# Patient Record
Sex: FEMALE | Race: WHITE | NOT HISPANIC OR LATINO | Employment: OTHER | URBAN - METROPOLITAN AREA
[De-identification: names, ages, dates, MRNs, and addresses within clinical notes are randomized per-mention and may not be internally consistent; named-entity substitution may affect disease eponyms.]

---

## 2017-01-09 ENCOUNTER — COMMUNICATION - HEALTHEAST (OUTPATIENT)
Dept: INTERNAL MEDICINE | Facility: CLINIC | Age: 77
End: 2017-01-09

## 2017-01-09 DIAGNOSIS — E78.5 HYPERLIPIDEMIA: ICD-10-CM

## 2017-01-09 DIAGNOSIS — I10 UNSPECIFIED ESSENTIAL HYPERTENSION: ICD-10-CM

## 2017-01-09 DIAGNOSIS — K21.9 GERD (GASTROESOPHAGEAL REFLUX DISEASE): ICD-10-CM

## 2017-03-06 ENCOUNTER — COMMUNICATION - HEALTHEAST (OUTPATIENT)
Dept: INTERNAL MEDICINE | Facility: CLINIC | Age: 77
End: 2017-03-06

## 2017-03-06 DIAGNOSIS — K21.9 GERD (GASTROESOPHAGEAL REFLUX DISEASE): ICD-10-CM

## 2017-03-06 DIAGNOSIS — E78.5 HYPERLIPIDEMIA: ICD-10-CM

## 2017-05-30 ENCOUNTER — COMMUNICATION - HEALTHEAST (OUTPATIENT)
Dept: INTERNAL MEDICINE | Facility: CLINIC | Age: 77
End: 2017-05-30

## 2017-05-30 ENCOUNTER — RECORDS - HEALTHEAST (OUTPATIENT)
Dept: GENERAL RADIOLOGY | Age: 77
End: 2017-05-30

## 2017-05-30 ENCOUNTER — OFFICE VISIT - HEALTHEAST (OUTPATIENT)
Dept: INTERNAL MEDICINE | Facility: CLINIC | Age: 77
End: 2017-05-30

## 2017-05-30 DIAGNOSIS — E78.5 HYPERLIPIDEMIA: ICD-10-CM

## 2017-05-30 DIAGNOSIS — M25.551 RIGHT HIP PAIN: ICD-10-CM

## 2017-05-30 DIAGNOSIS — M25.551 PAIN IN RIGHT HIP: ICD-10-CM

## 2017-05-30 DIAGNOSIS — I10 ESSENTIAL HYPERTENSION: ICD-10-CM

## 2017-05-30 DIAGNOSIS — Z00.00 ANNUAL PHYSICAL EXAM: ICD-10-CM

## 2017-05-30 LAB
CHOLEST SERPL-MCNC: 221 MG/DL
FASTING STATUS PATIENT QL REPORTED: YES
HDLC SERPL-MCNC: 53 MG/DL
LDLC SERPL CALC-MCNC: 109 MG/DL
TRIGL SERPL-MCNC: 297 MG/DL

## 2017-05-30 ASSESSMENT — MIFFLIN-ST. JEOR: SCORE: 1316.84

## 2017-05-31 ENCOUNTER — COMMUNICATION - HEALTHEAST (OUTPATIENT)
Dept: INTERNAL MEDICINE | Facility: CLINIC | Age: 77
End: 2017-05-31

## 2017-06-13 ENCOUNTER — COMMUNICATION - HEALTHEAST (OUTPATIENT)
Dept: PEDIATRICS | Facility: CLINIC | Age: 77
End: 2017-06-13

## 2017-06-13 ENCOUNTER — AMBULATORY - HEALTHEAST (OUTPATIENT)
Dept: NURSING | Facility: CLINIC | Age: 77
End: 2017-06-13

## 2017-06-13 ENCOUNTER — RECORDS - HEALTHEAST (OUTPATIENT)
Dept: ADMINISTRATIVE | Facility: OTHER | Age: 77
End: 2017-06-13

## 2017-06-13 DIAGNOSIS — I10 ESSENTIAL HYPERTENSION: ICD-10-CM

## 2017-07-18 ENCOUNTER — COMMUNICATION - HEALTHEAST (OUTPATIENT)
Dept: INTERNAL MEDICINE | Facility: CLINIC | Age: 77
End: 2017-07-18

## 2017-07-18 DIAGNOSIS — K21.9 GERD (GASTROESOPHAGEAL REFLUX DISEASE): ICD-10-CM

## 2017-07-18 DIAGNOSIS — E78.5 HYPERLIPIDEMIA: ICD-10-CM

## 2017-08-09 ENCOUNTER — COMMUNICATION - HEALTHEAST (OUTPATIENT)
Dept: INTERNAL MEDICINE | Facility: CLINIC | Age: 77
End: 2017-08-09

## 2017-09-07 ENCOUNTER — COMMUNICATION - HEALTHEAST (OUTPATIENT)
Dept: SCHEDULING | Facility: CLINIC | Age: 77
End: 2017-09-07

## 2017-09-07 ENCOUNTER — OFFICE VISIT - HEALTHEAST (OUTPATIENT)
Dept: INTERNAL MEDICINE | Facility: CLINIC | Age: 77
End: 2017-09-07

## 2017-09-07 DIAGNOSIS — I10 HTN (HYPERTENSION): ICD-10-CM

## 2017-09-07 DIAGNOSIS — I49.1 PREMATURE ATRIAL COMPLEXES: ICD-10-CM

## 2017-09-15 ENCOUNTER — HOSPITAL ENCOUNTER (OUTPATIENT)
Dept: CARDIOLOGY | Facility: HOSPITAL | Age: 77
Discharge: HOME | End: 2017-09-15

## 2017-09-15 DIAGNOSIS — I49.1 PREMATURE ATRIAL COMPLEXES: ICD-10-CM

## 2017-09-15 LAB
AORTIC ROOT: 3.7 CM
AORTIC VALVE MEAN VELOCITY: 120 CM/S
AV DIMENSIONLESS INDEX VTI: 0.7
AV MEAN GRADIENT: 6 MMHG
AV PEAK GRADIENT: 9.5 MMHG
AV VALVE AREA: 2.6 CM2
AV VELOCITY RATIO: 0.8
BSA FOR ECHO PROCEDURE: 1.99 M2
CV BLOOD PRESSURE: NORMAL MMHG
CV ECHO HEIGHT: 62 IN
CV ECHO WEIGHT: 200 LBS
DOP CALC AO PEAK VEL: 154 CM/S
DOP CALC AO VTI: 33.6 CM
DOP CALC LVOT AREA: 3.8 CM2
DOP CALC LVOT DIAMETER: 2.2 CM
DOP CALC LVOT PEAK VEL: 119 CM/S
DOP CALC LVOT STROKE VOLUME: 88.9 CM3
DOP CALCLVOT PEAK VEL VTI: 23.4 CM
EJECTION FRACTION: 65 % (ref 55–75)
FRACTIONAL SHORTENING: 28.7 % (ref 28–44)
INTERVENTRICULAR SEPTUM IN END DIASTOLE: 1.11 CM (ref 0.6–0.9)
IVS/PW RATIO: 1
LA AREA 1: 18.1 CM2
LA AREA 2: 19.2 CM2
LEFT ATRIUM LENGTH: 4.9 CM
LEFT ATRIUM SIZE: 4.2 CM
LEFT ATRIUM VOLUME INDEX: 30.3 ML/M2
LEFT ATRIUM VOLUME: 60.3 CM3
LEFT VENTRICLE CARDIAC INDEX: 3.1 L/MIN/M2
LEFT VENTRICLE CARDIAC OUTPUT: 6.2 L/MIN
LEFT VENTRICLE DIASTOLIC VOLUME INDEX: 35.3 CM3/M2 (ref 34–74)
LEFT VENTRICLE DIASTOLIC VOLUME: 70.2 CM3 (ref 46–106)
LEFT VENTRICLE HEART RATE: 70 BPM
LEFT VENTRICLE MASS INDEX: 87.9 G/M2
LEFT VENTRICLE SYSTOLIC VOLUME INDEX: 12.3 CM3/M2 (ref 11–31)
LEFT VENTRICLE SYSTOLIC VOLUME: 24.5 CM3 (ref 14–42)
LEFT VENTRICULAR INTERNAL DIMENSION IN DIASTOLE: 4.39 CM (ref 3.8–5.2)
LEFT VENTRICULAR INTERNAL DIMENSION IN SYSTOLE: 3.13 CM (ref 2.2–3.5)
LEFT VENTRICULAR MASS: 174.9 G
LEFT VENTRICULAR OUTFLOW TRACT MEAN GRADIENT: 3 MMHG
LEFT VENTRICULAR OUTFLOW TRACT MEAN VELOCITY: 88.5 CM/S
LEFT VENTRICULAR OUTFLOW TRACT PEAK GRADIENT: 6 MMHG
LEFT VENTRICULAR POSTERIOR WALL IN END DIASTOLE: 1.15 CM (ref 0.6–0.9)
LV STROKE VOLUME INDEX: 44.7 ML/M2
MITRAL VALVE E/A RATIO: 0.8
MV AVERAGE E/E' RATIO: 11.5 CM/S
MV DECELERATION TIME: 187 MS
MV E'TISSUE VEL-LAT: 8.51 CM/S
MV E'TISSUE VEL-MED: 6.67 CM/S
MV LATERAL E/E' RATIO: 10.3
MV MEDIAL E/E' RATIO: 13.1
MV PEAK A VELOCITY: 105 CM/S
MV PEAK E VELOCITY: 87.3 CM/S
NUC REST DIASTOLIC VOLUME INDEX: 3200 LBS
NUC REST SYSTOLIC VOLUME INDEX: 62 IN
TRICUSPID REGURGITATION PEAK PRESSURE GRADIENT: 31.8 MMHG
TRICUSPID VALVE ANULAR PLANE SYSTOLIC EXCURSION: 2.3 CM
TRICUSPID VALVE PEAK REGURGITANT VELOCITY: 282 CM/S

## 2017-09-15 ASSESSMENT — MIFFLIN-ST. JEOR: SCORE: 1330.44

## 2017-09-27 LAB
ATRIAL RATE - MUSE: 83 BPM
DIASTOLIC BLOOD PRESSURE - MUSE: NORMAL MMHG
INTERPRETATION ECG - MUSE: NORMAL
P AXIS - MUSE: 70 DEGREES
PR INTERVAL - MUSE: 128 MS
QRS DURATION - MUSE: 80 MS
QT - MUSE: 374 MS
QTC - MUSE: 439 MS
R AXIS - MUSE: 47 DEGREES
SYSTOLIC BLOOD PRESSURE - MUSE: NORMAL MMHG
T AXIS - MUSE: 37 DEGREES
VENTRICULAR RATE- MUSE: 83 BPM

## 2017-10-03 ENCOUNTER — OFFICE VISIT - HEALTHEAST (OUTPATIENT)
Dept: INTERNAL MEDICINE | Facility: CLINIC | Age: 77
End: 2017-10-03

## 2017-10-03 DIAGNOSIS — I10 HTN (HYPERTENSION): ICD-10-CM

## 2017-10-03 DIAGNOSIS — I10 ESSENTIAL HYPERTENSION: ICD-10-CM

## 2017-10-03 DIAGNOSIS — R06.83 SNORING: ICD-10-CM

## 2017-10-03 ASSESSMENT — MIFFLIN-ST. JEOR: SCORE: 1330.44

## 2017-10-05 ENCOUNTER — COMMUNICATION - HEALTHEAST (OUTPATIENT)
Dept: INTERNAL MEDICINE | Facility: CLINIC | Age: 77
End: 2017-10-05

## 2018-01-10 ENCOUNTER — COMMUNICATION - HEALTHEAST (OUTPATIENT)
Dept: INTERNAL MEDICINE | Facility: CLINIC | Age: 78
End: 2018-01-10

## 2018-01-10 DIAGNOSIS — I10 ESSENTIAL HYPERTENSION: ICD-10-CM

## 2018-01-16 ENCOUNTER — COMMUNICATION - HEALTHEAST (OUTPATIENT)
Dept: INTERNAL MEDICINE | Facility: CLINIC | Age: 78
End: 2018-01-16

## 2018-04-18 ENCOUNTER — COMMUNICATION - HEALTHEAST (OUTPATIENT)
Dept: INTERNAL MEDICINE | Facility: CLINIC | Age: 78
End: 2018-04-18

## 2018-04-18 ENCOUNTER — OFFICE VISIT - HEALTHEAST (OUTPATIENT)
Dept: INTERNAL MEDICINE | Facility: CLINIC | Age: 78
End: 2018-04-18

## 2018-04-18 DIAGNOSIS — J40 BRONCHITIS: ICD-10-CM

## 2018-04-18 DIAGNOSIS — R05.9 COUGH: ICD-10-CM

## 2018-04-18 DIAGNOSIS — G47.33 OSA (OBSTRUCTIVE SLEEP APNEA): ICD-10-CM

## 2018-04-18 DIAGNOSIS — R06.00 DYSPNEA: ICD-10-CM

## 2018-04-18 LAB — D DIMER PPP FEU-MCNC: 0.55 FEU UG/ML

## 2018-04-19 ENCOUNTER — COMMUNICATION - HEALTHEAST (OUTPATIENT)
Dept: INTERNAL MEDICINE | Facility: CLINIC | Age: 78
End: 2018-04-19

## 2018-04-23 ENCOUNTER — OFFICE VISIT - HEALTHEAST (OUTPATIENT)
Dept: INTERNAL MEDICINE | Facility: CLINIC | Age: 78
End: 2018-04-23

## 2018-04-23 DIAGNOSIS — I10 ESSENTIAL HYPERTENSION: ICD-10-CM

## 2018-04-23 DIAGNOSIS — J44.1 COPD EXACERBATION (H): ICD-10-CM

## 2018-04-23 DIAGNOSIS — R91.1 NODULE OF RIGHT LUNG: ICD-10-CM

## 2018-04-23 DIAGNOSIS — J43.9 EMPHYSEMA LUNG (H): ICD-10-CM

## 2018-04-23 LAB
ALBUMIN SERPL-MCNC: 3.6 G/DL (ref 3.5–5)
ALP SERPL-CCNC: 128 U/L (ref 45–120)
ALT SERPL W P-5'-P-CCNC: 39 U/L (ref 0–45)
ANION GAP SERPL CALCULATED.3IONS-SCNC: 14 MMOL/L (ref 5–18)
AST SERPL W P-5'-P-CCNC: 17 U/L (ref 0–40)
BASOPHILS # BLD AUTO: 0 THOU/UL (ref 0–0.2)
BASOPHILS NFR BLD AUTO: 0 % (ref 0–2)
BILIRUB SERPL-MCNC: 0.4 MG/DL (ref 0–1)
BUN SERPL-MCNC: 20 MG/DL (ref 8–28)
CALCIUM SERPL-MCNC: 9.2 MG/DL (ref 8.5–10.5)
CHLORIDE BLD-SCNC: 100 MMOL/L (ref 98–107)
CO2 SERPL-SCNC: 30 MMOL/L (ref 22–31)
CREAT SERPL-MCNC: 0.96 MG/DL (ref 0.6–1.1)
EOSINOPHIL # BLD AUTO: 0.1 THOU/UL (ref 0–0.4)
EOSINOPHIL NFR BLD AUTO: 1 % (ref 0–6)
ERYTHROCYTE [DISTWIDTH] IN BLOOD BY AUTOMATED COUNT: 13 % (ref 11–14.5)
GFR SERPL CREATININE-BSD FRML MDRD: 56 ML/MIN/1.73M2
GLUCOSE BLD-MCNC: 110 MG/DL (ref 70–125)
HCT VFR BLD AUTO: 45.4 % (ref 35–47)
HGB BLD-MCNC: 15.1 G/DL (ref 12–16)
LYMPHOCYTES # BLD AUTO: 2.3 THOU/UL (ref 0.8–4.4)
LYMPHOCYTES NFR BLD AUTO: 24 % (ref 20–40)
MCH RBC QN AUTO: 30.4 PG (ref 27–34)
MCHC RBC AUTO-ENTMCNC: 33.3 G/DL (ref 32–36)
MCV RBC AUTO: 91 FL (ref 80–100)
MONOCYTES # BLD AUTO: 0.9 THOU/UL (ref 0–0.9)
MONOCYTES NFR BLD AUTO: 9 % (ref 2–10)
NEUTROPHILS # BLD AUTO: 6.4 THOU/UL (ref 2–7.7)
NEUTROPHILS NFR BLD AUTO: 66 % (ref 50–70)
PLATELET # BLD AUTO: 341 THOU/UL (ref 140–440)
PMV BLD AUTO: 10.6 FL (ref 8.5–12.5)
POTASSIUM BLD-SCNC: 3.4 MMOL/L (ref 3.5–5)
PROT SERPL-MCNC: 7.1 G/DL (ref 6–8)
RBC # BLD AUTO: 4.97 MILL/UL (ref 3.8–5.4)
SODIUM SERPL-SCNC: 144 MMOL/L (ref 136–145)
WBC: 9.8 THOU/UL (ref 4–11)

## 2018-04-24 ENCOUNTER — COMMUNICATION - HEALTHEAST (OUTPATIENT)
Dept: INTERNAL MEDICINE | Facility: CLINIC | Age: 78
End: 2018-04-24

## 2018-04-26 ENCOUNTER — OFFICE VISIT - HEALTHEAST (OUTPATIENT)
Dept: INTERNAL MEDICINE | Facility: CLINIC | Age: 78
End: 2018-04-26

## 2018-04-26 DIAGNOSIS — J43.9 EMPHYSEMA LUNG (H): ICD-10-CM

## 2018-04-26 DIAGNOSIS — J44.1 COPD EXACERBATION (H): ICD-10-CM

## 2018-05-21 ENCOUNTER — HOSPITAL ENCOUNTER (OUTPATIENT)
Dept: RESPIRATORY THERAPY | Facility: HOSPITAL | Age: 78
Discharge: HOME | End: 2018-05-21

## 2018-05-21 DIAGNOSIS — J43.9 EMPHYSEMA LUNG (H): ICD-10-CM

## 2018-05-22 ENCOUNTER — COMMUNICATION - HEALTHEAST (OUTPATIENT)
Dept: INTERNAL MEDICINE | Facility: CLINIC | Age: 78
End: 2018-05-22

## 2018-06-02 ENCOUNTER — COMMUNICATION - HEALTHEAST (OUTPATIENT)
Dept: INTERNAL MEDICINE | Facility: CLINIC | Age: 78
End: 2018-06-02

## 2018-06-02 DIAGNOSIS — I10 ESSENTIAL HYPERTENSION: ICD-10-CM

## 2018-06-02 DIAGNOSIS — E78.5 HYPERLIPIDEMIA: ICD-10-CM

## 2018-06-02 DIAGNOSIS — K21.9 GERD (GASTROESOPHAGEAL REFLUX DISEASE): ICD-10-CM

## 2018-06-04 ENCOUNTER — AMBULATORY - HEALTHEAST (OUTPATIENT)
Dept: INTERNAL MEDICINE | Facility: CLINIC | Age: 78
End: 2018-06-04

## 2018-06-04 ENCOUNTER — COMMUNICATION - HEALTHEAST (OUTPATIENT)
Dept: INTERNAL MEDICINE | Facility: CLINIC | Age: 78
End: 2018-06-04

## 2018-06-04 ENCOUNTER — OFFICE VISIT - HEALTHEAST (OUTPATIENT)
Dept: INTERNAL MEDICINE | Facility: CLINIC | Age: 78
End: 2018-06-04

## 2018-06-04 DIAGNOSIS — E78.5 HYPERLIPIDEMIA: ICD-10-CM

## 2018-06-04 DIAGNOSIS — Z00.00 MEDICARE ANNUAL WELLNESS VISIT, SUBSEQUENT: ICD-10-CM

## 2018-06-04 DIAGNOSIS — Z12.11 SCREENING FOR COLON CANCER: ICD-10-CM

## 2018-06-04 DIAGNOSIS — I10 HTN (HYPERTENSION): ICD-10-CM

## 2018-06-04 DIAGNOSIS — R93.89 ABNORMAL CHEST XRAY: ICD-10-CM

## 2018-06-04 DIAGNOSIS — R73.01 IMPAIRED FASTING BLOOD SUGAR: ICD-10-CM

## 2018-06-04 DIAGNOSIS — K21.9 GERD (GASTROESOPHAGEAL REFLUX DISEASE): ICD-10-CM

## 2018-06-04 DIAGNOSIS — I10 ESSENTIAL HYPERTENSION: ICD-10-CM

## 2018-06-04 DIAGNOSIS — J44.9 COPD (CHRONIC OBSTRUCTIVE PULMONARY DISEASE) (H): ICD-10-CM

## 2018-06-04 DIAGNOSIS — E78.5 HLD (HYPERLIPIDEMIA): ICD-10-CM

## 2018-06-04 DIAGNOSIS — Z87.891 HISTORY OF NICOTINE USE: ICD-10-CM

## 2018-06-04 DIAGNOSIS — G47.33 OSA (OBSTRUCTIVE SLEEP APNEA): ICD-10-CM

## 2018-06-04 DIAGNOSIS — H81.10 BPPV (BENIGN PAROXYSMAL POSITIONAL VERTIGO): ICD-10-CM

## 2018-06-04 LAB
ALBUMIN SERPL-MCNC: 4.1 G/DL (ref 3.5–5)
ALP SERPL-CCNC: 123 U/L (ref 45–120)
ALT SERPL W P-5'-P-CCNC: 32 U/L (ref 0–45)
ANION GAP SERPL CALCULATED.3IONS-SCNC: 11 MMOL/L (ref 5–18)
AST SERPL W P-5'-P-CCNC: 18 U/L (ref 0–40)
BILIRUB SERPL-MCNC: 0.6 MG/DL (ref 0–1)
BUN SERPL-MCNC: 15 MG/DL (ref 8–28)
CALCIUM SERPL-MCNC: 9.8 MG/DL (ref 8.5–10.5)
CHLORIDE BLD-SCNC: 105 MMOL/L (ref 98–107)
CHOLEST SERPL-MCNC: 228 MG/DL
CO2 SERPL-SCNC: 29 MMOL/L (ref 22–31)
CREAT SERPL-MCNC: 0.82 MG/DL (ref 0.6–1.1)
FASTING STATUS PATIENT QL REPORTED: YES
GFR SERPL CREATININE-BSD FRML MDRD: >60 ML/MIN/1.73M2
GLUCOSE BLD-MCNC: 106 MG/DL (ref 70–125)
HBA1C MFR BLD: 6.3 % (ref 3.5–6)
HDLC SERPL-MCNC: 57 MG/DL
LDLC SERPL CALC-MCNC: 99 MG/DL
POTASSIUM BLD-SCNC: 3.8 MMOL/L (ref 3.5–5)
PROT SERPL-MCNC: 7.1 G/DL (ref 6–8)
SODIUM SERPL-SCNC: 145 MMOL/L (ref 136–145)
TRIGL SERPL-MCNC: 360 MG/DL

## 2018-06-04 ASSESSMENT — MIFFLIN-ST. JEOR: SCORE: 1326.82

## 2018-06-05 ENCOUNTER — OFFICE VISIT - HEALTHEAST (OUTPATIENT)
Dept: OCCUPATIONAL THERAPY | Facility: REHABILITATION | Age: 78
End: 2018-06-05

## 2018-06-05 DIAGNOSIS — R42 DIZZINESS: ICD-10-CM

## 2018-06-05 DIAGNOSIS — R26.81 UNSTEADINESS ON FEET: ICD-10-CM

## 2018-06-06 ENCOUNTER — COMMUNICATION - HEALTHEAST (OUTPATIENT)
Dept: INTERNAL MEDICINE | Facility: CLINIC | Age: 78
End: 2018-06-06

## 2018-06-06 DIAGNOSIS — E78.5 HLD (HYPERLIPIDEMIA): ICD-10-CM

## 2018-06-06 DIAGNOSIS — J44.9 COPD (CHRONIC OBSTRUCTIVE PULMONARY DISEASE) (H): ICD-10-CM

## 2018-06-18 ENCOUNTER — OFFICE VISIT - HEALTHEAST (OUTPATIENT)
Dept: OCCUPATIONAL THERAPY | Facility: REHABILITATION | Age: 78
End: 2018-06-18

## 2018-06-18 DIAGNOSIS — R26.81 UNSTEADINESS ON FEET: ICD-10-CM

## 2018-06-18 DIAGNOSIS — R42 DIZZINESS: ICD-10-CM

## 2018-06-29 ENCOUNTER — ANESTHESIA - HEALTHEAST (OUTPATIENT)
Dept: SURGERY | Facility: HOSPITAL | Age: 78
End: 2018-06-29

## 2018-06-29 ENCOUNTER — SURGERY - HEALTHEAST (OUTPATIENT)
Dept: SURGERY | Facility: HOSPITAL | Age: 78
End: 2018-06-29

## 2018-06-29 ASSESSMENT — MIFFLIN-ST. JEOR: SCORE: 1275.56

## 2018-07-03 ENCOUNTER — COMMUNICATION - HEALTHEAST (OUTPATIENT)
Dept: PHARMACY | Facility: CLINIC | Age: 78
End: 2018-07-03

## 2018-07-03 DIAGNOSIS — E78.5 HYPERLIPIDEMIA, UNSPECIFIED HYPERLIPIDEMIA TYPE: ICD-10-CM

## 2018-07-03 DIAGNOSIS — I15.9 SECONDARY HYPERTENSION: ICD-10-CM

## 2018-07-03 DIAGNOSIS — D50.0 IRON DEFICIENCY ANEMIA DUE TO CHRONIC BLOOD LOSS: ICD-10-CM

## 2018-07-03 DIAGNOSIS — K21.9 GASTROESOPHAGEAL REFLUX DISEASE WITHOUT ESOPHAGITIS: ICD-10-CM

## 2018-07-03 DIAGNOSIS — J44.9 CHRONIC OBSTRUCTIVE PULMONARY DISEASE, UNSPECIFIED COPD TYPE (H): ICD-10-CM

## 2018-07-03 DIAGNOSIS — K81.0 ACUTE CHOLECYSTITIS: ICD-10-CM

## 2018-07-03 DIAGNOSIS — R73.03 PREDIABETES: ICD-10-CM

## 2018-07-03 DIAGNOSIS — R42 DIZZINESS AND GIDDINESS: ICD-10-CM

## 2018-07-09 ENCOUNTER — OFFICE VISIT - HEALTHEAST (OUTPATIENT)
Dept: OCCUPATIONAL THERAPY | Facility: REHABILITATION | Age: 78
End: 2018-07-09

## 2018-07-09 DIAGNOSIS — R42 DIZZINESS: ICD-10-CM

## 2018-07-09 DIAGNOSIS — R26.81 UNSTEADINESS ON FEET: ICD-10-CM

## 2018-08-13 ENCOUNTER — RECORDS - HEALTHEAST (OUTPATIENT)
Dept: ADMINISTRATIVE | Facility: OTHER | Age: 78
End: 2018-08-13

## 2018-08-20 ENCOUNTER — RECORDS - HEALTHEAST (OUTPATIENT)
Dept: ADMINISTRATIVE | Facility: OTHER | Age: 78
End: 2018-08-20

## 2018-09-04 ENCOUNTER — COMMUNICATION - HEALTHEAST (OUTPATIENT)
Dept: INTERNAL MEDICINE | Facility: CLINIC | Age: 78
End: 2018-09-04

## 2018-09-04 ENCOUNTER — OFFICE VISIT - HEALTHEAST (OUTPATIENT)
Dept: INTERNAL MEDICINE | Facility: CLINIC | Age: 78
End: 2018-09-04

## 2018-09-04 DIAGNOSIS — D50.0 IRON DEFICIENCY ANEMIA DUE TO CHRONIC BLOOD LOSS: ICD-10-CM

## 2018-09-04 DIAGNOSIS — I10 HTN (HYPERTENSION): ICD-10-CM

## 2018-09-04 DIAGNOSIS — J44.9 CHRONIC OBSTRUCTIVE PULMONARY DISEASE, UNSPECIFIED COPD TYPE (H): ICD-10-CM

## 2018-09-04 DIAGNOSIS — R42 DIZZINESS AND GIDDINESS: ICD-10-CM

## 2018-09-04 DIAGNOSIS — Z01.818 PREOP GENERAL PHYSICAL EXAM: ICD-10-CM

## 2018-09-04 DIAGNOSIS — G47.33 OSA (OBSTRUCTIVE SLEEP APNEA): ICD-10-CM

## 2018-09-04 DIAGNOSIS — E66.01 MORBID OBESITY (H): ICD-10-CM

## 2018-09-04 DIAGNOSIS — E78.5 HYPERLIPIDEMIA, UNSPECIFIED HYPERLIPIDEMIA TYPE: ICD-10-CM

## 2018-09-04 DIAGNOSIS — M16.11 OSTEOARTHRITIS OF RIGHT HIP: ICD-10-CM

## 2018-09-04 DIAGNOSIS — R73.03 PREDIABETES: ICD-10-CM

## 2018-09-04 LAB
ANION GAP SERPL CALCULATED.3IONS-SCNC: 9 MMOL/L (ref 5–18)
ATRIAL RATE - MUSE: 65 BPM
BUN SERPL-MCNC: 17 MG/DL (ref 8–28)
CALCIUM SERPL-MCNC: 9.7 MG/DL (ref 8.5–10.5)
CHLORIDE BLD-SCNC: 105 MMOL/L (ref 98–107)
CO2 SERPL-SCNC: 29 MMOL/L (ref 22–31)
CREAT SERPL-MCNC: 0.83 MG/DL (ref 0.6–1.1)
DIASTOLIC BLOOD PRESSURE - MUSE: NORMAL MMHG
ERYTHROCYTE [DISTWIDTH] IN BLOOD BY AUTOMATED COUNT: 11.6 % (ref 11–14.5)
GFR SERPL CREATININE-BSD FRML MDRD: >60 ML/MIN/1.73M2
GLUCOSE BLD-MCNC: 98 MG/DL (ref 70–125)
HCT VFR BLD AUTO: 41.3 % (ref 35–47)
HGB BLD-MCNC: 13.9 G/DL (ref 12–16)
INTERPRETATION ECG - MUSE: NORMAL
IRON SERPL-MCNC: 57 UG/DL (ref 42–175)
MCH RBC QN AUTO: 30 PG (ref 27–34)
MCHC RBC AUTO-ENTMCNC: 33.8 G/DL (ref 32–36)
MCV RBC AUTO: 89 FL (ref 80–100)
P AXIS - MUSE: 69 DEGREES
PLATELET # BLD AUTO: 247 THOU/UL (ref 140–440)
PMV BLD AUTO: 8.5 FL (ref 7–10)
POTASSIUM BLD-SCNC: 4.6 MMOL/L (ref 3.5–5)
PR INTERVAL - MUSE: 140 MS
QRS DURATION - MUSE: 78 MS
QT - MUSE: 408 MS
QTC - MUSE: 424 MS
R AXIS - MUSE: 42 DEGREES
RBC # BLD AUTO: 4.65 MILL/UL (ref 3.8–5.4)
SODIUM SERPL-SCNC: 143 MMOL/L (ref 136–145)
SYSTOLIC BLOOD PRESSURE - MUSE: NORMAL MMHG
T AXIS - MUSE: 39 DEGREES
VENTRICULAR RATE- MUSE: 65 BPM
WBC: 5.3 THOU/UL (ref 4–11)

## 2018-09-04 ASSESSMENT — MIFFLIN-ST. JEOR: SCORE: 1303.23

## 2018-09-05 LAB
CHOLEST SERPL-MCNC: 206 MG/DL
FERRITIN SERPL-MCNC: 36 NG/ML (ref 10–130)
HDLC SERPL-MCNC: 71 MG/DL
LDLC SERPL CALC-MCNC: 86 MG/DL
TRIGL SERPL-MCNC: 244 MG/DL

## 2018-09-06 ENCOUNTER — COMMUNICATION - HEALTHEAST (OUTPATIENT)
Dept: INTERNAL MEDICINE | Facility: CLINIC | Age: 78
End: 2018-09-06

## 2018-09-17 ENCOUNTER — RECORDS - HEALTHEAST (OUTPATIENT)
Dept: ADMINISTRATIVE | Facility: OTHER | Age: 78
End: 2018-09-17

## 2018-09-17 ENCOUNTER — COMMUNICATION - HEALTHEAST (OUTPATIENT)
Dept: INTERNAL MEDICINE | Facility: CLINIC | Age: 78
End: 2018-09-17

## 2018-10-08 ENCOUNTER — OFFICE VISIT - HEALTHEAST (OUTPATIENT)
Dept: INTERNAL MEDICINE | Facility: CLINIC | Age: 78
End: 2018-10-08

## 2018-10-08 DIAGNOSIS — G47.33 OSA (OBSTRUCTIVE SLEEP APNEA): ICD-10-CM

## 2018-10-08 DIAGNOSIS — I10 HTN (HYPERTENSION): ICD-10-CM

## 2018-10-08 DIAGNOSIS — E78.5 HYPERLIPIDEMIA, UNSPECIFIED HYPERLIPIDEMIA TYPE: ICD-10-CM

## 2018-10-08 DIAGNOSIS — K21.9 GASTROESOPHAGEAL REFLUX DISEASE WITHOUT ESOPHAGITIS: ICD-10-CM

## 2018-10-08 DIAGNOSIS — J44.9 CHRONIC OBSTRUCTIVE PULMONARY DISEASE, UNSPECIFIED COPD TYPE (H): ICD-10-CM

## 2018-10-11 ENCOUNTER — RECORDS - HEALTHEAST (OUTPATIENT)
Dept: ADMINISTRATIVE | Facility: OTHER | Age: 78
End: 2018-10-11

## 2018-10-15 ENCOUNTER — RECORDS - HEALTHEAST (OUTPATIENT)
Dept: ADMINISTRATIVE | Facility: OTHER | Age: 78
End: 2018-10-15

## 2018-10-18 ENCOUNTER — RECORDS - HEALTHEAST (OUTPATIENT)
Dept: ADMINISTRATIVE | Facility: OTHER | Age: 78
End: 2018-10-18

## 2018-11-05 ENCOUNTER — RECORDS - HEALTHEAST (OUTPATIENT)
Dept: ADMINISTRATIVE | Facility: OTHER | Age: 78
End: 2018-11-05

## 2018-11-08 ENCOUNTER — RECORDS - HEALTHEAST (OUTPATIENT)
Dept: ADMINISTRATIVE | Facility: OTHER | Age: 78
End: 2018-11-08

## 2019-02-22 ENCOUNTER — COMMUNICATION - HEALTHEAST (OUTPATIENT)
Dept: INTERNAL MEDICINE | Facility: CLINIC | Age: 79
End: 2019-02-22

## 2019-02-22 DIAGNOSIS — K21.9 GASTROESOPHAGEAL REFLUX DISEASE WITHOUT ESOPHAGITIS: ICD-10-CM

## 2019-03-25 ENCOUNTER — COMMUNICATION - HEALTHEAST (OUTPATIENT)
Dept: INTERNAL MEDICINE | Facility: CLINIC | Age: 79
End: 2019-03-25

## 2019-03-25 DIAGNOSIS — E78.2 MIXED HYPERLIPIDEMIA: ICD-10-CM

## 2019-06-05 ENCOUNTER — COMMUNICATION - HEALTHEAST (OUTPATIENT)
Dept: LAB | Facility: CLINIC | Age: 79
End: 2019-06-05

## 2019-06-05 DIAGNOSIS — E78.5 HYPERLIPIDEMIA, UNSPECIFIED HYPERLIPIDEMIA TYPE: ICD-10-CM

## 2019-06-05 DIAGNOSIS — R73.03 PREDIABETES: ICD-10-CM

## 2019-06-05 DIAGNOSIS — D50.0 IRON DEFICIENCY ANEMIA DUE TO CHRONIC BLOOD LOSS: ICD-10-CM

## 2019-06-05 DIAGNOSIS — I15.9 SECONDARY HYPERTENSION: ICD-10-CM

## 2019-06-07 ENCOUNTER — AMBULATORY - HEALTHEAST (OUTPATIENT)
Dept: LAB | Facility: CLINIC | Age: 79
End: 2019-06-07

## 2019-06-07 DIAGNOSIS — I15.9 SECONDARY HYPERTENSION: ICD-10-CM

## 2019-06-07 DIAGNOSIS — E78.5 HYPERLIPIDEMIA, UNSPECIFIED HYPERLIPIDEMIA TYPE: ICD-10-CM

## 2019-06-07 DIAGNOSIS — D50.0 IRON DEFICIENCY ANEMIA DUE TO CHRONIC BLOOD LOSS: ICD-10-CM

## 2019-06-07 DIAGNOSIS — R73.03 PREDIABETES: ICD-10-CM

## 2019-06-07 LAB
ALBUMIN SERPL-MCNC: 4.3 G/DL (ref 3.5–5)
ALP SERPL-CCNC: 134 U/L (ref 45–120)
ALT SERPL W P-5'-P-CCNC: 44 U/L (ref 0–45)
ANION GAP SERPL CALCULATED.3IONS-SCNC: 11 MMOL/L (ref 5–18)
AST SERPL W P-5'-P-CCNC: 26 U/L (ref 0–40)
BILIRUB SERPL-MCNC: 0.3 MG/DL (ref 0–1)
BUN SERPL-MCNC: 14 MG/DL (ref 8–28)
CALCIUM SERPL-MCNC: 9.5 MG/DL (ref 8.5–10.5)
CHLORIDE BLD-SCNC: 109 MMOL/L (ref 98–107)
CHOLEST SERPL-MCNC: 181 MG/DL
CO2 SERPL-SCNC: 23 MMOL/L (ref 22–31)
CREAT SERPL-MCNC: 0.84 MG/DL (ref 0.6–1.1)
ERYTHROCYTE [DISTWIDTH] IN BLOOD BY AUTOMATED COUNT: 12.4 % (ref 11–14.5)
FASTING STATUS PATIENT QL REPORTED: YES
GFR SERPL CREATININE-BSD FRML MDRD: >60 ML/MIN/1.73M2
GLUCOSE BLD-MCNC: 112 MG/DL (ref 70–125)
HBA1C MFR BLD: 5.9 % (ref 3.5–6)
HCT VFR BLD AUTO: 44.3 % (ref 35–47)
HDLC SERPL-MCNC: 69 MG/DL
HGB BLD-MCNC: 14.5 G/DL (ref 12–16)
LDLC SERPL CALC-MCNC: 73 MG/DL
MCH RBC QN AUTO: 29.1 PG (ref 27–34)
MCHC RBC AUTO-ENTMCNC: 32.8 G/DL (ref 32–36)
MCV RBC AUTO: 89 FL (ref 80–100)
PLATELET # BLD AUTO: 272 THOU/UL (ref 140–440)
PMV BLD AUTO: 8.2 FL (ref 7–10)
POTASSIUM BLD-SCNC: 4.5 MMOL/L (ref 3.5–5)
PROT SERPL-MCNC: 6.8 G/DL (ref 6–8)
RBC # BLD AUTO: 4.98 MILL/UL (ref 3.8–5.4)
SODIUM SERPL-SCNC: 143 MMOL/L (ref 136–145)
TRIGL SERPL-MCNC: 196 MG/DL
WBC: 4.6 THOU/UL (ref 4–11)

## 2019-06-26 ENCOUNTER — OFFICE VISIT - HEALTHEAST (OUTPATIENT)
Dept: INTERNAL MEDICINE | Facility: CLINIC | Age: 79
End: 2019-06-26

## 2019-06-26 DIAGNOSIS — M54.50 CHRONIC RIGHT-SIDED LOW BACK PAIN WITHOUT SCIATICA: ICD-10-CM

## 2019-06-26 DIAGNOSIS — G89.29 CHRONIC RIGHT-SIDED LOW BACK PAIN WITHOUT SCIATICA: ICD-10-CM

## 2019-06-26 DIAGNOSIS — Z00.00 ENCOUNTER FOR MEDICARE ANNUAL WELLNESS EXAM: ICD-10-CM

## 2019-06-26 DIAGNOSIS — R42 VERTIGO: ICD-10-CM

## 2019-06-26 DIAGNOSIS — R91.1 NODULE OF RIGHT LUNG: ICD-10-CM

## 2019-06-26 DIAGNOSIS — I10 ESSENTIAL HYPERTENSION: ICD-10-CM

## 2019-06-26 DIAGNOSIS — E78.5 HYPERLIPIDEMIA, UNSPECIFIED HYPERLIPIDEMIA TYPE: ICD-10-CM

## 2019-06-26 DIAGNOSIS — Z78.0 POST-MENOPAUSAL: ICD-10-CM

## 2019-06-26 DIAGNOSIS — E66.01 MORBID OBESITY (H): ICD-10-CM

## 2019-06-26 DIAGNOSIS — R73.03 PREDIABETES: ICD-10-CM

## 2019-06-26 DIAGNOSIS — E78.2 MIXED HYPERLIPIDEMIA: ICD-10-CM

## 2019-06-26 DIAGNOSIS — G47.33 OSA (OBSTRUCTIVE SLEEP APNEA): ICD-10-CM

## 2019-06-26 DIAGNOSIS — J44.9 CHRONIC OBSTRUCTIVE PULMONARY DISEASE, UNSPECIFIED COPD TYPE (H): ICD-10-CM

## 2019-06-26 DIAGNOSIS — Z12.31 VISIT FOR SCREENING MAMMOGRAM: ICD-10-CM

## 2019-06-26 DIAGNOSIS — M25.511 ACUTE PAIN OF RIGHT SHOULDER: ICD-10-CM

## 2019-06-26 DIAGNOSIS — Z72.0 TOBACCO USE: ICD-10-CM

## 2019-06-26 DIAGNOSIS — K21.9 GASTROESOPHAGEAL REFLUX DISEASE WITHOUT ESOPHAGITIS: ICD-10-CM

## 2019-06-26 DIAGNOSIS — Z87.891 HISTORY OF NICOTINE USE: ICD-10-CM

## 2019-06-26 ASSESSMENT — MIFFLIN-ST. JEOR: SCORE: 1348.59

## 2019-07-01 ENCOUNTER — HOSPITAL ENCOUNTER (OUTPATIENT)
Dept: CT IMAGING | Facility: CLINIC | Age: 79
Discharge: HOME | End: 2019-07-01

## 2019-07-01 ENCOUNTER — RECORDS - HEALTHEAST (OUTPATIENT)
Dept: ADMINISTRATIVE | Facility: OTHER | Age: 79
End: 2019-07-01

## 2019-07-01 DIAGNOSIS — R91.1 NODULE OF RIGHT LUNG: ICD-10-CM

## 2019-07-02 ENCOUNTER — COMMUNICATION - HEALTHEAST (OUTPATIENT)
Dept: INTERNAL MEDICINE | Facility: CLINIC | Age: 79
End: 2019-07-02

## 2019-07-11 ENCOUNTER — RECORDS - HEALTHEAST (OUTPATIENT)
Dept: ADMINISTRATIVE | Facility: OTHER | Age: 79
End: 2019-07-11

## 2019-07-24 ENCOUNTER — OFFICE VISIT - HEALTHEAST (OUTPATIENT)
Dept: INTERNAL MEDICINE | Facility: CLINIC | Age: 79
End: 2019-07-24

## 2019-07-24 DIAGNOSIS — M10.9 ACUTE GOUTY ARTHRITIS: ICD-10-CM

## 2019-07-24 DIAGNOSIS — M75.41 IMPINGEMENT SYNDROME OF SHOULDER REGION, RIGHT: ICD-10-CM

## 2019-08-02 ENCOUNTER — HOSPITAL ENCOUNTER (OUTPATIENT)
Dept: MAMMOGRAPHY | Facility: CLINIC | Age: 79
Discharge: HOME | End: 2019-08-02

## 2019-08-02 DIAGNOSIS — Z12.31 VISIT FOR SCREENING MAMMOGRAM: ICD-10-CM

## 2019-08-28 ENCOUNTER — COMMUNICATION - HEALTHEAST (OUTPATIENT)
Dept: INTERNAL MEDICINE | Facility: CLINIC | Age: 79
End: 2019-08-28

## 2019-08-28 DIAGNOSIS — R42 VERTIGO: ICD-10-CM

## 2019-12-03 ENCOUNTER — OFFICE VISIT - HEALTHEAST (OUTPATIENT)
Dept: INTERNAL MEDICINE | Facility: CLINIC | Age: 79
End: 2019-12-03

## 2019-12-03 DIAGNOSIS — J44.1 COPD EXACERBATION (H): ICD-10-CM

## 2019-12-03 DIAGNOSIS — I10 ESSENTIAL HYPERTENSION: ICD-10-CM

## 2019-12-03 DIAGNOSIS — E78.5 HYPERLIPIDEMIA, UNSPECIFIED HYPERLIPIDEMIA TYPE: ICD-10-CM

## 2019-12-03 DIAGNOSIS — J44.9 CHRONIC OBSTRUCTIVE PULMONARY DISEASE, UNSPECIFIED COPD TYPE (H): ICD-10-CM

## 2019-12-03 DIAGNOSIS — R73.03 PREDIABETES: ICD-10-CM

## 2019-12-17 ENCOUNTER — COMMUNICATION - HEALTHEAST (OUTPATIENT)
Dept: INTERNAL MEDICINE | Facility: CLINIC | Age: 79
End: 2019-12-17

## 2019-12-17 DIAGNOSIS — I10 ESSENTIAL HYPERTENSION: ICD-10-CM

## 2020-01-15 ENCOUNTER — COMMUNICATION - HEALTHEAST (OUTPATIENT)
Dept: INTERNAL MEDICINE | Facility: CLINIC | Age: 80
End: 2020-01-15

## 2020-01-15 DIAGNOSIS — E78.2 MIXED HYPERLIPIDEMIA: ICD-10-CM

## 2020-01-15 DIAGNOSIS — K21.9 GASTROESOPHAGEAL REFLUX DISEASE WITHOUT ESOPHAGITIS: ICD-10-CM

## 2020-01-15 DIAGNOSIS — I10 ESSENTIAL HYPERTENSION: ICD-10-CM

## 2020-05-22 ENCOUNTER — COMMUNICATION - HEALTHEAST (OUTPATIENT)
Dept: INTERNAL MEDICINE | Facility: CLINIC | Age: 80
End: 2020-05-22

## 2020-06-08 ENCOUNTER — AMBULATORY - HEALTHEAST (OUTPATIENT)
Dept: LAB | Facility: CLINIC | Age: 80
End: 2020-06-08

## 2020-06-08 ENCOUNTER — OFFICE VISIT - HEALTHEAST (OUTPATIENT)
Dept: INTERNAL MEDICINE | Facility: CLINIC | Age: 80
End: 2020-06-08

## 2020-06-08 ENCOUNTER — COMMUNICATION - HEALTHEAST (OUTPATIENT)
Dept: INTERNAL MEDICINE | Facility: CLINIC | Age: 80
End: 2020-06-08

## 2020-06-08 DIAGNOSIS — J44.9 CHRONIC OBSTRUCTIVE PULMONARY DISEASE, UNSPECIFIED COPD TYPE (H): ICD-10-CM

## 2020-06-08 DIAGNOSIS — M54.2 CERVICAL PAIN (NECK): ICD-10-CM

## 2020-06-08 DIAGNOSIS — M75.41 IMPINGEMENT SYNDROME OF SHOULDER REGION, RIGHT: ICD-10-CM

## 2020-06-08 DIAGNOSIS — E66.01 MORBID OBESITY (H): ICD-10-CM

## 2020-06-08 DIAGNOSIS — R73.03 PREDIABETES: ICD-10-CM

## 2020-06-08 DIAGNOSIS — I10 ESSENTIAL HYPERTENSION: ICD-10-CM

## 2020-06-08 DIAGNOSIS — E78.2 MIXED HYPERLIPIDEMIA: ICD-10-CM

## 2020-06-08 DIAGNOSIS — E78.5 HYPERLIPIDEMIA, UNSPECIFIED HYPERLIPIDEMIA TYPE: ICD-10-CM

## 2020-06-08 DIAGNOSIS — Z72.0 TOBACCO USE: ICD-10-CM

## 2020-06-08 LAB
ALBUMIN SERPL-MCNC: 4.1 G/DL (ref 3.5–5)
ALP SERPL-CCNC: 117 U/L (ref 45–120)
ALT SERPL W P-5'-P-CCNC: 59 U/L (ref 0–45)
ANION GAP SERPL CALCULATED.3IONS-SCNC: 10 MMOL/L (ref 5–18)
AST SERPL W P-5'-P-CCNC: 41 U/L (ref 0–40)
BILIRUB SERPL-MCNC: 0.3 MG/DL (ref 0–1)
BUN SERPL-MCNC: 18 MG/DL (ref 8–28)
CALCIUM SERPL-MCNC: 9.6 MG/DL (ref 8.5–10.5)
CHLORIDE BLD-SCNC: 105 MMOL/L (ref 98–107)
CHOLEST SERPL-MCNC: 219 MG/DL
CO2 SERPL-SCNC: 26 MMOL/L (ref 22–31)
CREAT SERPL-MCNC: 0.89 MG/DL (ref 0.6–1.1)
ERYTHROCYTE [DISTWIDTH] IN BLOOD BY AUTOMATED COUNT: 13.8 % (ref 11–14.5)
FASTING STATUS PATIENT QL REPORTED: YES
GFR SERPL CREATININE-BSD FRML MDRD: >60 ML/MIN/1.73M2
GLUCOSE BLD-MCNC: 98 MG/DL (ref 70–125)
HBA1C MFR BLD: 6.3 % (ref 3.5–6)
HCT VFR BLD AUTO: 40.3 % (ref 35–47)
HDLC SERPL-MCNC: 83 MG/DL
HGB BLD-MCNC: 13.4 G/DL (ref 12–16)
LDLC SERPL CALC-MCNC: 77 MG/DL
MCH RBC QN AUTO: 27.7 PG (ref 27–34)
MCHC RBC AUTO-ENTMCNC: 33.2 G/DL (ref 32–36)
MCV RBC AUTO: 84 FL (ref 80–100)
PLATELET # BLD AUTO: 264 THOU/UL (ref 140–440)
PMV BLD AUTO: 8.2 FL (ref 7–10)
POTASSIUM BLD-SCNC: 4.4 MMOL/L (ref 3.5–5)
PROT SERPL-MCNC: 7.3 G/DL (ref 6–8)
RBC # BLD AUTO: 4.82 MILL/UL (ref 3.8–5.4)
SODIUM SERPL-SCNC: 141 MMOL/L (ref 136–145)
TRIGL SERPL-MCNC: 294 MG/DL
WBC: 5.9 THOU/UL (ref 4–11)

## 2020-06-09 ENCOUNTER — COMMUNICATION - HEALTHEAST (OUTPATIENT)
Dept: INTERNAL MEDICINE | Facility: CLINIC | Age: 80
End: 2020-06-09

## 2020-06-12 ENCOUNTER — AMBULATORY - HEALTHEAST (OUTPATIENT)
Dept: INTERNAL MEDICINE | Facility: CLINIC | Age: 80
End: 2020-06-12

## 2020-06-12 ENCOUNTER — COMMUNICATION - HEALTHEAST (OUTPATIENT)
Dept: INTERNAL MEDICINE | Facility: CLINIC | Age: 80
End: 2020-06-12

## 2020-06-12 DIAGNOSIS — I10 ESSENTIAL HYPERTENSION: ICD-10-CM

## 2020-06-12 DIAGNOSIS — J44.9 CHRONIC OBSTRUCTIVE PULMONARY DISEASE, UNSPECIFIED COPD TYPE (H): ICD-10-CM

## 2020-06-12 DIAGNOSIS — K21.9 GASTROESOPHAGEAL REFLUX DISEASE WITHOUT ESOPHAGITIS: ICD-10-CM

## 2020-06-12 DIAGNOSIS — M54.2 CERVICAL PAIN (NECK): ICD-10-CM

## 2020-06-12 DIAGNOSIS — E78.2 MIXED HYPERLIPIDEMIA: ICD-10-CM

## 2020-06-12 RX ORDER — LOSARTAN POTASSIUM 25 MG/1
25 TABLET ORAL DAILY
Qty: 90 TABLET | Refills: 3 | Status: SHIPPED | OUTPATIENT
Start: 2020-06-12 | End: 2021-08-18

## 2020-06-12 RX ORDER — ROSUVASTATIN CALCIUM 40 MG/1
40 TABLET, COATED ORAL AT BEDTIME
Qty: 90 TABLET | Refills: 3 | Status: SHIPPED | OUTPATIENT
Start: 2020-06-12 | End: 2021-08-18

## 2020-06-12 RX ORDER — ALBUTEROL SULFATE 90 UG/1
2 AEROSOL, METERED RESPIRATORY (INHALATION) EVERY 4 HOURS PRN
Qty: 1 INHALER | Refills: 0 | Status: SHIPPED | OUTPATIENT
Start: 2020-06-12 | End: 2021-08-06

## 2020-06-15 ENCOUNTER — AMBULATORY - HEALTHEAST (OUTPATIENT)
Dept: INTERNAL MEDICINE | Facility: CLINIC | Age: 80
End: 2020-06-15

## 2020-06-15 DIAGNOSIS — R79.89 ELEVATED LFTS: ICD-10-CM

## 2020-06-15 DIAGNOSIS — R73.03 PREDIABETES: ICD-10-CM

## 2020-06-19 ENCOUNTER — OFFICE VISIT - HEALTHEAST (OUTPATIENT)
Dept: PHYSICAL THERAPY | Facility: REHABILITATION | Age: 80
End: 2020-06-19

## 2020-06-19 DIAGNOSIS — M25.511 ACUTE PAIN OF RIGHT SHOULDER: ICD-10-CM

## 2020-06-19 DIAGNOSIS — M54.2 CHRONIC NECK PAIN: ICD-10-CM

## 2020-06-19 DIAGNOSIS — M25.519 ACUTE SHOULDER PAIN: ICD-10-CM

## 2020-06-19 DIAGNOSIS — M62.81 GENERALIZED MUSCLE WEAKNESS: ICD-10-CM

## 2020-06-19 DIAGNOSIS — M25.611 DECREASED RIGHT SHOULDER RANGE OF MOTION: ICD-10-CM

## 2020-06-19 DIAGNOSIS — R29.898 DECREASED RANGE OF MOTION OF NECK: ICD-10-CM

## 2020-06-19 DIAGNOSIS — G89.29 CHRONIC NECK PAIN: ICD-10-CM

## 2020-07-13 ENCOUNTER — OFFICE VISIT - HEALTHEAST (OUTPATIENT)
Dept: PHYSICAL THERAPY | Facility: REHABILITATION | Age: 80
End: 2020-07-13

## 2020-07-13 DIAGNOSIS — M54.2 CHRONIC NECK PAIN: ICD-10-CM

## 2020-07-13 DIAGNOSIS — M25.511 ACUTE PAIN OF RIGHT SHOULDER: ICD-10-CM

## 2020-07-13 DIAGNOSIS — M25.611 DECREASED RIGHT SHOULDER RANGE OF MOTION: ICD-10-CM

## 2020-07-13 DIAGNOSIS — R29.898 DECREASED RANGE OF MOTION OF NECK: ICD-10-CM

## 2020-07-13 DIAGNOSIS — G89.29 CHRONIC NECK PAIN: ICD-10-CM

## 2020-07-13 DIAGNOSIS — M62.81 GENERALIZED MUSCLE WEAKNESS: ICD-10-CM

## 2020-07-27 ENCOUNTER — COMMUNICATION - HEALTHEAST (OUTPATIENT)
Dept: INTERNAL MEDICINE | Facility: CLINIC | Age: 80
End: 2020-07-27

## 2020-08-19 ENCOUNTER — OFFICE VISIT - HEALTHEAST (OUTPATIENT)
Dept: PHYSICAL THERAPY | Facility: REHABILITATION | Age: 80
End: 2020-08-19

## 2020-08-19 DIAGNOSIS — M62.81 GENERALIZED MUSCLE WEAKNESS: ICD-10-CM

## 2020-08-19 DIAGNOSIS — M25.511 ACUTE PAIN OF RIGHT SHOULDER: ICD-10-CM

## 2020-08-19 DIAGNOSIS — G89.29 CHRONIC NECK PAIN: ICD-10-CM

## 2020-08-19 DIAGNOSIS — M54.2 CHRONIC NECK PAIN: ICD-10-CM

## 2020-08-19 DIAGNOSIS — R29.898 DECREASED RANGE OF MOTION OF NECK: ICD-10-CM

## 2020-08-19 DIAGNOSIS — M25.611 DECREASED RIGHT SHOULDER RANGE OF MOTION: ICD-10-CM

## 2020-08-26 ENCOUNTER — OFFICE VISIT - HEALTHEAST (OUTPATIENT)
Dept: SURGERY | Facility: CLINIC | Age: 80
End: 2020-08-26

## 2020-08-26 ENCOUNTER — COMMUNICATION - HEALTHEAST (OUTPATIENT)
Dept: SURGERY | Facility: CLINIC | Age: 80
End: 2020-08-26

## 2020-08-26 DIAGNOSIS — K21.9 GASTROESOPHAGEAL REFLUX DISEASE WITHOUT ESOPHAGITIS: ICD-10-CM

## 2020-08-26 DIAGNOSIS — R73.03 PRE-DIABETES: ICD-10-CM

## 2020-08-26 DIAGNOSIS — H91.91 HEARING LOSS OF RIGHT EAR, UNSPECIFIED HEARING LOSS TYPE: ICD-10-CM

## 2020-08-26 DIAGNOSIS — M19.90 ARTHRITIS: ICD-10-CM

## 2020-08-26 DIAGNOSIS — N39.3 STRESS INCONTINENCE OF URINE: ICD-10-CM

## 2020-08-26 DIAGNOSIS — G47.33 OBSTRUCTIVE SLEEP APNEA SYNDROME: ICD-10-CM

## 2020-08-26 DIAGNOSIS — I10 HYPERTENSION, UNSPECIFIED TYPE: ICD-10-CM

## 2020-08-26 DIAGNOSIS — K76.0 FATTY LIVER: ICD-10-CM

## 2020-08-26 DIAGNOSIS — M50.30 DEGENERATIVE DISC DISEASE, CERVICAL: ICD-10-CM

## 2020-08-26 DIAGNOSIS — E66.01 MORBID OBESITY (H): ICD-10-CM

## 2020-08-26 ASSESSMENT — MIFFLIN-ST. JEOR: SCORE: 1366.73

## 2020-08-31 ENCOUNTER — OFFICE VISIT - HEALTHEAST (OUTPATIENT)
Dept: INTERNAL MEDICINE | Facility: CLINIC | Age: 80
End: 2020-08-31

## 2020-08-31 DIAGNOSIS — R25.2 CRAMPING OF HANDS: ICD-10-CM

## 2020-08-31 DIAGNOSIS — K21.9 GASTROESOPHAGEAL REFLUX DISEASE WITHOUT ESOPHAGITIS: ICD-10-CM

## 2020-08-31 DIAGNOSIS — Z00.00 ENCOUNTER FOR MEDICARE ANNUAL WELLNESS EXAM: ICD-10-CM

## 2020-08-31 DIAGNOSIS — M50.30 DEGENERATIVE DISC DISEASE, CERVICAL: ICD-10-CM

## 2020-08-31 DIAGNOSIS — Z23 NEED FOR VACCINATION: ICD-10-CM

## 2020-08-31 DIAGNOSIS — R74.01 TRANSAMINITIS: ICD-10-CM

## 2020-08-31 DIAGNOSIS — Z87.39 HISTORY OF GOUT: ICD-10-CM

## 2020-08-31 DIAGNOSIS — R06.02 SOB (SHORTNESS OF BREATH) ON EXERTION: ICD-10-CM

## 2020-08-31 DIAGNOSIS — R35.0 URINARY FREQUENCY: ICD-10-CM

## 2020-08-31 DIAGNOSIS — R42 VERTIGO: ICD-10-CM

## 2020-08-31 LAB
ALBUMIN UR-MCNC: NEGATIVE MG/DL
APPEARANCE UR: CLEAR
ATRIAL RATE - MUSE: 74 BPM
BACTERIA #/AREA URNS HPF: ABNORMAL HPF
BILIRUB UR QL STRIP: NEGATIVE
COLOR UR AUTO: YELLOW
DIASTOLIC BLOOD PRESSURE - MUSE: NORMAL
GLUCOSE UR STRIP-MCNC: NEGATIVE MG/DL
HGB UR QL STRIP: NEGATIVE
INTERPRETATION ECG - MUSE: NORMAL
KETONES UR STRIP-MCNC: NEGATIVE MG/DL
LEUKOCYTE ESTERASE UR QL STRIP: ABNORMAL
NITRATE UR QL: NEGATIVE
P AXIS - MUSE: 65 DEGREES
PH UR STRIP: 5.5 [PH] (ref 5–8)
PR INTERVAL - MUSE: 140 MS
QRS DURATION - MUSE: 78 MS
QT - MUSE: 386 MS
QTC - MUSE: 428 MS
R AXIS - MUSE: 50 DEGREES
RBC #/AREA URNS AUTO: ABNORMAL HPF
SP GR UR STRIP: 1.02 (ref 1–1.03)
SQUAMOUS #/AREA URNS AUTO: ABNORMAL LPF
SYSTOLIC BLOOD PRESSURE - MUSE: NORMAL
T AXIS - MUSE: 40 DEGREES
UROBILINOGEN UR STRIP-ACNC: ABNORMAL
VENTRICULAR RATE- MUSE: 74 BPM
WBC #/AREA URNS AUTO: ABNORMAL HPF

## 2020-08-31 RX ORDER — CELECOXIB 200 MG/1
200 CAPSULE ORAL DAILY
Qty: 90 CAPSULE | Refills: 2 | Status: SHIPPED | OUTPATIENT
Start: 2020-08-31 | End: 2022-05-12

## 2020-08-31 RX ORDER — GABAPENTIN 100 MG/1
100 CAPSULE ORAL DAILY
Qty: 90 CAPSULE | Refills: 3 | Status: SHIPPED | OUTPATIENT
Start: 2020-08-31 | End: 2021-08-13

## 2020-08-31 RX ORDER — VIT B COMP NO.3/FOLIC/C/BIOTIN 1 MG-60 MG
1 TABLET ORAL DAILY
Status: SHIPPED | COMMUNITY
Start: 2020-08-31 | End: 2021-08-06

## 2020-08-31 RX ORDER — MECLIZINE HYDROCHLORIDE 25 MG/1
25 TABLET ORAL 3 TIMES DAILY PRN
Qty: 30 TABLET | Refills: 0 | Status: SHIPPED | OUTPATIENT
Start: 2020-08-31 | End: 2023-08-17

## 2020-08-31 ASSESSMENT — MIFFLIN-ST. JEOR: SCORE: 1366.73

## 2020-09-01 LAB — BACTERIA SPEC CULT: NO GROWTH

## 2020-09-02 ENCOUNTER — OFFICE VISIT - HEALTHEAST (OUTPATIENT)
Dept: SURGERY | Facility: CLINIC | Age: 80
End: 2020-09-02

## 2020-09-02 DIAGNOSIS — E66.9 OBESITY (BMI 30-39.9): ICD-10-CM

## 2020-09-03 ENCOUNTER — HOSPITAL ENCOUNTER (OUTPATIENT)
Dept: MAMMOGRAPHY | Facility: CLINIC | Age: 80
Discharge: HOME | End: 2020-09-03

## 2020-09-03 DIAGNOSIS — Z12.31 VISIT FOR SCREENING MAMMOGRAM: ICD-10-CM

## 2020-10-28 ENCOUNTER — OFFICE VISIT - HEALTHEAST (OUTPATIENT)
Dept: SURGERY | Facility: CLINIC | Age: 80
End: 2020-10-28

## 2020-10-28 ENCOUNTER — COMMUNICATION - HEALTHEAST (OUTPATIENT)
Dept: SURGERY | Facility: CLINIC | Age: 80
End: 2020-10-28

## 2020-10-28 DIAGNOSIS — R73.03 PREDIABETES: ICD-10-CM

## 2020-10-28 DIAGNOSIS — E66.01 MORBID OBESITY (H): ICD-10-CM

## 2020-10-28 DIAGNOSIS — K76.0 FATTY LIVER: ICD-10-CM

## 2020-10-28 ASSESSMENT — MIFFLIN-ST. JEOR: SCORE: 1393.95

## 2020-11-02 ENCOUNTER — OFFICE VISIT - HEALTHEAST (OUTPATIENT)
Dept: SURGERY | Facility: CLINIC | Age: 80
End: 2020-11-02

## 2020-11-02 DIAGNOSIS — E66.9 OBESITY (BMI 30-39.9): ICD-10-CM

## 2020-11-04 ENCOUNTER — AMBULATORY - HEALTHEAST (OUTPATIENT)
Dept: LAB | Facility: CLINIC | Age: 80
End: 2020-11-04

## 2020-11-04 DIAGNOSIS — R25.2 CRAMPING OF HANDS: ICD-10-CM

## 2020-11-04 DIAGNOSIS — R79.89 ELEVATED LFTS: ICD-10-CM

## 2020-11-04 DIAGNOSIS — R73.03 PREDIABETES: ICD-10-CM

## 2020-11-04 LAB
ALT SERPL W P-5'-P-CCNC: 37 U/L (ref 0–45)
AST SERPL W P-5'-P-CCNC: 30 U/L (ref 0–40)
HBA1C MFR BLD: 6.1 %
MAGNESIUM SERPL-MCNC: 2.2 MG/DL (ref 1.8–2.6)

## 2020-11-10 ENCOUNTER — COMMUNICATION - HEALTHEAST (OUTPATIENT)
Dept: INTERNAL MEDICINE | Facility: CLINIC | Age: 80
End: 2020-11-10

## 2020-11-30 ENCOUNTER — OFFICE VISIT - HEALTHEAST (OUTPATIENT)
Dept: INTERNAL MEDICINE | Facility: CLINIC | Age: 80
End: 2020-11-30

## 2020-11-30 DIAGNOSIS — E78.2 MIXED HYPERLIPIDEMIA: ICD-10-CM

## 2020-11-30 DIAGNOSIS — J44.9 CHRONIC OBSTRUCTIVE PULMONARY DISEASE, UNSPECIFIED COPD TYPE (H): ICD-10-CM

## 2020-11-30 DIAGNOSIS — K21.9 GASTROESOPHAGEAL REFLUX DISEASE WITHOUT ESOPHAGITIS: ICD-10-CM

## 2020-11-30 DIAGNOSIS — I10 ESSENTIAL HYPERTENSION: ICD-10-CM

## 2020-11-30 DIAGNOSIS — Z76.89 ENCOUNTER TO ESTABLISH CARE: ICD-10-CM

## 2020-11-30 DIAGNOSIS — G47.33 SEVERE OBSTRUCTIVE SLEEP APNEA: ICD-10-CM

## 2020-11-30 DIAGNOSIS — M79.646 THUMB PAIN, UNSPECIFIED LATERALITY: ICD-10-CM

## 2020-11-30 DIAGNOSIS — R06.02 SOB (SHORTNESS OF BREATH): ICD-10-CM

## 2020-11-30 DIAGNOSIS — Z87.39 HISTORY OF GOUT: ICD-10-CM

## 2020-11-30 LAB
ALBUMIN SERPL-MCNC: 4.1 G/DL (ref 3.5–5)
ALP SERPL-CCNC: 120 U/L (ref 45–120)
ALT SERPL W P-5'-P-CCNC: 48 U/L (ref 0–45)
ANION GAP SERPL CALCULATED.3IONS-SCNC: 12 MMOL/L (ref 5–18)
AST SERPL W P-5'-P-CCNC: 39 U/L (ref 0–40)
BILIRUB SERPL-MCNC: 0.3 MG/DL (ref 0–1)
BNP SERPL-MCNC: 34 PG/ML (ref 0–155)
BUN SERPL-MCNC: 14 MG/DL (ref 8–28)
C REACTIVE PROTEIN LHE: 0.3 MG/DL (ref 0–0.8)
CALCIUM SERPL-MCNC: 9.6 MG/DL (ref 8.5–10.5)
CHLORIDE BLD-SCNC: 106 MMOL/L (ref 98–107)
CO2 SERPL-SCNC: 24 MMOL/L (ref 22–31)
CREAT SERPL-MCNC: 0.85 MG/DL (ref 0.6–1.1)
ERYTHROCYTE [DISTWIDTH] IN BLOOD BY AUTOMATED COUNT: 14.2 % (ref 11–14.5)
ERYTHROCYTE [SEDIMENTATION RATE] IN BLOOD BY WESTERGREN METHOD: 19 MM/HR (ref 0–20)
GFR SERPL CREATININE-BSD FRML MDRD: >60 ML/MIN/1.73M2
GLUCOSE BLD-MCNC: 110 MG/DL (ref 70–125)
HCT VFR BLD AUTO: 41.5 % (ref 35–47)
HGB BLD-MCNC: 13.6 G/DL (ref 12–16)
MCH RBC QN AUTO: 27.3 PG (ref 27–34)
MCHC RBC AUTO-ENTMCNC: 32.7 G/DL (ref 32–36)
MCV RBC AUTO: 83 FL (ref 80–100)
PLATELET # BLD AUTO: 280 THOU/UL (ref 140–440)
PMV BLD AUTO: 8.5 FL (ref 7–10)
POTASSIUM BLD-SCNC: 4.9 MMOL/L (ref 3.5–5)
PROT SERPL-MCNC: 7.3 G/DL (ref 6–8)
RBC # BLD AUTO: 4.98 MILL/UL (ref 3.8–5.4)
SODIUM SERPL-SCNC: 142 MMOL/L (ref 136–145)
URATE SERPL-MCNC: 4 MG/DL (ref 2–7.5)
WBC: 6.3 THOU/UL (ref 4–11)

## 2020-11-30 RX ORDER — ZINC GLUCONATE 50 MG
50 TABLET ORAL DAILY
Status: SHIPPED | COMMUNITY
Start: 2020-11-30 | End: 2021-08-06

## 2020-11-30 RX ORDER — QUINIDINE SULFATE 200 MG
TABLET ORAL
Status: SHIPPED | COMMUNITY
Start: 2020-11-30 | End: 2023-08-07

## 2020-11-30 RX ORDER — ASCORBIC ACID 500 MG
500 TABLET ORAL DAILY
Status: SHIPPED | COMMUNITY
Start: 2020-11-30 | End: 2021-08-06

## 2020-12-04 ENCOUNTER — AMBULATORY - HEALTHEAST (OUTPATIENT)
Dept: INTERNAL MEDICINE | Facility: CLINIC | Age: 80
End: 2020-12-04

## 2020-12-04 DIAGNOSIS — R06.02 SOB (SHORTNESS OF BREATH): ICD-10-CM

## 2020-12-15 ENCOUNTER — OFFICE VISIT - HEALTHEAST (OUTPATIENT)
Dept: INTERNAL MEDICINE | Facility: CLINIC | Age: 80
End: 2020-12-15

## 2020-12-15 DIAGNOSIS — R10.13 EPIGASTRIC PAIN: ICD-10-CM

## 2020-12-15 DIAGNOSIS — R68.84 JAW PAIN: ICD-10-CM

## 2020-12-15 LAB
ATRIAL RATE - MUSE: 75 BPM
DIASTOLIC BLOOD PRESSURE - MUSE: NORMAL
INTERPRETATION ECG - MUSE: NORMAL
P AXIS - MUSE: 89 DEGREES
PR INTERVAL - MUSE: 134 MS
QRS DURATION - MUSE: 78 MS
QT - MUSE: 378 MS
QTC - MUSE: 422 MS
R AXIS - MUSE: 48 DEGREES
SYSTOLIC BLOOD PRESSURE - MUSE: NORMAL
T AXIS - MUSE: 34 DEGREES
VENTRICULAR RATE- MUSE: 75 BPM

## 2020-12-16 ENCOUNTER — COMMUNICATION - HEALTHEAST (OUTPATIENT)
Dept: INTERNAL MEDICINE | Facility: CLINIC | Age: 80
End: 2020-12-16

## 2020-12-16 ENCOUNTER — AMBULATORY - HEALTHEAST (OUTPATIENT)
Dept: INTERNAL MEDICINE | Facility: CLINIC | Age: 80
End: 2020-12-16

## 2020-12-16 DIAGNOSIS — K76.9 LIVER LESION, LEFT LOBE: ICD-10-CM

## 2020-12-18 ENCOUNTER — HOSPITAL ENCOUNTER (OUTPATIENT)
Dept: NUCLEAR MEDICINE | Facility: HOSPITAL | Age: 80
Discharge: HOME | End: 2020-12-18
Attending: INTERNAL MEDICINE

## 2020-12-18 ENCOUNTER — HOSPITAL ENCOUNTER (OUTPATIENT)
Dept: CARDIOLOGY | Facility: HOSPITAL | Age: 80
Discharge: HOME | End: 2020-12-18
Attending: INTERNAL MEDICINE

## 2020-12-18 DIAGNOSIS — R06.02 SOB (SHORTNESS OF BREATH): ICD-10-CM

## 2020-12-18 LAB
CV STRESS CURRENT BP HE: NORMAL
CV STRESS CURRENT HR HE: 101
CV STRESS CURRENT HR HE: 103
CV STRESS CURRENT HR HE: 104
CV STRESS CURRENT HR HE: 105
CV STRESS CURRENT HR HE: 108
CV STRESS CURRENT HR HE: 109
CV STRESS CURRENT HR HE: 110
CV STRESS CURRENT HR HE: 110
CV STRESS CURRENT HR HE: 111
CV STRESS CURRENT HR HE: 111
CV STRESS CURRENT HR HE: 91
CV STRESS CURRENT HR HE: 92
CV STRESS CURRENT HR HE: 96
CV STRESS DEVIATION TIME HE: NORMAL
CV STRESS ECHO PERCENT HR HE: NORMAL
CV STRESS EXERCISE STAGE HE: NORMAL
CV STRESS FINAL RESTING BP HE: NORMAL
CV STRESS FINAL RESTING HR HE: 103
CV STRESS MAX HR HE: 118
CV STRESS MAX TREADMILL GRADE HE: 0
CV STRESS MAX TREADMILL SPEED HE: 0
CV STRESS PEAK DIA BP HE: NORMAL
CV STRESS PEAK SYS BP HE: NORMAL
CV STRESS PHASE HE: NORMAL
CV STRESS PROTOCOL HE: NORMAL
CV STRESS RESTING PT POSITION HE: NORMAL
CV STRESS ST DEVIATION AMOUNT HE: NORMAL
CV STRESS ST DEVIATION ELEVATION HE: NORMAL
CV STRESS ST EVELATION AMOUNT HE: NORMAL
CV STRESS TEST TYPE HE: NORMAL
CV STRESS TOTAL STAGE TIME MIN 1 HE: NORMAL
NUC STRESS EJECTION FRACTION: 67 %
RATE PRESSURE PRODUCT: NORMAL
STRESS ECHO BASELINE DIASTOLIC HE: 95
STRESS ECHO BASELINE HR: 95
STRESS ECHO BASELINE SYSTOLIC BP: 234
STRESS ECHO CALCULATED PERCENT HR: 84 %
STRESS ECHO LAST STRESS DIASTOLIC BP: 90
STRESS ECHO LAST STRESS HR: 111
STRESS ECHO LAST STRESS SYSTOLIC BP: 198
STRESS ECHO TARGET HR: 140
STRESS/REST PERFUSION RATIO: 1.02

## 2020-12-18 ASSESSMENT — MIFFLIN-ST. JEOR: SCORE: 1391.68

## 2020-12-21 ENCOUNTER — OFFICE VISIT - HEALTHEAST (OUTPATIENT)
Dept: INTERNAL MEDICINE | Facility: CLINIC | Age: 80
End: 2020-12-21

## 2020-12-21 DIAGNOSIS — R06.02 SOB (SHORTNESS OF BREATH): ICD-10-CM

## 2020-12-21 DIAGNOSIS — I10 ESSENTIAL HYPERTENSION: ICD-10-CM

## 2020-12-21 DIAGNOSIS — R10.13 EPIGASTRIC PAIN: ICD-10-CM

## 2020-12-21 DIAGNOSIS — F40.240 CLAUSTROPHOBIA: ICD-10-CM

## 2020-12-21 DIAGNOSIS — K76.9 LIVER LESION, LEFT LOBE: ICD-10-CM

## 2020-12-22 ENCOUNTER — HOSPITAL ENCOUNTER (OUTPATIENT)
Dept: MRI IMAGING | Facility: HOSPITAL | Age: 80
Discharge: HOME | End: 2020-12-22

## 2020-12-22 DIAGNOSIS — K76.9 LIVER LESION, LEFT LOBE: ICD-10-CM

## 2021-02-02 ENCOUNTER — OFFICE VISIT - HEALTHEAST (OUTPATIENT)
Dept: INTERNAL MEDICINE | Facility: CLINIC | Age: 81
End: 2021-02-02

## 2021-02-02 DIAGNOSIS — M25.511 ACUTE PAIN OF RIGHT SHOULDER: ICD-10-CM

## 2021-02-04 ENCOUNTER — AMBULATORY - HEALTHEAST (OUTPATIENT)
Dept: NURSING | Facility: CLINIC | Age: 81
End: 2021-02-04

## 2021-02-08 ENCOUNTER — COMMUNICATION - HEALTHEAST (OUTPATIENT)
Dept: INTERNAL MEDICINE | Facility: CLINIC | Age: 81
End: 2021-02-08

## 2021-02-25 ENCOUNTER — AMBULATORY - HEALTHEAST (OUTPATIENT)
Dept: NURSING | Facility: CLINIC | Age: 81
End: 2021-02-25

## 2021-03-02 ENCOUNTER — COMMUNICATION - HEALTHEAST (OUTPATIENT)
Dept: INTERNAL MEDICINE | Facility: CLINIC | Age: 81
End: 2021-03-02

## 2021-03-02 DIAGNOSIS — J44.9 CHRONIC OBSTRUCTIVE PULMONARY DISEASE, UNSPECIFIED COPD TYPE (H): ICD-10-CM

## 2021-03-11 ENCOUNTER — OFFICE VISIT - HEALTHEAST (OUTPATIENT)
Dept: INTERNAL MEDICINE | Facility: CLINIC | Age: 81
End: 2021-03-11

## 2021-03-11 DIAGNOSIS — M62.830 SPASM OF BACK MUSCLES: ICD-10-CM

## 2021-03-11 DIAGNOSIS — N32.81 OAB (OVERACTIVE BLADDER): ICD-10-CM

## 2021-03-11 DIAGNOSIS — I10 ESSENTIAL HYPERTENSION: ICD-10-CM

## 2021-03-11 DIAGNOSIS — M25.511 ACUTE PAIN OF RIGHT SHOULDER: ICD-10-CM

## 2021-05-24 ENCOUNTER — RECORDS - HEALTHEAST (OUTPATIENT)
Dept: ADMINISTRATIVE | Facility: CLINIC | Age: 81
End: 2021-05-24

## 2021-05-25 ENCOUNTER — RECORDS - HEALTHEAST (OUTPATIENT)
Dept: ADMINISTRATIVE | Facility: CLINIC | Age: 81
End: 2021-05-25

## 2021-05-26 ENCOUNTER — RECORDS - HEALTHEAST (OUTPATIENT)
Dept: ADMINISTRATIVE | Facility: CLINIC | Age: 81
End: 2021-05-26

## 2021-05-27 ENCOUNTER — RECORDS - HEALTHEAST (OUTPATIENT)
Dept: ADMINISTRATIVE | Facility: CLINIC | Age: 81
End: 2021-05-27

## 2021-05-27 VITALS — HEART RATE: 70 BPM | DIASTOLIC BLOOD PRESSURE: 76 MMHG | SYSTOLIC BLOOD PRESSURE: 158 MMHG

## 2021-05-27 NOTE — TELEPHONE ENCOUNTER
Refill Approved    Rx renewed per Medication Renewal Policy. Medication was last renewed on 6/7/18.    Renetta Vargas, [facility] Triage/Med Refill 3/25/2019     Requested Prescriptions   Pending Prescriptions Disp Refills     rosuvastatin (CRESTOR) 40 MG tablet [Pharmacy Med Name: ROSUVASTATIN  40MG  TAB] 90 tablet 3     Sig: TAKE 1 TABLET BY MOUTH AT  BEDTIME    Statins Refill Protocol (Hmg CoA Reductase Inhibitors) Passed - 3/25/2019  5:08 AM       Passed - PCP or prescribing provider visit in past 12 months     Last office visit with prescriber/PCP: 10/8/2018 Shawna Moses FNP OR same dept: 10/8/2018 Shawna Moses FNP OR same specialty: 10/8/2018 Shawna Moses FNP  Last physical: 9/4/2018 Last MTM visit: Visit date not found   Next visit within 3 mo: Visit date not found  Next physical within 3 mo: Visit date not found  Prescriber OR PCP: MULU Rao  Last diagnosis associated with med order: There are no diagnoses linked to this encounter.  If protocol passes may refill for 12 months if within 3 months of last provider visit (or a total of 15 months).

## 2021-05-28 ENCOUNTER — RECORDS - HEALTHEAST (OUTPATIENT)
Dept: ADMINISTRATIVE | Facility: CLINIC | Age: 81
End: 2021-05-28

## 2021-05-29 ENCOUNTER — RECORDS - HEALTHEAST (OUTPATIENT)
Dept: ADMINISTRATIVE | Facility: CLINIC | Age: 81
End: 2021-05-29

## 2021-05-30 ENCOUNTER — RECORDS - HEALTHEAST (OUTPATIENT)
Dept: ADMINISTRATIVE | Facility: CLINIC | Age: 81
End: 2021-05-30

## 2021-05-30 VITALS — WEIGHT: 197 LBS | BODY MASS INDEX: 36.25 KG/M2 | HEIGHT: 62 IN

## 2021-05-30 NOTE — PROGRESS NOTES
Assessment and Plan:       1. Encounter for Medicare annual wellness exam  2. Obesity (BMI 35.0-39.9) with comorbidity (H)  3. Prediabetes  - The following high BMI interventions were performed this visit: encouragement to exercise    4. Tobacco use  5. Chronic obstructive pulmonary disease, unspecified COPD type (H)  6. Nodule of right lung, repeat 4/2019  - Has cut back on smoking but not stopped- encouraged complete cessation  - RESTART: Incruse Ellipta, reviewed rationale  - Continue Breo  - Due for follow up of incidental pulmonary nodule with repeat CT     7. TAMAR (obstructive sleep apnea)- CPAP nightly   - Continue CPAP     8. Essential hypertension  - stable    9. Hyperlipidemia, unspecified hyperlipidemia type  - continue statin     10. Gastroesophageal reflux disease without esophagitis  - Stable, unable to tolerate PPI just daily  - omeprazole (PRILOSEC) 20 MG capsule; Take 1 capsule (20 mg total) by mouth 2 (two) times a day.  Dispense: 180 capsule; Refill: 3    11. Mixed hyperlipidemia  - repeat lipid panel  - CONTINUE: rosuvastatin (CRESTOR) 40 MG tablet; Take 1 tablet (40 mg total) by mouth at bedtime.  Dispense: 90 tablet; Refill: 3    12. Chronic right-sided low back pain without sciatica  - Will refer for pool therapy   - Ambulatory referral to PT/OT    13. Vertigo  - Uses meclizine sparingly   - meclizine (ANTIVERT) 25 mg tablet; Take 1 tablet (25 mg total) by mouth 3 (three) times a day as needed.  Dispense: 30 tablet; Refill: 0    14. Visit for screening mammogram  - Mammo Screening Bilateral; Future    15. Right shoulder pain   - Signs of impingment  - XR Shoulder Right 2 or More VWS; Future  - Return in 2-3 weeks for right shoulder corticosteroid injection     16. Post-menopausal  - DXA Bone Density Scan; Future        The following health maintenance schedule was reviewed with the patient and provided in printed form in the after visit summary:   Health Maintenance   Topic Date Due     DXA  SCAN  07/08/2005     ZOSTER VACCINES (2 of 2) 12/10/2015     FALL RISK ASSESSMENT  06/26/2020     ADVANCE DIRECTIVES DISCUSSED WITH PATIENT  06/04/2023     TD 18+ HE  05/30/2027     PNEUMOCOCCAL POLYSACCHARIDE VACCINE AGE 65 AND OVER  Completed     INFLUENZA VACCINE RULE BASED  Completed     PNEUMOCOCCAL CONJUGATE VACCINE FOR ADULTS (PCV13 OR PREVNAR)  Completed        Subjective:   Chief Complaint: Kori Leach is an 78 y.o. female here for an Annual Wellness visit.     HPI:  Kori Leach is an 78 y.o. female here for an Annual Wellness visit. She has several new concerns today.     She has been going to [facility] and an [place] [facility] for right lumbar radiculopathy. She had 3 injections in [place] and did PT. February was her last injection. Pool therapy was the most effective for her pain. She also takes Aleve.     There is a family history of breast cancer in  Her mother. She wishes to continue with breast cancer screening every other year.   She has a new complaint of right shoulder pain. Symptoms began more than a month ago. No injury. Wakes her up at night.    She continues Breo for COPD. She still sneaks cigarettes occasionally-- her  does not know that she smokes. She had a number of exacerbations last fall so Incruse Ellipta was started, she stopped this after 1 month not realizing it was meant to be a regular inhaler.     Reflux reviewed. She tried omeprazole once daily but reflux worsened.     Review of Systems:  Please see above.  The rest of the review of systems are negative for all systems.    Patient Care Team:  Shawna Moses FNP as PCP - General (Nurse Practitioner)     Patient Active Problem List   Diagnosis     HLD (hyperlipidemia)     HTN (hypertension)     Dizziness     Low back pain     Dysphagia     TAMAR (obstructive sleep apnea)- CPAP nightly      Nodule of right lung, repeat 4/2019     Chronic obstructive pulmonary disease, unspecified COPD type (H)      Prediabetes     Acute cholecystitis     Obesity (BMI 35.0-39.9) with comorbidity (H)     Tobacco use     Past Medical History:   Diagnosis Date     History of nicotine use, stopped 2/2018 6/4/2018     MO (iron deficiency anemia), r/t angiodyplasia  4/24/2015     Inverted nipple     bilateral      Past Surgical History:   Procedure Laterality Date     APPENDECTOMY       CERVICAL SPINE SURGERY  2015     LAPAROSCOPIC CHOLECYSTECTOMY N/A 6/29/2018    Procedure: CHOLECYSTECTOMY, LAPAROSCOPIC;  Surgeon: Jose Armando Humphries MD;  [place] [facility] [place]:      REPLACEMENT TOTAL KNEE BILATERAL        Family History   Problem Relation Age of Onset     Breast cancer Mother 45     Diabetes Mother      Heart disease Mother      Hyperlipidemia Mother      Hypertension Mother      Depression Father      Alcohol abuse Father      Hyperlipidemia Sister      Depression Sister       Social History     Socioeconomic History     Marital status:      Spouse name: Not on file     Number of children: 3     Years of education: Not on file     Highest education level: Not on file   Occupational History     Occupation: retired    Social Needs     Financial resource strain: Not on file     Food insecurity:     Worry: Not on file     Inability: Not on file     Transportation needs:     Medical: Not on file     Non-medical: Not on file   Tobacco Use     Smoking status: Former Smoker     Types: Cigarettes     Smokeless tobacco: Never Used     Tobacco comment: 1-2 cigarettes per day on average    Substance and Sexual Activity     Alcohol use: Yes     Comment: less than 1 drink per month      Drug use: No     Sexual activity: Not on file   Lifestyle     Physical activity:     Days per week: Not on file     Minutes per session: Not on file     Stress: Not on file   Relationships     Social connections:     Talks on phone: Not on file     Gets together: Not on file     Attends Yazidism service: Not on file     Active member of club  "or organization: Not on file     Attends meetings of clubs or organizations: Not on file     Relationship status: Not on file     Intimate partner violence:     Fear of current or ex partner: Not on file     Emotionally abused: Not on file     Physically abused: Not on file     Forced sexual activity: Not on file   Other Topics Concern     Not on file   Social History Narrative     Not on file      Current Outpatient Medications   Medication Sig Dispense Refill     aspirin 81 MG EC tablet Take 81 mg by mouth daily.       omega-3 fatty acids-vitamin E (FISH OIL) 1,000 mg cap Take 1 capsule by mouth daily.       albuterol (PROAIR HFA;PROVENTIL HFA;VENTOLIN HFA) 90 mcg/actuation inhaler Inhale 2 puffs every 4 (four) hours as needed. 1 Inhaler 0     fluticasone furoate-vilanterol (BREO ELLIPTA) 100-25 mcg/dose DsDv inhaler Inhale 1 Dose daily. 60 each 5     losartan (COZAAR) 25 MG tablet Take 1 tablet (25 mg total) by mouth daily. 90 tablet 1     meclizine (ANTIVERT) 25 mg tablet Take 1 tablet (25 mg total) by mouth 3 (three) times a day as needed. 30 tablet 0     omeprazole (PRILOSEC) 20 MG capsule Take 1 capsule (20 mg total) by mouth 2 (two) times a day. 180 capsule 3     rosuvastatin (CRESTOR) 40 MG tablet Take 1 tablet (40 mg total) by mouth at bedtime. 90 tablet 3     umeclidinium (INCRUSE ELLIPTA) 62.5 mcg/actuation DsDv inhaler Inhale 1 puff daily. 90 each 1     No current facility-administered medications for this visit.       Objective:   Vital Signs:   Visit Vitals  /62   Pulse 66   Ht 5' 2\" (1.575 m)   Wt 204 lb (92.5 kg)   BMI 37.31 kg/m         VisionScreening:  No exam data present     PHYSICAL EXAM  Gen: Well developed, well nourished, no acute distress.  HEENT: normocephalic/atraumatic, PERRLA/EOMI, TMs: Gray, normal light reflex, no nasal discharge.  Oral mucosa: no erythema/exudate  Neck: No LAD/masses/thyromegaly/bruits  Lungs: clear bilaterally  Heart: regular rate and rhythm, no " murmurs/gallops/rubs  Abdomen: Normal bowel sounds, soft, non-tender, non-distended, no masses  Lymphatics: no supraclavicular/cervical LAD. No edema.  Neuro: A&O x 3  Psych: Behavior appropriate, engaging.  Thought processes congruent, non-tangential.  Musculoskeletal: right shoulder with pain above 90 degrees of abduction. She has some limitations reaching endpoints of extension and abduction as compared with left ROM. Full internal/external rotation   Skin: no rashes or lesions.      Assessment Results 6/26/2019   Activities of Daily Living No help needed   Instrumental Activities of Daily Living No help needed   Get Up and Go Score Less than 12 seconds   Mini Cog Total Score 4   Some recent data might be hidden     A Mini-Cog score of 0-2 suggests the possibility of dementia, score of 3-5 suggests no dementia    Identified Health Risks:     She is at risk for lack of exercise and has been provided with information to increase physical activity for the benefit of her well-being.  The patient was counseled and encouraged to consider modifying their diet and eating habits. She was provided with information on recommended healthy diet options.  She is at risk for falling and has been provided with information to reduce the risk of falling at home.  Information regarding advance directives (living kearns), including where she can download the appropriate form, was provided to the patient via the AVS.

## 2021-05-30 NOTE — PROGRESS NOTES
Internal Medicine Office Visit  [facility]  Patient Name: Kori Leach  Patient Age: 79 y.o.  YOB: 1940  MRN: [id number]    Date of Visit: 2019  Reason for Office Visit:   Chief Complaint   Patient presents with     Injections           Assessment / Plan / Medical Decision Makin. Impingement syndrome of shoulder region, right  - Injection procedure as noted below   - triamcinolone acetonide 40 mg/mL injection 80 mg (KENALOG-40)    2. Acute gouty arthritis  - Reviewed dietary modifications, possible uric acid lowering medication if recurrence. Will rx prednisone for use as needed. She should be seen if she has to take the prednisone for gout flare   - predniSONE (DELTASONE) 20 MG tablet; Take 40 mg by mouth daily for 3 days, THEN 20 mg daily for 3 days, THEN 10 mg daily for 3 days. Take at the first sign of a gout flare.  Dispense: 11 tablet; Refill: 0        Health Maintenance Review  Health Maintenance   Topic Date Due     ZOSTER VACCINES (2 of 2) 12/10/2015     INFLUENZA VACCINE RULE BASED (1) 2019     MEDICARE ANNUAL WELLNESS VISIT  2020     FALL RISK ASSESSMENT  2020     DXA SCAN  2021     ADVANCE CARE PLANNING  2023     TD 18+ HE  2027     PNEUMOCOCCAL POLYSACCHARIDE VACCINE AGE 65 AND OVER  Completed     PNEUMOCOCCAL CONJUGATE VACCINE FOR ADULTS (PCV13 OR PREVNAR)  Completed         I am having Kori Leach start on predniSONE. I am also having her maintain her aspirin, omega-3 fatty acids-vitamin E, albuterol, fluticasone furoate-vilanterol, losartan, meclizine, omeprazole, rosuvastatin, and umeclidinium. We administered triamcinolone acetonide 40 mg/mL.      HPI:  Kori Leach is a 79 y.o. year old who presents to the office today for follow up of right shoulder pain. She was also seen in  for an annual physical and reported pain of the right shoulder.  She had signs of impingement and  an x-ray was done showing degenerative changes in the AC joint with small osteophytes.    We reviewed a recent orthopedic visit for gout of the right wrist. This responded well to corticosteroid taper.     Review of Systems- pertinent positive in bold:  Negative for other joint pains, swelling      Current Scheduled Meds:  Outpatient Encounter Medications as of 2019   Medication Sig Dispense Refill     albuterol (PROAIR HFA;PROVENTIL HFA;VENTOLIN HFA) 90 mcg/actuation inhaler Inhale 2 puffs every 4 (four) hours as needed. 1 Inhaler 0     aspirin 81 MG EC tablet Take 81 mg by mouth daily.       fluticasone furoate-vilanterol (BREO ELLIPTA) 100-25 mcg/dose DsDv inhaler Inhale 1 Dose daily. 60 each 5     losartan (COZAAR) 25 MG tablet Take 1 tablet (25 mg total) by mouth daily. 90 tablet 1     meclizine (ANTIVERT) 25 mg tablet Take 1 tablet (25 mg total) by mouth 3 (three) times a day as needed. 30 tablet 0     omega-3 fatty acids-vitamin E (FISH OIL) 1,000 mg cap Take 1 capsule by mouth daily.       omeprazole (PRILOSEC) 20 MG capsule Take 1 capsule (20 mg total) by mouth 2 (two) times a day. 180 capsule 3     rosuvastatin (CRESTOR) 40 MG tablet Take 1 tablet (40 mg total) by mouth at bedtime. 90 tablet 3     umeclidinium (INCRUSE ELLIPTA) 62.5 mcg/actuation DsDv inhaler Inhale 1 puff daily. 90 each 1     predniSONE (DELTASONE) 20 MG tablet Take 40 mg by mouth daily for 3 days, THEN 20 mg daily for 3 days, THEN 10 mg daily for 3 days. Take at the first sign of a gout flare. 11 tablet 0     [] triamcinolone acetonide 40 mg/mL injection 80 mg (KENALOG-40)        No facility-administered encounter medications on file as of 2019.      Past Medical History:   Diagnosis Date     History of nicotine use, stopped 2018     MO (iron deficiency anemia), r/t angiodyplasia  2015     Inverted nipple     bilateral     Past Surgical History:   Procedure Laterality Date     APPENDECTOMY       CERVICAL  SPINE SURGERY  2015     LAPAROSCOPIC CHOLECYSTECTOMY N/A 6/29/2018    Procedure: CHOLECYSTECTOMY, LAPAROSCOPIC;  Surgeon: Jose Armando Humphries MD;  [place] [facility] [place]  Service:      REPLACEMENT TOTAL KNEE BILATERAL       Social History     Tobacco Use     Smoking status: Former Smoker     Types: Cigarettes     Smokeless tobacco: Never Used     Tobacco comment: 1-2 cigarettes per day on average    Substance Use Topics     Alcohol use: Yes     Comment: less than 1 drink per month      Drug use: No       Objective / Physical Examination:  Vitals:    07/24/19 1111   BP: 130/70   Pulse: 72     Wt Readings from Last 3 Encounters:   06/26/19 204 lb (92.5 kg)   10/08/18 189 lb (85.7 kg)   09/04/18 194 lb (88 kg)     There is no height or weight on file to calculate BMI.     Constitutional: In no apparent distress  MSK: right shoulder with pain over the AC joint. Pain with abduction above 90 degrees      Procedure: Verbal consent for a steroid injection was obtained. Posterior border of the acromion was sterilely prepped and a mixture of 1 mL lidocaine 1% and 2 mL kenalog (40 mg/mL) was injected to the subacromial space of the right shoulder.   Patient tolerated the procedure well.   Patient instructed to avoid excessive use of the shoulder over the next 2 weeks but in particular the next 3 days. Monitor for signs of allergic reaction. Contact the office for any concerns.       No orders of the defined types were placed in this encounter.  Followup: Return in about 6 months (around 1/24/2020) for Next scheduled follow up. earlier if needed.        Shawna Moses, CNP

## 2021-05-31 ENCOUNTER — RECORDS - HEALTHEAST (OUTPATIENT)
Dept: ADMINISTRATIVE | Facility: CLINIC | Age: 81
End: 2021-05-31

## 2021-05-31 VITALS — BODY MASS INDEX: 36.8 KG/M2 | WEIGHT: 200 LBS | HEIGHT: 62 IN

## 2021-05-31 VITALS — HEIGHT: 62 IN | BODY MASS INDEX: 36.8 KG/M2 | WEIGHT: 200 LBS

## 2021-05-31 VITALS — WEIGHT: 200.6 LBS | BODY MASS INDEX: 36.69 KG/M2

## 2021-05-31 NOTE — TELEPHONE ENCOUNTER
RN cannot approve Refill Request    RN can NOT refill this medication med is not covered by policy/route to provider. Last office visit: 7/24/2019 Shawna Moses FNP Last Physical: 6/26/2019 Last MTM visit: Visit date not found Last visit same specialty: 7/24/2019 Shawna Moses FNP.  Next visit within 3 mo: Visit date not found  Next physical within 3 mo: Visit date not found      Karime Stack, [facility] Triage/Med Refill 8/29/2019    Requested Prescriptions   Pending Prescriptions Disp Refills     meclizine (ANTIVERT) 25 mg tablet [Pharmacy Med Name: MECLIZINE  25MG  TAB] 30 tablet 0     Sig: TAKE 1 TABLET BY MOUTH 3  TIMES A DAY AS NEEDED       There is no refill protocol information for this order

## 2021-06-01 VITALS — BODY MASS INDEX: 35.85 KG/M2 | WEIGHT: 196 LBS

## 2021-06-01 VITALS — WEIGHT: 199.2 LBS | BODY MASS INDEX: 36.66 KG/M2 | HEIGHT: 62 IN

## 2021-06-01 VITALS — BODY MASS INDEX: 35.7 KG/M2 | WEIGHT: 194 LBS | HEIGHT: 62 IN

## 2021-06-01 VITALS — WEIGHT: 189 LBS | BODY MASS INDEX: 34.57 KG/M2

## 2021-06-01 VITALS — WEIGHT: 195 LBS | BODY MASS INDEX: 35.67 KG/M2

## 2021-06-01 VITALS — WEIGHT: 187.9 LBS | HEIGHT: 62 IN | BODY MASS INDEX: 34.58 KG/M2

## 2021-06-01 VITALS — WEIGHT: 196 LBS | BODY MASS INDEX: 35.85 KG/M2

## 2021-06-02 ENCOUNTER — RECORDS - HEALTHEAST (OUTPATIENT)
Dept: ADMINISTRATIVE | Facility: CLINIC | Age: 81
End: 2021-06-02

## 2021-06-03 VITALS
TEMPERATURE: 98.2 F | WEIGHT: 215 LBS | HEART RATE: 76 BPM | BODY MASS INDEX: 39.32 KG/M2 | DIASTOLIC BLOOD PRESSURE: 60 MMHG | SYSTOLIC BLOOD PRESSURE: 126 MMHG | OXYGEN SATURATION: 96 %

## 2021-06-03 VITALS — WEIGHT: 204 LBS | HEIGHT: 62 IN | BODY MASS INDEX: 37.54 KG/M2

## 2021-06-04 VITALS
HEIGHT: 62 IN | WEIGHT: 208 LBS | HEART RATE: 70 BPM | SYSTOLIC BLOOD PRESSURE: 122 MMHG | BODY MASS INDEX: 38.28 KG/M2 | DIASTOLIC BLOOD PRESSURE: 72 MMHG

## 2021-06-04 VITALS — HEIGHT: 62 IN | BODY MASS INDEX: 38.28 KG/M2 | WEIGHT: 208 LBS

## 2021-06-04 VITALS — WEIGHT: 214 LBS | BODY MASS INDEX: 39.38 KG/M2 | HEIGHT: 62 IN

## 2021-06-04 VITALS
WEIGHT: 206 LBS | BODY MASS INDEX: 37.68 KG/M2 | DIASTOLIC BLOOD PRESSURE: 70 MMHG | HEART RATE: 80 BPM | SYSTOLIC BLOOD PRESSURE: 130 MMHG

## 2021-06-04 NOTE — PROGRESS NOTES
Internal Medicine Office Visit  [facility]   Patient Name: Kori Leach  Patient Age: 79 y.o.  YOB: 1940  MRN: [id number]    Date of Visit: 12/3/2019  Reason for Office Visit:   Chief Complaint   Patient presents with     Cough           Assessment / Plan / Medical Decision Makin. Chronic obstructive pulmonary disease, unspecified COPD type (H)  2. COPD exacerbation (H)  - RESUME: umeclidinium (INCRUSE ELLIPTA) 62.5 mcg/actuation DsDv inhaler; Inhale 1 puff daily.  Dispense: 90 each; Refill: 1  - START: predniSONE (DELTASONE) 20 MG tablet; Take 40 mg by mouth daily for 5 days.  Dispense: 10 tablet; Refill: 0  - START: azithromycin (ZITHROMAX Z-MAKAYLA) 250 MG tablet; Take 2 tablets (500 mg) on  Day 1,  followed by 1 tablet (250 mg) once daily on Days 2 through 5.  Dispense: 6 tablet; Refill: 0    3. Hyperlipidemia, unspecified hyperlipidemia type  - Lipid Profile; Future    4. Essential hypertension  Stable     5. Prediabetes  - Glycosylated Hemoglobin A1c; Future        Health Maintenance Review  Health Maintenance   Topic Date Due     COPD ACTION PLAN  1940     ZOSTER VACCINES (2 of 2) 12/10/2015     MEDICARE ANNUAL WELLNESS VISIT  2020     FALL RISK ASSESSMENT  2020     DXA SCAN  2021     ADVANCE CARE PLANNING  2023     TD 18+ HE  2027     SPIROMETRY  Completed     PNEUMOCOCCAL IMMUNIZATION 65+ LOW/MEDIUM RISK  Completed     INFLUENZA VACCINE RULE BASED  Completed         I am having Kori Leach start on predniSONE and azithromycin. I am also having her maintain her aspirin, omega-3 fatty acids-vitamin E, albuterol, fluticasone furoate-vilanterol, losartan, omeprazole, rosuvastatin, meclizine, and umeclidinium.      HPI:  Kori Leach is a 79 y.o. year old who presents to the office today for a sick visit and follow up. For the past 3 weeks she has had cold symptoms of a runny nose, cough, phlegm. No fever or chills.  She does feel more short of breath. She has been out of her Incruse Ellipta inhaler because the pharmacy sent the refill of albuterol rather than this inhaler. She is using albuterol once daily.     She has low back pain, she has done pool therapy for this in the past which was effective.     She has cervical pain and in November had RFA for the pain.    Continues statin for HLD, no myalgias associated with the medication.     Leaves for [place] soon for the winter.     Review of Systems:  As in HPI       Current Scheduled Meds:  Outpatient Encounter Medications as of 12/3/2019   Medication Sig Dispense Refill     albuterol (PROAIR HFA;PROVENTIL HFA;VENTOLIN HFA) 90 mcg/actuation inhaler Inhale 2 puffs every 4 (four) hours as needed. 1 Inhaler 0     aspirin 81 MG EC tablet Take 81 mg by mouth daily.       fluticasone furoate-vilanterol (BREO ELLIPTA) 100-25 mcg/dose DsDv inhaler Inhale 1 Dose daily. 60 each 5     losartan (COZAAR) 25 MG tablet Take 1 tablet (25 mg total) by mouth daily. 90 tablet 1     meclizine (ANTIVERT) 25 mg tablet TAKE 1 TABLET BY MOUTH 3  TIMES A DAY AS NEEDED 30 tablet 0     omega-3 fatty acids-vitamin E (FISH OIL) 1,000 mg cap Take 1 capsule by mouth daily.       omeprazole (PRILOSEC) 20 MG capsule Take 1 capsule (20 mg total) by mouth 2 (two) times a day. 180 capsule 3     rosuvastatin (CRESTOR) 40 MG tablet Take 1 tablet (40 mg total) by mouth at bedtime. 90 tablet 3     umeclidinium (INCRUSE ELLIPTA) 62.5 mcg/actuation DsDv inhaler Inhale 1 puff daily. 90 each 1     [DISCONTINUED] umeclidinium (INCRUSE ELLIPTA) 62.5 mcg/actuation DsDv inhaler Inhale 1 puff daily. 90 each 1     [] azithromycin (ZITHROMAX Z-MAKAYLA) 250 MG tablet Take 2 tablets (500 mg) on  Day 1,  followed by 1 tablet (250 mg) once daily on Days 2 through 5. 6 tablet 0     [] predniSONE (DELTASONE) 20 MG tablet Take 40 mg by mouth daily for 5 days. 10 tablet 0     No facility-administered encounter medications on  file as of 12/3/2019.        Past Medical History:   Diagnosis Date     History of nicotine use, stopped 2/2018 6/4/2018     MO (iron deficiency anemia), r/t angiodyplasia  4/24/2015     Inverted nipple     bilateral     Past Surgical History:   Procedure Laterality Date     APPENDECTOMY       CERVICAL SPINE SURGERY  2015     LAPAROSCOPIC CHOLECYSTECTOMY N/A 6/29/2018    Procedure: CHOLECYSTECTOMY, LAPAROSCOPIC;  Surgeon: Jose Armando Humphries MD;  [place]: [facility] [place];  Service:      REPLACEMENT TOTAL KNEE BILATERAL       Social History     Tobacco Use     Smoking status: Former Smoker     Types: Cigarettes     Smokeless tobacco: Never Used     Tobacco comment: 1-2 cigarettes per day on average    Substance Use Topics     Alcohol use: Yes     Comment: less than 1 drink per month      Drug use: No       Objective / Physical Examination:  Vitals:    12/03/19 0942   BP: 126/60   Pulse: 76   Temp: 98.2  F (36.8  C)   TempSrc: Oral   SpO2: 96%   Weight: 215 lb (97.5 kg)     Wt Readings from Last 3 Encounters:   12/03/19 215 lb (97.5 kg)   06/26/19 204 lb (92.5 kg)   10/08/18 189 lb (85.7 kg)     Body mass index is 39.32 kg/m .     Constitutional: In no apparent distress  Eyes: PERRL, Non-icteric.   ENT: Tympanic membrane clear with landmarks well visualized bilaterally. Septum midline, nares patent, no visible polyps, mucosa moist and without drainage. Lips and mucosa moist. Pharynx without erythema or exudate. Neck is supple, trachea midline. No cervical adenopathy  Respiratory: Breath sounds diminished. No crackles but she does have expiratory wheezing in left lower lung field. Normal inspiratory and expiratory effort  Cardiovascular: Regular rate and rhythm. No murmurs, rubs, or gallops. No edema.  Gastrointestinal: Bowel sounds active all four quadrants. Soft, non-tender.   Psych: Alert and oriented x3.     Orders Placed This Encounter   Procedures     HM2(CBC w/o Differential)     Comprehensive Metabolic Panel      Glycosylated Hemoglobin A1c     Lipid Profile   Followup: Return in about 6 months (around 6/3/2020) for Next scheduled follow up. earlier if needed.      Shawna Moses, CNP

## 2021-06-04 NOTE — TELEPHONE ENCOUNTER
Refill Approved    Rx renewed per Medication Renewal Policy. Medication was last renewed on 6/26/19.    Renetta Vargas, [facility] Triage/Med Refill 12/19/2019     Requested Prescriptions   Pending Prescriptions Disp Refills     losartan (COZAAR) 25 MG tablet [Pharmacy Med Name: LOSARTAN  25MG  TAB] 90 tablet 1     Sig: TAKE 1 TABLET BY MOUTH  DAILY       Angiotensin Receptor Blocker Protocol Passed - 12/17/2019  4:39 AM        Passed - PCP or prescribing provider visit in past 12 months       Last office visit with prescriber/PCP: 12/3/2019 Shawna Moses FNP OR same dept: 12/3/2019 Shawna Moses FNP OR same specialty: 12/3/2019 Shawna Moses FNP  Last physical: 6/26/2019 Last MTM visit: Visit date not found   Next visit within 3 mo: Visit date not found  Next physical within 3 mo: Visit date not found  Prescriber OR PCP: MULU Rao  Last diagnosis associated with med order: 1. Essential hypertension  - losartan (COZAAR) 25 MG tablet [Pharmacy Med Name: LOSARTAN  25MG  TAB]; TAKE 1 TABLET BY MOUTH  DAILY  Dispense: 90 tablet; Refill: 1    If protocol passes may refill for 12 months if within 3 months of last provider visit (or a total of 15 months).             Passed - Serum potassium within the past 12 months     Lab Results   Component Value Date    Potassium 4.5 06/07/2019             Passed - Blood pressure filed in past 12 months     BP Readings from Last 1 Encounters:   12/03/19 126/60             Passed - Serum creatinine within the past 12 months     Creatinine   Date Value Ref Range Status   06/07/2019 0.84 0.60 - 1.10 mg/dL Final

## 2021-06-05 VITALS — BODY MASS INDEX: 37.21 KG/M2 | HEIGHT: 63 IN | WEIGHT: 210 LBS

## 2021-06-05 VITALS
WEIGHT: 213 LBS | SYSTOLIC BLOOD PRESSURE: 154 MMHG | BODY MASS INDEX: 37.73 KG/M2 | DIASTOLIC BLOOD PRESSURE: 72 MMHG | HEART RATE: 88 BPM

## 2021-06-05 VITALS
BODY MASS INDEX: 37.73 KG/M2 | HEART RATE: 90 BPM | OXYGEN SATURATION: 98 % | SYSTOLIC BLOOD PRESSURE: 126 MMHG | WEIGHT: 213 LBS | DIASTOLIC BLOOD PRESSURE: 80 MMHG

## 2021-06-05 VITALS
OXYGEN SATURATION: 98 % | WEIGHT: 211 LBS | HEART RATE: 77 BPM | SYSTOLIC BLOOD PRESSURE: 122 MMHG | TEMPERATURE: 97.6 F | DIASTOLIC BLOOD PRESSURE: 62 MMHG | BODY MASS INDEX: 38.59 KG/M2

## 2021-06-05 VITALS
SYSTOLIC BLOOD PRESSURE: 132 MMHG | HEART RATE: 82 BPM | OXYGEN SATURATION: 97 % | DIASTOLIC BLOOD PRESSURE: 64 MMHG | BODY MASS INDEX: 38.78 KG/M2 | WEIGHT: 212 LBS

## 2021-06-05 NOTE — TELEPHONE ENCOUNTER
"Pt is requesting refills on medicaitons:  They are not needed at this time but would like at new pharmacy so they are \"ready\" when needed.      Teed up      Last Office Visit  12/3/2019 Shawna Moses FNP  Notes:  1. Chronic obstructive pulmonary disease, unspecified COPD type (H)  2. COPD exacerbation (H)  - RESUME: umeclidinium (INCRUSE ELLIPTA) 62.5 mcg/actuation DsDv inhaler; Inhale 1 puff daily.  Dispense: 90 each; Refill: 1  - START: predniSONE (DELTASONE) 20 MG tablet; Take 40 mg by mouth daily for 5 days.  Dispense: 10 tablet; Refill: 0  - START: azithromycin (ZITHROMAX Z-MAKAYLA) 250 MG tablet; Take 2 tablets (500 mg) on  Day 1,  followed by 1 tablet (250 mg) once daily on Days 2 through 5.  Dispense: 6 tablet; Refill: 0     3. Hyperlipidemia, unspecified hyperlipidemia type  - Lipid Profile; Future     4. Essential hypertension  Stable      5. Prediabetes  - Glycosylated Hemoglobin A1c; Future       Last Filled:    rosuvastatin (CRESTOR) 40 MG tablet 90 tablet 3 6/26/2019  No     omeprazole (PRILOSEC) 20 MG capsule 180 capsule 3 6/26/2019  No     losartan (COZAAR) 25 MG tablet 90 tablet 1 12/19/2019  No         Next OV:  Visit date not found - nothing scheduled - In [place] at this time        Medication teed up for provider signature    "

## 2021-06-08 NOTE — PROGRESS NOTES
Internal Medicine Office Visit  [facility]   Patient Name: Kori Leach  Patient Age: 79 y.o.  YOB: 1940  MRN: [id number]    Date of Visit: 2020  Reason for Office Visit:   Chief Complaint   Patient presents with     Injections           Assessment / Plan / Medical Decision Makin. Chronic obstructive pulmonary disease, unspecified COPD type (H)  2. Tobacco use  Encouraged complete tobacco cessation but she is not ready to do so at this time  Continue Breo and albuterol. LAMA was too expensive so she is not currently taking this    3. Essential hypertension  Stable with current medication    4. Hyperlipidemia, unspecified hyperlipidemia type  Continue statin     5. Prediabetes  Morbid obesity (H)  Repeat A1C   Encouraged weight loss with a plant based diet     6. Cervical pain (neck)  Start gabapentin 100 mg nightly and increase by 100 mg every 3 days until she gets to 300 mg nightly. Could increase further beyond this but want to assure she tolerates the medication   - Ambulatory referral to PT/OT  - Ambulatory referral to [facility]    7. Impingement syndrome of shoulder region, right  Injection as indicated below. Aftercare instructions reviewed         Health Maintenance Review  Health Maintenance   Topic Date Due     COPD ACTION PLAN  1940     ZOSTER VACCINES (2 of 2) 12/10/2015     MEDICARE ANNUAL WELLNESS VISIT  2020     FALL RISK ASSESSMENT  2020     DXA SCAN  2021     ADVANCE CARE PLANNING  2023     LIPID  2025     TD 18+ HE  2027     SPIROMETRY  Completed     PNEUMOCOCCAL IMMUNIZATION 65+ LOW/MEDIUM RISK  Completed     INFLUENZA VACCINE RULE BASED  Completed         I have discontinued Kori Leach's umeclidinium. I am also having her maintain her aspirin, omega-3 fatty acids-vitamin E, and meclizine. We administered triamcinolone acetonide 40 mg/mL.      HPI:  Kori Leach is a 79 y.o. year old who  presents to the office today for right shoulder pain. She had an injection last July and had very good relief of pain with this for about 10 months. She takes an NSAID twice per week when she golfs.    She saw [facility] for cervical pain and had an injection in October before she left for [place]. She has pain when she turns her head, pain is on the left side. She did a medial branch block in [place] and was told that she is not a candidate for radiofrequency ablation.     COPD was reviewed. She has shortness of breath with activity. She has 1 cigarette per night. She hides this habit from her spouse.     Weight reviewed. She did weight watchers in [place], has not continued this in [place] and is frustrated/feels confined by eating this way.     Blood pressure is well controlled with current medication. No lightheadedness or dizziness associated with treatment.     Review of Systems:  Negative for chest pain       Current Scheduled Meds:  Outpatient Encounter Medications as of 6/8/2020   Medication Sig Dispense Refill     aspirin 81 MG EC tablet Take 81 mg by mouth daily.       meclizine (ANTIVERT) 25 mg tablet TAKE 1 TABLET BY MOUTH 3  TIMES A DAY AS NEEDED 30 tablet 0     omega-3 fatty acids-vitamin E (FISH OIL) 1,000 mg cap Take 1 capsule by mouth daily.       [DISCONTINUED] albuterol (PROAIR HFA;PROVENTIL HFA;VENTOLIN HFA) 90 mcg/actuation inhaler Inhale 2 puffs every 4 (four) hours as needed. 1 Inhaler 0     [DISCONTINUED] fluticasone furoate-vilanterol (BREO ELLIPTA) 100-25 mcg/dose DsDv inhaler Inhale 1 Dose daily. 60 each 5     [DISCONTINUED] losartan (COZAAR) 25 MG tablet Take 1 tablet (25 mg total) by mouth daily. 90 tablet 1     [DISCONTINUED] omeprazole (PRILOSEC) 20 MG capsule Take 1 capsule (20 mg total) by mouth 2 (two) times a day. 180 capsule 3     [DISCONTINUED] rosuvastatin (CRESTOR) 40 MG tablet Take 1 tablet (40 mg total) by mouth at bedtime. 90 tablet 1     [DISCONTINUED] albuterol (PROAIR  HFA;PROVENTIL HFA;VENTOLIN HFA) 90 mcg/actuation inhaler Inhale 2 puffs every 4 (four) hours as needed. 1 Inhaler 0     [DISCONTINUED] fluticasone furoate-vilanteroL (BREO ELLIPTA) 100-25 mcg/dose DsDv inhaler Inhale 1 Dose daily. 120 each 3     [DISCONTINUED] gabapentin (NEURONTIN) 100 MG capsule Take 100 mg by mouth at bedtime for 3 days, THEN 200 mg at bedtime for 3 days, THEN 300 mg at bedtime. 90 capsule 1     [DISCONTINUED] losartan (COZAAR) 25 MG tablet Take 1 tablet (25 mg total) by mouth daily. 90 tablet 3     [DISCONTINUED] rosuvastatin (CRESTOR) 40 MG tablet Take 1 tablet (40 mg total) by mouth at bedtime. 90 tablet 3     [DISCONTINUED] umeclidinium (INCRUSE ELLIPTA) 62.5 mcg/actuation DsDv inhaler Inhale 1 puff daily. 90 each 1     [] triamcinolone acetonide 40 mg/mL injection 80 mg (KENALOG-40)        No facility-administered encounter medications on file as of 2020.          Past Medical History:   Diagnosis Date     History of nicotine use, stopped 2018     MO (iron deficiency anemia), r/t angiodyplasia  2015     Inverted nipple     bilateral     Past Surgical History:   Procedure Laterality Date     APPENDECTOMY       CERVICAL SPINE SURGERY       LAPAROSCOPIC CHOLECYSTECTOMY N/A 2018    Procedure: CHOLECYSTECTOMY, LAPAROSCOPIC;  Surgeon: Jose Armando Humphries MD;  Location: [facility];  Service:      REPLACEMENT TOTAL KNEE BILATERAL       Social History     Tobacco Use     Smoking status: Former Smoker     Types: Cigarettes     Smokeless tobacco: Never Used     Tobacco comment: 1-2 cigarettes per day on average    Substance Use Topics     Alcohol use: Yes     Comment: less than 1 drink per month      Drug use: No       Objective / Physical Examination:  Vitals:    20 1130   BP: 130/70   Pulse: 80   Weight: 206 lb (93.4 kg)     Wt Readings from Last 3 Encounters:   20 206 lb (93.4 kg)   19 215 lb (97.5 kg)   19 204 lb (92.5 kg)     Body  mass index is 37.68 kg/m .     Constitutional: In no apparent distress  Respiratory: Clear to auscultation bilaterally. Normal inspiratory and expiratory effort  Cardiovascular: Regular rate and rhythm. No murmurs, rubs, or gallops. No edema.   Gastrointestinal: Bowel sounds active all four quadrants. Soft, non-tender.   Psych: Alert and oriented x3.   MSK: limited ROM r/t pain       Procedure: Verbal consent for a steroid injection was obtained. Posterior border of the acromion was sterilely prepped and a mixture of 1 mL lidocaine 1% and 2 mL kenalog (40 mg/mL) was injected to the subacromial space of the right shoulder.   Patient tolerated the procedure well.   Patient instructed to avoid excessive use of the shoulder over the next 2 weeks but in particular the next 3 days. Monitor for signs of allergic reaction. Contact the office for any concerns.       Orders Placed This Encounter   Procedures     Ambulatory referral to PT/OT     Ambulatory referral to Spine Care   Followup: Return in about 4 months (around 10/8/2020) for Annual physical, Next scheduled follow up. prior to leaving for [place] . earlier if needed.        Shawna Moses, CNP

## 2021-06-09 NOTE — PROGRESS NOTES
[facility] Daily Progress     Patient Name: Kori Leach  Date: 2020  Visit #:   Referring Provider: Shawna Moses FNP  Referring Diagnosis:   Cervical pain (neck) [M54.2]        Impingement syndrome of shoulder region, right [M75.41]         Visit Diagnosis:     ICD-10-CM    1. Chronic neck pain  M54.2     G89.29    2. Acute pain of right shoulder  M25.511    3. Generalized muscle weakness  M62.81    4. Decreased range of motion of neck  R29.898    5. Decreased right shoulder range of motion  M25.611        Assessment:     Pt demo's improved cervical and R shoulder ROM with minimal to no pain or difficulty remaining with ADLs and golfing.    Patient is benefitting from skilled physical therapy and is making steady progress toward functional goals.  Patient is appropriate to continue with skilled physical therapy intervention, as indicated by initial plan of care.    Goal Status:  Pt. will demonstrate/verbalize independence in self-management of condition in : 12 weeks - IMPROVING    Pt. will be independent with home exercise program in : 12 weeks;Comment  Comment:: for neck and shoulder flexibility and strengthening for improved daily function.   - IMPROVING    Pt will: demo equal neck and shoulder ROM B for improved use of neck and B UE in ADLs with pain <2/10 for improved QOL. - IMPROVING      Plan / Patient Education:     Continue with initial plan of care.  Reassess in 4 weeks rotator cuff strength and neck and shoulder ROM. Finalize HEP for golf strengthening as able and discharge if no sx remaining.    Exercises:  Exercise #1: S/l ER, standing scap abd, rows with theraband and resisted trunk rotation x10-20 reps 1-2 sets 1-2x/day    Subjective:     Pain Ratin/10  Pt reports doing HEP and doing well with them. Pt reports she feels like she was hitting the golf ball a little better and can look easier over L shoulder. Pt hasn't been having any shoulder pain lately.  Pt  reports she has been able to play 9 holes without difficulty.      Objective:     R cervical rotation 50 degrees (was 45 degrees), L rotation 40 degrees without pain (was 40 with pain), R SB 9 cm without pain (was 11 cm with pain), L SB 10 with pain (was 12 cm with pain)    R shoulder ER 65 degrees without pain, L shoulder ER 65 degrees without pain - R shoulder was 10 degrees tighter 4 weeks ago      Treatment Today      TREATMENT MINUTES COMMENTS   Evaluation     Self-care/ Home management     Manual therapy     Neuromuscular Re-education     Therapeutic Activity     Therapeutic Exercises 24 Discussed pt's response with HEP and activity level since initial eval. Reassessed R shoulder and cervical ROM and discussed progress. Reviewed and performed HEP and added scap plane abduction to HEP per flowsheet and pt instructions and printed AVS for home.     Gait training     Modality__________________                Total 24    Blank areas are intentional and mean the treatment did not include these items.       Guillermina Frazier PT, DPT, OCS, CLT  7/13/2020  8:41 AM

## 2021-06-10 NOTE — TELEPHONE ENCOUNTER
New Appointment Needed  What is the reason for the visit:    Annual Wellness Visit     Provider Preference: PCP only     How soon do you need to be seen?: Patient has appt scheduled for 10/01, but asking for an appointment for sometime in August - Mid September at the latest. [place] in [place] and is asking for an earlier appointment time as she is already behind in scheduling due to Covid-19.  Please call patient with scheduling options.    Waitlist offered?: Yes    Okay to leave a detailed message:  Yes

## 2021-06-10 NOTE — PROGRESS NOTES
"Kori Leach is a 80 y.o. female who is being evaluated via a billable video visit.      The patient has been notified of following:     \"This video visit will be conducted via a call between you and your physician/provider. We have found that certain health care needs can be provided without the need for an in-person physical exam.  This service lets us provide the care you need with a video conversation.  If a prescription is necessary we can send it directly to your pharmacy.  If lab work is needed we can place an order for that and you can then stop by our lab to have the test done at a later time.    Video visits are billed at different rates depending on your insurance coverage. Please reach out to your insurance provider with any questions.    If during the course of the call the physician/provider feels a video visit is not appropriate, you will not be charged for this service.\"    Patient has given verbal consent to a Video visit? Yes  How would you like to obtain your AVS? AVS Preference: MyChart.  If dropped by the video visit, the video invitation should be sent to: Send to text [phone]    Will anyone else be joining your video visit? No        Video Start Time: 10:45 AM    Additional provider notes:   BARIATRIC CONSULTATION    Impression: Kori Leach is a 80 y.o. year old female with  has a past medical history of Arthritis, COPD (chronic obstructive pulmonary disease) (H), Degenerative disc disease, cervical, Fatty liver, GERD (gastroesophageal reflux disease), History of nicotine use, stopped 2/2018 (6/4/2018), Hypertension, MO (iron deficiency anemia), r/t angiodyplasia  (4/24/2015), Inverted nipple, Loss of hearing, Morbid obesity (H), Pre-diabetes, Sleep apnea, and Urinary incontinence.  Poor functional capacity and musculoskeletal disability in the setting of the abovementioned weight related co-morbidities. Her Body mass index is 38.04 kg/m ..    Plan: DIET: We will work toward " 3 structured meals, protein first, less snacking   EXERCISE with COPD, Silver Sneakers and/or walking in 5 minute intervals starting today. Continue golfing:-)   REFERRAL(S) Dietitian   PHARMACOTHERAPY  Continue B-12 Consider vitamin D. Reviewed labs and iron stores, thyroid, look good. Discussed avoiding sympathomimetics like phentermine, phendimetrazine d/t increased demand of heart. Liraglutide would bring down blood sugars and weight but prefer avoiding polypharmacy at this point. With potential mental slowing of Topamax would also avoid that.     We discussed [facility] Bariatric Basics including:  -eating 3 meals daily  -eating protein first  -eating slowly, chewing food well  -avoiding/limiting calorie containing beverages  -choosing wheat, not white with breads, crackers, pastas, sabas, bagels, tortillas, rice  -limiting restaurant or cafeteria eating to twice a week or less    We discussed the importance of restorative sleep and stress management in maintaining a healthy weight.    We reviewed medications associated with weight gain.    We discussed insulin resistance and glycemic index as it relates to appetite and weight control.     We discussed the National Weight Control Registry healthy weight maintenance strategies and ways to optimize metabolism.  We discussed the importance of physical activity including cardiovascular and strength training in maintaining a healthier weight and explored viable options.    We discussed medications available for weight loss including Phentermine, Phendimetrazine, Topamax, Qsymia, Lorcaserin, Diethylproprion, Orlistat, Contrave, Saxenda, and Vyvanse. We discussed the risks and benefits of each. We discussed indications, contraindications, potential side effects, and estimated costs of each. Literature was provided. Kori understands that not using a weight loss medication is an option.      History Surrounding Consultation  Struggles with weight started at age 60  Her  "weight at age 18 was 100#  She has had several past supervised and unsupervised weight loss attempts  The most weight lost was: WW a little-not a lot  Unfortunately there was not durable weight maintenance.  History of bulimia, anorexia, or binge eating disorder? no  If Present has eating disorder been in remission at least 3 years? na  Night time eating? no    Dietary History  Meals per day: 2  Snacks: \"I'm a great snacker\"  Typical Snack: pepperoni, and olives  Who does the grocery shopping? Her  mostly  Who does the cooking? She does  A typical meal includes: B:yogurt, cookies L: skips/snacks, pepperoni and olives sandwich once in a while D: salad asian kit with chicken and tomatoes and cucumbers  Regular Pop: diet  Juice: vinny in theam  Caffeine: 1c/d with cream-chocolate raspberry  Amount of restaurant eating per week: 0-1  Eating a the table with the TV off? yes    Physical Activity Patterns  Current physical activity routine includes: loves GOLF one day a week in [place], twice a week in [place]  Looked in to [employer]. May have Silver Sneakers Benefits.    Limitations from being physically active on a regular basis includes: COPD    She describes her general health as: good    Past Medical History  HTN: yes  Dyslipidemia: yes  TAMAR: yes  Obesity Hypoventilation: NO  DM2: no DM1: NA DX: NA Most recent AIC: 6.3  Neuropathy: yes  Nephropathy: no  Retinopathy: no Glaucoma no  IFG or \"pre-DM\": yes  MI: no  CVA:no  CHF: no  Heart Valves: native  Previous cardiac testing includes: echo  Cancers: basal cell on her nose  Kidney Disease: no  DVT: no  PE: no  Colitis: no  Crohn's: no  IBS: no  PUD: no  Fatty Liver: yes  Abnormal LFTs: yes  Hepatitis: no  Asthma: COPD  Bronchitis: COPD  Pneumonia: no  Other Lung Problems: TAMAR  Back Pain:yes  DDD: yes  Gout: yes in her hand  Fibromyalgia: no  USI: yes  Severe Headaches: no  Seizures: no If so, last seizure: no  Pseudotumor: no  PCOS: no  Menstrual Irregularity: " NA  Menorrhagia: NA  Infertility: NA  Thyroid problems: no  Thyroid medications: no  HIV positive: NO  MRSA/VRE history: no  History of Blood transfusion: no  Anemia: no    Health Care Maintenance  Colonoscopy:   Mammogram: due  Pap:    Medications   Current Outpatient Medications   Medication Sig Dispense Refill     albuterol (PROAIR HFA;PROVENTIL HFA;VENTOLIN HFA) 90 mcg/actuation inhaler Inhale 2 puffs every 4 (four) hours as needed. 1 Inhaler 0     aspirin 81 MG EC tablet Take 81 mg by mouth daily.       fluticasone furoate-vilanteroL (BREO ELLIPTA) 100-25 mcg/dose DsDv inhaler Inhale 1 Dose daily. 120 each 3     gabapentin (NEURONTIN) 100 MG capsule Take 100 mg by mouth daily.       losartan (COZAAR) 25 MG tablet Take 1 tablet (25 mg total) by mouth daily. 90 tablet 3     meclizine (ANTIVERT) 25 mg tablet TAKE 1 TABLET BY MOUTH 3  TIMES A DAY AS NEEDED 30 tablet 0     omega-3 fatty acids-vitamin E (FISH OIL) 1,000 mg cap Take 1 capsule by mouth daily.       omeprazole (PRILOSEC) 20 MG capsule Take 1 capsule (20 mg total) by mouth daily before breakfast. 180 capsule 3     rosuvastatin (CRESTOR) 40 MG tablet Take 1 tablet (40 mg total) by mouth at bedtime. 90 tablet 3     No current facility-administered medications for this visit.      Allergies   Lisinopril  Past Surgical History  Past Surgical History:   Procedure Laterality Date     APPENDECTOMY       CATARACT EXTRACTION       CERVICAL SPINE SURGERY  2015     JOINT REPLACEMENT      bilateral knees and right hip     LAPAROSCOPIC CHOLECYSTECTOMY N/A 2018    Procedure: CHOLECYSTECTOMY, LAPAROSCOPIC;  Surgeon: Jose Armando Humphries MD;  Location: [facility] OR;  Service:      REPLACEMENT TOTAL KNEE BILATERAL       TONSILLECTOMY       WISDOM TOOTH EXTRACTION       History of problems with anesthesia: no  History of Malignant Hyperthermia: NO    Gynecological History  Problems getting pregnant: no  MD Involvement: no If so, explanation/Diagnosis: no  :  3  Para: 3003  C-S: 0  Vaginal deliveries: 3  SAB:0  EAB: 0  Gestational DM: no  Gestational HTN: no  Preeclampsia: no  Current Birth Control: PM    Family History  family history includes Alcohol abuse in her father; Breast cancer (age of onset: 45) in her mother; Depression in her father and sister; Diabetes in her mother; Heart disease in her mother; Hyperlipidemia in her mother and sister; Hypertension in her mother; Lung cancer in her son; Obesity in her daughter and sister.    Social History  Status:   Children: 3 grown  Work Status: Retired       Addiction History  Smoking History:   Started smokin Quit smokin Total years of tobacco use: 54  Alcohol use: rare  Current or Past history of alcohol or substance abuse: no  Last used: no  Chemical Dependency Treatment History: no  Chemicals: no    Psychiatric History  Diagnoses: no  Treated by: no  Psychiatric Hospitalizations: no  Suicide attempts: no  ECT: no  Panic attacks: no  History of Abuse: no    Palliative Medicine History  Involvement in a pain clinic: no    ROS  Sleep  Snoring: TAMAR  PND: TAMAR  Witnessed Apneas: TAMAR  Gary: TAMAR  STOP BANG: TAMAR  General  Fatigue: yes  Sleep Quality:good hugh CPAP  HEENT  Visual changes: no  Gastrointestinal  Heartburn: PPI  Dysphagia: no  Cardiovascular  Murmur: no  Elevated BP: no  Chest Pain with Exertion: no  Dyspnea with Exertion: yes!!!COPD  Palpitations: no  Lower Extremity Edema: yes  Syncope: no  Pulmonary  Shortness of breath at rest: no  Snoring: yes  PND: yes  Wheezing: yes  CPAP use: yes  Gastrointestinal  Trouble swallowing:no  Heartburn: PPI  HX UGI/EGD:   Abdominal pain: no  Hematochezia: no  Urologic  Hesitancy: no  Urgency: yes  Genitourinary  ED: NA  Menorrhagia: NA  Dysmenorrhea: NA  Neurologic  Severe headache:no  Paresthesias: yes  Psychiatric  Moods Stable: yes  Hallucinations: no  Rheumatologic  Myalgias: yes  Arthralgias: yes  Endocrine  Polydipsia: no  Polyuria:  "no  Galactorrhea: no  Heat intolerance: no  Hirsutism: no  Musculoskeletal  Joint pain;yes  Falls: before her hip replacement  Use of cane, crutch or motorized scooter: no  Hematologic  Abnormal Bleeding or Clotting: no  Dermatologic  Skin Tags: yes  Striae: yes  Furuncless: no  Acne: no  Intertrigo: no  Lower Leg ulcers: no      Physical Exam  Height: 5' 2\" (1.575 m) (8/26/2020 10:00 AM)  Initial Weight: 208 lbs (8/26/2020 10:00 AM)  Weight: 208 lb (94.3 kg) (8/26/2020 10:00 AM)  Weight loss from initial: 0 (8/26/2020 10:00 AM)  % Weight loss: 0 % (8/26/2020 10:00 AM)  BMI (Calculated): 38 (8/26/2020 10:00 AM)  SpO2: 96 % (12/3/2019  9:42 AM)        General Appearance  No acute distress. Obesity: central  Alert: yes  Sleepy: no  HEENT  PERRLA, EOMI  Neck  Stout: yes   Airway: 2+  Cardiovascular  Color pink  Pulmonary  [place] Score: TAMAR  Lungs :non labored respirations  Abdomen    Extremities:  Neurologic  Tremors: no  Psychiatric  Thought Content Organized  Mood appears stable  Endocrine  Moon Facies: NO  Dorsal Thoracic Prominence: NO  Skin tags: yes  Acanthosis nigricans:   Dermatologic  Intertrigo:       Video-Visit Details    Type of service:  Video Visit    Video End Time (time video stopped): 11:45  Originating Location (pt. Location): Home    Distant Location (provider location):  [facility] GENERAL SURGERY AND BARIATRICS CARE     Platform used for Video Visit: Dory Machado MD    "

## 2021-06-10 NOTE — PROGRESS NOTES
Assessment/Plan:     1. Hyperlipidemia  2. Essential hypertension  3. Annual physical exam  4. Right hip pain  - Reviewed the importance of monitoring for changes in breast appearance such as nipple inversion, changes in breast tissue texture, non-healing wounds, or nipple discharge   - The following high BMI interventions were performed this visit: encouragement to exercise   - Follow up with dentist regarding dental pain  - Discontinue losartan-hydrochlorothiazide which she is currently taking half tablet daily.  She will start losartan 25 mg daily and check her blood pressure readings at home.  She will then follow-up in the office in approximately 2 weeks for a blood pressure recheck.  She is advised that if the first check is high assure that the person checking her blood pressure does a recheck in 5-10 minutes as I suspected this was the reason that her last blood pressure was still elevated        Subjective:     Kori Leach is a 76 y.o. female who presents for an annual exam.     HLD- Started taking co-Q 10 again at her 's suggestion. No myalgias prompted taking this medication. She doesn't know why she started taking it.     HTN- took losartan 25 mg daily, BP was elevated with recheck.     Left upper jaw/dental pain- She had to have a root canal, had a lot of pain and swelling after the procedure and ended up going to the ER for this pain. An x-ray done at her dentist was reportedly normal. She has pain over the upper gum and when biting down hard-- ongoing since November when she had the initial root canal. No pain with forward flexion, no congestion.     She did adds that the end of the visit that she has been having some right hip pain.  She notices this particularly when she is golfing.  The pain is occasionally in the groin area and her leg feels like it does not want to move.  She will have to hold onto something until the sensation passes.  She will experience this type of pain 2-3  times per week.  She has not presently been doing any kind of intervention.    Mammogram-there is a family history of breast cancer in her mother.  For this reason she has elected to continue regular mammography.    The patient reports that there is not domestic violence in her life.     Healthy Habits:   Regular Exercise: Yes  Sunscreen Use: Yes  Healthy Diet: Yes  Dental Visits Regularly: Yes      Immunization History   Administered Date(s) Administered     Pneumo Conj 13-V (&after) 2017     Tdap 2017     Immunization status: 2 vaccines administered today as above    Gynecologic History  No LMP recorded. Patient is postmenopausal.      OB History    Para Term  AB Living   3 3 3      SAB TAB Ectopic Multiple Live Births             # Outcome Date GA Lbr Hi/2nd Weight Sex Delivery Anes PTL Lv   3 Term            2 Term            1 Term                   Current Outpatient Prescriptions   Medication Sig Dispense Refill     albuterol (PROVENTIL HFA;VENTOLIN HFA) 90 mcg/actuation inhaler Inhale 2 puffs every 4 (four) hours as needed. 1 Inhaler 11     aspirin 81 MG EC tablet Take 81 mg by mouth daily.       atorvastatin (LIPITOR) 80 MG tablet Take 1 tablet by mouth  daily 90 tablet 1     coenzyme Q10 (CO Q-10) 100 mg capsule Take 200 mg by mouth daily.       ferrous sulfate 325 (65 FE) MG tablet Take 1 tablet by mouth daily.       losartan (COZAAR) 25 MG tablet Take 1 tablet (25 mg total) by mouth daily. 30 tablet 0     losartan-hydrochlorothiazide (HYZAAR) 50-12.5 mg per tablet Take 0.5 tablets by mouth daily. 45 tablet 2     multivitamin capsule Take 1 capsule by mouth daily.       omega-3 fatty acids-vitamin E (FISH OIL) 1,000 mg cap Take 1 capsule by mouth daily.       omeprazole (PRILOSEC) 20 MG capsule Take 1 capsule by mouth two times daily 180 capsule 1     vit A-vit C-vit E-zinc-copper (EYE VITAMIN AND MINERALS) 7,160-113-100 unit-mg-unit Tab Take 1 tablet by mouth daily. 90  tablet 3     calcium citrate (CALCITRATE) 200 mg (950 mg) tablet Take 2 tablets by mouth daily.       No current facility-administered medications for this visit.      Past Medical History:   Diagnosis Date     Inverted nipple     bilateral     Past Surgical History:   Procedure Laterality Date     APPENDECTOMY       CERVICAL SPINE SURGERY  2015     REPLACEMENT TOTAL KNEE BILATERAL       Lisinopril  Family History   Problem Relation Age of Onset     Breast cancer Mother 45     No Medical Problems Father      Hyperlipidemia Sister      Depression Sister      Social History     Social History     Marital status:      Spouse name: N/A     Number of children: 3     Years of education: N/A     Occupational History     retired       Social History Main Topics     Smoking status: Current Some Day Smoker     Types: Cigarettes     Smokeless tobacco: Not on file      Comment: 1-2 cigarettes per day on average      Alcohol use Yes      Comment: less than 1 drink per month      Drug use: Not on file     Sexual activity: Not on file     Other Topics Concern     Not on file     Social History Narrative       Review of Systems  General:  Negative except as noted above  Eyes: Negative except as noted above. Vision changes   Ears/Nose/Throat: Negative except as noted above. Tinnitus bilaterally   Cardiovascular: Negative except as noted above  Respiratory:  Negative except as noted above  Gastrointestinal:  Negative except as noted above  Musculoskeletal:  Negative except as noted above. Back pain   Skin: Negative except as noted above  Neurologic: Negative except as noted above  Psychiatric: Negative except as noted above  Endocrine: Negative except as noted above  Heme/Lymphatic: Negative except as noted above   Allergic/Immunologic: Negative except as noted above      Objective:      Vitals:    05/30/17 0842   BP: 104/60   Patient Site: Left Arm   Patient Position: Sitting   Cuff Size: Adult Regular   Pulse: 72   Weight:  "197 lb (89.4 kg)   Height: 5' 2\" (1.575 m)     Wt Readings from Last 3 Encounters:   05/30/17 197 lb (89.4 kg)   10/03/16 200 lb 12.8 oz (91.1 kg)   07/20/16 200 lb (90.7 kg)     Body mass index is 36.03 kg/(m^2).     Physical Exam:  General Appearance: Alert, cooperative, no distress.  Head: Normocephalic, without obvious abnormality, atraumatic. No sinus percussion tenderness   Eyes: PERRL, conjunctiva/corneas clear, EOM's intact  Ears: Normal TM's and external ear canals, both ears  Nose: Nares normal, septum midline,mucosa normal, no drainage  Throat: Lips, mucosa, and tongue normal  Neck: Supple, symmetrical, trachea midline, no adenopathy;  thyroid: not enlarged, symmetric, no tenderness/mass/nodules  Lungs: Clear to auscultation bilaterally, respirations unlabored  Heart: Regular rate and rhythm, S1 and S2 normal, no murmur, rub, or gallop  Abdomen: Soft, non-tender, bowel sounds active all four quadrants,  no masses, no organomegaly  Extremities: Extremities normal, atraumatic, no cyanosis or edema  Skin: Skin color, texture, turgor normal, no rashes or lesions  Lymph nodes: Cervical, supraclavicular nodes normal  Neurologic: Normal  Psych: Normal affect.  Does not appear anxious or depressed.         "

## 2021-06-10 NOTE — TELEPHONE ENCOUNTER
Wait until Shawna Moses NP return for a ruling on this.    Donaldo Vernon MD  General Internal Medicine  [facility]  7/28/2020, 10:27 AM

## 2021-06-11 NOTE — PROGRESS NOTES
"   Kori Leach is a 80 y.o. female who is being evaluated via a billable video visit.      The patient has been notified of following:     \"This video visit will be conducted via a call between you and your physician/provider. We have found that certain health care needs can be provided without the need for an in-person physical exam.  This service lets us provide the care you need with a video conversation.  If a prescription is necessary we can send it directly to your pharmacy.  If lab work is needed we can place an order for that and you can then stop by our lab to have the test done at a later time.    Video visits are billed at different rates depending on your insurance coverage. Please reach out to your insurance provider with any questions.    If during the course of the call the physician/provider feels a video visit is not appropriate, you will not be charged for this service.\"    Patient has given verbal consent to a Video visit? Yes  How would you like to obtain your AVS? AVS Preference: MyChart.  If dropped by the video visit, the video invitation should be sent to: Send to e-mail at: [email or web address]  Will anyone else be joining your video visit? No        Video Start Time: 10:30 am    Additional provider notes:        Video-Visit Details    Type of service:  Video Visit    Video End Time (time video stopped): 10:30 am  Originating Location (pt. Location): Home    Distant Location (provider location):  [facility] GENERAL SURGERY AND BARIATRICS CARE     Platform used for Video Visit: Elvin Armenta RD      Medical  Weight Loss Initial Diet Evaluation  Kori is presenting today for a new weight management nutrition consultation. Pt has had an initial appointment with Dr. Machado.  Weight loss medication: none at this time.  Nutrition Assessment:   Anthropometrics:  Pt's Initial Weight: 208 lbs  Weight: 208 lb (94.3 kg)  Weight loss from initial: 0  % Weight loss: 0 %    BMI: There were " no vitals filed for this visit.   IBW: 110-120 lbs  Estimated RMR (Antler-St Jeor equation): 1368 kcals x 1.3 (light active) = 1779 kcals (for weight maintenance)  Recommended Protein Intake: 80-90 grams of protein/day  Medical History:  Patient Active Problem List   Diagnosis     HLD (hyperlipidemia)     HTN (hypertension)     Dizziness     Low back pain     Dysphagia     TAMAR (obstructive sleep apnea)- CPAP nightly      Nodule of right lung, repeat 4/2019     Chronic obstructive pulmonary disease, unspecified COPD type (H)     Prediabetes     Acute cholecystitis     Obesity (BMI 35.0-39.9) with comorbidity (H)     Tobacco use     Cervical post-laminectomy syndrome     Arthritis     Fatty liver     Degenerative disc disease, cervical     GERD (gastroesophageal reflux disease)     Hypertension     Sleep apnea     Urinary incontinence     Loss of hearing     Pre-diabetes      Diabetes: No; pre diabetes  Nutrition History:   Food allergies/intolerances/cultural or religous food customs: No; does not like liver, salmon, beets  Weight loss history: She has had several past supervised and unsupervised weight loss attempts  Biggest weight loss struggle per pt: meal planning and figuring out what to eat   Vitamins/Mineral Supplementation: B12, Nephro-libra  Dietary Recall:  A typical meal includes: B:yogurt, cookies L: skips/snacks, pepperoni and olives sandwich once in a while D: salad asian kit with chicken and tomatoes and cucumbers    Breakfast: Yogurt  Snack: none  Lunch: green olives, and pepperoni  Snack: none  Dinner: cod and salad  Snack: 2 Peanut butter cookies  Eating out (frequency/week): 0-1x  Hydration (type/oz. per day):  Water: flavored water packets  Caffeine: 1 cup of coffee in the am  Carbonation: has been avoiding diet soda; has been trying carbonated flavored water to replace it  Alcohol : none  Exercise:  Current physical activity routine includes: loves [employer]OLF one day a week in [place], twice a week in  [place]  Looked in to [employer]. May have Silver Sneakers Benefits.     Limitations from being physically active on a regular basis includes: COPD    Nutrition Diagnosis (PES statement):   Overweight/Obesity (NC 3.3) related to excessive energy intake as evidenced by BMI 38  Nutrition Intervention:  1. Food and/or Nutrient Delivery   a. Placed emphasis on importance of developing a healthy meal routine, aiming for 3 meals a day and aiming for 20-30 g protein (3-4 oz protein) at meal times.  2. Nutrition Education   a. Discussed with patient how to build a meal: the importance of including a lean/low fat protein at each meal, include a source of vegetables at a minimum of lunch and dinner and limiting carbohydrate intake.  b. Educated on sources of lean protein, portion sizes, the amount of grams found in each source. Recommend patient to aim for 20-30g protein at each meal.  3. Nutrition Counseling   a. Encouraged importance of developing routine exercise for health benefits and weight loss.  Goals established by patient:   1. Find about 7 or more healthy meals to have in rotation. Aim to balance meals according to plate method.  2. Aim to have 20-30 g of protein (or 3-4 oz) at meals.  Handouts provided:  Plate Method  List of Lean Protein Sources  Snack Ideas List  Assessment/Plan:    Pt will follow up in 2 month(s) with bariatrician and 3 month(s) with dietitian.     Michelle Armenta RD

## 2021-06-11 NOTE — PROGRESS NOTES
Assessment and Plan:       1. Encounter for Medicare annual wellness exam  2. BMI 38.0-38.9,adult  The following high BMI interventions were performed this visit: encouragement to exercise   I have had an Advance Directives discussion with the patient.    3. Vertigo  - meclizine (ANTIVERT) 25 mg tablet; Take 1 tablet (25 mg total) by mouth 3 (three) times a day as needed.  Dispense: 30 tablet; Refill: 0    4. Cramping of hands  - Magnesium; Future    5. Gastroesophageal reflux disease without esophagitis  - famotidine (PEPCID) 40 MG tablet; Take 1 tablet (40 mg total) by mouth every evening.  Dispense: 90 tablet; Refill: 1    6. Degenerative disc disease, cervical  - START: gabapentin (NEURONTIN) 100 MG capsule; Take 100 mg by mouth daily.  Dispense: 90 capsule; Refill: 3  - START: celecoxib (CELEBREX) 200 MG capsule; Take 1 capsule (200 mg total) by mouth daily.  Dispense: 90 capsule; Refill: 2    7. SOB (shortness of breath) on exertion  NSR without concerning changes for ischemia  Suspect r/t both smoking and deconditioning. Encouraged her to start a regular exercise program   - Electrocardiogram Perform and Read    8. History of gout  Will keep prednisone on hand in case she has a gout flare. Has experienced this only once   - predniSONE (DELTASONE) 20 MG tablet; Take 60 mg by mouth daily for 3 days, THEN 40 mg daily for 3 days, THEN 20 mg daily for 3 days. Take as needed for gout.  Dispense: 18 tablet; Refill: 0    9. Urinary frequency  - Urinalysis-UC if Indicated  - Culture, Urine  - If UA is normal, reviewed option to start oxybutynin for OAB. She declines at this time. Reviewed bladder retrained option as well     10. Need for vaccination  - Influenza,Quad,High Dose,PF 65 YR+    11. Transaminitis  - repeat hepatic function tests in November  - Suspect r/t obesity and fatty liver disease. She is trying to lose weight and seeing the bariatric clinic for this     The patient's current medical problems were  reviewed.    The following health maintenance schedule was reviewed with the patient and provided in printed form in the after visit summary:   Health Maintenance   Topic Date Due     COPD ACTION PLAN  1940     ZOSTER VACCINES (2 of 2) 12/10/2015     DXA SCAN  07/01/2021     MEDICARE ANNUAL WELLNESS VISIT  08/31/2021     FALL RISK ASSESSMENT  08/31/2021     ADVANCE CARE PLANNING  06/04/2023     LIPID  06/08/2025     TD 18+ HE  05/30/2027     SPIROMETRY  Completed     PNEUMOCOCCAL IMMUNIZATION 65+ LOW/MEDIUM RISK  Completed     INFLUENZA VACCINE RULE BASED  Completed     HEPATITIS B VACCINES  Discontinued        Subjective:   Chief Complaint: Kori Leach is an 80 y.o. female here for an Annual Wellness visit.     HPI:  Kori Leach is an 80 y.o. female here for an Annual Wellness visit.     Tobacco use reviewed, she smokes about 2 cigarettes per day. COPD was reviewed. She is having more SOA.     GERD was reviewed. She asks about switching medications so that she could get off the PPI.     Notes that she bruises easily and has itching. It occurs easily when she bumps herself anywhere. No epistaxis.     Her recent liver enzymes test were elevated. She is trying to lose weight as this is likely due to non-alcoholic fatty liver disease.     She is getting cramps in the hand. Asks about checking a magnesium level.     Cervical pain has resolved, she continues gabapentin. The shoulder pain has resolved. The shot helped a lot with this shoulder pain.     HTN reviewed, she is doing well in this regard without lightheadedness or dizziness.    She has urinary frequency. She never used to have to go so often. Symptoms began just this summer.  No nocturia.     Review of Systems:  Please see above.  The rest of the review of systems are negative for all systems.    Patient Care Team:  Shawna Moses FNP as PCP - General (Nurse Practitioner)  Shawna Moses FNP as Assigned PCP     Patient Active  Problem List   Diagnosis     HLD (hyperlipidemia)     HTN (hypertension)     Dizziness     Low back pain     Dysphagia     TAMAR (obstructive sleep apnea)- CPAP nightly      Nodule of right lung, repeat 4/2019     Chronic obstructive pulmonary disease, unspecified COPD type (H)     Prediabetes     Acute cholecystitis     Obesity (BMI 35.0-39.9) with comorbidity (H)     Tobacco use     Cervical post-laminectomy syndrome     Arthritis     Fatty liver     Degenerative disc disease, cervical     GERD (gastroesophageal reflux disease)     Hypertension     Sleep apnea     Urinary incontinence     Loss of hearing     Pre-diabetes     Past Medical History:   Diagnosis Date     Arthritis     knees and hip replacement     COPD (chronic obstructive pulmonary disease) (H)     smoked age 16 to 80     Degenerative disc disease, cervical      Fatty liver      GERD (gastroesophageal reflux disease)      History of nicotine use, stopped 2/2018 6/4/2018     Hypertension      MO (iron deficiency anemia), r/t angiodyplasia  4/24/2015     Inverted nipple     bilateral     Loss of hearing      Morbid obesity (H)      Pre-diabetes      Sleep apnea      Urinary incontinence       Past Surgical History:   Procedure Laterality Date     APPENDECTOMY       CATARACT EXTRACTION       CERVICAL SPINE SURGERY  2015     JOINT REPLACEMENT      bilateral knees and right hip     LAPAROSCOPIC CHOLECYSTECTOMY N/A 6/29/2018    Procedure: CHOLECYSTECTOMY, LAPAROSCOPIC;  Surgeon: Jose Armando Humphries MD;  Location: [facility];  Service:      REPLACEMENT TOTAL KNEE BILATERAL       TONSILLECTOMY       WISDOM TOOTH EXTRACTION        Family History   Problem Relation Age of Onset     Breast cancer Mother 45     Diabetes Mother      Heart disease Mother      Hyperlipidemia Mother      Hypertension Mother      Depression Father      Alcohol abuse Father      Hyperlipidemia Sister      Depression Sister      Obesity Sister      Obesity Daughter      Lung cancer  Son       Social History     Socioeconomic History     Marital status:      Spouse name: Not on file     Number of children: 3     Years of education: Not on file     Highest education level: Not on file   Occupational History     Occupation: retired    Social Needs     Financial resource strain: Not on file     Food insecurity     Worry: Not on file     Inability: Not on file     Transportation needs     Medical: Not on file     Non-medical: Not on file   Tobacco Use     Smoking status: Former Smoker     Types: Cigarettes     Smokeless tobacco: Never Used     Tobacco comment: 1-2 cigarettes per day on average    Substance and Sexual Activity     Alcohol use: Yes     Comment: less than 1 drink per month      Drug use: No     Sexual activity: Not Currently     Partners: Male   Lifestyle     Physical activity     Days per week: Not on file     Minutes per session: Not on file     Stress: Not on file   Relationships     Social connections     Talks on phone: Not on file     Gets together: Not on file     Attends Yarsanism service: Not on file     Active member of club or organization: Not on file     Attends meetings of clubs or organizations: Not on file     Relationship status: Not on file     Intimate partner violence     Fear of current or ex partner: Not on file     Emotionally abused: Not on file     Physically abused: Not on file     Forced sexual activity: Not on file   Other Topics Concern     Not on file   Social History Narrative    . Retired. Likes to play golf.    3 children. Son has lung cancer.    Was a . Lives in [place] for 6 months and [place] for 6 months of the years.      Current Outpatient Medications   Medication Sig Dispense Refill     albuterol (PROAIR HFA;PROVENTIL HFA;VENTOLIN HFA) 90 mcg/actuation inhaler Inhale 2 puffs every 4 (four) hours as needed. 1 Inhaler 0     aspirin 81 MG EC tablet Take 81 mg by mouth daily.       cyanocobalamin, vitamin B-12, 1,000 mcg cap  "Take 1 capsule by mouth daily.       fluticasone furoate-vilanteroL (BREO ELLIPTA) 100-25 mcg/dose DsDv inhaler Inhale 1 Dose daily. 120 each 3     gabapentin (NEURONTIN) 100 MG capsule Take 100 mg by mouth daily. 90 capsule 3     losartan (COZAAR) 25 MG tablet Take 1 tablet (25 mg total) by mouth daily. 90 tablet 3     meclizine (ANTIVERT) 25 mg tablet Take 1 tablet (25 mg total) by mouth 3 (three) times a day as needed. 30 tablet 0     rosuvastatin (CRESTOR) 40 MG tablet Take 1 tablet (40 mg total) by mouth at bedtime. 90 tablet 3     vit B comp no.3-folic-C-biotin (NEPHRO-MAYITO) 1- mg-mg-mcg Tab tablet Take 1 tablet by mouth daily.       celecoxib (CELEBREX) 200 MG capsule Take 1 capsule (200 mg total) by mouth daily. 90 capsule 2     famotidine (PEPCID) 40 MG tablet Take 1 tablet (40 mg total) by mouth every evening. 90 tablet 1     predniSONE (DELTASONE) 20 MG tablet Take 60 mg by mouth daily for 3 days, THEN 40 mg daily for 3 days, THEN 20 mg daily for 3 days. Take as needed for gout. 18 tablet 0     No current facility-administered medications for this visit.       Objective:   Vital Signs:   Visit Vitals  /72   Pulse 70   Ht 5' 2\" (1.575 m)   Wt 208 lb (94.3 kg)   BMI 38.04 kg/m           VisionScreening:  No exam data present     PHYSICAL EXAM  Gen: Well developed, well nourished, no acute distress.  HEENT: normocephalic/atraumatic, PERRL/EOMI, TMs: Gray, normal light reflex.  Oral mucosa: no erythema/exudate  Neck: No LAD/masses/thyromegaly/bruits  Lungs: clear bilaterally  Heart: regular rate and rhythm, no murmurs/gallops/rubs  Abdomen: Normal bowel sounds, soft, non-tender, non-distended, no masses  Lymphatics: no supraclavicular/cervical LAD. No edema.  Neuro: A&O x 3        Assessment Results 8/31/2020   Activities of Daily Living No help needed   Instrumental Activities of Daily Living No help needed   Get Up and Go Score -   Mini Cog Total Score 5   Some recent data might be hidden     A " Mini-Cog score of 0-2 suggests the possibility of dementia, score of 3-5 suggests no dementia      Identified Health Risks:     She is at risk for lack of exercise and has been provided with information to increase physical activity for the benefit of her well-being.  The patient was counseled and encouraged to consider modifying their diet and eating habits. She was provided with information on recommended healthy diet options.  The patient was provided with written information regarding signs of hearing loss.  Information regarding advance directives (living kearns), including where she can download the appropriate form, was provided to the patient via the AVS.

## 2021-06-12 NOTE — PROGRESS NOTES
"Patient was advised that Medicare may not pay for this nutrition consult today. \"If Medicare doesn't pay for services, you may have to pay. Medicare does not pay for everything, even some care that you or your health care provider have good reason to think you need. We expect that Medicare may not pay for the visit today.\"     Patient declines visit at this time.     Michelle Armenta, RD    "

## 2021-06-12 NOTE — PATIENT INSTRUCTIONS - HE
Look for the emily -white with a red heart-on your iPhone. It will count your steps for you as long as you have it with you when you are walking.    Pace, Pace, Pace while you are on the phone or walk the halls as you are out and about with your . Continue your stretches/band exercises.  Michelle will send you hard copies and myChart meal ideas.    [facility] Basics    Remember to:    -Eat 3 meals a day (not 2, not 5) Chew your food well/SLOW down  -Eat your protein first  -Be a water drinker/Minize liquid calories (no regular pop, no juice) skim or 1% milk OK  -Sleep 7-8 hours each night. Address sleep if problematic  -Stress management is important. Address if problematic  -Move-8000 steps daily Muscle: maintain your muscle mass (strength training 2X/wk)  -Wheat, not white (bread, pasta, crackers, sabas, bagels, tortillas, rice)  -Limit restaurant, cafeteria, take out, drive through to 2 times per week or less  -Minimize caffeine, alcohol, and night-time snacking  -Consider keeping a food diary (i.e. My Fitness Pal, Lose It, or other food tracker)  -Follow up with the dietitian      **Some lean proteins: chicken, turkey, tuna, salmon, crab, fish, shrimp, scallops, lobster, lean cuts of beef and pork, luncheon meats, veggie burgers, beans (black, lima, garbanzo, zimmerman, kidney, refried), chile, cottage cheese, string cheese, other cheese, eggs, tofu, peanut butter, nuts, vegan crumbles, greek yogurt

## 2021-06-12 NOTE — PROGRESS NOTES
[facility] Clinic Note  Patient Name: Kori Leach  Patient Age: 77 y.o.  YOB: 1940  MRN: [id number]  ?  Date of Visit: 9/7/2017  Reason for Office Visit:   Chief Complaint   Patient presents with     Follow-up     High blood pressure     HPI: Kori Leach 77 y.o. female who presents to clinic for high blood pressure readings 181 and 170s systolic today. Normally runs < 120. Woke up with headache 2 days, frontal, no associated blurry or double vision. She was recently switched from losartan-hctz to 25 mg of losartan. Other than her blood pressure readings being elevated, she has been experiencing intermittent palpitations. No chest pain, sob, valle, diaphoresis. She drinks 1-2 cups of coffee per day and has had an extra caffeine soda on top of this. There has been some stressors in her life as well    Review of Systems: As noted in HPI     Current Scheduled Meds:  Outpatient Encounter Prescriptions as of 9/7/2017   Medication Sig Dispense Refill     albuterol (PROVENTIL HFA;VENTOLIN HFA) 90 mcg/actuation inhaler Inhale 2 puffs every 4 (four) hours as needed. 1 Inhaler 11     aspirin 81 MG EC tablet Take 81 mg by mouth daily.       atorvastatin (LIPITOR) 80 MG tablet Take 1 tablet by mouth  daily 90 tablet 2     calcium citrate (CALCITRATE) 200 mg (950 mg) tablet Take 2 tablets by mouth daily.       coenzyme Q10 (CO Q-10) 100 mg capsule Take 200 mg by mouth daily.       ferrous sulfate 325 (65 FE) MG tablet Take 1 tablet by mouth daily.       losartan (COZAAR) 25 MG tablet Take 2 tablets (50 mg total) by mouth daily. 90 tablet 3     multivitamin capsule Take 1 capsule by mouth daily.       omega-3 fatty acids-vitamin E (FISH OIL) 1,000 mg cap Take 1 capsule by mouth daily.       omeprazole (PRILOSEC) 20 MG capsule Take 1 capsule by mouth two times daily 180 capsule 2     vit A-vit C-vit E-zinc-copper (EYE VITAMIN AND MINERALS) 7,160-113-100 unit-mg-unit Tab Take 1 tablet by mouth  daily. 90 tablet 3     [DISCONTINUED] losartan (COZAAR) 25 MG tablet Take 1 tablet (25 mg total) by mouth daily. 90 tablet 3     [DISCONTINUED] losartan-hydrochlorothiazide (HYZAAR) 50-12.5 mg per tablet Take 0.5 tablets by mouth daily. 45 tablet 2     No facility-administered encounter medications on file as of 9/7/2017.        Objective / Physical Examination:  /84  Pulse 60  Wt 200 lb 9.6 oz (91 kg)  BMI 36.69 kg/m2  Wt Readings from Last 3 Encounters:   09/07/17 200 lb 9.6 oz (91 kg)   05/30/17 197 lb (89.4 kg)   10/03/16 200 lb 12.8 oz (91.1 kg)     Body mass index is 36.69 kg/(m^2). (>25?)    General Appearance: Alert and oriented in no acute distress  Neck: No Thyromegaly   Lungs: Clear to auscultation bilaterally.   Cardiovascular: RRR S1, S2. No m/r/g  Abdomen:Soft, non-tender.   Extremities: No edema.   Integumentary: Warm and dry.  Neuro: Alert and oriented, follows commands appropriately.    Assessment / Plan / Medical Decision Making:      Encounter Diagnoses   Name Primary?     HTN (hypertension) Yes     Premature atrial complexes         1. HTN (hypertension)  Poorly controlled recently. Today 162/84 Suspect multifactorial in the setting of essential HTN and recent decrease in hypertensive.   Recommend increasing back to 50 mg daily (she as 25 mg tabs, so take 2 per day)  Limit salt intake and cut down on caffeine to 1-2 cups coffee per day  Continue to check BP at home. Goal < 140/90  - Electrocardiogram Perform and Read    2. Premature atrial complexes  PACs seen on ekg today. Rate normal. Could be triggered by caffeine and/or stress?   Currently asymptomatic but will obtain an echo to rule to out structural disease.   Advised that if symptoms return with palpitations, etc she should let us know and would refer her to cardiology for further eval and likely will need holter   - Echo Complete; Future    Follow up in 1-2 weeks for blood pressure check    Total time spent with patient was 20  minutes with >50% of time spent in face-to-face counseling regarding the above plan     Dylan Monzon MD  [facility]

## 2021-06-12 NOTE — TELEPHONE ENCOUNTER
Please call patient - or reach out through [employer].    ______________________________________________________________________     Home phone:  [phone] (home) [phone] (work)    Cell phone:   Telephone Information:   Mobile [phone]       Other contacts:  Name Home Phone Work Phone Mobile Phone Relationship Lgl Grd   PHONG ORTIZ [phone]   Spouse    JENNIFER ORTIZ [phone]   Child      ______________________________________________________________________     Patient would like to establish care with me as a patient.  I see her .    May schedule an establish care with me in the next 2-3 weeks.  Thank you,    Donaldo Vernon MD  [facility]  11/10/2020, 6:59 AM

## 2021-06-12 NOTE — PROGRESS NOTES
"Kori Leach is a 80 y.o. female who is being evaluated via a billable video visit.      The patient has been notified of following:     \"This video visit will be conducted via a call between you and your physician/provider. We have found that certain health care needs can be provided without the need for an in-person physical exam.  This service lets us provide the care you need with a video conversation.  If a prescription is necessary we can send it directly to your pharmacy.  If lab work is needed we can place an order for that and you can then stop by our lab to have the test done at a later time.    Video visits are billed at different rates depending on your insurance coverage. Please reach out to your insurance provider with any questions.    If during the course of the call the physician/provider feels a video visit is not appropriate, you will not be charged for this service.\"    Patient has given verbal consent to a Video visit? Yes  How would you like to obtain your AVS? AVS Preference: MyChart.  If dropped by the video visit, the video invitation should be sent to: Send to e-mail at: [email or web address]  Will anyone else be joining your video visit? No        Video Start Time: 2:00 pm    Additional provider notes:   Bariatric Follow-up    80 y.o.  female BMI:Body mass index is 39.14 kg/m .    Weight:   Wt Readings from Last 1 Encounters:   10/28/20 214 lb (97.1 kg)    pounds  Height: 5' 2\" (1.575 m) (10/28/2020  1:00 PM)  Initial Weight: 208 lbs (10/28/2020  1:00 PM)  Weight: 214 lb (97.1 kg) (10/28/2020  1:00 PM)  Weight loss from initial: -6 (10/28/2020  1:00 PM)  % Weight loss: -2.88 % (10/28/2020  1:00 PM)  BMI (Calculated): 39.1 (10/28/2020  1:00 PM)  SpO2: 96 % (12/3/2019  9:42 AM)      Comorbidities:  Patient Active Problem List   Diagnosis     HLD (hyperlipidemia)     HTN (hypertension)     Dizziness     Low back pain     Dysphagia     TAMAR (obstructive sleep apnea)- CPAP nightly      Nodule of " right lung, repeat 4/2019     Chronic obstructive pulmonary disease, unspecified COPD type (H)     Prediabetes     Acute cholecystitis     Obesity (BMI 35.0-39.9) with comorbidity (H)     Tobacco use     Cervical post-laminectomy syndrome     Arthritis     Fatty liver     Degenerative disc disease, cervical     GERD (gastroesophageal reflux disease)     Hypertension     Sleep apnea     Urinary incontinence     Loss of hearing     Pre-diabetes         Interim: She did not receive the literature from Michelle. She would like menus for 2 weeks then keep repeating dinners. She has not had time to look in to [employer]neakers.     Plan:  DIET  Will work on 3 meals, protein first. Will have Michelle send the 14 dinner ideas in mail and MyChart   EXERCISE encouraged using the Apple emily white with red heart to count steps. Encouraged pacing while on the phone, walking the halls when waiting for her    PHARMACOTHERAPY avoid polypharmacy. Continue B-12 and vitamin D supplements.    Discussed ways to keep blood sugars down. Reviewed lipids which look good.      -We reviewed her medications and whether associated with weight gain.  Current Outpatient Medications on File Prior to Visit   Medication Sig Dispense Refill     albuterol (PROAIR HFA;PROVENTIL HFA;VENTOLIN HFA) 90 mcg/actuation inhaler Inhale 2 puffs every 4 (four) hours as needed. 1 Inhaler 0     aspirin 81 MG EC tablet Take 81 mg by mouth daily.       celecoxib (CELEBREX) 200 MG capsule Take 1 capsule (200 mg total) by mouth daily. 90 capsule 2     cyanocobalamin, vitamin B-12, 1,000 mcg cap Take 1 capsule by mouth daily.       famotidine (PEPCID) 40 MG tablet Take 1 tablet (40 mg total) by mouth every evening. 90 tablet 1     fluticasone furoate-vilanteroL (BREO ELLIPTA) 100-25 mcg/dose DsDv inhaler Inhale 1 Dose daily. 120 each 3     gabapentin (NEURONTIN) 100 MG capsule Take 100 mg by mouth daily. 90 capsule 3     losartan (COZAAR) 25 MG tablet Take 1 tablet  (25 mg total) by mouth daily. 90 tablet 3     meclizine (ANTIVERT) 25 mg tablet Take 1 tablet (25 mg total) by mouth 3 (three) times a day as needed. 30 tablet 0     rosuvastatin (CRESTOR) 40 MG tablet Take 1 tablet (40 mg total) by mouth at bedtime. 90 tablet 3     vit B comp no.3-folic-C-biotin (NEPHRO-MAYITO) 1- mg-mg-mcg Tab tablet Take 1 tablet by mouth daily.       No current facility-administered medications on file prior to visit.         We discussed [facility] Bariatric Basics including:  -eating 3 meals daily  -eating protein first  -eating slowly, chewing food well  -avoiding/limiting calorie containing beverages  -choosing wheat, not white with breads, crackers, pastas, sabas, bagels, tortillas, rice  -limiting restaurant or cafeteria eating to twice a week or less  -We discussed the importance of restorative sleep and stress management in maintaining a healthy weight.  -We discussed insulin resistance and glycemic index as it relates to appetite and weight control  -We discussed the National Weight Control Registry healthy weight maintenance strategies and ways to optimize metabolism.  -We discussed the importance of physical activity including cardiovascular and strength training in maintaining a healthier weight and explored viable options.    Most recent labs:  Lab Results   Component Value Date    WBC 5.9 06/08/2020    HGB 13.4 06/08/2020    HCT 40.3 06/08/2020    MCV 84 06/08/2020     06/08/2020     Lab Results   Component Value Date    CHOL 219 (H) 06/08/2020     Lab Results   Component Value Date    HDL 83 06/08/2020     Lab Results   Component Value Date    LDLCALC 77 06/08/2020     Lab Results   Component Value Date    TRIG 294 (H) 06/08/2020     Lab Results   Component Value Date    ALT 59 (H) 06/08/2020    AST 41 (H) 06/08/2020    ALKPHOS 117 06/08/2020    BILITOT 0.3 06/08/2020     Lab Results   Component Value Date    HGBA1C 6.3 (H) 06/08/2020     Lab Results   Component Value  "Date    [id number] 763 09/28/2016       Lab Results   Component Value Date    FERRITIN 36 09/04/2018       Lab Results   Component Value Date    TSH 0.77 09/28/2016         DIETARY HISTORY  Meals Per Day: 2  Eating Protein First?: yes  Food Diary: B:greek yogurt or meat and toast WW, coffee with cream L:snack on olives and pepperoni or smart meal D:soup  Snacks Per Day: \"I like to snack\" pepperoni and olives  Typical Snack: pepperoni and olives  Fluid Intake: intentional  Portion Control: problematic snacking  Calorie Containing Beverages: cream in coffee and some OJ in the morning, drinkable yogurt  Alcohol per week: 0-2  Typical Protein Food Choices: variety  Choosing Whole Grains: yes  Grocery Shopping is done by: herself  Food Preparation is done by: herself  Meals at Restaurant/Cafeteria/Take Out Per Week: <2  Eating at the Table: yes  TV is Off During Meals: yes    Positive Changes Since Last Visit: eating her protein first  Struggling With: stress. Son has lung cancer.  needs a stent and a valve    Knowledgeable in Reading Food Labels: not yet  Getting Adequate Protein: yes  Sleeping 7-8 hours/day yes, sometimes 9 hours. Using CPAP  Stress management baking lateShanghai [employer] Website-watches cooking shows    PHYSICAL ACTIVITY PATTERNS:  Cardiovascular: none  Strength Training: none    REVIEW OF SYSTEMS  GENERAL/CONSTITUTIONAL:  Fatigue: OK  CARDIOVASCULAR:  Chest Pain with Exertion: no  PULMONARY:  Dyspnea on exertion: yes  CPAP Use: yes  Tobacco Use: no  GASTROINTESTINAL:  GERD/Heartburn:   UROLOGIC:  Urinary Symptoms: yes  NEUROLOGIC:  Headaches: no  Paresthesias: yes  PSYCHIATRIC:  Moods: OK  MUSCULOSKELETAL/RHEUMATOLOGIC  Arthralgias: yes  Myalgias: yes  ENDOCRINE:  Monitoring Blood Sugars: no  Sugars Well Controlled: no  DERMATOLOGIC:  Rashes: yes    PHYSICAL EXAM: (most recent vitals and today's stated weight)  Vitals: Ht 5' 2\" (1.575 m)   Wt 214 lb (97.1 kg)   BMI 39.14 kg/m    Height: 5' 2\" (1.575 m) (10/28/2020 "  1:00 PM)  Initial Weight: 208 lbs (10/28/2020  1:00 PM)  Weight: 214 lb (97.1 kg) (10/28/2020  1:00 PM)  Weight loss from initial: -6 (10/28/2020  1:00 PM)  % Weight loss: -2.88 % (10/28/2020  1:00 PM)  BMI (Calculated): 39.1 (10/28/2020  1:00 PM)  SpO2: 96 % (12/3/2019  9:42 AM)      GEN: Pleasant and in no acute distress  PSYCH: A&OX3,     I have reviewed the note as documented above.  This accurately captures the substance of my conversation with the patient.  Thank you for the opportunity to participate in the care of your patient.    Cristina Machado MD, FAAFP  [facility]  Diplomate, American Board of Obesity Medicine            Video-Visit Details    Type of service:  Video Visit    Video End Time (time video stopped): 2:24pm  Originating Location (pt. Location): Home    Distant Location (provider location):  [facility] [place]     Platform used for Video Visit: Elvin Machado MD

## 2021-06-13 NOTE — PATIENT INSTRUCTIONS - HE
Please follow up if you have any further issues.    You may contact me by phone or MyChart if you are worsening or if things are not improving.    ______________________________________________________________________     Please remember that you can call [phone] to schedule an appointment with me.     You can schedule appointments 24 hours a day, 7 days a week.  Sometimes the best time to schedule an appointment is after clinic hours when less people are calling in.  Weekends are another option for calling in to schedule appointments.        ______________________________________________________________________      Future Appointments   Date Time Provider Department Center   12[facility]15[facility]2020 11:10 AM [facility]   12/18/2020  8:30 AM JN NM CH 1 JN NM JN   12/18/2020  8:45 AM JN NM A JN NM JN   12/18/2020  9:15 AM JN HC NM STRESS 1 JN CTST JN   12/18/2020 11:00 AM JN NM A JN NM JN

## 2021-06-13 NOTE — PROGRESS NOTES
[facility] Clinic Note  Patient Name: Kori Leach  Patient Age: 77 y.o.  YOB: 1940  MRN: [id number]  ?  Date of Visit: 10/3/2017  Reason for Office Visit:   Chief Complaint   Patient presents with     Blood Pressure Check     Follow up on BP increase      HPI: Kori Leach 77 y.o. female who presents to clinic for f/u HTN management. She was seen last month for poorly controlled HTN and palpitations. She did have an echo that was normal. Her losartan was increased to 50 mg daily. She has been checking her BP at home and ranges between 140s-180s/60-80s. She denies any recent palpitations, headaches, dizziness, chest pain. She does have a mild cold right now with non productive cough. No f/c    She is compliant with her medication. We discussed secondary causes for HTN and she notes she snores quite loudly at night that her  has to sleep in another room. He has TAMAR. She has never been tested. She is quite fatigued during the day, most days    Review of Systems: As noted in HPI     Current Scheduled Meds:  Outpatient Encounter Prescriptions as of 10/3/2017   Medication Sig Dispense Refill     albuterol (PROVENTIL HFA;VENTOLIN HFA) 90 mcg/actuation inhaler Inhale 2 puffs every 4 (four) hours as needed. 1 Inhaler 11     aspirin 81 MG EC tablet Take 81 mg by mouth daily.       atorvastatin (LIPITOR) 80 MG tablet Take 1 tablet by mouth  daily 90 tablet 2     calcium citrate (CALCITRATE) 200 mg (950 mg) tablet Take 2 tablets by mouth daily.       coenzyme Q10 (CO Q-10) 100 mg capsule Take 200 mg by mouth daily.       ferrous sulfate 325 (65 FE) MG tablet Take 1 tablet by mouth daily.       multivitamin capsule Take 1 capsule by mouth daily.       omega-3 fatty acids-vitamin E (FISH OIL) 1,000 mg cap Take 1 capsule by mouth daily.       omeprazole (PRILOSEC) 20 MG capsule Take 1 capsule by mouth two times daily 180 capsule 2     vit A-vit C-vit E-zinc-copper (EYE VITAMIN AND  "MINERALS) 7,160-113-100 unit-mg-unit Tab Take 1 tablet by mouth daily. 90 tablet 3     [DISCONTINUED] losartan (COZAAR) 25 MG tablet Take 2 tablets (50 mg total) by mouth daily. 90 tablet 3     losartan-hydrochlorothiazide (HYZAAR) 50-12.5 mg per tablet Take 1 tablet by mouth daily. 90 tablet 1     [DISCONTINUED] losartan-hydrochlorothiazide (HYZAAR) 50-12.5 mg per tablet Take 1 tablet by mouth daily. 60 tablet 1     No facility-administered encounter medications on file as of 10/3/2017.        Objective / Physical Examination:  /80  Pulse 84  Ht 5' 2\" (1.575 m)  Wt 200 lb (90.7 kg)  BMI 36.58 kg/m2  Wt Readings from Last 3 Encounters:   10/03/17 200 lb (90.7 kg)   09/15/17 200 lb (90.7 kg)   09/07/17 200 lb 9.6 oz (91 kg)     Body mass index is 36.58 kg/(m^2). (>25?)    General Appearance: Alert and oriented in no acute distress  Neck: No JVD, No Thyromegaly   Lungs: Clear to auscultation bilaterally. Normal inspiratory and expiratory effort. No w/r/r  Cardiovascular: RRR S1, S2. No m/r/g  Abdomen Soft, non-tender.   Extremities: No edema.  Integumentary: Warm and dry  Neuro: Alert and oriented, follows commands appropriately.    Assessment / Plan / Medical Decision Making:      Encounter Diagnoses   Name Primary?     HTN (hypertension) Yes     Snoring      Essential hypertension         1. HTN (hypertension)    Poorly controlled on current regimen.   Likely multifactorial and may have TAMAR   Will switch back to Hyzaar (which she has been on in the past).   Discussed importance of diet and exercise.     - losartan-hydrochlorothiazide (HYZAAR) 50-12.5 mg per tablet; Take 1 tablet by mouth daily.  Dispense: 90 tablet; Refill: 1    2. Snoring  Referral placed to sleep for evaluation and sleep study, suspect TAMAR, which if treated properly will help lower her bp  - Ambulatory referral to Sleep Medicine    Return in about 4 weeks (around 10/31/2017) for Recheck. earlier if needed.    Total time spent with " patient was 15 minutes with >50% of time spent in face-to-face counseling regarding the above plan     Dylan Monzon MD  [facility]

## 2021-06-13 NOTE — PROGRESS NOTES
Internal Medicine Office Visit  [facility]   Patient Name: Kori Leach  Patient Age: 80 y.o.  YOB: 1940  MRN: [id number]    Date of Visit: 12/15/2020  Reason for Office Visit:   Chief Complaint   Patient presents with     Abdominal Pain     Jaw Pain           Assessment / Plan / Medical Decision Making:      Epigastric pain  RUQ pain  Jaw pain  - Broad differential was considered in light of the somewhat unusual presentation. She is s/p cholecystectomy but could have retained or recurrent stones. With her history of tobacco use, HTN, HLD a cardiac etiology is a possibility and she is strongly encouraged to keep scheduled stress test for this Friday. EKG today is without signs of ischemia. She may also have functional dyspepsia, PUD, or GERD and so we will do a trial of carafate four times a day PRN and continue with omeprazole at this time. Her LFTs have been mildly elevated, albeit stable as of an 11/30 draw, but hepatocellular disorder considered as well given the RUQ pain to palpation so we will proceed with ultrasound of the abdomen as well today.    - Follow up with PCP in 1 week after stress test completed and ultrasound done   - May need upper endoscopy   - US Abdomen Complete  - sucralfate (CARAFATE) 1 gram tablet  Dispense: 60 tablet; Refill: 0  - Electrocardiogram Perform and Read         Health Maintenance Review  Health Maintenance   Topic Date Due     ZOSTER VACCINES (2 of 2) 12/10/2015     MEDICARE ANNUAL WELLNESS VISIT  08/31/2021     FALL RISK ASSESSMENT  08/31/2021     LIPID  06/08/2025     ADVANCE CARE PLANNING  08/31/2025     TD 18+ HE  05/30/2027     DEXA SCAN  07/01/2034     SPIROMETRY  Completed     Pneumococcal Vaccine: 65+ Years  Completed     INFLUENZA VACCINE RULE BASED  Completed     COPD ACTION PLAN  Addressed     Pneumococcal Vaccine: Pediatrics (0 to 5 Years) and At-Risk Patients (6 to 64 Years)  Aged Out     HEPATITIS B VACCINES   Discontinued         I have discontinued Kori Leach's famotidine. I am also having her start on sucralfate. Additionally, I am having her maintain her aspirin, rosuvastatin, losartan, fluticasone furoate-vilanteroL, albuterol, cyanocobalamin (vitamin B-12), vit B comp no.3-folic-C-biotin, gabapentin, meclizine, celecoxib, zinc gluconate, ascorbic acid (vitamin C), mv-mn/iron/folic acid/herb 190 (VITAMIN D3 COMPLETE ORAL), multivitamin (MULTIPLE VITAMINS ORAL), MAGNESIUM CITRATE ORAL, and omeprazole.        Orders Placed This Encounter   Procedures     US Abdomen Complete     Electrocardiogram Perform and Read   Followup: Return in about 1 week (around 12/22/2020). earlier if needed.          Shawna Moses CNP        HPI:  Kori Leach is a 80 y.o. year old who presents to the office today for 2 weeks of epigastric pain. When this occurs she feels a pain in the bilateral jaw. Symptoms occur mostly at rest such as at night in bed or sitting at the table.  Activity does not worsen or induce the pain.  The pain is not associated with food intake. Rare alcohol intake. She switched from famotidine to omeprazole about 2 weeks ago because she thought it was due to reflux but has not seen any improvement in symptoms. H/o cholecystectomy. She has been more short of breath recently and is scheduled for a stress test this Friday.       Review of Systems:  Constitutional: Fever, chills, weight change, fatigue  Respiratory: shortness of breath  Cardiovascular: chest pain, palpitations   Gastrointestinal: abdominal pain, heartburn/indigestion, nausea/vomiting, change in appetite, change in bowel habits, constipation or diarrhea  Urinary: painful urination, frequent urination         Current Scheduled Meds:  Outpatient Encounter Medications as of 12/15/2020   Medication Sig Dispense Refill     albuterol (PROAIR HFA;PROVENTIL HFA;VENTOLIN HFA) 90 mcg/actuation inhaler Inhale 2 puffs every 4 (four) hours as needed. 1  Inhaler 0     ascorbic acid, vitamin C, (ASCORBIC ACID WITH SHERWIN HIPS) 500 MG tablet Take 500 mg by mouth daily.       aspirin 81 MG EC tablet Take 81 mg by mouth daily.       celecoxib (CELEBREX) 200 MG capsule Take 1 capsule (200 mg total) by mouth daily. 90 capsule 2     cyanocobalamin, vitamin B-12, 1,000 mcg cap Take 1 capsule by mouth daily.       fluticasone furoate-vilanteroL (BREO ELLIPTA) 100-25 mcg/dose DsDv inhaler Inhale 1 Dose daily. 120 each 3     gabapentin (NEURONTIN) 100 MG capsule Take 100 mg by mouth daily. 90 capsule 3     losartan (COZAAR) 25 MG tablet Take 1 tablet (25 mg total) by mouth daily. 90 tablet 3     MAGNESIUM CITRATE ORAL Take 250 mg by mouth.       meclizine (ANTIVERT) 25 mg tablet Take 1 tablet (25 mg total) by mouth 3 (three) times a day as needed. 30 tablet 0     multivitamin (MULTIPLE VITAMINS ORAL) Take by mouth.       mv-mn/iron/folic acid/herb 190 (VITAMIN D3 COMPLETE ORAL) Take by mouth.       omeprazole (PRILOSEC) 20 MG capsule Take 20 mg by mouth daily before breakfast.       rosuvastatin (CRESTOR) 40 MG tablet Take 1 tablet (40 mg total) by mouth at bedtime. 90 tablet 3     vit B comp no.3-folic-C-biotin (NEPHRO-MAYITO) 1- mg-mg-mcg Tab tablet Take 1 tablet by mouth daily.       zinc gluconate 50 mg tablet Take 50 mg by mouth daily.       [DISCONTINUED] famotidine (PEPCID) 40 MG tablet Take 1 tablet (40 mg total) by mouth every evening. 90 tablet 1     sucralfate (CARAFATE) 1 gram tablet Take 1 tablet (1 g total) by mouth 4 (four) times a day as needed. 60 tablet 0     No facility-administered encounter medications on file as of 12/15/2020.          Past Medical History:   Diagnosis Date     Arthritis     knees and hip replacement     Chronic obstructive pulmonary disease, unspecified COPD type (H), thought to be mild on PFTs 2018 6/4/2018    PFT Complete  Performed 5/21/2018 Final result Study Result FEV1/FVC is 69 and is reduced. FEV1 is 66% predicted and is  reduced. FVC is 74% predicted and reduced. There was improvement in spirometry after a single inhaled dose of bronchodilator. TLC is 110% predicted and is normal. RV is 141% predicted and is increased. DLCO is 77% predicted and is reduced when it  is corrected for hemoglobin.  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 She was set up with the CPAP on 10/25/17 through [facility].      Sleep apnea      Urinary incontinence      Past Surgical History:   Procedure Laterality Date     APPENDECTOMY       CATARACT EXTRACTION       CERVICAL SPINE SURGERY  2015     JOINT REPLACEMENT      bilateral knees and right hip     LAPAROSCOPIC CHOLECYSTECTOMY N/A 6/29/2018    Procedure: CHOLECYSTECTOMY, LAPAROSCOPIC;  Surgeon: Jose Armando Humphries MD;  [facility];  Service:      REPLACEMENT TOTAL KNEE BILATERAL       TONSILLECTOMY       WISDOM TOOTH EXTRACTION       Social History     Tobacco Use     Smoking status: Former Smoker     Types: Cigarettes     Smokeless tobacco: Never Used     Tobacco comment: 1-2 cigarettes per day on average    Substance Use Topics     Alcohol use: Yes     Comment: less than 1 drink per month      Drug use: No       Objective / Physical  Examination:  Vitals:    12/15/20 1110   BP: 122/62   Pulse: 77   Temp: 97.6  F (36.4  C)   TempSrc: Tympanic   SpO2: 98%   Weight: 211 lb (95.7 kg)     Wt Readings from Last 3 Encounters:   12/15/20 211 lb (95.7 kg)   11/30/20 212 lb (96.2 kg)   10/28/20 214 lb (97.1 kg)     Body mass index is 38.59 kg/m .     Constitutional: In no apparent distress  Respiratory: Clear to auscultation bilaterally. Normal inspiratory and expiratory effort  Cardiovascular: Regular rate and rhythm. No murmurs, rubs, or gallops  Gastrointestinal: Bowel sounds active all four quadrants. Soft, RUQ and epigastric tenderness to palpation. No rebound tenderness or guarding.   : no CVA tenderness

## 2021-06-14 NOTE — PROGRESS NOTES
Kori Leach was scheduled for a right shoulder injection today and mentioned she is scheduled for the COVID vaccine this Thursday. Because of this I recommended against a steroid injection until at least 2 weeks after the final vaccine is given. She will take Celebrex and acetaminophen for the pain, ice also recommended. She will be rescheduled. No charge for this visit.

## 2021-06-15 NOTE — PROGRESS NOTES
Internal Medicine Office Visit  [facility]   Patient Name: Kori Leach  Patient Age: 80 y.o.  YOB: 1940  MRN: [id number]    Date of Visit: 3/11/2021  Reason for Office Visit:   Chief Complaint   Patient presents with     Injections           Assessment / Plan / Medical Decision Making:    Problem List Items Addressed This Visit     HTN (hypertension) (Chronic)     - INCREASE losartan from 25 mg to 50 mg daily            Other Visit Diagnoses     Spasm of back muscles    -  Primary    - Try heat, stretching, OTC SalonPas back pain patches     Acute pain of right shoulder        - shoulder injection today     Relevant Medications    triamcinolone acetonide 40 mg/mL injection 80 mg (KENALOG-40) (Completed) (Start on 3/11/2021 12:00 PM)    OAB (overactive bladder)        - Bladder retraining reviewed today             I am having Kori Leach maintain her rosuvastatin, losartan, albuterol, cyanocobalamin (vitamin B-12), vit B comp no.3-folic-C-biotin, gabapentin, meclizine, celecoxib, zinc gluconate, ascorbic acid (vitamin C), mv-mn/iron/folic acid/herb 190 (VITAMIN D3 COMPLETE ORAL), multivitamin (MULTIPLE VITAMINS ORAL), MAGNESIUM CITRATE ORAL, omeprazole, and fluticasone furoate-vilanteroL. We administered triamcinolone acetonide 40 mg/mL.            No orders of the defined types were placed in this encounter.  Followup: Return in about 4 weeks (around 4/8/2021) for Recheck with PCP . earlier if needed.        Shawna Moses, XIOMARA        HPI:  Kori Leach is a 80 y.o. year old who presents to the office today for right shoulder pain. The right shoulder is painful, wakes her up at night.  She has had good success with shoulder injections in the past, her last was in June 2020.  It is been over 2 weeks since her last Covid vaccine.    For the past 2 weeks she has a bilateral flank pain. It hurts more with position changes and first thing in the morning. It is  intermittent. No pain with inspiration, she has a slight cough. No fevers or chills.     She has had urinary frequency and urgency.  No dysuria or hematuria, the symptoms are ongoing.        Health Maintenance Review  Health Maintenance   Topic Date Due     ZOSTER VACCINES (2 of 2) 12/10/2015     MEDICARE ANNUAL WELLNESS VISIT  08/31/2021     FALL RISK ASSESSMENT  08/31/2021     LIPID  06/08/2025     ADVANCE CARE PLANNING  08/31/2025     TD 18+ HE  05/30/2027     DEXA SCAN  07/01/2034     SPIROMETRY  Completed     Pneumococcal Vaccine: Pediatrics (0 to 5 Years) and At-Risk Patients (6 to 64 Years)  Completed     Pneumococcal Vaccine: 65+ Years  Completed     INFLUENZA VACCINE RULE BASED  Completed     COVID-19 Vaccine  Completed     COPD ACTION PLAN  Addressed     HEPATITIS B VACCINES  Discontinued       Current Scheduled Meds:  Outpatient Encounter Medications as of 3/11/2021   Medication Sig Dispense Refill     albuterol (PROAIR HFA;PROVENTIL HFA;VENTOLIN HFA) 90 mcg/actuation inhaler Inhale 2 puffs every 4 (four) hours as needed. 1 Inhaler 0     ascorbic acid, vitamin C, (ASCORBIC ACID WITH SHERWIN HIPS) 500 MG tablet Take 500 mg by mouth daily.       celecoxib (CELEBREX) 200 MG capsule Take 1 capsule (200 mg total) by mouth daily. 90 capsule 2     cyanocobalamin, vitamin B-12, 1,000 mcg cap Take 1 capsule by mouth daily.       fluticasone furoate-vilanteroL (BREO ELLIPTA) 100-25 mcg/dose inhaler Inhale 1 puff daily. 3 each 3     gabapentin (NEURONTIN) 100 MG capsule Take 100 mg by mouth daily. 90 capsule 3     losartan (COZAAR) 25 MG tablet Take 1 tablet (25 mg total) by mouth daily. 90 tablet 3     MAGNESIUM CITRATE ORAL Take 250 mg by mouth.       meclizine (ANTIVERT) 25 mg tablet Take 1 tablet (25 mg total) by mouth 3 (three) times a day as needed. 30 tablet 0     multivitamin (MULTIPLE VITAMINS ORAL) Take by mouth.       mv-mn/iron/folic acid/herb 190 (VITAMIN D3 COMPLETE ORAL) Take by mouth.       omeprazole  (PRILOSEC) 20 MG capsule Take 20 mg by mouth daily before breakfast.       rosuvastatin (CRESTOR) 40 MG tablet Take 1 tablet (40 mg total) by mouth at bedtime. 90 tablet 3     vit B comp no.3-folic-C-biotin (NEPHRO-MAYITO) 1- mg-mg-mcg Tab tablet Take 1 tablet by mouth daily.       zinc gluconate 50 mg tablet Take 50 mg by mouth daily.       Facility-Administered Encounter Medications as of 3/11/2021   Medication Dose Route Frequency Provider Last Rate Last Admin     [COMPLETED] triamcinolone acetonide 40 mg/mL injection 80 mg (KENALOG-40)  80 mg Other Once Shawna Moses, FNP   80 mg at 03/11/21 1109         Objective / Physical Examination:  Vitals:    03/11/21 1039 03/11/21 1121   BP: 154/72 154/72   Pulse: 88    Weight: 213 lb (96.6 kg)      Wt Readings from Last 3 Encounters:   03/11/21 213 lb (96.6 kg)   12/21/20 213 lb (96.6 kg)   12/18/20 210 lb (95.3 kg)     Body mass index is 37.73 kg/m .     Constitutional: In no apparent distress  Respiratory: Clear to auscultation bilaterally. Normal inspiratory and expiratory effort  Cardiovascular: Regular rate and rhythm. No murmurs, rubs, or gallops.   : no CVA pain   MSK: No spinous process tenderness.  She has thoracic paraspinal tenderness.        Procedure: Verbal consent for a steroid injection was obtained. Posterior border of the acromion was sterilely prepped and a mixture of 1 mL lidocaine 2% and 2 mL kenalog (40 mg/mL) was injected to the subacromial space of the right shoulder.   Patient tolerated the procedure well.   Patient instructed to avoid excessive use of the shoulder over the next 2 weeks but in particular the next 3 days. Monitor for signs of allergic reaction. Contact the office for any concerns.

## 2021-06-15 NOTE — PATIENT INSTRUCTIONS - HE
- Try bladder retraining as a way to treat overactive bladder symptoms     Problem List Items Addressed This Visit     None      Visit Diagnoses     Spasm of back muscles    -  Primary    - Try heat, stretching, OTC SalonPas back pain patches     Acute pain of right shoulder        - shoulder injection today     Relevant Medications    triamcinolone acetonide 40 mg/mL injection 80 mg (KENALOG-40) (Completed) (Start on 3/11/2021 12:00 PM)        
Improved

## 2021-06-16 PROBLEM — E66.01 MORBID OBESITY (H): Status: ACTIVE | Noted: 2018-09-04

## 2021-06-16 PROBLEM — J44.9 CHRONIC OBSTRUCTIVE PULMONARY DISEASE, UNSPECIFIED COPD TYPE (H): Status: ACTIVE | Noted: 2018-06-04

## 2021-06-16 PROBLEM — G47.33 SEVERE OBSTRUCTIVE SLEEP APNEA: Status: ACTIVE | Noted: 2018-04-18

## 2021-06-16 PROBLEM — R73.03 PREDIABETES: Status: ACTIVE | Noted: 2018-06-04

## 2021-06-16 NOTE — TELEPHONE ENCOUNTER
Telephone Encounter by Sandhya Tejada CMA at 6/5/2019  9:45 AM     Author: Sandhya Tejada CMA Service: -- Author Type: Certified Medical Assistant    Filed: 6/5/2019  9:45 AM Encounter Date: 6/5/2019 Status: Signed    : Sandhya Tejada CMA (Certified Medical Assistant)       Last Labs ordered

## 2021-06-17 NOTE — PROGRESS NOTES
Internal Medicine Office Visit  [facility]  Patient Name: Kori Leach  Patient Age: 77 y.o.  YOB: 1940  MRN: [id number]    Date of Visit: 2018  Reason for Office Visit:   Chief Complaint   Patient presents with     Follow-up     cough           Assessment / Plan / Medical Decision Makin. COPD exacerbation  2. Emphysema lung  - HM1(CBC and Differential)  - CMP  - Continue prednisone 40 mg daily an additional 4 days  - I am still concerned about her shortness of breath and would like to see her again in 3 days for a recheck. She will continue to monitor her symptoms, return to clinic or ER if she is worsening   - Consider PFT with resolution of illness   - May need ICS/LABA inhaler due to reported recurrent bronchitis over the past 1 year     3. Nodule of right lung, repeat 2019  - Explained nature of follow up. Repeat CT in 1 year     4. Hypertension  - Comprehensive Metabolic Panel  - Stable       Health Maintenance Review  Health Maintenance   Topic Date Due     DXA SCAN  2005     PNEUMOCOCCAL POLYSACCHARIDE VACCINE AGE 65 AND OVER  2005     FALL RISK ASSESSMENT  10/03/2017     ADVANCE DIRECTIVES DISCUSSED WITH PATIENT  10/03/2021     TD 18+ HE  2027     INFLUENZA VACCINE RULE BASED  Completed     PNEUMOCOCCAL CONJUGATE VACCINE FOR ADULTS (PCV13 OR PREVNAR)  Completed     ZOSTER VACCINE  Completed         I have changed Ms. Leach's predniSONE. I am also having her maintain her aspirin, omega-3 fatty acids-vitamin E, ferrous sulfate, multivitamin, calcium citrate, albuterol, coenzyme Q10, vit A-vit C-vit E-zinc-copper, omeprazole, atorvastatin, losartan-hydrochlorothiazide, and levoFLOXacin.      HPI:  Kori Leach is a 77 y.o. year old who presents to the office today for follow up of bronchitis after visit last week.    Cough- slightly better. Seems to have more productive cough but still loose in the  chest  Dyspnea- still feels short of breath but slightly less than she was last week  Denies headaches, myalgias, no chills/fevers, congestion    Was in [place], [place] from November to April. Traveled one day to [place] during her time in [place]. No known exposure to rodents in the area she lives.     Tobacco use- 3-4 cigarettes per day on average. Admits that she has been hiding the fact that she smokes.       Review of Systems- pertinent positive in bold:  A 10-point ROS is negative except as stated in HPI         Current Scheduled Meds:  Outpatient Encounter Prescriptions as of 4/23/2018   Medication Sig Dispense Refill     albuterol (PROVENTIL HFA;VENTOLIN HFA) 90 mcg/actuation inhaler Inhale 2 puffs every 4 (four) hours as needed. 1 Inhaler 11     aspirin 81 MG EC tablet Take 81 mg by mouth daily.       atorvastatin (LIPITOR) 80 MG tablet Take 1 tablet by mouth  daily 90 tablet 2     calcium citrate (CALCITRATE) 200 mg (950 mg) tablet Take 2 tablets by mouth daily.       coenzyme Q10 (CO Q-10) 100 mg capsule Take 200 mg by mouth daily.       ferrous sulfate 325 (65 FE) MG tablet Take 1 tablet by mouth daily.       levoFLOXacin (LEVAQUIN) 750 MG tablet Take 1 tablet (750 mg total) by mouth daily for 10 days. 10 tablet 0     losartan-hydrochlorothiazide (HYZAAR) 50-12.5 mg per tablet Take 1 tablet by mouth daily. Due for office follow up 90 tablet 0     multivitamin capsule Take 1 capsule by mouth daily.       omega-3 fatty acids-vitamin E (FISH OIL) 1,000 mg cap Take 1 capsule by mouth daily.       omeprazole (PRILOSEC) 20 MG capsule Take 1 capsule by mouth two times daily 180 capsule 2     predniSONE (DELTASONE) 20 MG tablet Take 2 tablets (40 mg total) by mouth daily for 4 days. 8 tablet 0     vit A-vit C-vit E-zinc-copper (EYE VITAMIN AND MINERALS) 7,160-113-100 unit-mg-unit Tab Take 1 tablet by mouth daily. 90 tablet 3     [DISCONTINUED] predniSONE (DELTASONE) 20 MG tablet Take 2 tablets (40 mg total) by mouth  daily for 5 days. 10 tablet 0     No facility-administered encounter medications on file as of 4/23/2018.      Past Medical History:   Diagnosis Date     Inverted nipple     bilateral     Past Surgical History:   Procedure Laterality Date     APPENDECTOMY       CERVICAL SPINE SURGERY  2015     REPLACEMENT TOTAL KNEE BILATERAL       Social History   Substance Use Topics     Smoking status: Former Smoker     Types: Cigarettes     Smokeless tobacco: Never Used      Comment: 1-2 cigarettes per day on average      Alcohol use Yes      Comment: less than 1 drink per month        Objective / Physical Examination:  Vitals:    04/23/18 1425   BP: 110/70   Pulse: 86   SpO2: 94%   Weight: 195 lb (88.5 kg)     Wt Readings from Last 3 Encounters:   04/23/18 195 lb (88.5 kg)   04/18/18 196 lb (88.9 kg)   10/03/17 200 lb (90.7 kg)     Body mass index is 35.67 kg/(m^2).     General Appearance: Alert and oriented, cooperative, affect appropriate, speech clear, in no apparent distress  Neck: Supple, trachea midline. No cervical adenopathy  Lungs: Scattered expiratory wheezing, slightly diminished left lower lobe. No crackles. Normal inspiratory and expiratory effort  Cardiovascular: Regular rate, normal S1, S2. No murmurs, rubs, or gallops  Extremities: Pulses 2+ and equal throughout. No edema      Orders Placed This Encounter   Procedures     Comprehensive Metabolic Panel     HM1 (CBC with Diff)   Followup: Return in about 3 days (around 4/26/2018). earlier if needed.        Shawna Moses, CNP

## 2021-06-17 NOTE — PROGRESS NOTES
Internal Medicine Office Visit  [facility]  Patient Name: Kori Leach  Patient Age: 77 y.o.  YOB: 1940  MRN: [id number]    Date of Visit: 2018  Reason for Office Visit:   Chief Complaint   Patient presents with     Follow-up           Assessment / Plan / Medical Decision Makin. Emphysema lung  2. COPD exacerbation  - Condition is improving and symptoms are resolving as expected. No further follow up for this episode of COPD exacerbation planned based on current condition   - Return to clinic beginning of  for annual physical/follow up chronic medical conditions   - Complete PFT in the next 2-4 weeks, consider starting Spiriva daily for COPD  - Will do chest x-ray for follow up of airspace disease seen on last x-ray with physical follow up appointment in    - Complete antibiotic     Health Maintenance Review  Health Maintenance   Topic Date Due     DXA SCAN  2005     PNEUMOCOCCAL POLYSACCHARIDE VACCINE AGE 65 AND OVER  2005     FALL RISK ASSESSMENT  10/03/2017     ADVANCE DIRECTIVES DISCUSSED WITH PATIENT  10/03/2021     TD 18+ HE  2027     INFLUENZA VACCINE RULE BASED  Completed     PNEUMOCOCCAL CONJUGATE VACCINE FOR ADULTS (PCV13 OR PREVNAR)  Completed     ZOSTER VACCINE  Completed         I am having Ms. Leach maintain her aspirin, omega-3 fatty acids-vitamin E, ferrous sulfate, multivitamin, calcium citrate, albuterol, coenzyme Q10, vit A-vit C-vit E-zinc-copper, omeprazole, atorvastatin, losartan-hydrochlorothiazide, levoFLOXacin, and predniSONE.      HPI:  Kori Leach is a 77 y.o. year old who presents to the office today for follow up of COPD exacerbation.     Last cigarette was 3 months ago.   SOA- better  Coughing- still a dry/loose cough. Not coughing as much  Dizziness- resolved  Fatigue is improving     Review of Systems- pertinent positive in bold:  A 10-point ROS is negative except as stated in  HPI         Current Scheduled Meds:  Outpatient Encounter Prescriptions as of 4/26/2018   Medication Sig Dispense Refill     albuterol (PROVENTIL HFA;VENTOLIN HFA) 90 mcg/actuation inhaler Inhale 2 puffs every 4 (four) hours as needed. 1 Inhaler 11     aspirin 81 MG EC tablet Take 81 mg by mouth daily.       atorvastatin (LIPITOR) 80 MG tablet Take 1 tablet by mouth  daily 90 tablet 2     calcium citrate (CALCITRATE) 200 mg (950 mg) tablet Take 2 tablets by mouth daily.       coenzyme Q10 (CO Q-10) 100 mg capsule Take 200 mg by mouth daily.       ferrous sulfate 325 (65 FE) MG tablet Take 1 tablet by mouth daily.       levoFLOXacin (LEVAQUIN) 750 MG tablet Take 1 tablet (750 mg total) by mouth daily for 10 days. 10 tablet 0     losartan-hydrochlorothiazide (HYZAAR) 50-12.5 mg per tablet Take 1 tablet by mouth daily. Due for office follow up 90 tablet 0     multivitamin capsule Take 1 capsule by mouth daily.       omega-3 fatty acids-vitamin E (FISH OIL) 1,000 mg cap Take 1 capsule by mouth daily.       omeprazole (PRILOSEC) 20 MG capsule Take 1 capsule by mouth two times daily 180 capsule 2     predniSONE (DELTASONE) 20 MG tablet Take 2 tablets (40 mg total) by mouth daily for 4 days. 8 tablet 0     vit A-vit C-vit E-zinc-copper (EYE VITAMIN AND MINERALS) 7,160-113-100 unit-mg-unit Tab Take 1 tablet by mouth daily. 90 tablet 3     No facility-administered encounter medications on file as of 4/26/2018.      Past Medical History:   Diagnosis Date     Inverted nipple     bilateral     Past Surgical History:   Procedure Laterality Date     APPENDECTOMY       CERVICAL SPINE SURGERY  2015     REPLACEMENT TOTAL KNEE BILATERAL       Social History   Substance Use Topics     Smoking status: Former Smoker     Types: Cigarettes     Smokeless tobacco: Never Used      Comment: 1-2 cigarettes per day on average      Alcohol use Yes      Comment: less than 1 drink per month        Objective / Physical Examination:  Vitals:     04/26/18 0838   BP: 120/74   Pulse: 70   SpO2: 96%   Weight: 196 lb (88.9 kg)     Wt Readings from Last 3 Encounters:   04/26/18 196 lb (88.9 kg)   04/23/18 195 lb (88.5 kg)   04/18/18 196 lb (88.9 kg)     Body mass index is 35.85 kg/(m^2).     General Appearance: Alert and oriented, cooperative, affect appropriate, speech clear, in no apparent distress  Ears: Tympanic membrane clear with landmarks well visualized bilaterally  Eyes: PERRL, Conjunctivae clear and sclerae non-icteric  Throat: Lips and mucosa moist. Teeth in good repair, pharynx without erythema or exudate  Lungs: Clear to auscultation bilaterally. No wheezing or crackles. Respiratory effort normal.   Cardiovascular: Regular rate, normal S1, S2. No murmurs, rubs, or gallops      Orders Placed This Encounter   Procedures     PFT Complete   Followup: Return in about 4 weeks (around 5/24/2018) for Next scheduled follow up. earlier if needed.      Shawna Moses, CNP

## 2021-06-17 NOTE — PATIENT INSTRUCTIONS - HE
Patient Instructions by Shawna Moses FNP at 7/24/2019 11:10 AM     Author: Shawna Moses FNP Service: -- Author Type: Nurse Practitioner    Filed: 7/24/2019 11:27 AM Encounter Date: 7/24/2019 Status: Signed    : Shawna Moses FNP (Nurse Practitioner)       Patient Education     Eating to Prevent Gout  Gout is a painful form of arthritis caused by an excess of uric acid. This is a waste product made by the body. It builds up in the body and forms crystals that collect in the joints, causing a gout attack. Alcohol and certain foods can trigger a gout attack. Below are some guidelines for changing your diet to help you manage gout. Your healthcare provider can work with you to determine the best eating plan for you. Know that diet is only one part of managing gout. Take your medicines as prescribed and follow the other guidelines your healthcare provider has given you.  Foods to limit  Eating too many foods containing purines may increase the levels of uric acid in your body and increase your risk for a gout attack. It may be best to limit these high-purine foods:    Alcohol (beer and red wine). You may be told to avoid alcohol completely.    Certain fish (anchovies, sardines, fish roes, herring, tuna, mussels, codfish, scallops, trout, and natasha)    Certain meats (red meat, processed meat, juan, turkey, wild game, and goose)    Sauces and gravies made with meat    Organ meats (such as liver, kidneys, sweetbreads, and tripe)    Legumes (such as dried beans and peas)    Mushrooms, spinach, asparagus, and cauliflower    Yeast and yeast extract supplements  Foods to try  Some foods may be helpful for people with gout. You may want to try adding some of the following foods to your diet:    Dark berries. These include blueberries, blackberries, and cherries. These berries contain chemicals that may lower uric acid.    Tofu. Tofu, which is made from soy, is a good source of protein. Studies  have shown that it may be a better choice than meat for people with gout.    Omega fatty acids. These acids are found in fatty fish (such as salmon), certain oils (such as flax, olive, or nut oils), or nuts. They may help prevent inflammation due to gout.  The following guidelines are recommended by the American Medical Association for people with gout. Your diet should be:    High in fiber, whole grains, fruits, and vegetables.    Low in protein (15% of calories should come from protein. Choose lean sources, such as soy, lean meats, and poultry).    Low in fat (no more than 30% of calories should come from fat, with only 10% coming from animal, or saturated, fat).   Date Last Reviewed: 11/1/2017 2000-2017 [employer]. [address] [place] [address], [place] [address]. All rights reserved. This information is not intended as a substitute for professional medical care. Always follow your healthcare professional's instructions.

## 2021-06-17 NOTE — TELEPHONE ENCOUNTER
Telephone Encounter by Antwan Vargas CMA at 3/2/2021 10:17 AM     Author: Antwan Vargas CMA Service: -- Author Type: Certified Medical Assistant    Filed: 3/2/2021 10:21 AM Encounter Date: 3/2/2021 Status: Signed    : Antwan Vargas CMA (Certified Medical Assistant)       Pt in clinic with  and would like printed RX of inhaler.  Pt last seen 12/21/20  Due to be seen around 6/21/21    Plan:      1. Stop aspirin (prophylaxis)  2. Omeprazole (Prilosec) 20 mg po two times a day.  3. MRI scan of the abdomen.  4. Lorazepam (Ativan) for MRI scan.  5. Stop sucralfate (CARAFATE)   6. Update me in 2 weeks on her status with the omeprazole (Prilosec).           Donaldo Vernon MD  General Internal Medicine  [facility]        Return in about 6 months (around 6/21/2021), or if symptoms worsen or fail to improve, for follow up visit.

## 2021-06-17 NOTE — PROGRESS NOTES
Internal Medicine Office Visit  [facility]  Patient Name: Kori Leach  Patient Age: 77 y.o.  YOB: 1940  MRN: [id number]    Date of Visit: 2018  Reason for Office Visit:   Chief Complaint   Patient presents with     Cough     started last week     Shortness of Breath     has gotten worse the last few days           Assessment / Plan / Medical Decision Makin. TAMAR (obstructive sleep apnea)- CPAP nightly   2. Dyspnea  3. Cough  - I have a low suspicion for pulmonary embolism, however, with recent travel and timing of onset of shortness of breath I am going to run a STAT d-dimer. If this is positive will proceed with a CT of the chest. More likely shortness of breath is due to bronchitis as she has had other symptoms of a viral illness with chills/feverish feeling and cough/congestion  - Chest x-ray today. Images were personally reviewed: she does not have any acute infiltrates, airspace disease  - Nebulizer treatment given, Sp02 90%. She does not notice much difference in wheezing/shortness of breath   - CTA ordered as a hold and call. She is advised results are negative for PE, will contact her with final results of CTA. Will treat with zpak and levofloxacin plus prednisone burst 40 mg daily x 5 days. See telephone encounter with call regarding CT results. She is strongly advised that if symptoms are worsening in any way that she should go to the ER. She declines to go there now and clinically appears stable but I am concerned about a rapid change in her condition. Will call  to check on how she is doing   Follow up on  with recheck.       Health Maintenance Review  Health Maintenance   Topic Date Due     DXA SCAN  2005     PNEUMOCOCCAL POLYSACCHARIDE VACCINE AGE 65 AND OVER  2005     INFLUENZA VACCINE RULE BASED (1) 2017     FALL RISK ASSESSMENT  10/03/2017     ADVANCE DIRECTIVES DISCUSSED WITH PATIENT  10/03/2021      TD 18+ HE  05/30/2027     PNEUMOCOCCAL CONJUGATE VACCINE FOR ADULTS (PCV13 OR PREVNAR)  Completed     ZOSTER VACCINE  Completed         I am having Ms. Leach start on predniSONE and levoFLOXacin. I am also having her maintain her aspirin, omega-3 fatty acids-vitamin E, ferrous sulfate, multivitamin, calcium citrate, albuterol, coenzyme Q10, vit A-vit C-vit E-zinc-copper, omeprazole, atorvastatin, losartan-hydrochlorothiazide, and azithromycin. We administered albuterol.      HPI:  Kori Leach is a 77 y.o. year old who presents to the office today for c/o cough and shortness of breath.  She has been living with her  in [place] since October and last Thursday flew home. The next day on Friday she started to cough and had a low grade fever she believes, has not checked a temperature. Now she feels short of breath, has pain in the ribs when she coughs, no pain when she takes deep breaths. She has not noticed any wheezing. She had a similar episode in January but was not seen in a clinic specifically for this and symptoms resolved without any treatment.  She has a GEOVANI inhaler which she purchased from [employer].  This is not effective in relieving her shortness of breath. She does endorse congestion and nasal drainage as well. No myalgias.     She was recently diagnosed with obstructive sleep apnea which was tested through [facility]. She does have a CPAP mask now and wears this nightly.     Review of Systems- pertinent positive in bold:  Constitutional: Fever, chills, night sweats, fainting, weight change, fatigue, seizures, dizziness, sleeping difficulties, loud snoring/pauses in breathing  Eyes: change in vision, blurred or double vision, redness/eye pain  Ears, nose, mouth, throat: change in hearing, ear pain, hoarseness, difficulty swallowing, sores in the mouth or throat  Respiratory: shortness of breath, cough, bloody sputum, wheezing  Cardiovascular: chest pain, palpitations    Gastrointestinal: abdominal pain, heartburn/indigestion, nausea/vomiting, change in appetite, change in bowel habits, constipation or diarrhea, rectal bleeding/dark stools, difficulty swallowing  Neurologic/emotional: worrisome memory change, numbness/tingling, anxiety, mood swings      Current Scheduled Meds:  Outpatient Encounter Prescriptions as of 4/18/2018   Medication Sig Dispense Refill     albuterol (PROVENTIL HFA;VENTOLIN HFA) 90 mcg/actuation inhaler Inhale 2 puffs every 4 (four) hours as needed. 1 Inhaler 11     aspirin 81 MG EC tablet Take 81 mg by mouth daily.       atorvastatin (LIPITOR) 80 MG tablet Take 1 tablet by mouth  daily 90 tablet 2     calcium citrate (CALCITRATE) 200 mg (950 mg) tablet Take 2 tablets by mouth daily.       coenzyme Q10 (CO Q-10) 100 mg capsule Take 200 mg by mouth daily.       ferrous sulfate 325 (65 FE) MG tablet Take 1 tablet by mouth daily.       losartan-hydrochlorothiazide (HYZAAR) 50-12.5 mg per tablet Take 1 tablet by mouth daily. Due for office follow up 90 tablet 0     multivitamin capsule Take 1 capsule by mouth daily.       omega-3 fatty acids-vitamin E (FISH OIL) 1,000 mg cap Take 1 capsule by mouth daily.       omeprazole (PRILOSEC) 20 MG capsule Take 1 capsule by mouth two times daily 180 capsule 2     vit A-vit C-vit E-zinc-copper (EYE VITAMIN AND MINERALS) 7,160-113-100 unit-mg-unit Tab Take 1 tablet by mouth daily. 90 tablet 3     azithromycin (ZITHROMAX Z-MAKAYLA) 250 MG tablet Take 2 tablets (500 mg) on  Day 1,  followed by 1 tablet (250 mg) once daily on Days 2 through 5. 6 tablet 0     levoFLOXacin (LEVAQUIN) 750 MG tablet Take 1 tablet (750 mg total) by mouth daily for 10 days. 10 tablet 0     predniSONE (DELTASONE) 20 MG tablet Take 2 tablets (40 mg total) by mouth daily for 5 days. 10 tablet 0     [DISCONTINUED] azithromycin (ZITHROMAX Z-MAKAYLA) 250 MG tablet Take 2 tablets (500 mg) on  Day 1,  followed by 1 tablet (250 mg) once daily on Days 2 through 5. 6  tablet 0     Facility-Administered Encounter Medications as of 4/18/2018   Medication Dose Route Frequency Provider Last Rate Last Dose     [COMPLETED] albuterol nebulizer solution 3 mL (PROVENTIL)  3 mL Nebulization Once Shawna Thu Moses, FNP   3 mL at 04/18/18 1301     Past Medical History:   Diagnosis Date     Inverted nipple     bilateral     Past Surgical History:   Procedure Laterality Date     APPENDECTOMY       CERVICAL SPINE SURGERY  2015     REPLACEMENT TOTAL KNEE BILATERAL       Social History   Substance Use Topics     Smoking status: Former Smoker     Types: Cigarettes     Smokeless tobacco: Never Used      Comment: 1-2 cigarettes per day on average      Alcohol use Yes      Comment: less than 1 drink per month        Objective / Physical Examination:  Vitals:    04/18/18 1134 04/18/18 1316   BP: 114/62    Pulse: 90    Temp: 98  F (36.7  C)    TempSrc: Oral    SpO2: (!) 89% 90%   Weight: 196 lb (88.9 kg)      Wt Readings from Last 3 Encounters:   04/18/18 196 lb (88.9 kg)   10/03/17 200 lb (90.7 kg)   09/15/17 200 lb (90.7 kg)     Body mass index is 35.85 kg/(m^2).     General Appearance: Alert and oriented, cooperative, affect appropriate, speech clear, in no apparent distress  Head: Normocephalic, atraumatic  Ears: Tympanic membrane clear with landmarks well visualized bilaterally  Eyes: PERRL, Conjunctivae clear and sclerae non-icteric  Throat: Lips and mucosa moist. Teeth in good repair, pharynx without erythema or exudate  Neck: Supple, trachea midline. No cervical adenopathy  Lungs: Breath sounds are tight, right upper lobe with inspiratory and expiratory wheezing. Normal inspiratory and expiratory effort. No crackles.   Cardiovascular: Regular rate, normal S1, S2. No murmurs, rubs, or gallops  Extremities: acyanotic       Orders Placed This Encounter   Procedures     XR Chest 2 Views     CTA Chest PE Run     D-dimer, Quantitative   Followup: Return in about 5 days (around 4/23/2018). earlier if  needed. Will contact patient tomorrow 4/19 to see how she is feeling     Time with patient: 45 minutes with >50% of time spent in face-to-face counseling and coordination of care     Shawna Moses, CNP

## 2021-06-17 NOTE — PATIENT INSTRUCTIONS - HE
Patient Instructions by Shawna Moses FNP at 6/26/2019  9:05 AM     Author: Shawna Moses FNP Service: -- Author Type: Nurse Practitioner    Filed: 6/26/2019  9:35 AM Encounter Date: 6/26/2019 Status: Addendum    : Shawna Moses FNP (Nurse Practitioner)    Related Notes: Original Note by Shawna Moses FNP (Nurse Practitioner) filed at 6/26/2019  9:30 AM         Patient Education     Exercise for a Healthier Heart  You may wonder how you can improve the health of your heart. If youre thinking about exercise, youre on the right track. You dont need to become an athlete, but you do need a certain amount of brisk exercise to help strengthen your heart. If you have been diagnosed with a heart condition, your doctor may recommend exercise to help stabilize your condition. To help make exercise a habit, choose safe, fun activities.       Be sure to check with your health care provider before starting an exercise program.    Why exercise?  Exercising regularly offers many healthy rewards. It can help you do all of the following:    Improve your blood cholesterol levels to help prevent further heart trouble    Lower your blood pressure to help prevent a stroke or heart attack    Control diabetes, or reduce your risk of getting this disease    Improve your heart and lung function    Reach and maintain a healthy weight    Make your muscles stronger and more limber so you can stay active    Prevent falls and fractures by slowing the loss of bone mass (osteoporosis)    Manage stress better  Exercise tips  Ease into your routine. Set small goals. Then build on them.  Exercise on most days. Aim for a total of 150 or more minutes of moderate to  vigorous intensity activity each week. Consider 40 minutes, 3 to 4 times a week. For best results, activity should last for 40 minutes on average. It is OK to work up to the 40 minute period over time. Examples of moderate-intensity activity is walking one  mile in 15 minutes or 30 to 45 minutes of yard work.  Step up your daily activity level. Along with your exercise program, try being more active throughout the day. Walk instead of drive. Do more household tasks or yard work.  Choose one or more activities you enjoy. Walking is one of the easiest things you can do. You can also try swimming, riding a bike, or taking an exercise class.  Stop exercising and call your doctor if you:    Have chest pain or feel dizzy or lightheaded    Feel burning, tightness, pressure, or heaviness in your chest, neck, shoulders, back, or arms    Have unusual shortness of breath    Have increased joint or muscle pain    Have palpitations or an irregular heartbeat      9202-6796 [employer]. [address] [place] [address]. All rights reserved. This information is not intended as a substitute for professional medical care. Always follow your healthcare professional's instructions.         Patient Education   Understanding [employer] MyPlate  The USDA (US Department of Agriculture) has guidelines to help you make healthy food choices. These are called MyPlate. MyPlate shows the food groups that make up healthy meals using the image of a place setting. Before you eat, think about the healthiest choices for what to put onto your plate or into your cup or bowl. To learn more about building a healthy plate, visit www.choosemyplate.gov.       The Food Groups    Fruits: Any fruit or 100% fruit juice counts as part of the Fruit Group. Fruits may be fresh, canned, frozen, or dried, and may be whole, cut-up, or pureed. Make half your plate fruits and vegetables.    Vegetables: Any vegetable or 100% vegetable juice counts as a member of the Vegetable Group. Vegetables may be fresh, frozen, canned, or dried. They can be served raw or cooked and may be whole, cut-up, or mashed. Make half your plate fruits and vegetables.     Grains: All foods made from grains are part of the Grains  Group. These include wheat, rice, oats, cornmeal, and barley such as bread, pasta, oatmeal, cereal, tortillas, and grits. Grains should be no more than a quarter of your plate. At least half of your grains should be whole grains.    Protein: This group includes meat, poultry, seafood, beans and peas, eggs, processed soy products (like tofu), nuts (including nut butters), and seeds. Make protein choices no more than a quarter of your plate. Meat and poultry choices should be lean or low fat.    Dairy: All fluid milk products and foods made from milk that contain calcium, like yogurt and cheese are part of the Dairy Group. (Foods that have little calcium, such as cream, butter, and cream cheese, are not part of the group.) Most dairy choices should be low-fat or fat-free.    Oils: These are fats that are liquid at room temperature. They include canola, corn, olive, soybean, and sunflower oil. Foods that are mainly oil include mayonnaise, certain salad dressings, and soft margarines. You should have only 5 to 7 teaspoons of oils a day. You probably already get this much from the food you eat.  Use [employer] to Help Build Your Meals  The SuperTracker can help you plan and track your meals and activity. You can look up individual foods to see or compare their nutritional value. You can get guidelines for what and how much you should eat. You can compare your food choices. And you can assess personal physical activities and see ways you can improve. Go to www.Kindermintplate.gov/supertracker/.    4234-7475 [employer]. [address] [place] [address]. All rights reserved. This information is not intended as a substitute for professional medical care. Always follow your healthcare professional's instructions.           Patient Education   Preventing Falls in the Home  As you get older, falls are more likely. Thats because your reaction time slows. Your muscles and joints may also get stiffer,  making them less flexible. Illness, medications, and vision changes can also affect your balance. A fall could leave you unable to live on your own. To make your home safer, follow these tips:    Floors    Put nonskid pads under area rugs.    Remove throw rugs.    Replace worn floor coverings.    Tack carpets firmly to each step on carpeted stairs. Put nonskid strips on the edges of uncarpeted stairs.    Keep floors and stairs free of clutter and cords.    Arrange furniture so there are clear pathways.    Clean up any spills right away.    Bathrooms    Install grab bars in the tub or shower.    Apply nonskid strips or put a nonskid rubber mat in the tub or shower.    Sit on a bath chair to bathe.    Use bathmats with nonskid backing.    Lighting    Keep a flashlight in each room.    Put a nightlight along the pathway between the bedroom and the bathroom.    0286-4418 [employer]. [address] [place] [address]. All rights reserved. This information is not intended as a substitute for professional medical care. Always follow your healthcare professional's instructions.         Patient Education   Understanding Advance Care Planning  Advance care planning is the process of deciding ones own future medical care. It helps ensure that if you cant speak for yourself, your wishes can still be carried out. The plan is a series of legal documents that note a persons wishes. The documents vary by state. Advance care planning may be done when a person has a serious illness that is expected to get worse. It may be done before major surgery. And it can help you and your family be prepared in case of a major illness or injury. Advance care planning helps with making decisions at these times.       A health care proxy is a person who acts as the voice of a patient when the patient cant speak for himself or herself. The name of this role varies by state. It may be called a Durable Medical Power of   or Durable Power of  for Healthcare. It may be called an agent, surrogate, or advocate. Or it may be called a representative or decision maker. It is an official duty that is identified by a legal document. The document also varies by state.    Why Is Advance Care Planning Important?  If a person communicates their healthcare wishes:    They will be given medical care that matches their values and goals.    Their family members will not be forced to make decisions in a crisis with no guidance.  Creating a Plan  Making an advance care plan is often done in 3 steps:    Thinking about ones wishes. To create an advance care plan, you should think about what kind of medical treatment you would want if you lose the ability to communicate. Are there any situations in which you would refuse or stop treatment? Are there therapies you would want or not want? And whom do you want to make decisions for you? There are many places to learn more about how to plan for your care. Ask your doctor or  for resources.    Picking a health care proxy. This means choosing a trusted person to speak for you only when you cant speak for yourself. When you cannot make medical decisions, your proxy makes sure the instructions in your advance care plan are followed. A proxy does not make decisions based on his or her own opinions. They must put aside those opinions and values if needed, and carry out your wishes.    Filling out the legal documents. There are several kinds of legal documents for advance care planning. Each one tells health care providers your wishes. The documents may vary by state. They must be signed and may need to be witnessed or notarized. You can cancel or change them whenever you wish. Depending on your state, the documents may include a Healthcare Proxy form, Living Will, Durable Medical Power of , Advance Directive, or others.  The Familys Role  The best help a family can give is to support  their loved ones wishes. Open and honest communication is vital. Family should express any concerns they have about the patients choices while the patient can still make decisions.    7941-4666 [employer]. [address] [place] [address], [place] [address]. All rights reserved. This information is not intended as a substitute for professional medical care. Always follow your healthcare professional's instructions.         Also, [employer] offers a free, downloadable health care directive that allows you to share your treatment choices and personal preferences if you cannot communicate your wishes. It also allows you to appoint another person (called a health care agent) to make health care decisions if you are unable to do so. You can download an advance directive by going here: [email or web address]     Patient Education   Personalized Prevention Plan  You are due for the preventive services outlined below.  Your care team is available to assist you in scheduling these services.  If you have already completed any of these items, please share that information with your care team to update in your medical record.  Health Maintenance   Topic Date Due   ? DXA SCAN  07/08/2005   ? ZOSTER VACCINES (2 of 2) 12/10/2015   ? FALL RISK ASSESSMENT  06/04/2019   ? ADVANCE DIRECTIVES DISCUSSED WITH PATIENT  06/04/2023   ? TD 18+ HE  05/30/2027   ? PNEUMOCOCCAL POLYSACCHARIDE VACCINE AGE 65 AND OVER  Completed   ? INFLUENZA VACCINE RULE BASED  Completed   ? PNEUMOCOCCAL CONJUGATE VACCINE FOR ADULTS (PCV13 OR PREVNAR)  Completed           The new shingles shot (Shingrix) is 2 separate shots  by 2-6 months.    The cost out of pocket is around $390 for the 2 shots, so you might want to see if your insurance covers it or a portion of it prior to having it done.  Often by having it done at a pharmacy rather than a clinic, it can be cheaper for you. I recommend that you check on  the cost through your insurance or talk to your pharmacist about the cost of the vaccine.     It is estimated that any person's risk of shingles over their lifetime is around 10-20%.    The old shingles vaccine (Zostavax) is about 50% effective, reducing your risk of shingles to about 5-10% over your lifetime.    The new shot is 90% effective, reducing your risk of shingles to about 1-2% over your lifetime.    Because the shot is strong, it is very common to have flu like symptoms for 2-3 days after the shot.  25-50% of patients will have fever, muscle aches or headache.

## 2021-06-18 NOTE — PROGRESS NOTES
[facility] Daily Progress     Patient Name: Kori Leach  Date: 2018  Visit #: 2  Referral Diagnosis: BPPV  Referring provider: Shawna Moses FNP  Visit Diagnosis:     ICD-10-CM    1. Dizziness R42    2. Unsteadiness on feet R26.81        Assessment:     Patient is appropriate to continue with skilled occupational therapy intervention, as indicated by initial plan of care.    Goal Status:  Patient will bend: to dress;to clean;without vertigo;without loss of balance;in 12 weeks  Patient will turn head: without dizziness;for conversation;in 12 weeks  Patient will look up / down: without dizziness;for reading;in 12 weeks    Plan / Patient Education:     Continue with initial plan of care. Progress with home program as tolerated.    Subjective:     Pain Ratin    Objective:       Subjective progress relative to last visit:  Intensity of dizziness is:  less  Frequency of dizziness is: less  Duration of dizziness is: less  Balance is:  better    Positional Tests:  Hallpike Right:  NT  Hallpike Left:  NT    Treatment Today:  X1 viewing-10 seconds-continue  Normal surface eyes closed-continue  VOR suppression-20 seconds-continue  Gait with head motion-instructed  TREATMENT MINUTES COMMENTS   Evaluation     Self-care/ Home management     Neuromuscular Re-education 25    Canalith repositioning procedure           Total 25    Blank areas are intentional and mean the treatment did not include these items.          Thalia Boyd  2018  11:05 AM

## 2021-06-18 NOTE — PATIENT INSTRUCTIONS - HE
Patient Instructions by Guillermina Frazier PT at 7/13/2020  8:30 AM     Author: Guillermina Frazier PT Service: -- Author Type: Physical Therapist    Filed: 7/13/2020  9:06 AM Encounter Date: 7/13/2020 Status: Signed    : Guillermina Frazier PT (Physical Therapist)        ELASTIC BAND ROWS     Holding elastic band with both hands, draw back the band as you bend your elbows. Keep your elbows near the side of your body.  Hold x2-3 seconds x10 reps  Progress up to 20 reps as able 1-2x/day  Switch to green band when 20 reps is easy.           SIDELYING EXTERNAL ROTATION WITH TOWEL    Lie on your side with your elbow bent to 90 degrees. Place a rolled up towel (or use other hand on your side) between your arm and the side your body as shown.     Squeeze your shoulder blade back and down toward your buttocks and hold that position.     Next, rotate (NOT A RAISE) your arm upwards from your stomach area towards the ceiling while maintaining your arm against the towel and with your shoulder blade held down and back the entire time. Lower your arm and repeat.   x10-20 reps 1-2x/day  When easy then can try 1 lb weight or water bottle and then can try 2 lbs.           Hold on to band connected to door or post and rotate away. Can also pull away and up or away and down to mimic golf swing. x10-20 reps 1-2x/day   Progress to green band when easy.  MOVE WHOLE BODY first for 10-20 reps and then can try keeping back still for 10-20 reps - you can feel this more in ab control!       Shoulder Scapular Plane Abduction    Raise arms diagonally from hip up to shoulder height with thumbs up, keeping elbow mostly straight.  Then return to hip.  x10-20 reps 1-2 sets  1-2x/day  When easy can add butterknife, food/soup can or water bottle.

## 2021-06-18 NOTE — PROGRESS NOTES
Assessment and Plan:     1. Medicare annual wellness visit, subsequent  - Shingrix vaccine recommended, she will check on cost   - Reviewed recommended screening tests for her age/risk factors   - The following high BMI interventions were performed this visit: encouragement to exercise   - I have had an Advance Directives discussion with the patient.    2. COPD (chronic obstructive pulmonary disease)  - Start Symbicort  - albuterol (PROAIR HFA;PROVENTIL HFA;VENTOLIN HFA) 90 mcg/actuation inhaler; Inhale 2 puffs every 4 (four) hours as needed.  Dispense: 1 Inhaler; Refill: 0  - RTC in 2 months for recheck     3. Abnormal chest xray  - XR Chest 2 Views; Future    4. TAMAR (obstructive sleep apnea)- CPAP nightly   - Continue CPAP nightly, good compliance     5. HLD (hyperlipidemia)  - Consider switching to generic rosuvastatin   - Lipid Profile    6. HTN (hypertension)  - Stable   - Comprehensive Metabolic Panel    7. BPPV (benign paroxysmal positional vertigo)  - Ambulatory referral to PT/OT    9. GERD (gastroesophageal reflux disease)  - omeprazole (PRILOSEC) 20 MG capsule; Take 1 capsule by mouth two times daily  Dispense: 180 capsule; Refill: 3    10. Screening for colon cancer  - Ambulatory referral for Colonoscopy    11. History of nicotine use, stopped 2/2018  - Commended for quitting     12. Impaired fasting blood sugar  - Glycosylated Hemoglobin A1c        The patient's current medical problems were reviewed.      The following health maintenance schedule was reviewed with the patient and provided in printed form in the after visit summary:   Health Maintenance   Topic Date Due     DXA SCAN  07/08/2005     FALL RISK ASSESSMENT  06/04/2019     ADVANCE DIRECTIVES DISCUSSED WITH PATIENT  10/03/2021     TD 18+ HE  05/30/2027     PNEUMOCOCCAL POLYSACCHARIDE VACCINE AGE 65 AND OVER  Completed     INFLUENZA VACCINE RULE BASED  Completed     PNEUMOCOCCAL CONJUGATE VACCINE FOR ADULTS (PCV13 OR PREVNAR)  Completed      "ZOSTER VACCINE  Completed        Subjective:   Chief Complaint: Kori Leach is an 77 y.o. female here for an Annual Wellness visit.   HPI:   Kori Leach is an 77 y.o. female here for an Annual Wellness visit. She has new concerns outside of the wellness visit.     Dizziness- started 1 week ago Friday. Has had similar symptoms 2 years ago June 2016. She was referred to ENT/PT. Exercises were not effective for her. ENG- normal. Saw a neurologist and had an MRI/MRA of the head and neck which was normal. The dizziness is worse if she moves her head in the car or in bed. The dizziness is described as a \"real lightheadedness like you have had a lot of drinks\". She was referred to the [facility] when she started physical therapy. No head trauma/vision loss. She has chronic tinnitus which is unchanged.      She has had recurrent bronchitis over the past 1 year with pneumonia earlier this year. She had PFT which shows obstruction with reversibility.     GERD- reflux has been well controlled with twice per day omeprazole     HLD- She is taking a statin for this, denies myalgias or weakness associated with treatment. It was recommended last year that she switch to rosuvastatin due to cholesterol readings outside of goal, did not do so due to cost.     HTN- blood pressure is well controlled with current medication. No lightheadedness or dizziness associated with treatment.     Code status: full code     Review of Systems:  Please see above.  The rest of the review of systems are negative for all systems.    Patient Care Team:  MULU Garcia as PCP - General (Nurse Practitioner)     Patient Active Problem List   Diagnosis     HLD (hyperlipidemia)     HTN (hypertension)     Dizziness     MO (iron deficiency anemia), r/t angiodyplasia      Low back pain     Dysphagia     TAMAR (obstructive sleep apnea)- CPAP nightly      Nodule of right lung, repeat 4/2019     COPD (chronic " obstructive pulmonary disease), with reversibility      History of nicotine use, stopped 2/2018     Prediabetes     Past Medical History:   Diagnosis Date     Inverted nipple     bilateral      Past Surgical History:   Procedure Laterality Date     APPENDECTOMY       CERVICAL SPINE SURGERY  2015     REPLACEMENT TOTAL KNEE BILATERAL        Family History   Problem Relation Age of Onset     Breast cancer Mother 45     Diabetes Mother      Heart disease Mother      Hyperlipidemia Mother      Hypertension Mother      Depression Father      Alcohol abuse Father      Hyperlipidemia Sister      Depression Sister       Social History     Social History     Marital status:      Spouse name: N/A     Number of children: 3     Years of education: N/A     Occupational History     retired       Social History Main Topics     Smoking status: Former Smoker     Types: Cigarettes     Smokeless tobacco: Never Used      Comment: 1-2 cigarettes per day on average      Alcohol use Yes      Comment: less than 1 drink per month      Drug use: No     Sexual activity: Not on file     Other Topics Concern     Not on file     Social History Narrative      Current Outpatient Prescriptions   Medication Sig Dispense Refill     albuterol (PROAIR HFA;PROVENTIL HFA;VENTOLIN HFA) 90 mcg/actuation inhaler Inhale 2 puffs every 4 (four) hours as needed. 1 Inhaler 0     aspirin 81 MG EC tablet Take 81 mg by mouth daily.       atorvastatin (LIPITOR) 80 MG tablet Take 1 tablet by mouth  daily 90 tablet 2     calcium citrate (CALCITRATE) 200 mg (950 mg) tablet Take 2 tablets by mouth daily.       coenzyme Q10 (CO Q-10) 100 mg capsule Take 200 mg by mouth daily.       ferrous sulfate 325 (65 FE) MG tablet Take 1 tablet by mouth daily.       losartan-hydrochlorothiazide (HYZAAR) 50-12.5 mg per tablet Take 1 tablet by mouth daily. Due for office follow up 90 tablet 3     multivitamin capsule Take 1 capsule by mouth daily.       omega-3 fatty  "acids-vitamin E (FISH OIL) 1,000 mg cap Take 1 capsule by mouth daily.       omeprazole (PRILOSEC) 20 MG capsule Take 1 capsule by mouth two times daily 180 capsule 3     vit A-vit C-vit E-zinc-copper (EYE VITAMIN AND MINERALS) 7,160-113-100 unit-mg-unit Tab Take 1 tablet by mouth daily. 90 tablet 3     fluticasone-vilanterol (BREO ELLIPTA) 100-25 mcg/dose DsDv inhaler Inhale 1 puff daily. 60 each 11     meclizine (ANTIVERT) 25 mg tablet Take 1 tablet (25 mg total) by mouth 3 (three) times a day as needed. 30 tablet 0     rosuvastatin (CRESTOR) 40 MG tablet Take 1 tablet (40 mg total) by mouth at bedtime. 90 tablet 3     No current facility-administered medications for this visit.       Objective:   Vital Signs:   Visit Vitals     /68 (Patient Site: Right Arm, Patient Position: Sitting, Cuff Size: Adult Regular)     Pulse 74     Ht 5' 2\" (1.575 m)     Wt 199 lb 3.2 oz (90.4 kg)     BMI 36.43 kg/m2        VisionScreening:  No exam data present     PHYSICAL EXAM  Physical Exam   Constitutional: She is oriented to person, place, and time and well-developed, well-nourished, and in no distress.   HENT:   Head: Normocephalic and atraumatic.   Right Ear: External ear normal.   Left Ear: External ear normal.   Mouth/Throat: Oropharynx is clear and moist.   Eyes: Conjunctivae and EOM are normal. Pupils are equal, round, and reactive to light. No scleral icterus.   Neck: Normal range of motion. Neck supple. No thyromegaly present.   Cardiovascular: Normal rate, regular rhythm, normal heart sounds and intact distal pulses.  Exam reveals no gallop and no friction rub.    No murmur heard.  Pulmonary/Chest: Effort normal and breath sounds normal. She has no wheezes.   Abdominal: Soft. Bowel sounds are normal.   Musculoskeletal: Normal range of motion.   Lymphadenopathy:     She has no cervical adenopathy.   Neurological: She is alert and oriented to person, place, and time. She has normal sensation, normal strength and " intact cranial nerves. She has a normal Romberg Test. Gait normal.         Assessment Results 6/4/2018   Activities of Daily Living No help needed   Instrumental Activities of Daily Living 2-4 - Moderate impairment   Mini Cog Total Score 5   Some recent data might be hidden     A Mini-Cog score of 0-2 suggests the possibility of dementia, score of 3-5 suggests no dementia    Identified Health Risks:     She is at risk for lack of exercise and has been provided with information to increase physical activity for the benefit of her well-being.  The patient was counseled and encouraged to consider modifying their diet and eating habits. She was provided with information on recommended healthy diet options.  The patient reports that she has difficulty with instrumental activities of daily living.    The patient was provided with written information regarding signs of hearing loss.  Patient's advanced directive was discussed and I am comfortable with the patient's wishes.

## 2021-06-18 NOTE — PATIENT INSTRUCTIONS - HE
Patient Instructions by Guillermina Frazier PT at 6/19/2020 10:00 AM     Author: Guillermina Frazier PT Service: -- Author Type: Physical Therapist    Filed: 6/19/2020 10:58 AM Encounter Date: 6/19/2020 Status: Signed    : Guillermina Frazier PT (Physical Therapist)        ELASTIC BAND ROWS     Holding elastic band with both hands, draw back the band as you bend your elbows. Keep your elbows near the side of your body.  Hold x2-3 seconds x10 reps  Progress up to 20 reps as able 1-2x/day  Switch to green band when 20 reps is easy.           SIDELYING EXTERNAL ROTATION WITH TOWEL    Lie on your side with your elbow bent to 90 degrees. Place a rolled up towel (or use other hand on your side) between your arm and the side your body as shown.     Squeeze your shoulder blade back and down toward your buttocks and hold that position.     Next, rotate (NOT A RAISE) your arm upwards from your stomach area towards the ceiling while maintaining your arm against the towel and with your shoulder blade held down and back the entire time. Lower your arm and repeat.   x10-20 reps 1-2x/day  When easy then can try 1 lb weight or water bottle and then can try 2 lbs.           Hold on to band connected to door or post and rotate away. Can also pull away and up or away and down to mimic golf swing. x10-20 reps 1-2x/day   Progress to green band when easy.

## 2021-06-18 NOTE — PATIENT INSTRUCTIONS - HE
Patient Instructions by Guillermina Frazier PT at 8/19/2020  8:30 AM     Author: Guillermina Frazier PT Service: -- Author Type: Physical Therapist    Filed: 8/19/2020  8:58 AM Encounter Date: 8/19/2020 Status: Signed    : Guillermina Frazier PT (Physical Therapist)        ELASTIC BAND ROWS     Holding elastic band with both hands, draw back the band as you bend your elbows. Keep your elbows near the side of your body.  Hold x2-3 seconds x10 reps  Progress up to 20 reps as able.  Switch to green band when 20 reps is easy.  Attempt 3-5x/week for strength program.      ELASTIC BAND SHOULDER EXTERNAL ROTATION    While holding an elastic band at your side with your elbow bent, start with your hand near your stomach and then pull the band away. Keep your elbow at your side the entire time.    Do 10-15 repetitions. 1-2 sets with orange band  Perform 3-5 weeks.         Hold on to band connected to door or post and rotate away. Can also pull away and up or away and down to mimic golf swing. x10-20 reps 3-5x/week   Progress to green band when easy.  MOVE WHOLE BODY first for 10-20 reps and then can try keeping back still for 10-20 reps - you can feel this more in ab control!       Shoulder Scapular Plane Abduction    Raise arms diagonally from hip up to shoulder height with thumbs up, keeping elbow mostly straight.  Then return to hip.  x10-20 reps 1-2 sets  3-5x/week    When easy can add butterknife, food/soup can or water bottle.        CERVICAL SIDE BEND    Tilt your head towards the side, then return back to looking straight ahead.  (Be sure to keep you eyes and nose pointed straight ahead the entire time)  SMALL AND GENTLE MOVEMENT  x5-10 reps  1-2x/day    CERVICAL ROTATION    Turn your head towards the side, then return back to looking straight ahead.  SMALL AND GENTLE MOVEMENT  x5-10 reps 1-2x/day

## 2021-06-18 NOTE — PATIENT INSTRUCTIONS - HE
Patient Instructions by Shawna Moses FNP at 8/31/2020  9:05 AM     Author: Shawna Moses FNP Service: -- Author Type: Nurse Practitioner    Filed: 8/31/2020  9:07 AM Encounter Date: 8/31/2020 Status: Signed    : Shawna Moses FNP (Nurse Practitioner)         Patient Education     Exercise for a Healthier Heart  You may wonder how you can improve the health of your heart. If youre thinking about exercise, youre on the right track. You dont need to become an athlete, but you do need a certain amount of brisk exercise to help strengthen your heart. If you have been diagnosed with a heart condition, your doctor may recommend exercise to help stabilize your condition. To help make exercise a habit, choose safe, fun activities.       Be sure to check with your health care provider before starting an exercise program.    Why exercise?  Exercising regularly offers many healthy rewards. It can help you do all of the following:    Improve your blood cholesterol levels to help prevent further heart trouble    Lower your blood pressure to help prevent a stroke or heart attack    Control diabetes, or reduce your risk of getting this disease    Improve your heart and lung function    Reach and maintain a healthy weight    Make your muscles stronger and more limber so you can stay active    Prevent falls and fractures by slowing the loss of bone mass (osteoporosis)    Manage stress better  Exercise tips  Ease into your routine. Set small goals. Then build on them.  Exercise on most days. Aim for a total of 150 or more minutes of moderate to  vigorous intensity activity each week. Consider 40 minutes, 3 to 4 times a week. For best results, activity should last for 40 minutes on average. It is OK to work up to the 40 minute period over time. Examples of moderate-intensity activity is walking one mile in 15 minutes or 30 to 45 minutes of yard work.  Step up your daily activity level. Along with your  exercise program, try being more active throughout the day. Walk instead of drive. Do more household tasks or yard work.  Choose one or more activities you enjoy. Walking is one of the easiest things you can do. You can also try swimming, riding a bike, or taking an exercise class.  Stop exercising and call your doctor if you:    Have chest pain or feel dizzy or lightheaded    Feel burning, tightness, pressure, or heaviness in your chest, neck, shoulders, back, or arms    Have unusual shortness of breath    Have increased joint or muscle pain    Have palpitations or an irregular heartbeat      2064-1409 [employer]. [address] [place] [id number]. All rights reserved. This information is not intended as a substitute for professional medical care. Always follow your healthcare professional's instructions.         Patient Education   Understanding [employer] MyPlate  The USDA (US Department of Agriculture) has guidelines to help you make healthy food choices. These are called MyPlate. MyPlate shows the food groups that make up healthy meals using the image of a place setting. Before you eat, think about the healthiest choices for what to put onto your plate or into your cup or bowl. To learn more about building a healthy plate, visit www.choosemyplate.gov.       The Food Groups    Fruits: Any fruit or 100% fruit juice counts as part of the Fruit Group. Fruits may be fresh, canned, frozen, or dried, and may be whole, cut-up, or pureed. Make half your plate fruits and vegetables.    Vegetables: Any vegetable or 100% vegetable juice counts as a member of the Vegetable Group. Vegetables may be fresh, frozen, canned, or dried. They can be served raw or cooked and may be whole, cut-up, or mashed. Make half your plate fruits and vegetables.     Grains: All foods made from grains are part of the Grains Group. These include wheat, rice, oats, cornmeal, and barley such as bread, pasta, oatmeal, cereal,  tortillas, and grits. Grains should be no more than a quarter of your plate. At least half of your grains should be whole grains.    Protein: This group includes meat, poultry, seafood, beans and peas, eggs, processed soy products (like tofu), nuts (including nut butters), and seeds. Make protein choices no more than a quarter of your plate. Meat and poultry choices should be lean or low fat.    Dairy: All fluid milk products and foods made from milk that contain calcium, like yogurt and cheese are part of the Dairy Group. (Foods that have little calcium, such as cream, butter, and cream cheese, are not part of the group.) Most dairy choices should be low-fat or fat-free.    Oils: These are fats that are liquid at room temperature. They include canola, corn, olive, soybean, and sunflower oil. Foods that are mainly oil include mayonnaise, certain salad dressings, and soft margarines. You should have only 5 to 7 teaspoons of oils a day. You probably already get this much from the food you eat.  Use HealthTell to Help Build Your Meals  The [employer] can help you plan and track your meals and activity. You can look up individual foods to see or compare their nutritional value. You can get guidelines for what and how much you should eat. You can compare your food choices. And you can assess personal physical activities and see ways you can improve. Go to www.[employer].gov/supertracker/.    5794-9867 [employer]. [address] [place] [address], [place] [address]. All rights reserved. This information is not intended as a substitute for professional medical care. Always follow your healthcare professional's instructions.           Patient Education   Signs of Hearing Loss  Hearing loss is a problem shared by many people. In fact, it is one of the most common health conditions, particularly as people age. Most people over age 65 have some hearing loss, and by age 80, almost everyone does. Because hearing loss  usually occurs slowly over the years, you may not realize your hearing ability has gotten worse.       Have your hearing checked  Contact your [facility] care provider if you:    Have to strain to hear normal conversation.    Have to watch other peoples faces very carefully to follow what theyre saying.    Need to ask people to repeat what theyve said.    Often misunderstand what people are saying.    Turn the volume of the television or radio up so high that others complain.    Feel that people are mumbling when theyre talking to you.    Find that the effort to hear leaves you feeling tired and irritated.    Notice, when using the phone, that you hear better with 1 ear than the other.    0302-4150 [employer]. [address] [place] [address], [place] [address]. All rights reserved. This information is not intended as a substitute for professional medical care. Always follow your healthcare professional's instructions.         Patient Education   Understanding Advance Care Planning  Advance care planning is the process of deciding ones own future medical care. It helps ensure that if you cant speak for yourself, your wishes can still be carried out. The plan is a series of legal documents that note a persons wishes. The documents vary by state. Advance care planning may be done when a person has a serious illness that is expected to get worse. It may be done before major surgery. And it can help you and your family be prepared in case of a major illness or injury. Advance care planning helps with making decisions at these times.       A health care proxy is a person who acts as the voice of a patient when the patient cant speak for himself or herself. The name of this role varies by state. It may be called a Durable Medical Power of  or Durable Power of  for Healthcare. It may be called an agent, surrogate, or advocate. Or it may be called a representative or decision maker. It is an official duty  that is identified by a legal document. The document also varies by state.    Why Is Advance Care Planning Important?  If a person communicates their healthcare wishes:    They will be given medical care that matches their values and goals.    Their family members will not be forced to make decisions in a crisis with no guidance.  Creating a Plan  Making an advance care plan is often done in 3 steps:    Thinking about ones wishes. To create an advance care plan, you should think about what kind of medical treatment you would want if you lose the ability to communicate. Are there any situations in which you would refuse or stop treatment? Are there therapies you would want or not want? And whom do you want to make decisions for you? There are many places to learn more about how to plan for your care. Ask your doctor or  for resources.    Picking a health care proxy. This means choosing a trusted person to speak for you only when you cant speak for yourself. When you cannot make medical decisions, your proxy makes sure the instructions in your advance care plan are followed. A proxy does not make decisions based on his or her own opinions. They must put aside those opinions and values if needed, and carry out your wishes.    Filling out the legal documents. There are several kinds of legal documents for advance care planning. Each one tells health care providers your wishes. The documents may vary by state. They must be signed and may need to be witnessed or notarized. You can cancel or change them whenever you wish. Depending on your state, the documents may include a Healthcare Proxy form, Living Will, Durable Medical Power of , Advance Directive, or others.  The Familys Role  The best help a family can give is to support their loved ones wishes. Open and honest communication is vital. Family should express any concerns they have about the patients choices while the patient can still make  decisions.    5795-1807 [employer]. [address] [place] [address], [place] [address]. All rights reserved. This information is not intended as a substitute for professional medical care. Always follow your healthcare professional's instructions.         Also, [facility] offers a free, downloadable health care directive that allows you to share your treatment choices and personal preferences if you cannot communicate your wishes. It also allows you to appoint another person (called a health care agent) to make health care decisions if you are unable to do so. You can download an advance directive by going here: [email or web address]     Patient Education   Personalized Prevention Plan  You are due for the preventive services outlined below.  Your care team is available to assist you in scheduling these services.  If you have already completed any of these items, please share that information with your care team to update in your medical record.  Health Maintenance   Topic Date Due   ? COPD ACTION PLAN  1940   ? HEPATITIS B VACCINES (1 of 3 - Risk 3-dose series) 07/08/1959   ? ZOSTER VACCINES (2 of 2) 12/10/2015   ? MEDICARE ANNUAL WELLNESS VISIT  06/26/2020   ? FALL RISK ASSESSMENT  06/26/2020   ? INFLUENZA VACCINE RULE BASED (1) 08/01/2020   ? DXA SCAN  07/01/2021   ? ADVANCE CARE PLANNING  06/04/2023   ? LIPID  06/08/2025   ? TD 18+ HE  05/30/2027   ? SPIROMETRY  Completed   ? PNEUMOCOCCAL IMMUNIZATION 65+ LOW/MEDIUM RISK  Completed

## 2021-06-18 NOTE — PROGRESS NOTES
Cpft with pre and post spirometry done using albuterol 2.5 mg neb.  Ats standards met, patient dorene well, results scanned to patients chart.

## 2021-06-18 NOTE — PROGRESS NOTES
[facility] Certification Request    June 5, 2018      Patient: Kori Leach  MR Number: [id number]  YOB: 1940  Date of Visit: 6/5/2018      Dear Dr. Shawna Moses:    Thank you for this referral.   We are seeing Kori Leach in Occupational Therapy for vertigo.    Medicare and/or Medicaid requires physician review and approval of the treatment plan. Please review the plan of care and verify that you agree with the therapy plan of care by co-signing this note.      Plan of Care  Authorization / Certification Start Date: 06/05/18  Authorization / Certification End Date: 09/03/18  Authorization / Certification Number of Visits: 12  Communication with: Referral Source  Patient Related Instruction: Nature of Condition;Basis of treatment;Treatment plan and rationale;Expected outcome  Times per Week: 1  Number of Weeks: 12  Number of Visits: 12  Neuromuscular Reeducation: vestibular  Canolith Repostioning:      Goals:  Patient will bend: to dress;to clean;without vertigo;without loss of balance;in 12 weeks  Patient will turn head: without dizziness;for conversation;in 12 weeks  Patient will look up / down: without dizziness;for reading;in 12 weeks      If you have any questions or concerns, please don't hesitate to call.    Sincerely,      Thalia Boyd, OT        Physician recommendation:     ___ Follow therapist's recommendation        ___ Modify therapy      *Physician co-signature indicates they certify the need for these services furnished within this plan and while under their care.      [facility]   Vestibular Initial Evaluation    Patient Name: Kori Leach  Date of evaluation: 6/5/2018  Referral Diagnosis: BPPV  Referring provider: Shawna Moses FNP  Visit Diagnosis:     ICD-10-CM    1. Dizziness R42    2. Unsteadiness on feet R26.81        Assessment:       Symptoms are consistent with weakened vestibular function. Patient instructed on adaptation  and substitution exercises.    Goals:  Patient will bend: to dress;to clean;without vertigo;without loss of balance;in 12 weeks  Patient will turn head: without dizziness;for conversation;in 12 weeks  Patient will look up / down: without dizziness;for reading;in 12 weeks    Patient's expectations/goals are realistic.    Barriers to Learning or Achieving Goals:  No Barriers.       Plan / Patient Instructions:        Plan of Care:   Authorization / Certification Start Date: 18  Authorization / Certification End Date: 18  Authorization / Certification Number of Visits: 12  Communication with: Referral Source  Patient Related Instruction: Nature of Condition;Basis of treatment;Treatment plan and rationale;Expected outcome  Times per Week: 1  Number of Weeks: 12  Number of Visits: 12  Neuromuscular Reeducation: vestibular  Canolith Repostioning:      Plan for next visit: progress HEP     Subjective:         History of Present Illness:    Kori is a 77 y.o. female who presents to therapy today with complaints of lightheadedness and unsteadiness. Patient had sudden onset of symptoms 2016. She was sent to the [facility] and had VNG and some therapy. She states that her symptoms resolved around 2016 and were absent until 18. Symptoms are not improving. Patient denies ear pain. Patient denies hearing changes. 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    Pain Ratin    Functional limitations are described as occurring with:   balance, bending, bed mobility, head turns, looking up or down, stepping over curbs         Objective:      Note: Items left blank indicates the item was not performed or not indicated at the time of the evaluation.    Patient Outcome Measures:  Dizziness Handicap Inventory  Score in %: 54     Vestibular Disorder Examination  1. Dizziness     2. Unsteadiness on feet       Precautions/Restrictions: None  Posture Observation:      General standing posture is normal.    ROM:   Not Tested    Strength: Not Tested    Functional Mobility: good      Oculomotor Assessment: NT    VOR Function: poor    Positional Tests:  Hallpike Right:  Negative  Hallpike Left:  Negative    Balance Assessment: vestibular pattern on [place] SOT    Treatment Today:    -X1 viewing-10 seconds-instructed  -Normal surface eyes closed-instructed  -VOR suppression-10 seconds-instructed  TREATMENT MINUTES COMMENTS   Evaluation 20    Self-care/ Home management     Neuromuscular Re-education 30    Canalith repositioning procedure           Total 50    Blank areas are intentional and mean the treatment did not include these items.     GOALS AND PLAN OF CARE WERE ESTABLISHED IN COOPERATION WITH THE PATIENT    OT Evaluation Code: (Please list factors)   Comorbidities: HTN, HLD, LBP, COPD, prediabetes   Profile/History Review: Brief   Need for eval modification: No     # Treatment options: Several    Clinical Decision Making:  Low      Occupational Profile/ Medical and Therapy History and Comorbidities Occupational Performance Clinical Decision Making   (Complexity)   brief history with review of medical/therapy records related to the presenting problem.  No comorbidities 1-3 Performance deficits that result in activity limitations and/or participation restrictions.    No Assessment Modification  Low complexity, which includes  problem-focused assessments, and consideration of a limited number of treatment options.      expanded review of medical/therapy records and additional review of physical, cognitive and psychosocial history.    May have comorbidities 3-5 Performance deficits that result in activity limitations and/or participation restrictions.    Minimal to moderate modification of assessment Moderate complexity, which includes analysis of data from detailed assessments, and consideration of several treatment options.         Review of medical/therapy records and extensive additional review of physical, cognitive and  psychosocial history.  Comorbidities affect occupational performance 5 or more Performance deficits that result in activity limitations and/or participation restrictions.    Significant modification of assessment High complexity, analysis of  Occupational profile and data,  Comprehensive assessments, multiple treatment options.            Thalia Boyd  6/5/2018  1:33 PM

## 2021-06-19 NOTE — ANESTHESIA PREPROCEDURE EVALUATION
Anesthesia Evaluation      Patient summary reviewed   History of anesthetic complications (h/o difficult airway)     Airway   Mallampati: II  Neck ROM: full   Pulmonary - normal exam   (+) COPD moderate, sleep apnea on CPAP, ,                          Cardiovascular   Exercise tolerance: > or = 4 METS  (+) hypertension, ,     Rhythm: regular  Rate: normal,         Neuro/Psych      Endo/Other       GI/Hepatic/Renal       Other findings: Small mouth      Dental                         Anesthesia Plan  Planned anesthetic: general endotracheal    ASA 3   Induction: intravenous   Anesthetic plan and risks discussed with: patient    Post-op plan: routine recovery        GETA, glidescope intuabtion

## 2021-06-19 NOTE — ANESTHESIA POSTPROCEDURE EVALUATION
Patient: Kori Leach  CHOLECYSTECTOMY, LAPAROSCOPIC  Anesthesia type: general    Patient location: PACU  Last vitals:   Vitals:    06/29/18 1800   BP: 151/66   Pulse: 73   Resp: 12   Temp:    SpO2: 98%     Post vital signs: stable  Level of consciousness: awake, alert and oriented  Post-anesthesia pain: pain controlled  Post-anesthesia nausea and vomiting: no  Pulmonary: unassisted, return to baseline  Cardiovascular: stable and blood pressure at baseline  Hydration: adequate  Anesthetic events: no    QCDR Measures:  ASA# 11 - Breonna-op Cardiac Arrest: ASA11B - Patient did NOT experience unanticipated cardiac arrest  ASA# 12 - Breonna-op Mortality Rate: ASA12B - Patient did NOT die  ASA# 13 - PACU Re-Intubation Rate: ASA13B - Patient did NOT require a new airway mgmt  ASA# 10 - Composite Anes Safety: ASA10A - No serious adverse event    Additional Notes:  Patient with known difficult airway, glidescope electively used.  No ST in PACU

## 2021-06-19 NOTE — PROGRESS NOTES
[facility] Daily Progress     Patient Name: Kori Leach  Date: 2018  Visit #: 3  Referral Diagnosis: BPPV  Referring provider: Shawna Moses FNP  Visit Diagnosis:     ICD-10-CM    1. Dizziness R42    2. Unsteadiness on feet R26.81        Assessment:     Patient is appropriate to continue with skilled occupational therapy intervention, as indicated by initial plan of care.    Goal Status:  Patient will bend: to dress;to clean;without vertigo;without loss of balance;in 12 weeks  Patient will turn head: without dizziness;for conversation;in 12 weeks  Patient will look up / down: without dizziness;for reading;in 12 weeks    Plan / Patient Education:     Continue with initial plan of care. Progress with home program as tolerated.    Subjective:     Pain Ratin    Objective:       Subjective progress relative to last visit:  Intensity of dizziness is:  less  Frequency of dizziness is: less  Duration of dizziness is: less  Balance is:  better    Positional Tests:  Hallpike Right:  NT  Hallpike Left:  NT    Treatment Today: Patient had gallbladder surgery a week ago so she had to stop her HEP. She started them again about 3 days ago. Modified her technique slightly. Consider new exercises next visit if no improvement,  X1 viewing-20 seconds-continue  Normal surface eyes closed-discontinue  Perturbed surface eyes closed-instructed  VOR suppression-60 seconds-continue  Gait with head motion-discontinue  TREATMENT MINUTES COMMENTS   Evaluation     Self-care/ Home management     Neuromuscular Re-education 25    Canalith repositioning procedure           Total 25    Blank areas are intentional and mean the treatment did not include these items.          Thalia Boyd  2018  10:33 AM

## 2021-06-19 NOTE — ANESTHESIA CARE TRANSFER NOTE
Last vitals:   Vitals:    06/29/18 1735   BP: (!) 182/80   Pulse: 80   Resp: 16   Temp: 36.8  C (98.2  F)   SpO2: 100%     Patient's level of consciousness is drowsy  Spontaneous respirations: yes  Maintains airway independently: yes  Dentition unchanged: yes  Oropharynx: oropharynx clear of all foreign objects    QCDR Measures:  ASA# 20 - Surgical Safety Checklist: WHO surgical safety checklist completed prior to induction  PQRS# 430 - Adult PONV Prevention: 4558F - Pt received => 2 anti-emetic agents (different classes) preop & intraop  ASA# 8 - Peds PONV Prevention: NA - Not pediatric patient, not GA or 2 or more risk factors NOT present  PQRS# 424 - Breonna-op Temp Management: 4559F - At least one body temp DOCUMENTED => 35.5C or 95.9F within required timeframe  PQRS# 426 - PACU Transfer Protocol: - Transfer of care checklist used  ASA# 14 - Acute Post-op Pain: ASA14B - Patient did NOT experience pain >= 7 out of 10

## 2021-06-20 NOTE — PROGRESS NOTES
Internal Medicine Office Visit  [facility]  Patient Name: Kori Leach  Patient Age: 78 y.o.  YOB: 1940  MRN: [id number]    Date of Visit: 10/8/2018  Reason for Office Visit:   Chief Complaint   Patient presents with     Follow-up     BP           Assessment / Plan / Medical Decision Makin. Gastroesophageal reflux disease without esophagitis  - CONTINUE: omeprazole (PRILOSEC) 20 MG capsule; Take 1 capsule (20 mg total) by mouth 2 (two) times a day before meals.  Dispense: 180 capsule; Refill: 1    2. Hyperlipidemia, unspecified hyperlipidemia type  - continue rosuvastatin 40 mg daily    3. TAMAR (obstructive sleep apnea)- CPAP nightly   - Ambulatory referral to Sleep Medicine, needs new mask     4. HTN (hypertension)  - stable, continue with lower dose losartan   - losartan (COZAAR) 25 MG tablet; Take 1 tablet (25 mg total) by mouth daily.  Dispense: 90 tablet; Refill: 3    5. Chronic obstructive pulmonary disease, unspecified COPD type (H)  - stable, PRN albuterol inhaler and Breo Ellipta       Health Maintenance Review  Health Maintenance   Topic Date Due     DXA SCAN  2005     FALL RISK ASSESSMENT  2019     ADVANCE DIRECTIVES DISCUSSED WITH PATIENT  2023     TD 18+ HE  2027     PNEUMOCOCCAL POLYSACCHARIDE VACCINE AGE 65 AND OVER  Completed     INFLUENZA VACCINE RULE BASED  Completed     PNEUMOCOCCAL CONJUGATE VACCINE FOR ADULTS (PCV13 OR PREVNAR)  Completed     ZOSTER VACCINE  Completed         I have discontinued Ms. Leach's losartan-hydrochlorothiazide and losartan. I have also changed her omeprazole. Additionally, I am having her start on losartan. Lastly, I am having her maintain her aspirin, omega-3 fatty acids-vitamin E, meclizine, albuterol, rosuvastatin, and fluticasone-vilanterol.      HPI:  Kori Leach is a 78 y.o. year old who presents to the office today for follow up.      she was seen in the ED for  left ear fullness and was prescribed fluticasone. This has resolved.     She had her hip surgery on 9/6. She is using a cane to ambulate, is slower walking but gaining strength and stamina.     GERD reviewed. After her surgery she was instructed to take omeprazole just once per day but developed some dysphagia with this. She has resumed two times a day dosing and this has resolved.     Hypertension reviewed, at her last visit we discussed some ongoing dizziness and had planned to switch her to losartan and has been cutting her losartan in half to 25 mg daily. She has noticed that the dizziness has improved, has some mild dizziness when she changes position quickly.     Leaving for [place] next month.     Review of Systems- pertinent positive in bold:  A 10-point ROS is negative except as stated in HPI         Current Scheduled Meds:  Outpatient Encounter Prescriptions as of 10/8/2018   Medication Sig Dispense Refill     albuterol (PROAIR HFA;PROVENTIL HFA;VENTOLIN HFA) 90 mcg/actuation inhaler Inhale 2 puffs every 4 (four) hours as needed. 1 Inhaler 0     aspirin 81 MG EC tablet Take 81 mg by mouth daily.       fluticasone-vilanterol (BREO ELLIPTA) 100-25 mcg/dose DsDv inhaler Inhale 1 puff daily. 60 each 11     meclizine (ANTIVERT) 25 mg tablet Take 1 tablet (25 mg total) by mouth 3 (three) times a day as needed. 30 tablet 0     omega-3 fatty acids-vitamin E (FISH OIL) 1,000 mg cap Take 1 capsule by mouth daily.       omeprazole (PRILOSEC) 20 MG capsule Take 1 capsule (20 mg total) by mouth 2 (two) times a day before meals. 180 capsule 1     rosuvastatin (CRESTOR) 40 MG tablet Take 1 tablet (40 mg total) by mouth at bedtime. 90 tablet 3     [DISCONTINUED] losartan (COZAAR) 50 MG tablet Take 1 tablet (50 mg total) by mouth daily. 30 tablet 1     [DISCONTINUED] losartan-hydrochlorothiazide (HYZAAR) 50-12.5 mg per tablet Take 1 tablet by mouth daily. Due for office follow up 90 tablet 3     [DISCONTINUED] omeprazole  (PRILOSEC) 20 MG capsule Take 1 capsule (20 mg total) by mouth daily. 90 capsule 1     losartan (COZAAR) 25 MG tablet Take 1 tablet (25 mg total) by mouth daily. 90 tablet 3     No facility-administered encounter medications on file as of 10/8/2018.      Past Medical History:   Diagnosis Date     Inverted nipple     bilateral     Past Surgical History:   Procedure Laterality Date     APPENDECTOMY       CERVICAL SPINE SURGERY  2015     LAPAROSCOPIC CHOLECYSTECTOMY N/A 6/29/2018    Procedure: CHOLECYSTECTOMY, LAPAROSCOPIC;  Surgeon: Jose Armando Humphries MD;  [facility];  Service:      REPLACEMENT TOTAL KNEE BILATERAL       Social History   Substance Use Topics     Smoking status: Former Smoker     Types: Cigarettes     Smokeless tobacco: Never Used      Comment: 1-2 cigarettes per day on average      Alcohol use Yes      Comment: less than 1 drink per month        Objective / Physical Examination:  Vitals:    10/08/18 1055   BP: 120/66   Pulse: 70   Weight: 189 lb (85.7 kg)     Wt Readings from Last 3 Encounters:   10/08/18 189 lb (85.7 kg)   09/04/18 194 lb (88 kg)   06/29/18 187 lb 14.4 oz (85.2 kg)     Body mass index is 34.57 kg/(m^2).     General Appearance: Alert and oriented, cooperative, affect appropriate, speech clear, in no apparent distress  Neck: Supple, trachea midline. No carotid bruits   Lungs: Clear to auscultation bilaterally. Normal inspiratory and expiratory effort  Cardiovascular: Regular rate, normal S1, S2. No murmurs, rubs, or gallops      Orders Placed This Encounter   Procedures     Influenza High Dose, Seasonal 65+ yrs     Ambulatory referral to Sleep Medicine   Followup: Return in about 6 months (around 4/8/2019) for Next scheduled follow up. earlier if needed.      Shawna Moses, CNP

## 2021-06-20 NOTE — PROGRESS NOTES
[facility] Discharge Summary  Patient Name: Kori Leach  Date: 8/28/2018  Referral Diagnosis: BPPV  Referring provider: Shawna Moses FNP  Visit Diagnosis:   1. Dizziness     2. Unsteadiness on feet         Goal status: progressing toward  Patient will bend: to dress;to clean;without vertigo;without loss of balance;in 12 weeks  Patient will turn head: without dizziness;for conversation;in 12 weeks  Patient will look up / down: without dizziness;for reading;in 12 weeks    Patient was seen for 3 visits between 6-5-18 and 7-9-18.    Therapy will be discontinued at this time.  The patient will need a new referral to resume.    Thank you for your referral.  Thalia Boyd  8/28/2018  12:09 PM

## 2021-06-26 NOTE — PROGRESS NOTES
Progress Notes by Shawna Moses FNP at 9/4/2018 10:20 AM     Author: Shawna Moses FNP Service: -- Author Type: Nurse Practitioner    Filed: 9/4/2018  4:30 PM Encounter Date: 9/4/2018 Status: Signed    : Shawna Moses FNP (Nurse Practitioner)       Assessment/Plan:      Visit for Preoperative Exam.      1. Preop general physical exam  2. Osteoarthritis of right hip  - Patient is approved for surgery  - May take medications with a sip of water on the morning of surgery   - After your surgery stop the losartan-hct and start losartan only due to lightheadedness which may be due to hypotension     3. Chronic obstructive pulmonary disease, unspecified COPD type (H)  - stable, PRN albuterol only     4. Hyperlipidemia, unspecified hyperlipidemia type  - stable, continue rosuvastatin     5. HTN (hypertension)  6. Dizziness  - Discontinue HCTZ  - Follow up in 1 month. Consider further dose reduction of losartan to 25 mg daily based on home and office readings     7. TAMAR (obstructive sleep apnea)- CPAP nightly   - Continue CPAP     8. Prediabetes  - Weight loss encouraged     9. Iron deficiency anemia due to chronic blood loss  - Ferritin  - Iron      Subjective:     Scheduled Procedure: Rt Hip Replacement  Surgery Date:  9/6/2018  Surgery Location:  [facility]  Surgeon:  Dr Morales    HPI: Kori Leach is a 78 year female who presents to the office today for a preoperative physical. The right hip has given out on a her several times causing her to fall. She has aches in this hip as well. She has elected to proceed with hip replacement.     She still has several days of dizziness on and off. This is described as a lightheadedness, no sensation that the room is spinning.  She notes that after her hospitalization for cholecystectomy that her blood pressure medication was held due to hypotension.    GERD is well controlled with a single dose of omeprazole daily.     There is a history of iron  deficiency anemia.  She has no longer been taking her iron tablets since her hospital stay for cholecystectomy.    COPD was reviewed. She denies any breathing issues, wheezing, SOA.     HLD- She is taking a statin for this, denies myalgias or weakness associated with treatment.       Current Outpatient Prescriptions   Medication Sig Dispense Refill   ? albuterol (PROAIR HFA;PROVENTIL HFA;VENTOLIN HFA) 90 mcg/actuation inhaler Inhale 2 puffs every 4 (four) hours as needed. 1 Inhaler 0   ? aspirin 81 MG EC tablet Take 81 mg by mouth daily.     ? fluticasone-vilanterol (BREO ELLIPTA) 100-25 mcg/dose DsDv inhaler Inhale 1 puff daily. 60 each 11   ? losartan-hydrochlorothiazide (HYZAAR) 50-12.5 mg per tablet Take 1 tablet by mouth daily. Due for office follow up 90 tablet 3   ? meclizine (ANTIVERT) 25 mg tablet Take 1 tablet (25 mg total) by mouth 3 (three) times a day as needed. 30 tablet 0   ? omega-3 fatty acids-vitamin E (FISH OIL) 1,000 mg cap Take 1 capsule by mouth daily.     ? omeprazole (PRILOSEC) 20 MG capsule Take 1 capsule (20 mg total) by mouth daily. 90 capsule 1   ? rosuvastatin (CRESTOR) 40 MG tablet Take 1 tablet (40 mg total) by mouth at bedtime. 90 tablet 3   ? losartan (COZAAR) 50 MG tablet Take 1 tablet (50 mg total) by mouth daily. 30 tablet 1     No current facility-administered medications for this visit.        Allergies   Allergen Reactions   ? Lisinopril Cough       Immunization History   Administered Date(s) Administered   ? Influenza high dose, seasonal 10/10/2016, 10/11/2017   ? Pneumo Conj 13-V (2010&after) 05/30/2017   ? Pneumo Polysac 23-V 06/04/2018   ? Tdap 05/30/2017       Patient Active Problem List   Diagnosis   ? HLD (hyperlipidemia)   ? HTN (hypertension)   ? Dizziness   ? MO (iron deficiency anemia), r/t angiodyplasia    ? Low back pain   ? Dysphagia   ? TAMAR (obstructive sleep apnea)- CPAP nightly    ? Nodule of right lung, repeat 4/2019   ? Chronic obstructive pulmonary disease,  unspecified COPD type (H)   ? History of nicotine use, stopped 2/2018   ? Prediabetes   ? Acute cholecystitis   ? Obesity (BMI 35.0-39.9) with comorbidity (H)       Past Medical History:   Diagnosis Date   ? Inverted nipple     bilateral       Social History     Social History   ? Marital status:      Spouse name: N/A   ? Number of children: 3   ? Years of education: N/A     Occupational History   ? retired       Social History Main Topics   ? Smoking status: Former Smoker     Types: Cigarettes   ? Smokeless tobacco: Never Used      Comment: 1-2 cigarettes per day on average    ? Alcohol use Yes      Comment: less than 1 drink per month    ? Drug use: No   ? Sexual activity: Not on file     Other Topics Concern   ? Not on file     Social History Narrative       Past Surgical History:   Procedure Laterality Date   ? APPENDECTOMY     ? CERVICAL SPINE SURGERY  2015   ? LAPAROSCOPIC CHOLECYSTECTOMY N/A 6/29/2018    Procedure: CHOLECYSTECTOMY, LAPAROSCOPIC;  Surgeon: Jose Armando Humphries MD;  Location: [facility];  Service:    ? REPLACEMENT TOTAL KNEE BILATERAL             History of Present Illness  Recent Health  Fever: no  Chills: no  Fatigue: no  Chest Pain: no  Cough: no  Dyspnea: no  Urinary Frequency: no  Nausea: no  Vomiting: no  Diarrhea: no  Abdominal Pain: no  Easy Bruising: no  Lower Extremity Swelling: no  Poor Exercise Tolerance: no    Pertinent History  Prior Anesthesia: yes  Previous Anesthesia Reaction:  No. Has been told that she is difficult to intubate  Diabetes: no  Cardiovascular Disease: no  Pulmonary Disease: yes, COPD   Renal Disease: no  GI Disease: no  Sleep Apnea: yes  Thromboembolic Problems: no  Clotting Disorder: no  Bleeding Disorder: no  Transfusion Reaction: n/a  Impaired Immunity: no  Steroid use in the last 6 months: yes, prednisone April 27, 2018  Frequent Aspirin use: yes    Family history: There is no family history of abnormal reaction to anesthesia or sudden unexplained  "death    Social history of patient does not wear denture or partial plates    After surgery, the patient plans to recover at home with family.    Review of Systems: Positives in bold  Constitutional: Fever, chills, night sweats, fainting, weight change, fatigue, seizures, dizziness, sleeping difficulties, loud snoring/pauses in breathing  Eyes: change in vision, blurred or double vision, redness/eye pain  Ears, nose, mouth, throat: change in hearing, ear pain, hoarseness, difficulty swallowing, sores in the mouth or throat  Respiratory: shortness of breath, cough, bloody sputum, wheezing  Cardiovascular: chest pain, palpitations   Gastrointestinal: abdominal pain, heartburn/indigestion, nausea/vomiting, change in appetite, change in bowel habits, constipation or diarrhea, rectal bleeding/dark stools, difficulty swallowing  Urinary: painful urination, frequent urination, urinary urgency/incontinence, blood in urine/dark urine, nocturia  Genital: WOMEN: vaginal discharge or odor, bleeding/pain with intercourse, pelvic pain, vulvar/vaginal itching or burning, excessive menstrual bleeding, problems with sexual function  Musculoskeletal: backache/back pain (new or increasing), weakness, joint pain/stiffness (new or increasing), muscle cramps, swelling of hands, feet, ankles, leg pain/redness  Skin: change in moles/freckles, rash, nodules  Hematologic/lymphatic: swollen lymph glands, abnormal bruising/bleeding  Endocrine: excessive thirst/urination, cold or heat intolerance  Breast: breast lump, breast pain, nipple discharge/skin changes  Neurologic/emotional: worrisome memory change, numbness/tingling, anxiety, mood swings      Objective:       Vitals:    09/04/18 1012   BP: 116/62   Pulse: 70   Weight: 194 lb (88 kg)   Height: 5' 2\" (1.575 m)     Study Result      EXAM DATE:         06/04/2018     [facility]  X-RAY CHEST, 2 VIEWS, FRONTAL AND LATERAL  6/4/2018 9:00 AM     INDICATION: follow up of " airspace disease seen on prior chest  COMPARISON: Chest CT and chest x-ray 04/18/2018     FINDINGS: On the prior chest x-ray and chest CT there were left lower lobe  airspace opacities likely representing pneumonia, these were seen better on the  CT. On today's study these appear to have cleared. The heart and pulmonary  vasculature are normal the lungs are clear            Physical Exam:  General Appearance: Alert, cooperative, no distress, appears stated age  Head: Normocephalic, without obvious abnormality, atraumatic  Eyes: PERRL, conjunctiva/corneas clear, EOM's intact  Ears: Normal TM's and external ear canals, both ears  Throat: Lips, mucosa, and tongue normal; teeth and gums normal  Neck: Supple, symmetrical, trachea midline, no adenopathy;  thyroid: not enlarged, symmetric, no tenderness/mass/nodules; no carotid bruit   Lungs: Clear to auscultation bilaterally, respirations unlabored  Heart: Regular rate and rhythm, S1 and S2 normal, no murmur, rub, or gallop  Abdomen: Soft, non-tender, bowel sounds active all four quadrants,  no masses, no organomegaly  Extremities: Extremities normal, atraumatic, no cyanosis or edema  Skin: Skin color, texture, turgor normal, no rashes or lesions  Lymph nodes: Cervical, supraclavicular nodes normal  Neurologic: Normal        Recent Results (from the past 240 hour(s))   Electrocardiogram Perform and Read   Result Value Ref Range    SYSTOLIC BLOOD PRESSURE  mmHg    DIASTOLIC BLOOD PRESSURE  mmHg    VENTRICULAR RATE 65 BPM    ATRIAL RATE 65 BPM    P-R INTERVAL 140 ms    QRS DURATION 78 ms    Q-T INTERVAL 408 ms    QTC CALCULATION (BEZET) 424 ms    P Axis 69 degrees    R AXIS 42 degrees    T AXIS 39 degrees    MUSE DIAGNOSIS       Normal sinus rhythm  Normal ECG  When compared with ECG of 07-SEP-2017 14:37,  Premature atrial complexes are no longer Present  Confirmed by KAYLIE GARCIA, MIGUELITO LOC:SJ ([id number]) on 9/4/2018 1:13:49 PM     HM2(CBC w/o Differential)   Result Value Ref Range     WBC 5.3 4.0 - 11.0 thou/uL    RBC 4.65 3.80 - 5.40 mill/uL    Hemoglobin 13.9 12.0 - 16.0 g/dL    Hematocrit 41.3 35.0 - 47.0 %    MCV 89 80 - 100 fL    MCH 30.0 27.0 - 34.0 pg    MCHC 33.8 32.0 - 36.0 g/dL    RDW 11.6 11.0 - 14.5 %    Platelets 247 140 - 440 thou/uL    MPV 8.5 7.0 - 10.0 fL               Shawna Moses, CNP  [facility]

## 2021-06-29 NOTE — PROGRESS NOTES
Progress Notes by Guillermina Frazier PT at 6/19/2020 10:00 AM     Author: Guillermina Frazier PT Service: -- Author Type: Physical Therapist    Filed: 6/23/2020  8:01 AM Encounter Date: 6/19/2020 Status: Attested Addendum    : Guillermina Frazier PT (Physical Therapist)    Related Notes: Original Note by Guillermina Frazier PT (Physical Therapist) filed at 6/20/2020  8:10 AM    Cosigner: Shawna Moses FNP at 6/23/2020  9:35 AM    Attestation signed by Shawna Moses FNP at 6/23/2020  9:35 AM    Agree with plan                     [facility] Certification Request    June 23, 2020      Patient: Kori Leach  MR Number: [id number]  YOB: 1940  Date of Visit: 6/19/2020      Dear Shawna Moses CNP:    Thank you for this referral.   We are seeing Kori Leach for Physical Therapy of Neck and shoulder pain.    Medicare and/or Medicaid requires physician review and approval of the treatment plan. Please review the plan of care and verify that you agree with the therapy plan of care by co-signing this note.      Plan of Care  Authorization / Certification Start Date: 06/19/20  Authorization / Certification End Date: 09/17/20  Authorization / Certification Number of Visits: 4-6  Communication with: Referral Source  Patient Related Instruction: Nature of Condition;Treatment plan and rationale;Self Care instruction;Basis of treatment;Body mechanics;Posture;Precautions;Next steps;Expected outcome  Times per Week: 1x/every 2-4 weeks  Number of Weeks: 12  Number of Visits: 4-6  Discharge Planning: when indicated  Precautions / Restrictions : none  Therapeutic Exercise: ROM;Stretching;Strengthening  Neuromuscular Reeducation: posture;TNE;core;other  Neuromuscular Re-education: neurodynamics  Manual Therapy: soft tissue mobilization;joint mobilization;muscle energy  Functional Training (ADL's): self care;ADL's      Goals:  Pt. will demonstrate/verbalize independence in self-management of condition  in : 12 weeks  Pt. will be independent with home exercise program in : 12 weeks;Comment  Comment:: for neck and shoulder flexibility and strengthening for improved daily function.    Pt will: demo equal neck and shoulder ROM B for improved use of neck and B UE in ADLs with pain <2/10 for improved QOL.        If you have any questions or concerns, please don't hesitate to call.    Sincerely,      Guillermina Frazier, PT, DPT, OCS, CLT        Physician recommendation:     ___ Follow therapist's recommendation        ___ Modify therapy      *Physician co-signature indicates they certify the need for these services furnished within this plan and while under their care.    [facility]   Initial Evaluation    Patient Name: Kori Leach  Date of evaluation: 6/23/2020  Visit number: 1/6  Referring Provider: Shawna Moses FNP  Referring Diagnosis:   Cervical pain (neck) [M54.2]        Impingement syndrome of shoulder region, right [M75.41]         Visit Diagnosis:     ICD-10-CM    1. Chronic neck pain  M54.2     G89.29    2. Acute shoulder pain  M25.519    3. Generalized muscle weakness  M62.81    4. Decreased range of motion of neck  R29.898    5. Decreased right shoulder range of motion  M25.611    6. Acute pain of right shoulder  M25.511        Assessment:        Kori Leach is a 79 y.o. female who presents to therapy today with chief complaints of neck and shoulder pain. Onset date of sx was  Insidious about 1 month ago for shoulder pain.  Pt reported h/o R shoulder pain 1 year ago that was completely resolved by an injection until about 1 month ago.  Pt also reports neck pain that has increased insidiously over the past year. Pt had a 4 level fusion about 4 years ago, then injections to the levels above and below the fusion last year and then increased pain through this winter. Pain symptoms were significant but have improved dramatically with starting gabapentin over the past couple  of weeks - now only rated 3-4/10.  Functional impairments include difficulty with turning her head and moving shoulder through full range for ADLs and for golfing. Pt demo's signs and sx consistent with mild cervical and shoulder ROM loss, mild strength impairments neck and shoulder.     Pt. is appropriate for skilled PT intervention as outlined in the Plan of Care (POC).  Pt. is a good candidate for skilled PT services to improve pain levels and function.    Goals:  Pt. will demonstrate/verbalize independence in self-management of condition in : 12 weeks  Pt. will be independent with home exercise program in : 12 weeks;Comment  Comment:: for neck and shoulder flexibility and strengthening for improved daily function.    Pt will: demo equal neck and shoulder ROM B for improved use of neck and B UE in ADLs with pain <2/10 for improved QOL.      Patient's expectations/goals are realistic.    Barriers to Learning or Achieving Goals:  No Barriers.       Plan / Patient Instructions:      Plan for next visit:  Assess response to HEP in 4 weeks. Reassess shoulder and cervical ROM and progress strength for improved golf game and ADLs if able.    Plan of Care:   Authorization / Certification Start Date: 06/19/20  Authorization / Certification End Date: 09/17/20  Authorization / Certification Number of Visits: 4-6  Communication with: Referral Source  Patient Related Instruction: Nature of Condition;Treatment plan and rationale;Self Care instruction;Basis of treatment;Body mechanics;Posture;Precautions;Next steps;Expected outcome  Times per Week: 1x/every 2-4 weeks  Number of Weeks: 12  Number of Visits: 4-6  Discharge Planning: when indicated  Precautions / Restrictions : none  Therapeutic Exercise: ROM;Stretching;Strengthening  Neuromuscular Reeducation: posture;TNE;core;other  Neuromuscular Re-education: neurodynamics  Manual Therapy: soft tissue mobilization;joint mobilization;muscle energy  Functional Training (ADL's):  self care;ADL's    Treatment techniques, plan of care, and goals were discussed with the patient.  The patient agrees to the plan as outlined.  The plan of care is dynamic and will be modified on an ongoing basis.       Subjective:       Social information:   Occupation:retired   Work Status:NA    History of Present Illness:  Pt reports a 4 level fusion about 4 years ago (Dr. Sheets) in her neck. Last year pt had injections above and below her fusion and that helped some. Pt recently also started taking gabapentin and that has helped a lot with night time pain.  Pt reports last year R shoulder pain insidious that was helped a lot with an injection for almost a year. Pt reports about a month ago the shoulder pain returned and so pt went back to [facility] to get another injection and then wants to strengthen her shoulders.    Pt also reports she had surgery on her low back in her 20s.    Pain Rating: 3  Pain rating at best: 3  Pain rating at worst: 4  Pain description: ache    Patient reports benefit from:  gabapentin       Objective:      Patient Outcome Measures :    Neck Disability Score in %: 26     Scores range from 0-100%, where a score of 0% represents minimal pain and maximal function. The minmal clinically important difference is a score reduction of 10%.    Precautions/Restrictions: 4 level fusion  Involved side: Bilateral  Posture Observation:      General sitting posture is  fair.  Gait: WNL    ROM: Cervical flex 44 degrees without pain, R rotation 45 degrees without pain, L rotation 40 degrees with mild pain, R SB 11 cm ear to acromion with L sided pain, L SB 12 cm ear to acromion    R shoulder ER ~10 degrees less than L shoulder ER    Strength: B UE grossly 5/5 except R flex and scap plane abd 4/5 with mild pain    Sensation: Intact to light touch B UE    Special tests: Negative painful arc R side, negative ERST    Treatment Today      TREATMENT MINUTES COMMENTS   Evaluation 28 Cervical and R shoulder    Self-care/ Home management     Manual therapy     Neuromuscular Re-education     Therapeutic Activity     Therapeutic Exercises 24 Initiated and performed HEP per pt instructions and flow sheet and printed AVS for home.   Gait training     Modality__________________                Total 52    Blank areas are intentional and mean the treatment did not include these items.        PT Evaluation Code: (Please list factors)  Patient History/Comorbidities: see subj  Examination: neck/shoulder  Clinical Presentation: stable  Clinical Decision Making: low    Patient History/  Comorbidities Examination  (body structures and functions, activity limitations, and/or participation restrictions) Clinical Presentation Clinical Decision Making (Complexity)   No documented Comorbidities or personal factors 1-2 Elements Stable and/or uncomplicated Low   1-2 documented comorbidities or personal factor 3 Elements Evolving clinical presentation with changing characteristics Moderate   3-4 documented comorbidities or personal factors 4 or more Unstable and unpredictable High       Guillermina  Teece PT, DPT, OCS, CLT  6/19/20  2:15 pm

## 2021-06-29 NOTE — PROGRESS NOTES
Progress Notes by Guillermina Frazier PT at 8/19/2020  8:30 AM     Author: Guillermina Frazier PT Service: -- Author Type: Physical Therapist    Filed: 8/19/2020  5:59 PM Encounter Date: 8/19/2020 Status: Attested    : Guillermina Frazier PT (Physical Therapist) Cosigner: Shawna Moses FNP at 8/20/2020  9:10 AM    Attestation signed by Shawna Moses FNP at 8/20/2020  9:10 AM    Agree with plan                 [facility] Daily Progress     Patient Name: Kori Leach  Date: 8/19/2020  Visit #: 3/12  Referring Provider: Shawna Moses FNP  Referring Diagnosis:   Cervical pain (neck) [M54.2]        Impingement syndrome of shoulder region, right [M75.41]         Visit Diagnosis:     ICD-10-CM    1. Chronic neck pain  M54.2     G89.29    2. Acute pain of right shoulder  M25.511    3. Generalized muscle weakness  M62.81    4. Decreased range of motion of neck  R29.898    5. Decreased right shoulder range of motion  M25.611        Assessment:     Pt demo's great improvement to cervical and R shoulder ROM and strength with minimal to no pain or difficulty remaining with ADLs and golfing.    Patient is benefitting from skilled physical therapy and is making steady progress toward functional goals.  Patient is appropriate to continue with skilled physical therapy intervention, as indicated by initial plan of care.    Goal Status:  Pt. will demonstrate/verbalize independence in self-management of condition in : 12 weeks - MET    Pt. will be independent with home exercise program in : 12 weeks;Comment  Comment:: for neck and shoulder flexibility and strengthening for improved daily function.   - MET    Pt will: demo equal neck and shoulder ROM B for improved use of neck and B UE in ADLs with pain <2/10 for improved QOL. - IMPROVING      Plan / Patient Education:     Discharge to [facility] as pt mayank's independence with performance and has mostly met goals.  Thank you for this  referral!    Exercises:  Exercise #1: Standing ER with resistance band L2, standing scap abd, rows with theraband and resisted trunk rotation x10-20 reps 1-2 sets 1-2x/day  Comment #1: Cervical rotation and SB ROM x5-10 reps 2-3x/day    Subjective:     Pain Ratin/10    Pt reports her ability to turn her head left has gotten much better.  Pt reports she is able to do her ADLs and perform in golf without pain at this time.    Objective:     Cervical flexion 45 degrees without pain, R cervical rotation 50 degrees (was 45 degrees), L rotation 50 degrees without pain (was 40 with pain), R SB 9 cm without pain (was 11 cm with pain), L SB 10 with pain (was 12 cm with pain)    B shoulder strength grossly 5/5 without pain (was 4/5)    FROM LAST VISIT  R shoulder ER 65 degrees without pain, L shoulder ER 65 degrees without pain - R shoulder was 10 degrees tighter 4 weeks ago      Treatment Today      TREATMENT MINUTES COMMENTS   Evaluation     Self-care/ Home management     Manual therapy     Neuromuscular Re-education     Therapeutic Activity     Therapeutic Exercises 24 Discussed pt's response with HEP and activity level.  Reassessed R shoulder and cervical ROM and shoulder strength and discussed progress. Reviewed, added to and performed finalized HEP per flowsheet and pt instructions and printed AVS for home.     Gait training     Modality__________________                Total 24    Blank areas are intentional and mean the treatment did not include these items.       Guillermina Frazier PT, DPT, OCS, CLT  2020  8:41 AM

## 2021-06-30 NOTE — PROGRESS NOTES
Progress Notes by Donaldo Vernon MD at 12/21/2020 12:25 PM     Author: Donaldo Vernon MD Service: -- Author Type: Physician    Filed: 12/28/2020  8:05 AM Encounter Date: 12/21/2020 Status: Signed    : Donaldo Vernon MD (Physician)       BP Readings from Last 20 Encounters:   12/21/20 126/80   12/15/20 122/62   11/30/20 132/64   08/31/20 122/72   06/08/20 130/70   12/03/19 126/60   07/24/19 130/70   06/26/19 124/62   10/08/18 120/66   09/04/18 116/62   06/30/18 106/54   06/04/18 126/68   04/26/18 120/74   04/23/18 110/70   04/18/18 114/62   10/03/17 158/80   09/15/17 148/80   09/07/17 162/84   06/13/17 132/70   05/30/17 104/60          [facility]  Dr. Donaldo Vernon             Date of Service: 12/21/2020  Primary Provider: Donaldo Vernon MD    Patient Care Team:  Donaldo Vernon MD as PCP - General (Internal Medicine)  Shawna Moses FNP as Assigned PCP  Mariella Coyne MD as Acupuncturist (Neurology)  Bennett Varela MD as Physician (Spine Surgery)  Beka Adams MD (Ophthalmology)     ______________________________________________________________________     Patient's Pharmacy:    [id number] - [place] - [address] [place]  [address] [place] [address]  Phone: [phone] Fax: [phone]     Patient's Contacts:  Name Home Phone Work Phone Mobile Phone Relationship Lgl PHONG Weiner [phone]   Spouse    JENNIFER ORTIZ [phone]   Child        Patient's Insurance:    Payor: MEDICARE / Plan: MEDICARE A AND B / Product Type: Medicare /            Kori CAMILO Haylee is a 80 y.o. female who comes in today for:    Chief Complaint   Patient presents with   ? Lab Result Follow Up     IMAGING AND STRESS TEST   ? Medication Questions     COULD GET SOMETHING BEFORE HAS OTHER TESTS       Active Problem List:  Problem List as of 12/21/2020 Reviewed: 12/16/2020  7:58 AM by Shawna Moses FNP [place]     Former smoker - quit in 2018    Chronic obstructive pulmonary disease, unspecified COPD type (H), thought to be mild on PFTs 2018       Medium    HTN (hypertension)    Severe obstructive sleep apnea on CPAP       Low    Fatty liver    GERD (gastroesophageal reflux disease)    HLD (hyperlipidemia)    Obesity (BMI 35.0-39.9) with comorbidity (H)       Unprioritized    Dysphagia    Loss of hearing    Prediabetes    Urinary incontinence           Current Outpatient Medications   Medication Sig   ? albuterol (PROAIR HFA;PROVENTIL HFA;VENTOLIN HFA) 90 mcg/actuation inhaler Inhale 2 puffs every 4 (four) hours as needed.   ? ascorbic acid, vitamin C, (ASCORBIC ACID WITH SHERWIN HIPS) 500 MG tablet Take 500 mg by mouth daily.   ? aspirin 81 MG EC tablet Take 81 mg by mouth daily.   ? celecoxib (CELEBREX) 200 MG capsule Take 1 capsule (200 mg total) by mouth daily.   ? cyanocobalamin, vitamin B-12, 1,000 mcg cap Take 1 capsule by mouth daily.   ? fluticasone furoate-vilanteroL (BREO ELLIPTA) 100-25 mcg/dose DsDv inhaler Inhale 1 Dose daily.   ? gabapentin (NEURONTIN) 100 MG capsule Take 100 mg by mouth daily.   ? losartan (COZAAR) 25 MG tablet Take 1 tablet (25 mg total) by mouth daily.   ? MAGNESIUM CITRATE ORAL Take 250 mg by mouth.   ? meclizine (ANTIVERT) 25 mg tablet Take 1 tablet (25 mg total) by mouth 3 (three) times a day as needed.   ? multivitamin (MULTIPLE VITAMINS ORAL) Take by mouth.   ? mv-mn/iron/folic acid/herb 190 (VITAMIN D3 COMPLETE ORAL) Take by mouth.   ? omeprazole (PRILOSEC) 20 MG capsule Take 20 mg by mouth daily before breakfast.   ? rosuvastatin (CRESTOR) 40 MG tablet Take 1 tablet (40 mg total) by mouth at bedtime.   ? sucralfate (CARAFATE) 1 gram tablet Take 1 tablet (1 g total) by mouth 4 (four) times a day as needed.   ? vit B comp no.3-folic-C-biotin (NEPHRO-MAYITO) 1- mg-mg-mcg Tab tablet Take 1 tablet by mouth daily.   ? zinc gluconate 50 mg tablet Take 50 mg by mouth daily.     Social  History     Social History Narrative    . Retired. Likes to play golf.    3 children. Son has lung cancer.    Was a . Lives in [place] for 6 months and [place] for 6 months of the years.       Subjective:     In for follow up multiple issues.    Mainly in for follow up of visit with Shawna Moses DNP.  She was having increased epigastric pain.  More at nighttime.  Not apparently related to eating.  More at nighttime.    Previously on proton pump inhibitor (PPI) for gastroesophageal reflux disease (GERD) and dyspepsia.  Was on famotidine (Pepcid) instead.      Pain does not radiate into the back.  Bowel movements seem normal.  No blood in the stool.  No nausea or vomiting.    Situation may be complicated by stress over her husbands health issues.  He is going to [facility] for a second opinion.    Has an ultrasound which showed a liver lesion which appeared to be increasing when noted in 2017.  Was thought to be benign at that time.  Will be having MRI scan of the lesion.  She has some claustrophobia.    Reviewed her shortness of breath and her recent nuclear stress test was very normal.  Symptoms have not worsened.    We reviewed her other issues noted in the assessment but not specifically addressed in the HPI above.     On review of systems, the patient denies any chest pain or shortness of breath.    Objective:     Wt Readings from Last 3 Encounters:   12/21/20 213 lb (96.6 kg)   12/18/20 210 lb (95.3 kg)   12/15/20 211 lb (95.7 kg)       BP Readings from Last 3 Encounters:   12/21/20 126/80   12/15/20 122/62   11/30/20 132/64       /80   Pulse 90   Wt 213 lb (96.6 kg)   SpO2 98%   BMI 37.73 kg/m     The patient is comfortable, no acute distress.  Mood good.  Insight good.  Eyes are nonicteric.  Neck is supple without mass.  No cervical adenopathy.  No thyromegaly. Heart regular rate and rhythm.  Lungs clear to auscultation bilaterally.  Respiratory effort is good.  Abdomen soft and  nontender.  No hepatosplenomegaly.  Extremities no edema.      Diagnostics:     Lab Results   Component Value Date    WBC 6.3 11/30/2020    HGB 13.6 11/30/2020    HCT 41.5 11/30/2020     11/30/2020    CHOL 219 (H) 06/08/2020    TRIG 294 (H) 06/08/2020    HDL 83 06/08/2020    ALT 48 (H) 11/30/2020    AST 39 11/30/2020     11/30/2020    K 4.9 11/30/2020     11/30/2020    CREATININE 0.85 11/30/2020    BUN 14 11/30/2020    CO2 24 11/30/2020    TSH 0.77 09/28/2016    INR 0.97 06/29/2018    HGBA1C 6.1 (H) 11/04/2020      ______________________________________________________________________    Pertinent radiology for this visit includes the following:    MR Liver With Without Contrast  Narrative: EXAM: MR LIVER W WO CONTRAST  LOCATION: [facility]  DATE/TIME: 12/22/2020 7:04 PM    INDICATION: Indeterminate enlarging 2.7 cm left hepatic lobe mass at ultrasound.  COMPARISON: Ultrasounds abdomen 12/15/2020 and 06/29/2018 and CTA chest 04/18/2018.  TECHNIQUE: Routine MRI liver protocol including T1 in/out phase, diffusion, multiplane T2, and dynamic T1 with IV contrast.    CONTRAST: Gadavist 10 mL    FINDINGS:     LIVER: Moderate to severe diffuse hepatic steatosis. No geovany hepatic contour irregularity but there is mild hepatic contour undulation and asymmetric hypertrophy of the lateral segment left hepatic lobe which could indicate underlying steatohepatitis   and/or some degree of fibrosis related to the chronic steatosis. Portal, hepatic, splenic and mesenteric veins patent. No ascites. Spleen size normal. No portal to caval collateral vein formation identified.    Circumscribed solid mass hepatic segment III left hepatic lobe adjacent to the falciform ligament measures 2.7 x 2.7 x 2.0 cm at today's MRI and 12/15/2020 ultrasound compared with 2.6 x 2.6 x 2.1 cm at 04/18/2018 CTA chest; therefore, has not changed   significantly. No signal dropout at out-of-phase gradient  T1-weighted imaging; therefore, is not focal fatty sparing. Arterial phase enhancement that persists into the portal venous and delayed phases, T2 isointensity to surrounding liver and mild   diffusion restriction.     ADDITIONAL FINDINGS: Pancreas, kidneys, adrenal glands, gallbladder, bile ducts, and abdominal aorta are otherwise negative.  Impression: 1.  Moderate to severe diffuse hepatic steatosis, mild hepatic contour undulation and asymmetric hypertrophy of the lateral segment left hepatic lobe which are morphologic features which could indicate underlying steatohepatitis and/or some degree of   fibrosis related to the chronic steatosis.  2.  Circumscribed solid 2.7 x 2.7 x 2.0 cm mass hepatic segment III is not changed significantly since 04/18/2018. Lesion stability and imaging characteristics suggestive of benign focal nodular hyperplasia with indolent hepatic adenoma less likely but   not entirely excluded. Recommend 12 month follow-up liver MRI with Eovist to ensure stability and help confirm diagnosis.      ______________________________________________________________________      Assessment:     1. Epigastric pain    2. Claustrophobia    3. Liver lesion, left lobe    4. SOB (shortness of breath)    5. Essential hypertension        Quality review:     PHQ-2 Total Score: 0 (8/31/2020  9:00 AM)      No data recorded  ______________________________________________________________________     BMI Readings from Last 1 Encounters:   12/21/20 37.73 kg/m        Plan:     1. Stop aspirin (prophylaxis)  2. Omeprazole (Prilosec) 20 mg po two times a day.  3. MRI scan of the abdomen.  4. Lorazepam (Ativan) for MRI scan.  5. Stop sucralfate (CARAFATE)   6. Update me in 2 weeks on her status with the omeprazole (Prilosec).         Donaldo Vernon MD  General Internal Medicine  [facility]      Return in about 6 months (around 6/21/2021), or if symptoms worsen or fail to improve, for  follow up visit.     No future appointments.      ______________________________________________________________________     Relevant ICD-10 codes and order associations:      ICD-10-CM    1. Epigastric pain  R10.13    2. Claustrophobia  F40.240 LORazepam (ATIVAN) 1 MG tablet   3. Liver lesion, left lobe  K76.9    4. SOB (shortness of breath)  R06.02    5. Essential hypertension  I10

## 2021-06-30 NOTE — PROGRESS NOTES
Progress Notes by Donaldo Vernon MD at 11/30/2020 10:20 AM     Author: Donaldo Vernon MD Service: -- Author Type: Physician    Filed: 12/14/2020  1:28 PM Encounter Date: 11/30/2020 Status: Signed    : Donaldo Vernon MD (Physician)       Wt Readings from Last 20 Encounters:   11/30/20 212 lb (96.2 kg)   10/28/20 214 lb (97.1 kg)   08/31/20 208 lb (94.3 kg)   08/26/20 208 lb (94.3 kg)   06/08/20 206 lb (93.4 kg)   12/03/19 215 lb (97.5 kg)   06/26/19 204 lb (92.5 kg)   10/08/18 189 lb (85.7 kg)   09/04/18 194 lb (88 kg)   06/29/18 187 lb 14.4 oz (85.2 kg)   06/04/18 199 lb 3.2 oz (90.4 kg)   04/26/18 196 lb (88.9 kg)   04/23/18 195 lb (88.5 kg)   04/18/18 196 lb (88.9 kg)   10/03/17 200 lb (90.7 kg)   09/15/17 200 lb (90.7 kg)   09/07/17 200 lb 9.6 oz (91 kg)   05/30/17 197 lb (89.4 kg)   10/03/16 200 lb 12.8 oz (91.1 kg)   07/20/16 200 lb (90.7 kg)     BP Readings from Last 20 Encounters:   11/30/20 132/64   08/31/20 122/72   06/08/20 130/70   12/03/19 126/60   07/24/19 130/70   06/26/19 124/62   10/08/18 120/66   09/04/18 116/62   06/30/18 106/54   06/04/18 126/68   04/26/18 120/74   04/23/18 110/70   04/18/18 114/62   10/03/17 158/80   09/15/17 148/80   09/07/17 162/84   06/13/17 132/70   05/30/17 104/60   10/25/16 134/72   10/18/16 160/80       ______________________________________________________________________            [facility]    Comprehensive and complex medical care - Chronic disease management - Shared decision making - Care coordination - Compassionate care    Patient advocacy - Rational deprescribing - Minimally disruptive medicine - Ethical focus - Customized care          Date of Service: 11/30/2020  Primary Provider: Donaldo Vernon MD    Patient Care Team:  Donaldo Vernon MD as PCP - General (Internal Medicine)  Shawna Moses FNP as Assigned PCP  Mariella Coyne MD as Acupuncturist (Neurology)  Bennett Varela MD as  Physician (Spine Surgery)  Beka Adams MD (Ophthalmology)     ______________________________________________________________________     Patient's Pharmacy:    [id number] [place], [place] - [address] [place] [address]  Phone: [phone] Fax: [phone]     Patient's Contacts:  Name Home Phone Work Phone Mobile Phone Relationship Lgl Grd   PHONG ORTIZ [phone]   Spouse    JENNIFER ORTIZ [phone]   Child      Patient's Insurance:    Payor/Plan Subscr  Sex Relation Sub. Ins. ID Effective Group Num   1. MEDICARE - ME* KORI ORTIZ* 1940 Female Self [id number]                                    [place], [address]   2. MEDICA - MEDI* KORI ORTIZ* 1940 Female  [id number] Not Eff [id number]                                   [address] [address]           Kori Ortiz is a 80 y.o. female who comes in today for:    Chief Complaint   Patient presents with   ? Establish Care   ? Shortness of Breath   ? Medication Refill     PREDNISONE FOR GOUT       Active Problem List:  Problem List as of 2020 Reviewed: 10/28/2020  1:59 PM by Cristina Stephen MD       Unprioritized    Acute cholecystitis    Arthritis    Cervical post-laminectomy syndrome    Chronic obstructive pulmonary disease, unspecified COPD type (H)    Degenerative disc disease, cervical    Dizziness    Dysphagia    Fatty liver    GERD (gastroesophageal reflux disease)    HLD (hyperlipidemia)    HTN (hypertension)    Hypertension    Loss of hearing    Low back pain    Nodule of right lung, repeat 2019    Obesity (BMI 35.0-39.9) with comorbidity (H)    TAMAR (obstructive sleep apnea)- CPAP nightly     Pre-diabetes    Prediabetes    Sleep apnea    Tobacco use    Urinary incontinence           Current Outpatient Medications   Medication Sig   ? albuterol (PROAIR HFA;PROVENTIL HFA;VENTOLIN HFA) 90 mcg/actuation inhaler Inhale 2 puffs every 4 (four) hours as needed.   ? ascorbic acid, vitamin  C, (ASCORBIC ACID WITH SHERWIN HIPS) 500 MG tablet Take 500 mg by mouth daily.   ? aspirin 81 MG EC tablet Take 81 mg by mouth daily.   ? celecoxib (CELEBREX) 200 MG capsule Take 1 capsule (200 mg total) by mouth daily.   ? cyanocobalamin, vitamin B-12, 1,000 mcg cap Take 1 capsule by mouth daily.   ? famotidine (PEPCID) 40 MG tablet Take 1 tablet (40 mg total) by mouth every evening.   ? fluticasone furoate-vilanteroL (BREO ELLIPTA) 100-25 mcg/dose DsDv inhaler Inhale 1 Dose daily.   ? gabapentin (NEURONTIN) 100 MG capsule Take 100 mg by mouth daily.   ? losartan (COZAAR) 25 MG tablet Take 1 tablet (25 mg total) by mouth daily.   ? MAGNESIUM CITRATE ORAL Take 250 mg by mouth.   ? meclizine (ANTIVERT) 25 mg tablet Take 1 tablet (25 mg total) by mouth 3 (three) times a day as needed.   ? multivitamin (MULTIPLE VITAMINS ORAL) Take by mouth.   ? mv-mn/iron/folic acid/herb 190 (VITAMIN D3 COMPLETE ORAL) Take by mouth.   ? rosuvastatin (CRESTOR) 40 MG tablet Take 1 tablet (40 mg total) by mouth at bedtime.   ? vit B comp no.3-folic-C-biotin (NEPHRO-MAYITO) 1- mg-mg-mcg Tab tablet Take 1 tablet by mouth daily.   ? zinc gluconate 50 mg tablet Take 50 mg by mouth daily.     Social History     Social History Narrative    . Retired. Likes to play golf.    3 children. Son has lung cancer.    Was a . Lives in [place] for 6 months and [place] for 6 months of the years.       Subjective:     Here to establish care and to review her breathing.    She is having increased issues with dyspnea on exertion (PERKINS).  Denies any chest pain or chest pain.  She has a history of smoking up until 2 years ago.  No chronic cough.  Previous CT scans reviewed.    Reviewed chronic obstructive lung disease and there are no new issues with this.  Chronic obstructive pulmonary disease (COPD) felt to be mild in the past.  Using inhaler at this time.    Reviewed her hypertension today.  Blood pressure has been in the goal range.   Denies any excessive dizziness from the medication with this.     Reviewed hyperlipidemia and cholesterol is doing well.  Tolerating the medication without significant muscle symptoms.      She has had joint issues.  Possible history of gout versus pseudogout.  Uric acid is low.  Would like to have prednisone on hand as needed exacerbation.    We reviewed her other issues noted in the assessment but not specifically addressed in the HPI above.     Past medical, family and social history reviewed today.     Comprehensive review of systems was performed today with no major problems noted except as above.     Objective:     Wt Readings from Last 3 Encounters:   11/30/20 212 lb (96.2 kg)   10/28/20 214 lb (97.1 kg)   08/31/20 208 lb (94.3 kg)       BP Readings from Last 3 Encounters:   11/30/20 132/64   08/31/20 122/72   06/08/20 130/70       /64   Pulse 82   Wt 212 lb (96.2 kg)   SpO2 97%   BMI 38.78 kg/m     The patient is comfortable, no acute distress.  Mood good.  Insight good.  Eyes are nonicteric.  Neck is supple without mass.  No cervical adenopathy.  No thyromegaly. Heart regular rate and rhythm.  Lungs clear to auscultation bilaterally.  Respiratory effort is good.  Abdomen soft and nontender.  No hepatosplenomegaly.  Extremities no edema.  No joint swelling noted today.      Diagnostics:     Results for orders placed or performed in visit on 11/30/20   BNP(B-type Natriuretic Peptide)   Result Value Ref Range    BNP 34 0 - 155 pg/mL   HM2(CBC w/o Differential)   Result Value Ref Range    WBC 6.3 4.0 - 11.0 thou/uL    RBC 4.98 3.80 - 5.40 mill/uL    Hemoglobin 13.6 12.0 - 16.0 g/dL    Hematocrit 41.5 35.0 - 47.0 %    MCV 83 80 - 100 fL    MCH 27.3 27.0 - 34.0 pg    MCHC 32.7 32.0 - 36.0 g/dL    RDW 14.2 11.0 - 14.5 %    Platelets 280 140 - 440 thou/uL    MPV 8.5 7.0 - 10.0 fL   Sedimentation Rate   Result Value Ref Range    Sed Rate 19 0 - 20 mm/hr   C-Reactive Protein(CRP)   Result Value Ref Range     CRP 0.3 0.0 - 0.8 mg/dL   Uric Acid   Result Value Ref Range    Uric Acid 4.0 2.0 - 7.5 mg/dL   Comprehensive Metabolic Panel   Result Value Ref Range    Sodium 142 136 - 145 mmol/L    Potassium 4.9 3.5 - 5.0 mmol/L    Chloride 106 98 - 107 mmol/L    CO2 24 22 - 31 mmol/L    Anion Gap, Calculation 12 5 - 18 mmol/L    Glucose 110 70 - 125 mg/dL    BUN 14 8 - 28 mg/dL    Creatinine 0.85 0.60 - 1.10 mg/dL    GFR MDRD Af Amer >60 >60 mL/min/1.73m2    GFR MDRD Non Af Amer >60 >60 mL/min/1.73m2    Bilirubin, Total 0.3 0.0 - 1.0 mg/dL    Calcium 9.6 8.5 - 10.5 mg/dL    Protein, Total 7.3 6.0 - 8.0 g/dL    Albumin 4.1 3.5 - 5.0 g/dL    Alkaline Phosphatase 120 45 - 120 U/L    AST 39 0 - 40 U/L    ALT 48 (H) 0 - 45 U/L       Assessment:     1. SOB (shortness of breath)    2. History of gout    3. Thumb pain, unspecified laterality    4. Severe obstructive sleep apnea    5. Chronic obstructive pulmonary disease, unspecified COPD type (H)    6. Mixed hyperlipidemia    7. Essential hypertension    8. Gastroesophageal reflux disease without esophagitis    9. Encounter to establish care        Quality review:     PHQ-2 Total Score: 0 (8/31/2020  9:00 AM)      No data recorded  ______________________________________________________________________     BMI Readings from Last 1 Encounters:   11/30/20 38.78 kg/m        Plan:     1. Check blood work today.  See relevant orders and diagnosis associations at the bottom of this note.   2. Will likely proceed with nuclear stress test as a next step.  3. Consider further tests as needed.  4. Follow up sooner if issues.  5. Reviewed records.  6. Continue current medications at this time.  7. Prednisone sent in.         Donaldo Vernon MD  General Internal Medicine  [facility]    Return in about 6 weeks (around 1/11/2021), or if symptoms worsen or fail to improve, for follow up visit.     Future Appointments   Date Time Provider Department Center   12/15/2020  11:10 AM Shawna Moses, MULU [facility]   12/18/2020  8:30 AM JN NM CH 1 JN NM JN   12/18/2020  8:45 AM JN NM A JN NM JN   12/18/2020  9:15 AM JN HC NM STRESS 1 JN CTST JN   12/18/2020 11:00 AM JN NM A JN NM JN         ______________________________________________________________________     Relevant ICD-10 codes and order associations:      ICD-10-CM    1. SOB (shortness of breath)  R06.02 BNP(B-type Natriuretic Peptide)     HM2(CBC w/o Differential)     Comprehensive Metabolic Panel   2. History of gout  Z87.39 predniSONE (DELTASONE) 20 MG tablet     Uric Acid   3. Thumb pain, unspecified laterality  M79.646 Sedimentation Rate     C-Reactive Protein(CRP)   4. Severe obstructive sleep apnea  G47.33    5. Chronic obstructive pulmonary disease, unspecified COPD type (H)  J44.9    6. Mixed hyperlipidemia  E78.2    7. Essential hypertension  I10    8. Gastroesophageal reflux disease without esophagitis  K21.9    9. Encounter to establish care  Z76.89

## 2021-07-01 ENCOUNTER — RECORDS - HEALTHEAST (OUTPATIENT)
Dept: ADMINISTRATIVE | Facility: CLINIC | Age: 81
End: 2021-07-01

## 2021-07-01 DIAGNOSIS — E78.2 MIXED HYPERLIPIDEMIA: ICD-10-CM

## 2021-07-01 DIAGNOSIS — I10 ESSENTIAL HYPERTENSION: ICD-10-CM

## 2021-07-03 NOTE — ADDENDUM NOTE
Addendum Note by Shawna Moses FNP at 9/5/2018  3:46 PM     Author: Shawna Moses FNP Service: -- Author Type: Nurse Practitioner    Filed: 9/5/2018  3:46 PM Encounter Date: 9/4/2018 Status: Signed    : Shawna Moses FNP (Nurse Practitioner)    Addended by: SHAWNA MOSES on: 9/5/2018 03:46 PM        Modules accepted: Orders

## 2021-07-04 NOTE — TELEPHONE ENCOUNTER
Telephone Encounter by Donaldo Vernon MD at 7/1/2021 10:41 AM     Author: Donaldo Vernon MD Service: -- Author Type: Physician    Filed: 7/1/2021 10:42 AM Encounter Date: 6/29/2021 Status: Signed    : Donaldo Vernon MD (Physician)       Okay to schedule fasting blood work before annual wellness visit if the patient desires.    Donaldo Vernon MD  General Internal Medicine  [facility]  7/1/2021, 10:42 AM

## 2021-07-04 NOTE — TELEPHONE ENCOUNTER
Telephone Encounter by Angle Duque LPN at 7/1/2021 10:48 AM     Author: Angle Duque LPN Service: -- Author Type: Licensed Nurse    Filed: 7/1/2021 10:49 AM Encounter Date: 6/29/2021 Status: Signed    : Angle Duque LPN (Licensed Nurse)       Called and spoke with patients . He will have her call back as she is out golfing at this time.    When patient call back please assist in scheduling lab only appt-fasting

## 2021-07-07 NOTE — TELEPHONE ENCOUNTER
Pt scheduled an AWV with Dr. Vernon on 8/6/21 at 4:20pm. Pt wants to know if she can get blood work done the day before in the morning so she can fast.

## 2021-07-11 ENCOUNTER — HEALTH MAINTENANCE LETTER (OUTPATIENT)
Age: 81
End: 2021-07-11

## 2021-07-13 ENCOUNTER — RECORDS - HEALTHEAST (OUTPATIENT)
Dept: ADMINISTRATIVE | Facility: CLINIC | Age: 81
End: 2021-07-13

## 2021-07-21 ENCOUNTER — RECORDS - HEALTHEAST (OUTPATIENT)
Dept: ADMINISTRATIVE | Facility: CLINIC | Age: 81
End: 2021-07-21

## 2021-07-22 ENCOUNTER — RECORDS - HEALTHEAST (OUTPATIENT)
Dept: INTERNAL MEDICINE | Facility: CLINIC | Age: 81
End: 2021-07-22

## 2021-07-22 DIAGNOSIS — Z12.31 OTHER SCREENING MAMMOGRAM: ICD-10-CM

## 2021-08-04 ENCOUNTER — LAB (OUTPATIENT)
Dept: LAB | Facility: CLINIC | Age: 81
End: 2021-08-04
Payer: MEDICARE

## 2021-08-04 DIAGNOSIS — E78.2 MIXED HYPERLIPIDEMIA: ICD-10-CM

## 2021-08-04 DIAGNOSIS — I10 ESSENTIAL HYPERTENSION: ICD-10-CM

## 2021-08-04 LAB
ALBUMIN SERPL-MCNC: 4 G/DL (ref 3.5–5)
ALP SERPL-CCNC: 134 U/L (ref 45–120)
ALT SERPL W P-5'-P-CCNC: 29 U/L (ref 0–45)
ANION GAP SERPL CALCULATED.3IONS-SCNC: 10 MMOL/L (ref 5–18)
AST SERPL W P-5'-P-CCNC: 28 U/L (ref 0–40)
BILIRUB SERPL-MCNC: 0.3 MG/DL (ref 0–1)
BUN SERPL-MCNC: 20 MG/DL (ref 8–28)
CALCIUM SERPL-MCNC: 9.4 MG/DL (ref 8.5–10.5)
CHLORIDE BLD-SCNC: 107 MMOL/L (ref 98–107)
CHOLEST SERPL-MCNC: 183 MG/DL
CO2 SERPL-SCNC: 25 MMOL/L (ref 22–31)
CREAT SERPL-MCNC: 1.01 MG/DL (ref 0.6–1.1)
FASTING STATUS PATIENT QL REPORTED: ABNORMAL
GFR SERPL CREATININE-BSD FRML MDRD: 52 ML/MIN/1.73M2
GLUCOSE BLD-MCNC: 103 MG/DL (ref 70–125)
HDLC SERPL-MCNC: 70 MG/DL
LDLC SERPL CALC-MCNC: 74 MG/DL
POTASSIUM BLD-SCNC: 4.7 MMOL/L (ref 3.5–5)
PROT SERPL-MCNC: 7 G/DL (ref 6–8)
SODIUM SERPL-SCNC: 142 MMOL/L (ref 136–145)
TRIGL SERPL-MCNC: 194 MG/DL

## 2021-08-04 PROCEDURE — 80053 COMPREHEN METABOLIC PANEL: CPT

## 2021-08-04 PROCEDURE — 36415 COLL VENOUS BLD VENIPUNCTURE: CPT

## 2021-08-04 PROCEDURE — 80061 LIPID PANEL: CPT

## 2021-08-06 ENCOUNTER — OFFICE VISIT (OUTPATIENT)
Dept: INTERNAL MEDICINE | Facility: CLINIC | Age: 81
End: 2021-08-06
Payer: MEDICARE

## 2021-08-06 VITALS
HEART RATE: 78 BPM | DIASTOLIC BLOOD PRESSURE: 62 MMHG | SYSTOLIC BLOOD PRESSURE: 120 MMHG | BODY MASS INDEX: 37.76 KG/M2 | OXYGEN SATURATION: 94 % | HEIGHT: 62 IN | WEIGHT: 205.2 LBS

## 2021-08-06 DIAGNOSIS — J44.9 CHRONIC OBSTRUCTIVE PULMONARY DISEASE, UNSPECIFIED COPD TYPE (H): ICD-10-CM

## 2021-08-06 DIAGNOSIS — R05.3 CHRONIC COUGH: ICD-10-CM

## 2021-08-06 DIAGNOSIS — Z00.00 MEDICARE ANNUAL WELLNESS VISIT, SUBSEQUENT: Primary | ICD-10-CM

## 2021-08-06 DIAGNOSIS — K21.9 GASTROESOPHAGEAL REFLUX DISEASE, UNSPECIFIED WHETHER ESOPHAGITIS PRESENT: ICD-10-CM

## 2021-08-06 DIAGNOSIS — E78.2 MIXED HYPERLIPIDEMIA: ICD-10-CM

## 2021-08-06 DIAGNOSIS — M54.50 LUMBAR BACK PAIN: ICD-10-CM

## 2021-08-06 DIAGNOSIS — G47.33 SEVERE OBSTRUCTIVE SLEEP APNEA: ICD-10-CM

## 2021-08-06 DIAGNOSIS — I10 ESSENTIAL HYPERTENSION: Chronic | ICD-10-CM

## 2021-08-06 PROCEDURE — 99397 PER PM REEVAL EST PAT 65+ YR: CPT | Performed by: INTERNAL MEDICINE

## 2021-08-06 PROCEDURE — 99213 OFFICE O/P EST LOW 20 MIN: CPT | Mod: 25 | Performed by: INTERNAL MEDICINE

## 2021-08-06 ASSESSMENT — ACTIVITIES OF DAILY LIVING (ADL): CURRENT_FUNCTION: NO ASSISTANCE NEEDED

## 2021-08-06 ASSESSMENT — MIFFLIN-ST. JEOR: SCORE: 1341.09

## 2021-08-06 NOTE — PROGRESS NOTES
"  Patient has been advised of split billing requirements and indicates understanding: Yes   Are you in the first 12 months of your Medicare coverage?  No    Healthy Habits:     In general, how would you rate your overall health?  Good    Frequency of exercise:  1 day/week    Duration of exercise:  Less than 15 minutes    Do you usually eat at least 4 servings of fruit and vegetables a day, include whole grains    & fiber and avoid regularly eating high fat or \"junk\" foods?  No    Taking medications regularly:  Yes    Medication side effects:  None    Ability to successfully perform activities of daily living:  No assistance needed    Home Safety:  No safety concerns identified    Hearing Impairment:  Difficulty following a conversation in a noisy restaurant or crowded room and difficulty understanding soft or whispered speech    In the past 6 months, have you been bothered by leaking of urine?  No    In general, how would you rate your overall mental or emotional health?  Excellent      PHQ-2 Total Score: 0    Additional concerns today:  Yes    Do you feel safe in your environment? YES    Have you ever done Advance Care Planning? (For example, a Health Directive, POLST, or a discussion with a medical provider or your loved ones about your wishes): No, advance care planning information given to patient to review.  Patient declined advance care planning discussion at this time. doesn't want to discuss today    Fall risk  0  Cognitive Screening word 2 clock 2    Do you have sleep apnea, excessive snoring or daytime drowsiness?: yes    "

## 2021-08-06 NOTE — PROGRESS NOTES
[facility]    Comprehensive and complex medical care - Chronic disease management - Shared decision making - Care coordination - Compassionate care    Patient advocacy - Rational deprescribing - Minimally disruptive medicine - Ethical focus - Customized care         Date of Service: 8/6/2021  Primary Provider: Donaldo Vernon    Patient Care Team:  oDnaldo Vernon MD as PCP - General (Internal Medicine)  Shawna Moses NP as Assigned PCP  Mariella Coyne MD as MD (Neurology)  Bennett Varela MD as MD (Orthopaedic Surgery)  Beka Adams MD as MD          Patient's Pharmacy:    [id number] [place], [place] - [address] [place] [address]  [address] [place] [id number]  Phone: [phone] Fax: [phone]     Patient's Contacts:  Name Home Phone Work Phone Mobile Phone Relationship Lgl Grd   PHONG LEACH [phone]   Spouse    JENNIFER LEACH [phone]   Other      Patient's Insurance:    Payor: [employer] / Plan: [employer]INUM BLUE / Product Type: PPO /      Subjective:     History of present illness:    Kori Leach is an 81 year old male here for an annual wellness visit.    The issues he would like to address at today's visit include the following:    Chief Complaint   Patient presents with     Medicare Visit      Comes in for annual wellness visit and other issues.    Reviewed her hypertension today.  Blood pressure has been in the goal range.  Denies any excessive dizziness from the medication with this.     Obstructive sleep apnea (TAMAR) is doing okay.    Has some issues with chronic back pain and has had 2 symptoms.  Upper and lower lumbar.  No radiculopathy at this time.  Last surgery 4-5 years ago.    Sees dermatology in the past for skin issues.  Considering again.    Uses naproxen (Aleve) for the back.    Shoulder pain is okay at this time.    Chronic cough and shortness of breath are still an issue.  We reviewed options in relationship  to this.  Discussed trying TRELEGY.  Patient did have normal stress test earlier.  Discussed x-rays again.   has lung cancer.    We reviewed her other issues noted in the assessment but not specifically addressed in the HPI above.     ______________________________________________________________________     Active Problem List:  Problem List as of 8/6/2021 Reviewed: 8/6/2021  4:37 PM by Donaldo Vernon MD       Other    HTN (hypertension)    Last Assessment & Plan 3/11/2021 Office Visit - [facility] Written 3/11/2021 11:22 AM by Shawna Moses NP     - INCREASE losartan from 25 mg to 50 mg daily          Former smoker    HLD (hyperlipidemia)    Dysphagia    Severe obstructive sleep apnea on CPAP    Chronic obstructive pulmonary disease, unspecified COPD type (H), thought to be mild on PFTs 2018    Prediabetes    Morbid obesity (H)    Fatty liver    GERD (gastroesophageal reflux disease)    Urinary incontinence    Loss of hearing           Past Medical History:   Diagnosis Date     Arthritis     knees and hip replacement     Chronic obstructive pulmonary disease, unspecified COPD type (H) 6/4/2018    PFT Complete  Performed 5/21/2018 Final result Study Result FEV1/FVC is 69 and is reduced. FEV1 is 66% predicted and is reduced. FVC is 74% predicted and reduced. There was improvement in spirometry after a single inhaled dose of bronchodilator. TLC is 110% predicted and is normal. RV is 141% predicted and is increased. DLCO is 77% predicted and is reduced when it  is corrected for hemoglobin.  Im     COPD (chronic obstructive pulmonary disease) (H)     smoked age 16 to 80     Degenerative disc disease, cervical      Fatty liver      Former smoker      GERD (gastroesophageal reflux disease)      GERD (gastroesophageal reflux disease)      History of nicotine use 6/4/2018     HLD (hyperlipidemia)      HTN (hypertension)      Hypertension      MO (iron deficiency anemia) 4/24/2015     Inverted nipple     bilateral      Loss of hearing      Lumbar back pain      Morbid obesity (H)      Morbid obesity (H) 9/4/2018     Pre-diabetes      Severe obstructive sleep apnea 4/18/2018    10/06/17 where we ordered a sleep study which showed severe TAMAR with an RDI of 30 and she had a titration study that showed CPAP of 9 cmH20 to be effective. She was set up with the CPAP on 10/25/17 through [facility].      Sleep apnea      Urinary incontinence      Past Surgical History:   Procedure Laterality Date     APPENDECTOMY       ARTHROPLASTY KNEE BILATERAL       CATARACT EXTRACTION       CERVICAL SPINE SURGERY  2015     JOINT REPLACEMENT      bilateral knees and right hip     LAPAROSCOPIC CHOLECYSTECTOMY N/A 6/29/2018    Procedure: CHOLECYSTECTOMY, LAPAROSCOPIC;  Surgeon: Jose Armando Humphries MD;  Location: [facility] [place];  Service:      TONSILLECTOMY       WISDOM TOOTH EXTRACTION       Family History   Problem Relation Age of Onset     Breast Cancer Mother 45.00     Diabetes Mother      Heart Disease Mother      Hyperlipidemia Mother      Hypertension Mother      Depression Father      Alcoholism Father      Hyperlipidemia Sister      Depression Sister      Obesity Sister      Obesity Daughter      Lung Cancer Son      Family history is otherwise noncontributory.     Social History     Occupational History     Not on file   Tobacco Use     Smoking status: Former Smoker     Types: Cigarettes, Cigarettes     Smokeless tobacco: Never Used     Tobacco comment: 1-2 cigarettes per day on average   Substance and Sexual Activity     Alcohol use: Yes     Comment: Alcoholic Drinks/day: less than 1 drink per month      Drug use: No     Sexual activity: Not Currently     Partners: Male      Social History     Social History Narrative    . Retired. Likes to play golf.  3 children. Son has lung cancer.  Was a . Lives in [place] for 6 months and [place] for 6 months of the years.      Current Outpatient Medications   Medication Instructions      "Aspirin Buf,CaCarb-MgCarb-MgO, 81 MG TABS 81 mg, Oral, DAILY     celecoxib (CELEBREX) 200 mg, Oral, DAILY     fluticasone furoate-vilanteroL (BREO ELLIPTA) 100-25 mcg/dose inhaler 1 puff, Inhalation, DAILY     Fluticasone-Umeclidin-Vilanterol (TRELEGY ELLIPTA) 200-62.5-25 MCG/INH oral inhaler 1 puff, Inhalation, DAILY     gabapentin (NEURONTIN) 100 mg, Oral, DAILY     losartan (COZAAR) 25 mg, Oral, DAILY     meclizine (ANTIVERT) 25 mg, Oral, 3 TIMES DAILY PRN     multivitamin (MULTIPLE VITAMINS ORAL) [MULTIVITAMIN (MULTIPLE VITAMINS ORAL)] Take by mouth.     mv-mn/iron/folic acid/herb 190 (VITAMIN D3 COMPLETE ORAL) [MV-MN/IRON/FOLIC ACID/HERB 190 (VITAMIN D3 COMPLETE ORAL)] Take by mouth.     omeprazole (PRILOSEC) 20 mg, EVERY MORNING     rosuvastatin (CRESTOR) 40 mg, Oral, AT BEDTIME     Allergies: Lisinopril     Immunization History   Administered Date(s) Administered     COVID-19,PF,Pfizer 02/04/2021, 02/25/2021     Influenza (High Dose) 3 valent vaccine 10/10/2016, 10/11/2017, 10/08/2018, 09/25/2019     Influenza, Quad, High Dose, Pf, 65yr + 08/31/2020     Pneumo Conj 13-V (2010&after) 05/30/2017     Pneumococcal 23 valent 06/04/2018     Tdap (Adacel,Boostrix) 05/30/2017      Objective:     Wt Readings from Last 3 Encounters:   08/06/21 93.1 kg (205 lb 3.2 oz)   03/11/21 96.6 kg (213 lb)   12/21/20 96.6 kg (213 lb)     BP Readings from Last 3 Encounters:   08/06/21 120/62   03/11/21 (!) 154/72   02/02/21 (!) 158/76     Vision Screening:  No exam data present     PHYSICAL EXAM  /62 (BP Location: Right arm, Patient Position: Sitting, Cuff Size: Adult Large)   Pulse 78   Ht 1.562 m (5' 1.5\")   Wt 93.1 kg (205 lb 3.2 oz)   LMP 08/06/1988   SpO2 94%   BMI 38.14 kg/m     Patient declines an undressed exam.   The patient is comfortable, no acute distress.  Mood good.  Insight is good.  No skin lesions or nodules of concern.  Ears clear.  Eyes are nonicteric.  Pupils equal and reactive.  Throat is clear.  " Neck is supple without mass, no thyromegaly. No cervical or epitrochlear adenopathy.  Heart regular rate and rhythm.  Lungs clear to auscultation bilaterally.  Respiratory effort good.  Abdomen soft and nontender.  No hepatosplenomegaly.  Extremities show trace edema.         Diagnostics:     Lab on 08/04/2021   Component Date Value Ref Range Status     Sodium 08/04/2021 142  136 - 145 mmol/L Final     Potassium 08/04/2021 4.7  3.5 - 5.0 mmol/L Final     Chloride 08/04/2021 107  98 - 107 mmol/L Final     Carbon Dioxide (CO2) 08/04/2021 25  22 - 31 mmol/L Final     Anion Gap 08/04/2021 10  5 - 18 mmol/L Final     Urea Nitrogen 08/04/2021 20  8 - 28 mg/dL Final     Creatinine 08/04/2021 1.01  0.60 - 1.10 mg/dL Final     Calcium 08/04/2021 9.4  8.5 - 10.5 mg/dL Final     Glucose 08/04/2021 103  70 - 125 mg/dL Final     Alkaline Phosphatase 08/04/2021 134* 45 - 120 U/L Final     AST 08/04/2021 28  0 - 40 U/L Final     ALT 08/04/2021 29  0 - 45 U/L Final     Protein Total 08/04/2021 7.0  6.0 - 8.0 g/dL Final     Albumin 08/04/2021 4.0  3.5 - 5.0 g/dL Final     Bilirubin Total 08/04/2021 0.3  0.0 - 1.0 mg/dL Final     GFR Estimate 08/04/2021 52* >60 mL/min/1.73m2 Final    As of July 11, 2021, eGFR is calculated by the CKD-EPI creatinine equation, without race adjustment. eGFR can be influenced by muscle mass, exercise, and diet. The reported eGFR is an estimation only and is only applicable if the renal function is stable.     Cholesterol 08/04/2021 183  <=199 mg/dL Final     Triglycerides 08/04/2021 194* <=149 mg/dL Final     Direct Measure HDL 08/04/2021 70  >=50 mg/dL Final    HDL Cholesterol Reference Range:     0-2 years:   No reference ranges established for patients under 2 years old  at [facility] for lipid analytes.    2-8 years:  Greater than 45 mg/dL     18 years and older:   Female: Greater than or equal to 50 mg/dL   Male:   Greater than or equal to 40 mg/dL     LDL Cholesterol Calculated  08/04/2021 74  <=129 mg/dL Final     Patient Fasting > 8hrs? 08/04/2021 Unknown   Final      ______________________________________________________________________     Pertinent radiology for this visit includes the following:    MR Liver wo & w Contrast  Narrative: EXAM: MR LIVER W WO CONTRAST  LOCATION: [facility]  DATE/TIME: 12/22/2020 7:04 PM    INDICATION: Indeterminate enlarging 2.7 cm left hepatic lobe mass at ultrasound.  COMPARISON: Ultrasounds abdomen 12/15/2020 and 06/29/2018 and CTA chest 04/18/2018.  TECHNIQUE: Routine MRI liver protocol including T1 in/out phase, diffusion, multiplane T2, and dynamic T1 with IV contrast.    CONTRAST: Gadavist 10 mL    FINDINGS:     LIVER: Moderate to severe diffuse hepatic steatosis. No geovany hepatic contour irregularity but there is mild hepatic contour undulation and asymmetric hypertrophy of the lateral segment left hepatic lobe which could indicate underlying steatohepatitis   and/or some degree of fibrosis related to the chronic steatosis. Portal, hepatic, splenic and mesenteric veins patent. No ascites. Spleen size normal. No portal to caval collateral vein formation identified.    Circumscribed solid mass hepatic segment III left hepatic lobe adjacent to the falciform ligament measures 2.7 x 2.7 x 2.0 cm at today's MRI and 12/15/2020 ultrasound compared with 2.6 x 2.6 x 2.1 cm at 04/18/2018 CTA chest; therefore, has not changed   significantly. No signal dropout at out-of-phase gradient T1-weighted imaging; therefore, is not focal fatty sparing. Arterial phase enhancement that persists into the portal venous and delayed phases, T2 isointensity to surrounding liver and mild   diffusion restriction.     ADDITIONAL FINDINGS: Pancreas, kidneys, adrenal glands, gallbladder, bile ducts, and abdominal aorta are otherwise negative.  Impression: 1.  Moderate to severe diffuse hepatic steatosis, mild hepatic contour undulation and asymmetric  hypertrophy of the lateral segment left hepatic lobe which are morphologic features which could indicate underlying steatohepatitis and/or some degree of   fibrosis related to the chronic steatosis.  2.  Circumscribed solid 2.7 x 2.7 x 2.0 cm mass hepatic segment III is not changed significantly since 04/18/2018. Lesion stability and imaging characteristics suggestive of benign focal nodular hyperplasia with indolent hepatic adenoma less likely but   not entirely excluded. Recommend 12 month follow-up liver MRI with Eovist to ensure stability and help confirm diagnosis.       ______________________________________________________________________      Assessment:     1. Medicare annual wellness visit, subsequent    2. Chronic obstructive pulmonary disease, unspecified COPD type (H)    3. Chronic cough    4. Essential hypertension    5. Severe obstructive sleep apnea on CPAP    6. Mixed hyperlipidemia    7. Gastroesophageal reflux disease, unspecified whether esophagitis present    8. Lumbar back pain         Plan:     1. Blood work from before visit reviewed.  2. Chest x-ray (CXR) done today.  3. Proceed with high resolution CT scan (HRCT) if chest x-ray (CXR) unrevealing.  4. Could try TRELIGY for breathing instead.  5. Continue current medications.  6. Follow up sooner if issues.      A personalized health plan based on the identified health risks was provided to the patient on the AVS.       Donaldo Vernon MD  General Internal Medicine  [facility]    Return in about 53 weeks (around 8/12/2022), or if symptoms worsen or fail to improve, for Annual Wellness Visit, annual wellness visit.     No future appointments.      She is at risk for lack of exercise and has been provided with information to increase physical activity for the benefit of her well-being.  The patient was counseled and encouraged to consider modifying their diet and eating habits. She was provided with information on  recommended healthy diet options.  The patient was provided with written information regarding signs of hearing loss.

## 2021-08-06 NOTE — PATIENT INSTRUCTIONS
Exercise for a Healthier Heart  You may wonder how you can improve the health of your heart. If you re thinking about exercise, you re on the right track. You don t need to become an athlete. But you do need a certain amount of brisk exercise to help strengthen your heart. If you have been diagnosed with a heart condition, your healthcare provider may advise exercise to help stabilize your condition. To help make exercise a habit, choose safe, fun activities.      Exercise with a friend. When activity is fun, you're more likely to stick with it.   Before you start  Check with your healthcare provider before starting an exercise program. This is especially important if you have not been active for a while. It's also important if you have a long-term (chronic) health problem such as heart disease, diabetes, or obesity. Or if you are at high risk for having these problems.   Why exercise?  Exercising regularly offers many healthy rewards. It can help you do all of the following:     Improve your blood cholesterol level to help prevent further heart trouble    Lower your blood pressure to help prevent a stroke or heart attack    Control diabetes, or reduce your risk of getting this disease    Improve your heart and lung function    Reach and stay at a healthy weight    Make your muscles stronger so you can stay active    Prevent falls and fractures by slowing the loss of bone mass (osteoporosis)    Manage stress better    Reduce your blood pressure    Improve your sense of self and your body image  Exercise tips      Ease into your routine. Set small goals. Then build on them. If you are not sure what your activity level should be, talk with your healthcare provider first before starting an exercise routine.    Exercise on most days. Aim for a total of 150 minutes (2 hours and 30 minutes) or more of moderate-intensity aerobic activity each week. Or 75 minutes (1 hour and 15 minutes) or more of vigorous-intensity  aerobic activity each week. Or try for a combination of both. Moderate activity means that you breathe heavier and your heart rate increases but you can still talk. Think about doing 40 minutes of moderate exercise, 3 to 4 times a week. For best results, activity should last for about 40 minutes to lower blood pressure and cholesterol. It's OK to work up to the 40-minute period over time. Examples of moderate-intensity activity are walking 1 mile in 15 minutes. Or doing 30 to 45 minutes of yard work.    Step up your daily activity level.  Along with your exercise program, try being more active the whole day. Walk instead of drive. Or park further away so that you take more steps each day. Do more household tasks or yard work. You may not be able to meet the advised mount of physical activity. But doing some moderate- or vigorous-intensity aerobic activity can help reduce your risk for heart disease. Your healthcare provider can help you figure out what is best for you.    Choose 1 or more activities you enjoy.  Walking is one of the easiest things you can do. You can also try swimming, riding a bike, dancing, or taking an exercise class.    When to call your healthcare provider  Call your healthcare provider if you have any of these:     Chest pain or feel dizzy or lightheaded    Burning, tightness, pressure, or heaviness in your chest, neck, shoulders, back, or arms    Abnormal shortness of breath    More joint or muscle pain    A very fast or irregular heartbeat (palpitations)  [employer] last reviewed this educational content on 7/1/2019 2000-2021 [employer]. All rights reserved. This information is not intended as a substitute for professional medical care. Always follow your healthcare professional's instructions.          Understanding USDA MyPlate  The USDA has guidelines to help you make healthy food choices. These are called MyPlate. MyPlate shows the food groups that make up healthy meals  using the image of a place setting. Before you eat, think about the healthiest choices for what to put on your plate or in your cup or bowl. To learn more about building a healthy plate, visit www.choosemyplate.gov.    The food groups    Fruits. Any fruit or 100% fruit juice counts as part of the Fruit Group. Fruits may be fresh, canned, frozen, or dried, and may be whole, cut-up, or pureed. Make 1/2 of your plate fruits and vegetables.    Vegetables. Any vegetable or 100% vegetable juice counts as a member of the Vegetable Group. Vegetables may be fresh, frozen, canned, or dried. They can be served raw or cooked and may be whole, cut-up, or mashed. Make 1/2 of your plate fruits and vegetables.    Grains. All foods made from grains are part of the Grains Group. These include wheat, rice, oats, cornmeal, and barley. Grains are often used to make foods such as bread, pasta, oatmeal, cereal, tortillas, and grits. Grains should be no more than 1/4 of your plate. At least half of your grains should be whole grains.    Protein. This group includes meat, poultry, seafood, beans and peas, eggs, processed soy products (such as tofu), nuts (including nut butters), and seeds. Make protein choices no more than 1/4 of your plate. Meat and poultry choices should be lean or low fat.    Dairy. The Dairy Group includes all fluid milk products and foods made from milk that contain calcium, such as yogurt and cheese. (Foods that have little calcium, such as cream, butter, and cream cheese, are not part of this group.) Most dairy choices should be low-fat or fat-free.    Oils. Oils aren't a food group, but they do contain essential nutrients. However it's important to watch your intake of oils. These are fats that are liquid at room temperature. They include canola, corn, olive, soybean, vegetable, and sunflower oil. Foods that are mainly oil include mayonnaise, certain salad dressings, and soft margarines. You likely already get your  daily oil allowance from the foods you eat.  Things to limit  Eating healthy also means limiting these things in your diet:       Salt (sodium). Many processed foods have a lot of sodium. To keep sodium intake down, eat fresh vegetables, meats, poultry, and seafood when possible. Purchase low-sodium, reduced-sodium, or no-salt-added food products at the store. And don't add salt to your meals at home. Instead, season them with herbs and spices such as dill, oregano, cumin, and paprika. Or try adding flavor with lemon or lime zest and juice.    Saturated fat. Saturated fats are most often found in animal products such as beef, pork, and chicken. They are often solid at room temperature, such as butter. To reduce your saturated fat intake, choose leaner cuts of meat and poultry. And try healthier cooking methods such as grilling, broiling, roasting, or baking. For a simple lower-fat swap, use plain nonfat yogurt instead of mayonnaise when making potato salad or macaroni salad.    Added sugars. These are sugars added to foods. They are in foods such as ice cream, candy, soda, fruit drinks, sports drinks, energy drinks, cookies, pastries, jams, and syrups. Cut down on added sugars by sharing sweet treats with a family member or friend. You can also choose fruit for dessert, and drink water or other unsweetened beverages.     [employer] last reviewed this educational content on 6/1/2020 2000-2021 [employer]. All rights reserved. This information is not intended as a substitute for professional medical care. Always follow your healthcare professional's instructions.          Signs of Hearing Loss      Hearing much better with one ear can be a sign of hearing loss.   Hearing loss is a problem shared by many people. In fact, it is one of the most common health problems, particularly as people age. Most people age 65 and older have some hearing loss. By age 80, almost everyone does. Hearing loss often occurs  slowly over the years. So you may not realize your hearing has gotten worse.  Have your hearing checked  Call your healthcare provider if you:    Have to strain to hear normal conversation    Have to watch other people s faces very carefully to follow what they re saying    Need to ask people to repeat what they ve said    Often misunderstand what people are saying    Turn the volume of the television or radio up so high that others complain    Feel that people are mumbling when they re talking to you    Find that the effort to hear leaves you feeling tired and irritated    Notice, when using the phone, that you hear better with one ear than the other  [employer] last reviewed this educational content on 1/1/2020 2000-2021 [employer]. All rights reserved. This information is not intended as a substitute for professional medical care. Always follow your healthcare professional's instructions.        ______________________________________________________________________      Preventive Health Recommendations    See your health care provider every year (or as recommended) to:    Review health changes.     Discuss preventive care.      Review your medicines and supplements.    Consider having a mammogram at least every 2 years between the ages of 50 and 75 years old.  Yearly if desired.    Consider colon cancer screening between the ages of 50 and 75 years old if necessary.      Cholesterol and diabetes testing as necessary.    At age 65, consider haveing a bone density scan (DEXA) to check for osteoporosis.    Shots:    COVID vaccination if you have not had it yet.    Get a flu shot each year.    Get a tetanus shot every 10 years if you are under the age of 80 years old.    Talk to your doctor about a one time pneumonia vaccine that is recommended at the age of 65 years old.    Talk to your pharmacist about the shingles vaccine.  This is often better covered in the pharmacy through your insurance than  if you have it in the clinic.    Lifestyle    Exercise at least 150 minutes a week (30 minutes a day, 5 days a week) if you are able. This will help you control your weight and prevent disease.    Limit alcohol to one drink per day.    No smoking.     Wear sunscreen to prevent skin cancer.     See your dentist twice a year for an exam and cleaning.    See your eye doctor every 1 to 2 years to screen for conditions such as glaucoma, macular degeneration and cataracts.    Personalized Prevention Plan  You are due for the preventive services outlined below.  Your care team is available to assist you in scheduling these services.  If you have already completed any of these items, please share that information with your care team to update in your medical record.    Health Maintenance Due   Topic Date Due     ANNUAL REVIEW OF HM ORDERS  Never done     ZOSTER IMMUNIZATION (2 of 2) 12/10/2015

## 2021-08-18 DIAGNOSIS — E78.2 MIXED HYPERLIPIDEMIA: ICD-10-CM

## 2021-08-18 DIAGNOSIS — I10 ESSENTIAL HYPERTENSION: ICD-10-CM

## 2021-08-18 RX ORDER — ROSUVASTATIN CALCIUM 40 MG/1
TABLET, COATED ORAL
Qty: 90 TABLET | Refills: 3 | Status: SHIPPED | OUTPATIENT
Start: 2021-08-18 | End: 2022-07-28

## 2021-08-18 RX ORDER — LOSARTAN POTASSIUM 25 MG/1
TABLET ORAL
Qty: 90 TABLET | Refills: 3 | Status: SHIPPED | OUTPATIENT
Start: 2021-08-18 | End: 2022-07-28

## 2021-08-20 ENCOUNTER — HOSPITAL ENCOUNTER (OUTPATIENT)
Dept: CT IMAGING | Facility: HOSPITAL | Age: 81
Discharge: HOME | End: 2021-08-20
Attending: INTERNAL MEDICINE | Admitting: INTERNAL MEDICINE
Payer: MEDICARE

## 2021-08-20 DIAGNOSIS — R05.3 CHRONIC COUGH: ICD-10-CM

## 2021-08-20 PROCEDURE — 71250 CT THORAX DX C-: CPT | Mod: MG

## 2021-09-05 ENCOUNTER — HEALTH MAINTENANCE LETTER (OUTPATIENT)
Age: 81
End: 2021-09-05

## 2021-10-04 DIAGNOSIS — J44.9 CHRONIC OBSTRUCTIVE PULMONARY DISEASE, UNSPECIFIED COPD TYPE (H): ICD-10-CM

## 2021-11-15 ENCOUNTER — MYC MEDICAL ADVICE (OUTPATIENT)
Dept: INTERNAL MEDICINE | Facility: CLINIC | Age: 81
End: 2021-11-15
Payer: MEDICARE

## 2021-11-15 DIAGNOSIS — R53.83 FATIGUE, UNSPECIFIED TYPE: ICD-10-CM

## 2021-11-15 DIAGNOSIS — J00 ACUTE RHINITIS: ICD-10-CM

## 2021-11-15 DIAGNOSIS — J44.9 CHRONIC OBSTRUCTIVE PULMONARY DISEASE, UNSPECIFIED COPD TYPE (H): ICD-10-CM

## 2021-11-15 DIAGNOSIS — R63.0 LOSS OF APPETITE: ICD-10-CM

## 2021-11-15 DIAGNOSIS — R05.9 COUGH: Primary | ICD-10-CM

## 2021-11-23 ENCOUNTER — TELEPHONE (OUTPATIENT)
Dept: INTERNAL MEDICINE | Facility: CLINIC | Age: 81
End: 2021-11-23
Payer: MEDICARE

## 2021-11-23 DIAGNOSIS — K76.9 LIVER LESION: Primary | ICD-10-CM

## 2021-11-23 DIAGNOSIS — R93.2 ABNORMAL MRI, LIVER: ICD-10-CM

## 2021-11-24 NOTE — TELEPHONE ENCOUNTER
Please schedule this patient for an appointment with me on:    ______________________________________________________________________     Monday, December 6th  Time of appointment:  3 pm    Reason for appointment:  Shortness of breath.    ______________________________________________________________________     Patient already notified.  No need to notify the patient.    Thank you.    Donaldo Vernon MD  General Internal Medicine  [facility]   11/23/2021 10:21 PM

## 2021-12-06 ENCOUNTER — OFFICE VISIT (OUTPATIENT)
Dept: INTERNAL MEDICINE | Facility: CLINIC | Age: 81
End: 2021-12-06
Payer: MEDICARE

## 2021-12-06 VITALS
OXYGEN SATURATION: 96 % | SYSTOLIC BLOOD PRESSURE: 164 MMHG | DIASTOLIC BLOOD PRESSURE: 60 MMHG | BODY MASS INDEX: 38.48 KG/M2 | WEIGHT: 207 LBS | HEART RATE: 82 BPM

## 2021-12-06 DIAGNOSIS — J44.9 CHRONIC OBSTRUCTIVE PULMONARY DISEASE, UNSPECIFIED COPD TYPE (H): ICD-10-CM

## 2021-12-06 DIAGNOSIS — M75.41 IMPINGEMENT SYNDROME, SHOULDER, RIGHT: Primary | ICD-10-CM

## 2021-12-06 DIAGNOSIS — D50.9 MICROCYTIC ANEMIA: ICD-10-CM

## 2021-12-06 DIAGNOSIS — R06.02 SOB (SHORTNESS OF BREATH): ICD-10-CM

## 2021-12-06 DIAGNOSIS — R93.2 ABNORMAL MRI, LIVER: ICD-10-CM

## 2021-12-06 DIAGNOSIS — F40.240 CLAUSTROPHOBIA: ICD-10-CM

## 2021-12-06 DIAGNOSIS — I10 PRIMARY HYPERTENSION: ICD-10-CM

## 2021-12-06 DIAGNOSIS — R73.03 PREDIABETES: ICD-10-CM

## 2021-12-06 LAB
ALBUMIN SERPL-MCNC: 4 G/DL (ref 3.5–5)
ALP SERPL-CCNC: 133 U/L (ref 45–120)
ALT SERPL W P-5'-P-CCNC: 31 U/L (ref 0–45)
ANION GAP SERPL CALCULATED.3IONS-SCNC: 11 MMOL/L (ref 5–18)
AST SERPL W P-5'-P-CCNC: 37 U/L (ref 0–40)
BILIRUB SERPL-MCNC: 0.3 MG/DL (ref 0–1)
BUN SERPL-MCNC: 11 MG/DL (ref 8–28)
CALCIUM SERPL-MCNC: 9.1 MG/DL (ref 8.5–10.5)
CHLORIDE BLD-SCNC: 109 MMOL/L (ref 98–107)
CO2 SERPL-SCNC: 22 MMOL/L (ref 22–31)
CREAT SERPL-MCNC: 0.76 MG/DL (ref 0.6–1.1)
ERYTHROCYTE [DISTWIDTH] IN BLOOD BY AUTOMATED COUNT: 17.1 % (ref 10–15)
GFR SERPL CREATININE-BSD FRML MDRD: 74 ML/MIN/1.73M2
GLUCOSE BLD-MCNC: 97 MG/DL (ref 70–125)
HBA1C MFR BLD: 5.9 % (ref 0–5.6)
HCT VFR BLD AUTO: 32.8 % (ref 35–47)
HGB BLD-MCNC: 9.2 G/DL (ref 11.7–15.7)
MCH RBC QN AUTO: 21.4 PG (ref 26.5–33)
MCHC RBC AUTO-ENTMCNC: 28 G/DL (ref 31.5–36.5)
MCV RBC AUTO: 77 FL (ref 78–100)
PLATELET # BLD AUTO: 349 10E3/UL (ref 150–450)
POTASSIUM BLD-SCNC: 4.3 MMOL/L (ref 3.5–5)
PROT SERPL-MCNC: 6.9 G/DL (ref 6–8)
RBC # BLD AUTO: 4.29 10E6/UL (ref 3.8–5.2)
SODIUM SERPL-SCNC: 142 MMOL/L (ref 136–145)
WBC # BLD AUTO: 5.2 10E3/UL (ref 4–11)

## 2021-12-06 PROCEDURE — 83036 HEMOGLOBIN GLYCOSYLATED A1C: CPT | Performed by: INTERNAL MEDICINE

## 2021-12-06 PROCEDURE — 84466 ASSAY OF TRANSFERRIN: CPT | Performed by: INTERNAL MEDICINE

## 2021-12-06 PROCEDURE — 36415 COLL VENOUS BLD VENIPUNCTURE: CPT | Performed by: INTERNAL MEDICINE

## 2021-12-06 PROCEDURE — 82728 ASSAY OF FERRITIN: CPT | Performed by: INTERNAL MEDICINE

## 2021-12-06 PROCEDURE — 20610 DRAIN/INJ JOINT/BURSA W/O US: CPT | Mod: RT | Performed by: INTERNAL MEDICINE

## 2021-12-06 PROCEDURE — 85027 COMPLETE CBC AUTOMATED: CPT | Performed by: INTERNAL MEDICINE

## 2021-12-06 PROCEDURE — 90662 IIV NO PRSV INCREASED AG IM: CPT | Performed by: INTERNAL MEDICINE

## 2021-12-06 PROCEDURE — 83540 ASSAY OF IRON: CPT | Performed by: INTERNAL MEDICINE

## 2021-12-06 PROCEDURE — G0008 ADMIN INFLUENZA VIRUS VAC: HCPCS | Performed by: INTERNAL MEDICINE

## 2021-12-06 PROCEDURE — 80053 COMPREHEN METABOLIC PANEL: CPT | Performed by: INTERNAL MEDICINE

## 2021-12-06 PROCEDURE — 99214 OFFICE O/P EST MOD 30 MIN: CPT | Mod: 25 | Performed by: INTERNAL MEDICINE

## 2021-12-06 PROCEDURE — 83880 ASSAY OF NATRIURETIC PEPTIDE: CPT | Performed by: INTERNAL MEDICINE

## 2021-12-06 RX ORDER — IPRATROPIUM BROMIDE AND ALBUTEROL SULFATE 2.5; .5 MG/3ML; MG/3ML
1 SOLUTION RESPIRATORY (INHALATION) EVERY 6 HOURS PRN
Qty: 360 ML | Refills: 11 | Status: SHIPPED | OUTPATIENT
Start: 2021-12-06 | End: 2024-07-12

## 2021-12-06 RX ORDER — PREDNISONE 20 MG/1
TABLET ORAL
Qty: 15 TABLET | Refills: 0 | Status: SHIPPED | OUTPATIENT
Start: 2021-12-06 | End: 2022-01-06

## 2021-12-06 RX ORDER — TRIAMCINOLONE ACETONIDE 40 MG/ML
80 INJECTION, SUSPENSION INTRA-ARTICULAR; INTRAMUSCULAR ONCE
Status: COMPLETED | OUTPATIENT
Start: 2021-12-06 | End: 2021-12-06

## 2021-12-06 RX ORDER — LORAZEPAM 1 MG/1
2 TABLET ORAL
Qty: 2 TABLET | Refills: 0 | Status: SHIPPED | OUTPATIENT
Start: 2021-12-06 | End: 2022-05-12

## 2021-12-06 RX ADMIN — TRIAMCINOLONE ACETONIDE 80 MG: 40 INJECTION, SUSPENSION INTRA-ARTICULAR; INTRAMUSCULAR at 16:42

## 2021-12-06 NOTE — PROGRESS NOTES
[facility] - [facility]ists    Comprehensive and complex medical care - Chronic disease management - Shared decision making - Care coordination - Compassionate care    Patient advocacy - Rational deprescribing - Minimally disruptive medicine - Ethical focus - Customized care         Date of Service: 12/6/2021  Primary Provider: Donaldo Vernon    Patient Care Team:  Donaldo Vernon MD as PCP - General (Internal Medicine)  Mariella Coyne MD as MD (Neurology)  Bennett Varela MD as MD (Orthopaedic Surgery)  Beka Adams MD as MD  Donaldo Vernon MD as Assigned PCP          Patient's Pharmacy:    [id number] - [place], [place] - [address] [place] [address]  [address] [place] [id number]  Phone: [phone] Fax: [phone]    [facility] - [place], [place] - [address] AT [address] [facility]  [address]  [facility] [address]  Phone: [phone] Fax: [phone]     Patient's Contacts:  Name Home Phone Work Phone Mobile Phone Relationship Lgl Grd   PHONG ORTIZ [phone]   Spouse    JENNIFER ORTIZ [phone]   Other      Patient's Insurance:    Payor: MEDICARE / Plan: MEDICARE / Product Type: Medicare /           Active Problem List:  Problem List as of 12/6/2021 Reviewed: 12/6/2021 10:12 PM by Donaldo Vernon MD       High    Former smoker - quit in 2018    Chronic obstructive pulmonary disease, unspecified COPD type (H), thought to be mild on PFTs 2018       Medium    HTN (hypertension)    Last Assessment & Plan 3/11/2021 Office Visit - [facility] Written 3/11/2021 11:22 AM by Shawna Moses NP     - INCREASE losartan from 25 mg to 50 mg daily          Severe obstructive sleep apnea on CPAP       Low    HLD (hyperlipidemia)    Dysphagia    Prediabetes    Obesity (BMI 35.0-39.9) with comorbidity (H)    Fatty liver    GERD (gastroesophageal reflux disease)    Loss of hearing    Lumbar back pain           Current  Outpatient Medications   Medication Instructions     Aspirin Buf,CaCarb-MgCarb-MgO, 81 MG TABS 81 mg, Oral, DAILY     celecoxib (CELEBREX) 200 mg, Oral, DAILY     fluticasone furoate-vilanteroL (BREO ELLIPTA) 100-25 mcg/dose inhaler 1 puff, Inhalation, DAILY     Fluticasone-Umeclidin-Vilanterol (TRELEGY ELLIPTA) 200-62.5-25 MCG/INH oral inhaler 1 puff, Inhalation, DAILY     gabapentin (NEURONTIN) 100 MG capsule TAKE 1 CAPSULE BY MOUTH ONCE DAILY.     ipratropium - albuterol 0.5 mg/2.5 mg/3 mL (DUONEB) 0.5-2.5 (3) MG/3ML neb solution 3 mLs, Nebulization, EVERY 6 HOURS PRN     LORazepam (ATIVAN) 2 mg, Oral, ONCE PRN     losartan (COZAAR) 25 MG tablet TAKE 1 TABLET DAILY     meclizine (ANTIVERT) 25 mg, Oral, 3 TIMES DAILY PRN     multivitamin (MULTIPLE VITAMINS ORAL) [MULTIVITAMIN (MULTIPLE VITAMINS ORAL)] Take by mouth.     mv-mn/iron/folic acid/herb 190 (VITAMIN D3 COMPLETE ORAL) Oral     omeprazole (PRILOSEC) 20 mg, EVERY MORNING     predniSONE (DELTASONE) 20 MG tablet Take 2 tablets (40 mg) by mouth daily for 5 days, THEN 1 tablet (20 mg) daily for 5 days.     rosuvastatin (CRESTOR) 40 MG tablet TAKE 1 TABLET AT BEDTIME     Social History     Social History Narrative    . Retired. Likes to play golf.  3 children. Son has lung cancer.  Was a . Lives in [place] for 6 months and [place] for 6 months of the years.       Subjective:     Kori Leach is a 81 year old female who comes in today for:    Chief Complaint   Patient presents with     RECHECK     SOB is bad, HTN      Imm/Inj     right     Refill Request     1 anxiety pill for MRI, ipratropium/albuterol 0.5-2.5mg/3ml sol neph 1 vial 3ml as needed every 6 prn      Patient comes in today for a number of issues.  Her  accompanies her to the visit.    She is been having a lot of issues on her own.  Her hot  has metastatic lung cancer and is going to be starting chemotherapy.  We talked about this with them today.    She is trying  to help him out quite a bit.    She has been having issues with her own breathing.  She has defined COPD.  Her last CT scan of her chest showed emphysema present.  We reviewed this with her.  She is on the Breo inhaler but has limited supply.  Cost can be an issue for her.  She had some old DuoNeb vials and would like refills of this.  This was sent in.  She has been having a bit more cough and a bit more wheezing.  She might get some yellow phlegm with this.  This has been more of gradual change for her.  She has not had chest pain or chest tightness.    Reviewed her sleep apnea.  She did follow-up with her sleep doctor.  She has been doing okay in relationship to this.    Reviewed her blood pressure and her blood pressure is elevated today.  We talked about this and possible interventions.  She did have a markedly elevated blood pressure when she was in for her stress test.  It has been good otherwise.  She denies stress as of major contributing factor for this.    She been having increasing problems with right shoulder impingement.  She has had this previously and has benefited from corticosteroid injections.  We reviewed a repeat of this today.  She does have issues with abducting her shoulder.    We reviewed her other issues noted in the assessment but not specifically addressed in the HPI above.     Objective:     Wt Readings from Last 3 Encounters:   12/06/21 93.9 kg (207 lb)   08/06/21 93.1 kg (205 lb 3.2 oz)   03/11/21 96.6 kg (213 lb)     BP Readings from Last 3 Encounters:   12/06/21 (!) 164/60   08/06/21 120/62   03/11/21 (!) 154/72     BP (!) 164/60   Pulse 82   Wt 93.9 kg (207 lb)   LMP 08/06/1988   SpO2 96%   BMI 38.48 kg/m     The patient is comfortable, no acute distress.  Mood good.  Insight good.  Eyes are nonicteric.  Neck is supple without mass.  No cervical adenopathy.  No thyromegaly. Heart regular rate and rhythm.  Lungs clear to auscultation bilaterally.  Respiratory effort is good.   Abdomen soft and nontender.  No hepatosplenomegaly.  Extremities no edema.  She does have positive impingement signs noted.      Diagnostics:     Lab on 08/04/2021   Component Date Value Ref Range Status     Sodium 08/04/2021 142  136 - 145 mmol/L Final     Potassium 08/04/2021 4.7  3.5 - 5.0 mmol/L Final     Chloride 08/04/2021 107  98 - 107 mmol/L Final     Carbon Dioxide (CO2) 08/04/2021 25  22 - 31 mmol/L Final     Anion Gap 08/04/2021 10  5 - 18 mmol/L Final     Urea Nitrogen 08/04/2021 20  8 - 28 mg/dL Final     Creatinine 08/04/2021 1.01  0.60 - 1.10 mg/dL Final     Calcium 08/04/2021 9.4  8.5 - 10.5 mg/dL Final     Glucose 08/04/2021 103  70 - 125 mg/dL Final     Alkaline Phosphatase 08/04/2021 134* 45 - 120 U/L Final     AST 08/04/2021 28  0 - 40 U/L Final     ALT 08/04/2021 29  0 - 45 U/L Final     Protein Total 08/04/2021 7.0  6.0 - 8.0 g/dL Final     Albumin 08/04/2021 4.0  3.5 - 5.0 g/dL Final     Bilirubin Total 08/04/2021 0.3  0.0 - 1.0 mg/dL Final     GFR Estimate 08/04/2021 52* >60 mL/min/1.73m2 Final    As of July 11, 2021, eGFR is calculated by the CKD-EPI creatinine equation, without race adjustment. eGFR can be influenced by muscle mass, exercise, and diet. The reported eGFR is an estimation only and is only applicable if the renal function is stable.     Cholesterol 08/04/2021 183  <=199 mg/dL Final     Triglycerides 08/04/2021 194* <=149 mg/dL Final     Direct Measure HDL 08/04/2021 70  >=50 mg/dL Final    HDL Cholesterol Reference Range:     0-2 years:   No reference ranges established for patients under 2 years old  at [facility] for lipid analytes.    2-8 years:  Greater than 45 mg/dL     18 years and older:   Female: Greater than or equal to 50 mg/dL   Male:   Greater than or equal to 40 mg/dL     LDL Cholesterol Calculated 08/04/2021 74  <=129 mg/dL Final     Patient Fasting > 8hrs? 08/04/2021 Unknown   Final       Results for orders placed or performed in visit on 12/06/21    CBC with platelets     Status: Abnormal   Result Value Ref Range    WBC Count 5.2 4.0 - 11.0 10e3/uL    RBC Count 4.29 3.80 - 5.20 10e6/uL    Hemoglobin 9.2 (L) 11.7 - 15.7 g/dL    Hematocrit 32.8 (L) 35.0 - 47.0 %    MCV 77 (L) 78 - 100 fL    MCH 21.4 (L) 26.5 - 33.0 pg    MCHC 28.0 (L) 31.5 - 36.5 g/dL    RDW 17.1 (H) 10.0 - 15.0 %    Platelet Count 349 150 - 450 10e3/uL   Hemoglobin A1c     Status: Abnormal   Result Value Ref Range    Hemoglobin A1C 5.9 (H) 0.0 - 5.6 %       Assessment:     1. Impingement syndrome, shoulder, right    2. Claustrophobia    3. Chronic obstructive pulmonary disease, unspecified COPD type (H)    4. Primary hypertension    5. Prediabetes    6. SOB (shortness of breath)    7. Abnormal MRI, liver    8. Microcytic anemia        Plan:     1. Corticosteroid injection done to the right shoulder.  2. Given lorazepam for next MRI.  This was done for follow-up of a previous abnormal MRI which is likely okay.  3. Trial of a course of steroids to see if it would help with her breathing.  4. Refilled DuoNeb and she could use this.  5. She is going to start Trelegy in 2022 when her insurance resets.  6. She could see pulmonary if needed in the future.  7. Blood work done today.  8. She was found to be anemic and this could be worsening her shortness of breath.  Last colonoscopy in 2018 was good overall.  May need further evaluation potentially.  9. Consider increasing her blood pressure medications here in the future.  Could increase losartan and possibly add additional treatments if needed.  10. Continue current medications otherwise.  11. Follow up sooner if issues.       Donaldo Vernon MD  General Internal Medicine  [facility]    Return in about 2 months (around 2/6/2022), or if symptoms worsen or fail to improve, for visit and blood work.     Future Appointments   Date Time Provider Department Center   1/3/2022  5:45 PM [facility] MR 1 [facility]       Wt Readings  from Last 20 Encounters:   12/06/21 93.9 kg (207 lb)   08/06/21 93.1 kg (205 lb 3.2 oz)   03/11/21 96.6 kg (213 lb)   12/21/20 96.6 kg (213 lb)   12/15/20 95.7 kg (211 lb)   11/30/20 96.2 kg (212 lb)   10/28/20 97.1 kg (214 lb)   08/31/20 94.3 kg (208 lb)   08/26/20 94.3 kg (208 lb)   06/08/20 93.4 kg (206 lb)   12/03/19 97.5 kg (215 lb)   06/26/19 92.5 kg (204 lb)   10/08/18 85.7 kg (189 lb)   09/04/18 88 kg (194 lb)   06/29/18 85.2 kg (187 lb 14.4 oz)   06/04/18 90.4 kg (199 lb 3.2 oz)   04/26/18 88.9 kg (196 lb)   04/23/18 88.5 kg (195 lb)   04/18/18 88.9 kg (196 lb)   10/03/17 90.7 kg (200 lb)     BP Readings from Last 20 Encounters:   12/06/21 (!) 164/60   08/06/21 120/62   03/11/21 (!) 154/72   02/02/21 (!) 158/76   12/21/20 126/80   12/15/20 122/62   11/30/20 132/64   08/31/20 122/72   06/08/20 130/70   12/03/19 126/60      Pulse Readings from Last 20 Encounters:   12/06/21 82   08/06/21 78   03/11/21 88   02/02/21 70   12/21/20 90   12/15/20 77   11/30/20 82   08/31/20 70   06/08/20 80   12/03/19 76     SpO2 Readings from Last 20 Encounters:   12/06/21 96%   08/06/21 94%   12/21/20 98%   12/15/20 98%   11/30/20 97%   12/03/19 96%

## 2021-12-07 LAB
BNP SERPL-MCNC: 51 PG/ML (ref 0–159)
FERRITIN SERPL-MCNC: 9 NG/ML (ref 10–130)
IRON SATN MFR SERPL: 4 % (ref 20–50)
IRON SERPL-MCNC: 24 UG/DL (ref 42–175)
TIBC SERPL-MCNC: 536 UG/DL (ref 313–563)
TRANSFERRIN SERPL-MCNC: 429 MG/DL (ref 212–360)

## 2021-12-07 NOTE — PATIENT INSTRUCTIONS
Please follow up if you have any further issues.    You may contact me by phone or MyChart if you are worsening or if things are not improving.    ______________________________________________________________________     Please remember that you can call [phone] to schedule an appointment.     You can schedule appointments 24 hours a day, 7 days a week.  Sometimes the best time to schedule an appointment is after clinic hours when less people are calling in.  Weekends are another option for calling in to schedule appointments.

## 2021-12-09 ENCOUNTER — TELEPHONE (OUTPATIENT)
Dept: INTERNAL MEDICINE | Facility: CLINIC | Age: 81
End: 2021-12-09
Payer: MEDICARE

## 2021-12-10 NOTE — TELEPHONE ENCOUNTER
Please schedule this patient for an appointment with me on:    ______________________________________________________________________     Thursday, January 6th  Time of appointment:  2:45 pm FACE TO FACE visit     Reason for appointment:  Follow up anemia.    ______________________________________________________________________     Patient already notified.  No need to notify the patient.    Thank you.    Donaldo Vernon MD  General Internal Medicine  [facility]   12/9/2021 9:37 PM

## 2022-01-06 ENCOUNTER — TELEPHONE (OUTPATIENT)
Dept: INTERNAL MEDICINE | Facility: CLINIC | Age: 82
End: 2022-01-06

## 2022-01-06 ENCOUNTER — OFFICE VISIT (OUTPATIENT)
Dept: INTERNAL MEDICINE | Facility: CLINIC | Age: 82
End: 2022-01-06
Payer: MEDICARE

## 2022-01-06 VITALS
HEART RATE: 93 BPM | DIASTOLIC BLOOD PRESSURE: 64 MMHG | BODY MASS INDEX: 37.55 KG/M2 | OXYGEN SATURATION: 97 % | TEMPERATURE: 98.5 F | WEIGHT: 202 LBS | SYSTOLIC BLOOD PRESSURE: 152 MMHG

## 2022-01-06 DIAGNOSIS — U07.1 PNEUMONIA DUE TO 2019 NOVEL CORONAVIRUS: Primary | ICD-10-CM

## 2022-01-06 DIAGNOSIS — R05.9 COUGH: ICD-10-CM

## 2022-01-06 DIAGNOSIS — R63.0 LOSS OF APPETITE: ICD-10-CM

## 2022-01-06 DIAGNOSIS — E66.01 MORBID OBESITY (H): ICD-10-CM

## 2022-01-06 DIAGNOSIS — D50.9 IRON DEFICIENCY ANEMIA, UNSPECIFIED IRON DEFICIENCY ANEMIA TYPE: ICD-10-CM

## 2022-01-06 DIAGNOSIS — M75.41 IMPINGEMENT SYNDROME, SHOULDER, RIGHT: ICD-10-CM

## 2022-01-06 DIAGNOSIS — R53.83 FATIGUE, UNSPECIFIED TYPE: ICD-10-CM

## 2022-01-06 DIAGNOSIS — J44.9 CHRONIC OBSTRUCTIVE PULMONARY DISEASE, UNSPECIFIED COPD TYPE (H): ICD-10-CM

## 2022-01-06 DIAGNOSIS — J00 ACUTE RHINITIS: ICD-10-CM

## 2022-01-06 DIAGNOSIS — J12.82 PNEUMONIA DUE TO 2019 NOVEL CORONAVIRUS: Primary | ICD-10-CM

## 2022-01-06 LAB
FLUAV AG SPEC QL IA: NEGATIVE
FLUBV AG SPEC QL IA: NEGATIVE
SARS-COV-2 RNA RESP QL NAA+PROBE: POSITIVE

## 2022-01-06 PROCEDURE — 87804 INFLUENZA ASSAY W/OPTIC: CPT | Performed by: INTERNAL MEDICINE

## 2022-01-06 PROCEDURE — 99215 OFFICE O/P EST HI 40 MIN: CPT | Performed by: INTERNAL MEDICINE

## 2022-01-06 PROCEDURE — U0003 INFECTIOUS AGENT DETECTION BY NUCLEIC ACID (DNA OR RNA); SEVERE ACUTE RESPIRATORY SYNDROME CORONAVIRUS 2 (SARS-COV-2) (CORONAVIRUS DISEASE [COVID-19]), AMPLIFIED PROBE TECHNIQUE, MAKING USE OF HIGH THROUGHPUT TECHNOLOGIES AS DESCRIBED BY CMS-2020-01-R: HCPCS | Performed by: INTERNAL MEDICINE

## 2022-01-06 PROCEDURE — U0005 INFEC AGEN DETEC AMPLI PROBE: HCPCS | Performed by: INTERNAL MEDICINE

## 2022-01-06 RX ORDER — PREDNISONE 20 MG/1
TABLET ORAL
Qty: 15 TABLET | Refills: 0 | Status: SHIPPED | OUTPATIENT
Start: 2022-01-06 | End: 2022-01-16

## 2022-01-06 NOTE — PROGRESS NOTES
[facility]    Comprehensive and complex medical care - Chronic disease management - Shared decision making - Care coordination - Compassionate care    Patient advocacy - Rational deprescribing - Minimally disruptive medicine - Ethical focus - Customized care         Date of Service: 1/6/2022  Primary Provider: Donaldo Vernon    Patient Care Team:  Donaldo Vernon MD as PCP - General (Internal Medicine)  Mariella Coyne MD as MD (Neurology)  Bennett Varela MD as MD (Orthopaedic Surgery)  Beka Adams MD as MD  Donaldo Vernon MD as Assigned PCP          Patient's Pharmacy:    [id number] - [place], [place] - [address] [place] [address]  [address] [place] [address]  Phone: [phone] Fax: [phone]    [facility] - [place], [place] - [address] AT [facility] Sites  [address]  [facility] [address]  Phone: [phone] Fax: [phone]     Patient's Contacts:  Name Home Phone Work Phone Mobile Phone Relationship Lgl Grd   PHONG ORTIZ [phone]   Spouse    JENNIFER ORTIZ [phone]   Other      Patient's Insurance:    Payor: MEDICARE / Plan: MEDICARE / Product Type: Medicare /            Active Problem List:  Problem List as of 1/6/2022 Reviewed: 12/6/2021 10:12 PM by Donaldo Vernon MD       High    Former smoker - quit in 2018    Chronic obstructive pulmonary disease, unspecified COPD type (H), thought to be mild on PFTs 2018       Low    HLD (hyperlipidemia)    Dysphagia    Prediabetes    Obesity (BMI 35.0-39.9) with comorbidity (H)    Fatty liver    GERD (gastroesophageal reflux disease)    Loss of hearing    Lumbar back pain       Other    HTN (hypertension)    Last Assessment & Plan 3/11/2021 Office Visit - [facility] Written 3/11/2021 11:22 AM by Shawna Moses NP     - INCREASE losartan from 25 mg to 50 mg daily          Severe obstructive sleep apnea on CPAP           Current  Outpatient Medications   Medication Instructions     Aspirin Buf,CaCarb-MgCarb-MgO, 81 MG TABS 81 mg, Oral, DAILY     celecoxib (CELEBREX) 200 mg, Oral, DAILY     Fluticasone-Umeclidin-Vilanterol (TRELEGY ELLIPTA) 200-62.5-25 MCG/INH oral inhaler 1 puff, Inhalation, DAILY     gabapentin (NEURONTIN) 100 MG capsule TAKE 1 CAPSULE BY MOUTH ONCE DAILY.     ipratropium - albuterol 0.5 mg/2.5 mg/3 mL (DUONEB) 0.5-2.5 (3) MG/3ML neb solution 3 mLs, Nebulization, EVERY 6 HOURS PRN     LORazepam (ATIVAN) 2 mg, Oral, ONCE PRN     losartan (COZAAR) 25 MG tablet TAKE 1 TABLET DAILY     meclizine (ANTIVERT) 25 mg, Oral, 3 TIMES DAILY PRN     multivitamin (MULTIPLE VITAMINS ORAL) [MULTIVITAMIN (MULTIPLE VITAMINS ORAL)] Take by mouth.     mv-mn/iron/folic acid/herb 190 (VITAMIN D3 COMPLETE ORAL) Oral     omeprazole (PRILOSEC) 20 mg, EVERY MORNING     predniSONE (DELTASONE) 20 MG tablet Take 2 tablets (40 mg) by mouth daily for 5 days, THEN 1 tablet (20 mg) daily for 5 days.     rosuvastatin (CRESTOR) 40 MG tablet TAKE 1 TABLET AT BEDTIME      Social History     Social History Narrative    . Retired. Likes to play golf.  3 children. Son has lung cancer.  Was a . Lives in [place] for 6 months and [place] for 6 months of the years.       Subjective:     Kori Leach is a 81 year old female who comes in today for:    Chief Complaint   Patient presents with     RECHECK     anemia,, copd, sleep apnea, right shoulder impingement     History of Present Illness     covid symptoms      Patient comes in today with her .    The patient has been feeling sick since Saturday.  They were into the cancer center on Friday.  She has been having a cough with yellowish phlegm.  Her nose has been stuffy.  She had a cold sore on her face.  She has felt tired.  She has had a headache for a couple of days.  It is in the frontal area and posterior.  Her nose has run during this time.  She is using her inhalers but not her  nebulizer.  She has had a chills but no fever.  She is normally short of breath but not more so at this point.  Her  had symptoms for a couple of days but is doing better at this time.    We reviewed her iron deficiency anemia.  She had this in 2015 as well.  We reviewed her tests overall.  We talked about possibly looking at the GI system again.  She is on a baby aspirin without a compelling reason at this time.  We are going to have her stop this.  She has not seen any GI blood loss.  She been taking iron Monday Wednesday and Friday and tolerating it okay.    We reviewed her gabapentin and she would like to try off of this.  This would be reasonable.    Her right shoulder is doing better after injection.    We reviewed her other issues noted in the assessment but not specifically addressed in the HPI above.     Objective:     Wt Readings from Last 3 Encounters:   01/06/22 91.6 kg (202 lb)   12/06/21 93.9 kg (207 lb)   08/06/21 93.1 kg (205 lb 3.2 oz)     BP Readings from Last 3 Encounters:   01/06/22 (!) 152/64   12/06/21 (!) 164/60   08/06/21 120/62     BP (!) 152/64   Pulse 93   Temp 98.5  F (36.9  C) (Oral)   Wt 91.6 kg (202 lb)   LMP 08/06/1988   SpO2 97%   BMI 37.55 kg/m     The patient is comfortable, no acute distress.  Mood good.  Insight good.  Eyes are nonicteric.  Neck is supple without mass.  No cervical adenopathy.  No thyromegaly. Heart regular rate and rhythm.  Lungs shows rales in multiple lung fields on auscultation bilaterally.  Mild wheezing.  Respiratory effort is good.  Extremities no edema.      Diagnostics:     Office Visit on 12/06/2021   Component Date Value Ref Range Status     Sodium 12/06/2021 142  136 - 145 mmol/L Final     Potassium 12/06/2021 4.3  3.5 - 5.0 mmol/L Final     Chloride 12/06/2021 109* 98 - 107 mmol/L Final     Carbon Dioxide (CO2) 12/06/2021 22  22 - 31 mmol/L Final     Anion Gap 12/06/2021 11  5 - 18 mmol/L Final     Urea Nitrogen 12/06/2021 11  8 - 28  mg/dL Final     Creatinine 12/06/2021 0.76  0.60 - 1.10 mg/dL Final     Calcium 12/06/2021 9.1  8.5 - 10.5 mg/dL Final     Glucose 12/06/2021 97  70 - 125 mg/dL Final     Alkaline Phosphatase 12/06/2021 133* 45 - 120 U/L Final     AST 12/06/2021 37  0 - 40 U/L Final     ALT 12/06/2021 31  0 - 45 U/L Final     Protein Total 12/06/2021 6.9  6.0 - 8.0 g/dL Final     Albumin 12/06/2021 4.0  3.5 - 5.0 g/dL Final     Bilirubin Total 12/06/2021 0.3  0.0 - 1.0 mg/dL Final     GFR Estimate 12/06/2021 74  >60 mL/min/1.73m2 Final    As of July 11, 2021, eGFR is calculated by the CKD-EPI creatinine equation, without race adjustment. eGFR can be influenced by muscle mass, exercise, and diet. The reported eGFR is an estimation only and is only applicable if the renal function is stable.     WBC Count 12/06/2021 5.2  4.0 - 11.0 10e3/uL Final     RBC Count 12/06/2021 4.29  3.80 - 5.20 10e6/uL Final     Hemoglobin 12/06/2021 9.2* 11.7 - 15.7 g/dL Final     Hematocrit 12/06/2021 32.8* 35.0 - 47.0 % Final     MCV 12/06/2021 77* 78 - 100 fL Final     MCH 12/06/2021 21.4* 26.5 - 33.0 pg Final     MCHC 12/06/2021 28.0* 31.5 - 36.5 g/dL Final     RDW 12/06/2021 17.1* 10.0 - 15.0 % Final     Platelet Count 12/06/2021 349  150 - 450 10e3/uL Final     Hemoglobin A1C 12/06/2021 5.9* 0.0 - 5.6 % Final    Normal <5.7%   Prediabetes 5.7-6.4%    Diabetes 6.5% or higher     Note: Adopted from ADA consensus guidelines.     BNP 12/06/2021 51  0 - 159 pg/mL Final     Iron 12/06/2021 24* 42 - 175 ug/dL Final     Transferrin 12/06/2021 429* 212 - 360 mg/dL Final     Transferrin Saturation, Calculated 12/06/2021 4* 20 - 50 % Final     Transferrin IBC, Calculated 12/06/2021 536  313 - 563 ug/dL Final     Ferritin 12/06/2021 9* 10 - 130 ng/mL Final       Results for orders placed or performed in visit on 01/06/22   Symptomatic; Unknown COVID-19 Virus (Coronavirus) by PCR Nose     Status: Abnormal    Specimen: Nose; Swab   Result Value Ref Range    SARS  CoV2 PCR Positive (A) Negative, Testing sent to reference lab. Results will be returned via unsolicited result    Narrative    Testing was performed using the Xpert Xpress SARS-CoV-2 Assay on the  Cepheid Gene-Xpert Instrument Systems. Additional information about  this Emergency Use Authorization (EUA) assay can be found via the Lab  Guide. This test should be ordered for the detection of SARS-CoV-2 in  individuals who meet SARS-CoV-2 clinical and/or epidemiological  criteria. Test performance is unknown in asymptomatic patients. This  test is for in vitro diagnostic use under the FDA EUA for  laboratories certified under CLIA to perform high complexity testing.  This test has not been FDA cleared or approved. A negative result  does not rule out the presence of PCR inhibitors in the specimen or  target RNA in concentration below the limit of detection for the  assay. The possibility of a false negative should be considered if  the patient's recent exposure or clinical presentation suggests  COVID-19. This test was validated by the [facility]. This laboratory is certified under  the Clinical Laboratory Improvement Amendments of 1988 (CLIA-88) as  qualified to perform high complexity laboratory testing.     Influenza A & B Antigen - Clinic Collect     Status: Normal    Specimen: Nose; Swab   Result Value Ref Range    Influenza A antigen Negative Negative    Influenza B antigen Negative Negative    Narrative    Test results must be correlated with clinical data. If necessary, results should be confirmed by a molecular assay or viral culture.        Assessment:     1. Pneumonia due to 2019 novel coronavirus    2. Cough    3. Loss of appetite    4. Acute rhinitis    5. Fatigue, unspecified type    6. Chronic obstructive pulmonary disease, unspecified COPD type (H)-she is on Trelegy at this time and we will see how this works.   7. Morbid obesity (H)-work on weight loss  as able.   8. Impingement syndrome, shoulder, right        Plan:     1. She had COVID testing done today.  This did turn out positive fairly quickly.  Advice dispensed online.  2. Go off of aspirin a day in light of the chronic blood loss.  3. Did review colonoscopy and upper endoscopy and stool testing.  Her colonoscopy in 2018 was okay and her upper endoscopy about 6 years ago was normal.  She also had iron deficiency anemia in 2015.  4. Continue iron replacement at this time.  5. Try off of gabapentin.  6. Prescription for Trelegy done today.  7. Prescription for prednisone to have on hand in case her wheezing were to worsen.  8. Continue current medications otherwise.  9. Follow up sooner if issues.    40 minutes or greater was spent today on the patient's care on the day of service.      This includes time for chart preparation, reviewing medical tests done before or during the visit, talking with the patient, review of quality indicators, required documentation, and other elements of care.        Donaldo Vernon MD  General Internal Medicine  [facility]    Return in about 8 days (around 1/14/2022), or if symptoms worsen or fail to improve, for Blood work.     Future Appointments   Date Time Provider Department Center   1/14/2022 11:15 AM [facility]   1/21/2022  3:00 PM [facility]         Wt Readings from Last 20 Encounters:   01/06/22 91.6 kg (202 lb)   12/06/21 93.9 kg (207 lb)   08/06/21 93.1 kg (205 lb 3.2 oz)   03/11/21 96.6 kg (213 lb)   12/21/20 96.6 kg (213 lb)   12/15/20 95.7 kg (211 lb)   11/30/20 96.2 kg (212 lb)   10/28/20 97.1 kg (214 lb)   08/31/20 94.3 kg (208 lb)   08/26/20 94.3 kg (208 lb)   06/08/20 93.4 kg (206 lb)   12/03/19 97.5 kg (215 lb)   06/26/19 92.5 kg (204 lb)   10/08/18 85.7 kg (189 lb)   09/04/18 88 kg (194 lb)   06/29/18 85.2 kg (187 lb 14.4 oz)   06/04/18 90.4 kg (199 lb 3.2 oz)   04/26/18 88.9 kg (196 lb)   04/23/18 88.5  kg (195 lb)   04/18/18 88.9 kg (196 lb)     BP Readings from Last 20 Encounters:   01/06/22 (!) 152/64   12/06/21 (!) 164/60   08/06/21 120/62   03/11/21 (!) 154/72   02/02/21 (!) 158/76   12/21/20 126/80   12/15/20 122/62   11/30/20 132/64   08/31/20 122/72   06/08/20 130/70   12/03/19 126/60      Pulse Readings from Last 20 Encounters:   01/06/22 93   12/06/21 82   08/06/21 78   03/11/21 88   02/02/21 70   12/21/20 90   12/15/20 77   11/30/20 82   08/31/20 70   06/08/20 80   12/03/19 76     SpO2 Readings from Last 20 Encounters:   01/06/22 97%   12/06/21 96%   08/06/21 94%   12/21/20 98%   12/15/20 98%   11/30/20 97%   12/03/19 96%

## 2022-01-07 ENCOUNTER — VIRTUAL VISIT (OUTPATIENT)
Dept: PEDIATRICS | Facility: CLINIC | Age: 82
End: 2022-01-07
Payer: MEDICARE

## 2022-01-07 DIAGNOSIS — U07.1 CLINICAL DIAGNOSIS OF COVID-19: Primary | ICD-10-CM

## 2022-01-07 PROCEDURE — 99443 PR PHYSICIAN TELEPHONE EVALUATION 21-30 MIN: CPT | Mod: 95 | Performed by: FAMILY MEDICINE

## 2022-01-07 NOTE — PROGRESS NOTES
BMI 38.14  MASSBP score  Age 2 points  BMI 2 points  Chronic respiratory disease 3 points  Hypertension 1 point    8 points     Assessment:     COVID-19 positive patient.  Encounter for consideration of medication intervention.    Patient does not qualify for a prescription.   Full discussion with patient including medication options, risks and benefits.   Potential drug interactions reviewed with patient.      Plan:    Treatment planned -  Referral to [facility] for possible monoclonal antibodies, as patient does not qualify for other treatments due to being > 5 days from symptom onset.  See AVS for any medications ordered for symptom relief  Temporary change to home medications: None    Radha Lopez MD    End Time:   Start time: 11 AM  End time: 11:26 AM         Subjective:    Kori  is a 81 year old  who has a confirmed new positive COVID-19 diagnosis.    She has been identified as high risk for complications of this infection, and is being evaluated via a billable telephone visit.    Feeling very run down   Had a bad cold runny nose sinus headache   No energy   Day 6 of symptoms   Runny nose has improved   No chest pain   Has shortness of breath on baseline with copd - as per usual.   Patient has a pulse ox at home and it is 96%      also had symptoms started 5 days ago and his symptoms are now are better  However  tested and was negative     Patient is still having symptoms       Concern for COVID-19  Exactly how many days ago did these symptoms start? 6 days      Are any of the following symptoms significant for you?    new or worsening difficulty breathing? No    worsening cough? Yes, I am coughing up mucus.    Fever or chills? No    Headache: No    Sore throat: No    Loss of taste or smell: No    Chest pain: No    Diarrhea: No    body aches? YES      What treatments have you tried? None   Do you live in a nursing home, group home, or shelter? No  Do you have a way to get  food/medications during quarantined? Yes, I have a friend or family member who can help me.      MASSBP Scoring:     Age ?65 years (2 points)    BMI ?35 kg/m2 (2 points)    Diabetes mellitus (2 points)    Chronic kidney disease (3 points)    Cardiovascular disease in a patient ?55 years (2 points)    Chronic respiratory disease in a patient ?55 years (3 points)    Hypertension in a patient ?55 years (1 point)    Immunocompromised status (4 points)    Pregnancy (4 points)    Member of [facility] community (Black/, /, ,  or , or  or [place] Native) (2 points)  TOTAL SCORE:  8    Current Outpatient Medications   Medication Sig Dispense Refill     Aspirin Buf,CaCarb-MgCarb-MgO, 81 MG TABS Take 81 mg by mouth daily       celecoxib (CELEBREX) 200 MG capsule [CELECOXIB (CELEBREX) 200 MG CAPSULE] Take 1 capsule (200 mg total) by mouth daily. 90 capsule 2     Fluticasone-Umeclidin-Vilanterol (TRELEGY ELLIPTA) 200-62.5-25 MCG/INH oral inhaler Inhale 1 puff into the lungs daily 60 each 11     gabapentin (NEURONTIN) 100 MG capsule TAKE 1 CAPSULE BY MOUTH ONCE DAILY. 90 capsule 3     ipratropium - albuterol 0.5 mg/2.5 mg/3 mL (DUONEB) 0.5-2.5 (3) MG/3ML neb solution Take 1 vial (3 mLs) by nebulization every 6 hours as needed for shortness of breath / dyspnea or wheezing 360 mL 11     LORazepam (ATIVAN) 1 MG tablet Take 2 tablets (2 mg) by mouth once as needed (take before MRI) 2 tablet 0     losartan (COZAAR) 25 MG tablet TAKE 1 TABLET DAILY 90 tablet 3     meclizine (ANTIVERT) 25 mg tablet [MECLIZINE (ANTIVERT) 25 MG TABLET] Take 1 tablet (25 mg total) by mouth 3 (three) times a day as needed. 30 tablet 0     multivitamin (MULTIPLE VITAMINS ORAL) [MULTIVITAMIN (MULTIPLE VITAMINS ORAL)] Take by mouth.       mv-mn/iron/folic acid/herb 190 (VITAMIN D3 COMPLETE ORAL)        omeprazole (PRILOSEC) 20 MG capsule [OMEPRAZOLE (PRILOSEC) 20 MG CAPSULE] Take 20 mg  by mouth daily before breakfast.       predniSONE (DELTASONE) 20 MG tablet Take 2 tablets (40 mg) by mouth daily for 5 days, THEN 1 tablet (20 mg) daily for 5 days. 15 tablet 0     rosuvastatin (CRESTOR) 40 MG tablet TAKE 1 TABLET AT BEDTIME 90 tablet 3     Review negative except for noted above    Objective:  Vitals:  No vitals were obtained today due to virtual visit.  GENERAL APPEARANCE:  PSYCH: Alert and oriented times 3; coherent speech, normal   rate and volume, able to articulate logical thoughts, able   to abstract reason, no tangential thoughts, no hallucinations   or delusions  Her affect is normal  RESP: No cough, no audible wheezing, able to talk in full sentences  Additional exam:  none  Remainder of exam unable to be completed due to telephone visits        Radha Lopez M.D.

## 2022-01-07 NOTE — TELEPHONE ENCOUNTER
"Coronavirus (COVID-19) Notification    Caller Name (Patient, parent, daughter/son, grandparent, etc)  Pt    Reason for call  Notify of Positive Coronavirus (COVID-19) lab results, assess symptoms,  review  [facility] recommendations    Lab Result    Lab test:  2019-nCoV rRt-PCR or SARS-CoV-2 PCR    Oropharyngeal AND/OR nasopharyngeal swabs is POSITIVE for 2019-nCoV RNA/SARS-COV-2 PCR (COVID-19 virus)    RN Recommendations/Instructions per [facility] Coronavirus COVID-19 recommendations    Brief introduction script  Introduce self then review script:  \"I am calling on behalf of [employer].  We were notified that your Coronavirus test (COVID-19) for was POSITIVE for the virus.  I have some information to relay to you but first I wanted to mention that the [place] Dept of Health will be contacting you shortly [it's possible MD already called Patient] to talk to you more about how you are feeling and other people you have had contact with who might now also have the virus.  Also,  [facility] is Partnering with the [facility] for Covid-19 research, you may be contacted directly by research staff.\"      Assessment (Inquire about Patient's current symptoms)   Assessment   Current Symptoms at time of phone call: (if no symptoms, document No symptoms] Cough, headache, runny nose, fatigue, body aches   Symptoms onset (if applicable) 01/01/22     If at time of call, Patients symptoms hare worsened, the Patient should contact 911 or have someone drive them to Emergency Dept promptly:      If Patient calling 911, inform 911 personal that you have tested positive for the Coronavirus (COVID-19).  Place mask on and await 911 to arrive.    If Emergency Dept, If possible, please have another adult drive you to the Emergency Dept but you need to wear mask when in contact with other people.          Treatment Options:   Is the MASSBP score 4 or greator? Yes. Was the onset of symptoms within the last 5 days? " Yes.   Inform patient they may be eligible for an oral or IV medication for the treatment of COVID-19. These options would be discussed during a virtual visit with a provider.   Does the patient agree to have a visit with a provider to discuss treatment options? Yes. Send telephone encounter to [facility] Pool ([id number]).      Review information with Patient    Your result was positive. This means you have COVID-19 (coronavirus).  We have sent you a letter that reviews the information that I'll be reviewing with you now.    How can I protect others?    If you have symptoms: stay home and away from others (self-isolate) until:    You've had no fever--and no medicine that reduces fever--for 1 full day (24 hours). And       Your other symptoms have gotten better. For example, your cough or breathing has improved. And     At least 10 days have passed since your symptoms started. (If you've been told by a doctor that you have a weak immune system, wait 20 days.)     If you don't have symptoms: Stay home and away from others (self-isolate) until at least 10 days have passed since your first positive COVID-19 test. (Date test collected)    During this time:    Stay in your own room, including for meals. Use your own bathroom if you can.    Stay away from others in your home. No hugging, kissing or shaking hands. No visitors.     Don't go to work, school or anywhere else.     Clean  high touch  surfaces often (doorknobs, counters, handles, etc.). Use a household cleaning spray or wipes. You'll find a full list on the EPA website at www.epa.gov/pesticide-registration/list-n-disinfectants-use-against-sars-cov-2.     Cover your mouth and nose with a mask, tissue or other face covering to avoid spreading germs.    Wash your hands and face often with soap and water.    Make a list of people you have been in close contact with recently, even if either of you wore a face covering.   - Start your list from 2 days before  you became ill or had a positive test.  - Include anyone that was within 6 feet of you for a cumulative total of 15 minutes or more in 24 hours. (Example: if you sat next to Mau for 5 minutes in the morning and 10 minutes in the afternoon, then you were in close contact for 15 minutes total that day. Mau would be added to your list.)    A public health worker will call or text you. It is important that you answer. They will ask you questions about possible exposures to COVID-19, such as people you have been in direct contact with and places you have visited.    Tell the people on your list that you have COVID-19; they should stay away from others for 14 days starting from the last time they were in contact with you (unless you are told something different from a public health worker).     Caregivers in these groups are at risk for severe illness due to COVID-19:  o People 65 years and older  o People who live in a nursing home or long-term care facility  o People with chronic disease (lung, heart, cancer, diabetes, kidney, liver, immunologic)  o People who have a weakened immune system, including those who:  - Are in cancer treatment  - Take medicine that weakens the immune system, such as corticosteroids  - Had a bone marrow or organ transplant  - Have an immune deficiency  - Have poorly controlled HIV or AIDS  - Are obese (body mass index of 40 or higher)  - Smoke regularly    Caregivers should wear gloves while washing dishes, handling laundry and cleaning bedrooms and bathrooms.    Wash and dry laundry with special caution. Don't shake dirty laundry, and use the warmest water setting you can.    If you have a weakened immune system, ask your doctor about other actions you should take.    For more tips, go to www.cdc.gov/coronavirus/2019-ncov/downloads/10Things.pdf.    You should not go back to work until you meet the guidelines above for ending your home isolation. You don't need to be retested for COVID-19  before going back to work--studies show that you won't spread the virus if it's been at least 10 days since your symptoms started (or 20 days, if you have a weak immune system).    Employers: This document serves as formal notice of your employee's medical guidelines for going back to work. They must meet the above guidelines before going back to work in person.    How can I take care of myself?    1. Get lots of rest. Drink extra fluids (unless a doctor has told you not to).    2. Take Tylenol (acetaminophen) for fever or pain. If you have liver or kidney problems, ask your family doctor if it's okay to take Tylenol.     Take either:     650 mg (two 325 mg pills) every 4 to 6 hours, or     1,000 mg (two 500 mg pills) every 8 hours as needed.     Note: Don't take more than 3,000 mg in one day. Acetaminophen is found in many medicines (both prescribed and over-the-counter medicines). Read all labels to be sure you don't take too much.    For children, check the Tylenol bottle for the right dose (based on their age or weight).    3. If you have other health problems (like cancer, heart failure, an organ transplant or severe kidney disease): Call your specialty clinic if you don't feel better in the next 2 days.    4. Know when to call 911: Emergency warning signs include:    Trouble breathing or shortness of breath    Pain or pressure in the chest that doesn't go away    Feeling confused like you haven't felt before, or not being able to wake up    Bluish-colored lips or face    5. Sign up for [employer]flap. We know it's scary to hear that you have COVID-19. We want to track your symptoms to make sure you're okay over the next 2 weeks. Please look for an email from fflap--this is a free, online program that we'll use to keep in touch. To sign up, follow the link in the email. Learn more at [email or web address].    Where can I get more information?    [facility]:  [email or web address]    Coronavirus Basics: www.health.[facility].[place]./diseases/coronavirus/basics.html    What to Do If You're Sick: www.cdc.gov/coronavirus/2019-ncov/about/steps-when-sick.html    Ending Home Isolation: www.cdc.gov/coronavirus/2019-ncov/hcp/disposition-in-home-patients.html     Caring for Someone with COVID-19: www.cdc.gov/coronavirus/2019-ncov/if-you-are-sick/care-for-someone.html     [facility] clinical trials (COVID-19 research studies): clinicalaffairs.[facility]-clinical-trials     A Positive COVID-19 letter will be sent via [employer] or the mail. (Exception, no letters sent to Presurgerical/Preprocedure Patients)    Marlen Hernandez

## 2022-01-07 NOTE — PATIENT INSTRUCTIONS
Please follow up if you have any further issues.    You may contact me by phone or MyChart if you are worsening or if things are not improving.    ______________________________________________________________________     Please remember that you can call [phone] to schedule an appointment.     You can schedule appointments 24 hours a day, 7 days a week.  Sometimes the best time to schedule an appointment is after clinic hours when less people are calling in.  Weekends are another option for calling in to schedule appointments.      Future Appointments   Date Time Provider Department Center   1/14/2022 11:15 AM [facility]   1/21/2022  3:00 PM BRIAN MR 2 [facility] ANN TORRES

## 2022-01-07 NOTE — TELEPHONE ENCOUNTER
Next 5 appointments (look out 90 days)    Jan 07, 2022 10:30 AM  Telephone Visit with MG MYRNA CANADA  [facility] ([facility]) [address] [place] [id number]  [phone]           Harley Caruso , VINNY  01/07/22 10:08 AM

## 2022-01-09 ENCOUNTER — HOSPITAL ENCOUNTER (EMERGENCY)
Facility: CLINIC | Age: 82
Discharge: HOME | End: 2022-01-09
Attending: EMERGENCY MEDICINE | Admitting: EMERGENCY MEDICINE
Payer: MEDICARE

## 2022-01-09 VITALS
BODY MASS INDEX: 36.14 KG/M2 | HEART RATE: 90 BPM | SYSTOLIC BLOOD PRESSURE: 166 MMHG | OXYGEN SATURATION: 93 % | WEIGHT: 204 LBS | RESPIRATION RATE: 20 BRPM | TEMPERATURE: 99.1 F | DIASTOLIC BLOOD PRESSURE: 80 MMHG | HEIGHT: 63 IN

## 2022-01-09 DIAGNOSIS — U07.1 INFECTION DUE TO 2019 NOVEL CORONAVIRUS: ICD-10-CM

## 2022-01-09 LAB
D DIMER PPP FEU-MCNC: 0.35 UG/ML FEU (ref 0–0.5)
PROCALCITONIN SERPL-MCNC: 0.03 NG/ML (ref 0–0.49)

## 2022-01-09 PROCEDURE — 99283 EMERGENCY DEPT VISIT LOW MDM: CPT

## 2022-01-09 PROCEDURE — 84145 PROCALCITONIN (PCT): CPT | Performed by: EMERGENCY MEDICINE

## 2022-01-09 PROCEDURE — 36415 COLL VENOUS BLD VENIPUNCTURE: CPT | Performed by: EMERGENCY MEDICINE

## 2022-01-09 PROCEDURE — 85379 FIBRIN DEGRADATION QUANT: CPT | Performed by: EMERGENCY MEDICINE

## 2022-01-09 ASSESSMENT — ENCOUNTER SYMPTOMS
APPETITE CHANGE: 1
SHORTNESS OF BREATH: 1
COUGH: 1
NAUSEA: 0
VOMITING: 0
FEVER: 0
DIARRHEA: 0
FATIGUE: 1
HEADACHES: 0

## 2022-01-09 ASSESSMENT — MIFFLIN-ST. JEOR: SCORE: 1359.47

## 2022-01-09 NOTE — ED TRIAGE NOTES
Patient COVID +, SOB since 1/6/22, increasing over time. Denies pain. Reports oxygen reading last night 90% at rest. Hx. COPD. Small amount of hemoptysis last night.

## 2022-01-09 NOTE — DISCHARGE INSTRUCTIONS
Please check your oxygen when you are feeling short of breath, make sure that it comes back up to 90% at rest.  If it is not coming back up to 90% after you have been sitting down call me then brings up back to the emergency department or call 911.  This would mean that you need to be admitted for oxygen and treated with steroids      There is no evidence of any blood clot or new pneumonia or infection on top of Covid

## 2022-01-09 NOTE — ED PROVIDER NOTES
EMERGENCY DEPARTMENT ENCOUNTER      NAME: Kori Leach  AGE: 81 year old female  YOB: 1940  MRN: [id number]  EVALUATION DATE & TIME: 1/9/2022  2:06 PM    PCP: Donaldo Vernon    ED PROVIDER: Dominik Parrish M.D.      Chief Complaint   Patient presents with     Shortness of Breath     Covid Concern         FINAL IMPRESSION:  1. Infection due to 2019 novel coronavirus          ED COURSE & MEDICAL DECISION MAKING:    Pertinent Labs & Imaging studies reviewed. (See chart for details)  ED Course as of 01/09/22 1948   Sun Jan 09, 2022   1439 Patient is an 81-year-old woman with history of COPD, known COVID here on day 8 of symptoms with fatigue, SPO2 of 90% at home, and some hemoptysis.  She is hypertensive here, heart rate in the 90s, oxygen anywhere from 93 to 96%.  Took somewhat tired but managing her breathing quite well.  Plan to screen with procalcitonin and D-dimer for superimposed bacterial infection or PE, if these are within normal limits then will discharge otherwise will get a CT scan.   1539 D-Dimer Quantitative: 0.35     Procalcitonin was also normal.  She was updated.  She was made aware that oxygen not improving to 90% at home with rest would be indication for admission.    Additional ED Course Timestamps:  2:31 PM I met with the patient, obtained history, performed an initial exam, and discussed options and plan for diagnostics and treatment here in the ED.    At the conclusion of the encounter I discussed the results of all of the tests and the disposition. The questions were answered. The patient or family acknowledged understanding and was agreeable with the care plan.       MEDICATIONS GIVEN IN THE EMERGENCY:  Medications - No data to display      NEW PRESCRIPTIONS STARTED AT TODAY'S ER VISIT  Discharge Medication List as of 1/9/2022  4:27 PM             =================================================================    HPI    Patient information was obtained from: patient      Use of : N/A       Kori Leach is a 81 year old female with a pertinent history of COPD, hyperlipidemia, HTN, TAMAR, GERD who presents to this ED for evaluation of shortness of breath.     The patient reports she developed cough and shortness of breath on 1/1/2022 (8 days ago). She then tested positive for COVID-19 on 1/6 (3 days ago). The patient has been fatigued and mostly sits in her chair all day. Her  has a pulse oximeter and her oxygen saturation was 90% last night. She also had blood tinged sputum after coughing last night. Both of these symptoms concerned her  and he wanted her to be seen today. The patient additionally reports loss of appetite and poor oral intake because of it. The patient has a history of COPD and uses a steroid inhaler. She denies history of heart or kidney disease. She denies fever, headache, nausea, vomiting, diarrhea, or other medical complaints.            REVIEW OF SYSTEMS   Review of Systems   Constitutional: Positive for appetite change (decreased) and fatigue. Negative for fever.   Respiratory: Positive for cough ( hemoptysis) and shortness of breath.    Cardiovascular: Positive for chest pain.   Gastrointestinal: Negative for diarrhea, nausea and vomiting.   Neurological: Negative for headaches.   All other systems reviewed and are negative.     PAST MEDICAL HISTORY:  Past Medical History:   Diagnosis Date     Arthritis     knees and hip replacement     Chronic obstructive pulmonary disease, unspecified COPD type (H) 06/04/2018    PFT Complete  Performed 5/21/2018 Final result Study Result FEV1/FVC is 69 and is reduced. FEV1 is 66% predicted and is reduced. FVC is 74% predicted and reduced. There was improvement in spirometry after a single inhaled dose of bronchodilator. TLC is 110% predicted and is normal. RV is 141% predicted and is increased. DLCO is 77% predicted and is reduced when it  is corrected for hemoglobin.  Im     Degenerative  disc disease, cervical      Fatty liver      Former smoker      GERD (gastroesophageal reflux disease)      History of nicotine use 06/04/2018     HLD (hyperlipidemia)      HTN (hypertension)      MO (iron deficiency anemia) 04/24/2015     Inverted nipple     bilateral     Loss of hearing      Lumbar back pain      Morbid obesity (H) 09/04/2018     Pre-diabetes      Severe obstructive sleep apnea 04/18/2018    10/06/17 where we ordered a sleep study which showed severe TAMAR with an RDI of 30 and she had a titration study that showed CPAP of 9 cmH20 to be effective. She was set up with the CPAP on 10/25/17 through [facility].      Urinary incontinence        PAST SURGICAL HISTORY:  Past Surgical History:   Procedure Laterality Date     APPENDECTOMY       ARTHROPLASTY KNEE BILATERAL       CATARACT EXTRACTION       CERVICAL SPINE SURGERY  2015     JOINT REPLACEMENT      bilateral knees and right hip     LAPAROSCOPIC CHOLECYSTECTOMY N/A 6/29/2018    Procedure: CHOLECYSTECTOMY, LAPAROSCOPIC;  Surgeon: Jose Armando Humphries MD;  [place] [facility] [place]  Service:      TONSILLECTOMY       WISDOM TOOTH EXTRACTION             CURRENT MEDICATIONS:    No current facility-administered medications for this encounter.     Current Outpatient Medications   Medication     celecoxib (CELEBREX) 200 MG capsule     Fluticasone-Umeclidin-Vilanterol (TRELEGY ELLIPTA) 200-62.5-25 MCG/INH oral inhaler     ipratropium - albuterol 0.5 mg/2.5 mg/3 mL (DUONEB) 0.5-2.5 (3) MG/3ML neb solution     LORazepam (ATIVAN) 1 MG tablet     losartan (COZAAR) 25 MG tablet     meclizine (ANTIVERT) 25 mg tablet     multivitamin (MULTIPLE VITAMINS ORAL)     mv-mn/iron/folic acid/herb 190 (VITAMIN D3 COMPLETE ORAL)     omeprazole (PRILOSEC) 20 MG capsule     predniSONE (DELTASONE) 20 MG tablet     rosuvastatin (CRESTOR) 40 MG tablet       ALLERGIES:  Allergies   Allergen Reactions     Lisinopril Cough       FAMILY HISTORY:  Family History   Problem Relation Age of  "Onset     Breast Cancer Mother 45.00     Diabetes Mother      Heart Disease Mother      Hyperlipidemia Mother      Hypertension Mother      Depression Father      Alcoholism Father      Hyperlipidemia Sister      Depression Sister      Obesity Sister      Obesity Daughter      Lung Cancer Son        SOCIAL HISTORY:   Social History     Socioeconomic History     Marital status:      Spouse name: Not on file     Number of children: 3     Years of education: Not on file     Highest education level: Not on file   Occupational History     Not on file   Tobacco Use     Smoking status: Former Smoker     Types: Cigarettes, Cigarettes     Smokeless tobacco: Never Used     Tobacco comment: 1-2 cigarettes per day on average   Substance and Sexual Activity     Alcohol use: Yes     Comment: Alcoholic Drinks/day: less than 1 drink per month      Drug use: No     Sexual activity: Not Currently     Partners: Male   Other Topics Concern     Not on file   Social History Narrative    . Retired. Likes to play golf.  3 children. Son has lung cancer.  Was a . Lives in [place] for 6 months and [place] for 6 months of the years.     Social Determinants of Health     Financial Resource Strain: Not on file   Food Insecurity: Not on file   Transportation Needs: Not on file   Physical Activity: Not on file   Stress: Not on file   Social Connections: Not on file   Intimate Partner Violence: Not on file   Housing Stability: Not on file       VITALS:  BP (!) 166/80   Pulse 90   Temp 99.1  F (37.3  C) (Oral)   Resp 20   Ht 1.6 m (5' 3\")   Wt 92.5 kg (204 lb)   LMP 08/06/1988   SpO2 93%   BMI 36.14 kg/m      PHYSICAL EXAM    Constitutional: Well developed, well nourished. Comfortable appearing.  HENT: Normocephalic, atraumatic, mucous membranes moist, nose normal. Neck- Supple, gross ROM intact.   Eyes: Pupils mid-range, conjunctiva without injection, no discharge.   Respiratory: Mildly tachypneic. Clear to " auscultation bilaterally, no respiratory distress, no wheezing, speaks full sentences easily. No cough.  Cardiovascular: Normal heart rate, regular rhythm, no murmurs. No pedal edema, 2+ DP pulses.   GI: Soft, no tenderness to deep palpation in all quadrants, no masses.  Musculoskeletal: Moving all 4 extremities intentionally and without pain. No obvious deformity.  Skin: Warm, dry, no rash.  Neurologic: Alert & oriented x 3, cranial nerves grossly intact.  Psychiatric: Affect normal, cooperative.    LAB:  All pertinent labs reviewed and interpreted.  Labs Ordered and Resulted from Time of ED Arrival to Time of ED Departure   PROCALCITONIN - Normal       Result Value    Procalcitonin 0.03     D DIMER QUANTITATIVE - Normal    D-Dimer Quantitative 0.35         RADIOLOGY:  Reviewed all pertinent imaging. Please see official radiology report.  No orders to display       EKG:    All EKG interpretations will be found in ED course above.      I, Madhavi Armendariz am serving as a scribe to document services personally performed by Dr. Dominik Parrish based on my observation and the provider's statements to me. I, Dominik Parrish MD attest that Madhavi Armendariz is acting in a scribe capacity, has observed my performance of the services and has documented them in accordance with my direction.    Dominik Parrish M.D.  Emergency Medicine  [facility] EMERGENCY ROOM  [address] [facility] [address]  [phone]  Dept: [phone]     Dominik Parrish MD  01/09/22 1949

## 2022-01-13 ENCOUNTER — MYC MEDICAL ADVICE (OUTPATIENT)
Dept: INTERNAL MEDICINE | Facility: CLINIC | Age: 82
End: 2022-01-13
Payer: MEDICARE

## 2022-01-14 ENCOUNTER — LAB (OUTPATIENT)
Dept: LAB | Facility: CLINIC | Age: 82
End: 2022-01-14
Payer: MEDICARE

## 2022-01-14 DIAGNOSIS — D50.9 IRON DEFICIENCY ANEMIA, UNSPECIFIED IRON DEFICIENCY ANEMIA TYPE: ICD-10-CM

## 2022-01-14 LAB
ERYTHROCYTE [DISTWIDTH] IN BLOOD BY AUTOMATED COUNT: 23.4 % (ref 10–15)
FERRITIN SERPL-MCNC: 55 NG/ML (ref 10–130)
HCT VFR BLD AUTO: 39.7 % (ref 35–47)
HGB BLD-MCNC: 11.6 G/DL (ref 11.7–15.7)
IRON SATN MFR SERPL: 10 % (ref 20–50)
IRON SERPL-MCNC: 42 UG/DL (ref 42–175)
MCH RBC QN AUTO: 23.9 PG (ref 26.5–33)
MCHC RBC AUTO-ENTMCNC: 29.2 G/DL (ref 31.5–36.5)
MCV RBC AUTO: 82 FL (ref 78–100)
NRBC # BLD AUTO: 0 10E3/UL
NRBC BLD AUTO-RTO: 0 /100
PLATELET # BLD AUTO: 397 10E3/UL (ref 150–450)
RBC # BLD AUTO: 4.86 10E6/UL (ref 3.8–5.2)
TIBC SERPL-MCNC: 423 UG/DL (ref 313–563)
TRANSFERRIN SERPL-MCNC: 338 MG/DL (ref 212–360)
WBC # BLD AUTO: 8.9 10E3/UL (ref 4–11)

## 2022-01-14 PROCEDURE — 36415 COLL VENOUS BLD VENIPUNCTURE: CPT

## 2022-01-14 PROCEDURE — 83540 ASSAY OF IRON: CPT

## 2022-01-14 PROCEDURE — 82728 ASSAY OF FERRITIN: CPT

## 2022-01-14 PROCEDURE — 84466 ASSAY OF TRANSFERRIN: CPT

## 2022-01-14 PROCEDURE — 85027 COMPLETE CBC AUTOMATED: CPT

## 2022-01-21 ENCOUNTER — HOSPITAL ENCOUNTER (OUTPATIENT)
Dept: MRI IMAGING | Facility: HOSPITAL | Age: 82
Discharge: HOME | End: 2022-01-21
Attending: INTERNAL MEDICINE | Admitting: INTERNAL MEDICINE
Payer: MEDICARE

## 2022-01-21 DIAGNOSIS — K76.9 LIVER LESION: ICD-10-CM

## 2022-01-21 DIAGNOSIS — R93.2 ABNORMAL MRI, LIVER: ICD-10-CM

## 2022-01-21 PROCEDURE — 255N000002 HC RX 255 OP 636: Performed by: INTERNAL MEDICINE

## 2022-01-21 PROCEDURE — G1004 CDSM NDSC: HCPCS

## 2022-01-21 PROCEDURE — A9581 GADOXETATE DISODIUM INJ: HCPCS | Performed by: INTERNAL MEDICINE

## 2022-01-21 RX ADMIN — GADOXETATE DISODIUM 9 ML: 181.43 INJECTION, SOLUTION INTRAVENOUS at 16:00

## 2022-04-26 ENCOUNTER — TRANSFERRED RECORDS (OUTPATIENT)
Dept: HEALTH INFORMATION MANAGEMENT | Facility: CLINIC | Age: 82
End: 2022-04-26
Payer: MEDICARE

## 2022-04-26 ENCOUNTER — MYC MEDICAL ADVICE (OUTPATIENT)
Dept: INTERNAL MEDICINE | Facility: CLINIC | Age: 82
End: 2022-04-26
Payer: MEDICARE

## 2022-04-28 ENCOUNTER — TRANSFERRED RECORDS (OUTPATIENT)
Dept: HEALTH INFORMATION MANAGEMENT | Facility: CLINIC | Age: 82
End: 2022-04-28
Payer: MEDICARE

## 2022-05-10 ENCOUNTER — NURSE TRIAGE (OUTPATIENT)
Dept: INTERNAL MEDICINE | Facility: CLINIC | Age: 82
End: 2022-05-10
Payer: MEDICARE

## 2022-05-10 ENCOUNTER — MYC MEDICAL ADVICE (OUTPATIENT)
Dept: INTERNAL MEDICINE | Facility: CLINIC | Age: 82
End: 2022-05-10
Payer: MEDICARE

## 2022-05-10 NOTE — TELEPHONE ENCOUNTER
"Patient stated does not want to go to ER now as pain has subsided, patient agreed to go to ER if chest pain returns.  Will call back if any other symptoms present     Scheduled appointment with PCP on Thursday 5/12/2022    Reason for Disposition    Pain also in shoulder(s) or arm(s) or jaw (Exception: pain is clearly made worse by movement)    Additional Information    Negative: [1] Chest pain (or \"angina\") comes and goes AND [2] is happening more often (increasing in frequency) or getting worse (increasing in severity) (Exception: chest pains that last only a few seconds)    Negative: SEVERE chest pain    Negative: Followed a chest injury    Negative: SEVERE difficulty breathing (e.g., struggling for each breath, speaks in single words)    Negative: Difficult to awaken or acting confused (e.g., disoriented, slurred speech)    Negative: Shock suspected (e.g., cold/pale/clammy skin, too weak to stand, low BP, rapid pulse)    Negative: Passed out (i.e., fainted, collapsed and was not responding)    Negative: [1] Chest pain lasts > 5 minutes AND [2] age > 44    Negative: [1] Chest pain lasts > 5 minutes AND [2] age > 30 AND [3] one or more cardiac risk factors (e.g., diabetes, high blood pressure, high cholesterol, smoker, or strong family history of heart disease)    Negative: [1] Chest pain lasts > 5 minutes AND [2] history of heart disease (i.e., angina, heart attack, heart failure, bypass surgery, takes nitroglycerin)    Negative: [1] Chest pain lasts > 5 minutes AND [2] described as crushing, pressure-like, or heavy    Negative: Heart beating < 50 beats per minute OR > 140 beats per minute    Negative: Visible sweat on face or sweat dripping down face    Negative: Sounds like a life-threatening emergency to the triager    Answer Assessment - Initial Assessment Questions  1. LOCATION: \"Where does it hurt?\"        Middle chest tightness  2. RADIATION: \"Does the pain go anywhere else?\" (e.g., into neck, jaw, arms, " "back)      jaw  3. ONSET: \"When did the chest pain begin?\" (Minutes, hours or days)       Yesterday AM- lasted on & off all day   4. PATTERN \"Does the pain come and go, or has it been constant since it started?\"  \"Does it get worse with exertion?\"       Comes  & goes- not worse on exertion  5. DURATION: \"How long does it last\" (e.g., seconds, minutes, hours)      20 min or so   6. SEVERITY: \"How bad is the pain?\"  (e.g., Scale 1-10; mild, moderate, or severe)     - MILD (1-3): doesn't interfere with normal activities      - MODERATE (4-7): interferes with normal activities or awakens from sleep     - SEVERE (8-10): excruciating pain, unable to do any normal activities        4/10  7. CARDIAC RISK FACTORS: \"Do you have any history of heart problems or risk factors for heart disease?\" (e.g., angina, prior heart attack; diabetes, high blood pressure, high cholesterol, smoker, or strong family history of heart disease)      no  8. PULMONARY RISK FACTORS: \"Do you have any history of lung disease?\"  (e.g., blood clots in lung, asthma, emphysema, birth control pills)      copd  9. CAUSE: \"What do you think is causing the chest pain?\"      unknown  10. OTHER SYMPTOMS: \"Do you have any other symptoms?\" (e.g., dizziness, nausea, vomiting, sweating, fever, difficulty breathing, cough)        No   11. PREGNANCY: \"Is there any chance you are pregnant?\" \"When was your last menstrual period?\"        no    Protocols used: CHEST PAIN-A-AH      "

## 2022-05-12 ENCOUNTER — OFFICE VISIT (OUTPATIENT)
Dept: INTERNAL MEDICINE | Facility: CLINIC | Age: 82
End: 2022-05-12
Payer: MEDICARE

## 2022-05-12 VITALS
RESPIRATION RATE: 18 BRPM | BODY MASS INDEX: 35.43 KG/M2 | DIASTOLIC BLOOD PRESSURE: 74 MMHG | HEART RATE: 90 BPM | SYSTOLIC BLOOD PRESSURE: 138 MMHG | OXYGEN SATURATION: 97 % | WEIGHT: 200 LBS

## 2022-05-12 DIAGNOSIS — I10 PRIMARY HYPERTENSION: ICD-10-CM

## 2022-05-12 DIAGNOSIS — F40.240 CLAUSTROPHOBIA: ICD-10-CM

## 2022-05-12 DIAGNOSIS — D50.9 IRON DEFICIENCY ANEMIA, UNSPECIFIED IRON DEFICIENCY ANEMIA TYPE: ICD-10-CM

## 2022-05-12 DIAGNOSIS — M75.41 IMPINGEMENT SYNDROME, SHOULDER, RIGHT: ICD-10-CM

## 2022-05-12 DIAGNOSIS — R07.9 CHEST PAIN, UNSPECIFIED TYPE: Primary | ICD-10-CM

## 2022-05-12 LAB
ALBUMIN SERPL-MCNC: 4 G/DL (ref 3.5–5)
ALP SERPL-CCNC: 110 U/L (ref 45–120)
ALT SERPL W P-5'-P-CCNC: 34 U/L (ref 0–45)
ANION GAP SERPL CALCULATED.3IONS-SCNC: 11 MMOL/L (ref 5–18)
AST SERPL W P-5'-P-CCNC: 31 U/L (ref 0–40)
ATRIAL RATE - MUSE: 83 BPM
BILIRUB SERPL-MCNC: 0.4 MG/DL (ref 0–1)
BUN SERPL-MCNC: 9 MG/DL (ref 8–28)
CALCIUM SERPL-MCNC: 9.2 MG/DL (ref 8.5–10.5)
CHLORIDE BLD-SCNC: 109 MMOL/L (ref 98–107)
CO2 SERPL-SCNC: 22 MMOL/L (ref 22–31)
CREAT SERPL-MCNC: 0.82 MG/DL (ref 0.6–1.1)
DIASTOLIC BLOOD PRESSURE - MUSE: NORMAL MMHG
ERYTHROCYTE [DISTWIDTH] IN BLOOD BY AUTOMATED COUNT: 14.1 % (ref 10–15)
FERRITIN SERPL-MCNC: 61 NG/ML (ref 10–130)
GFR SERPL CREATININE-BSD FRML MDRD: 71 ML/MIN/1.73M2
GLUCOSE BLD-MCNC: 125 MG/DL (ref 70–125)
HCT VFR BLD AUTO: 44.6 % (ref 35–47)
HGB BLD-MCNC: 14.6 G/DL (ref 11.7–15.7)
INTERPRETATION ECG - MUSE: NORMAL
IRON SATN MFR SERPL: 23 % (ref 20–50)
IRON SERPL-MCNC: 89 UG/DL (ref 42–175)
MCH RBC QN AUTO: 29 PG (ref 26.5–33)
MCHC RBC AUTO-ENTMCNC: 32.7 G/DL (ref 31.5–36.5)
MCV RBC AUTO: 89 FL (ref 78–100)
P AXIS - MUSE: 61 DEGREES
PLATELET # BLD AUTO: 223 10E3/UL (ref 150–450)
POTASSIUM BLD-SCNC: 4 MMOL/L (ref 3.5–5)
PR INTERVAL - MUSE: 134 MS
PROT SERPL-MCNC: 7.3 G/DL (ref 6–8)
QRS DURATION - MUSE: 80 MS
QT - MUSE: 366 MS
QTC - MUSE: 430 MS
R AXIS - MUSE: 41 DEGREES
RBC # BLD AUTO: 5.04 10E6/UL (ref 3.8–5.2)
SODIUM SERPL-SCNC: 142 MMOL/L (ref 136–145)
SYSTOLIC BLOOD PRESSURE - MUSE: NORMAL MMHG
T AXIS - MUSE: 39 DEGREES
TIBC SERPL-MCNC: 391 UG/DL (ref 313–563)
TRANSFERRIN SERPL-MCNC: 313 MG/DL (ref 212–360)
TROPONIN I SERPL-MCNC: <0.01 NG/ML (ref 0–0.29)
VENTRICULAR RATE- MUSE: 83 BPM
WBC # BLD AUTO: 6.6 10E3/UL (ref 4–11)

## 2022-05-12 PROCEDURE — 82728 ASSAY OF FERRITIN: CPT | Performed by: INTERNAL MEDICINE

## 2022-05-12 PROCEDURE — 85027 COMPLETE CBC AUTOMATED: CPT | Performed by: INTERNAL MEDICINE

## 2022-05-12 PROCEDURE — 93010 ELECTROCARDIOGRAM REPORT: CPT | Performed by: INTERNAL MEDICINE

## 2022-05-12 PROCEDURE — 84466 ASSAY OF TRANSFERRIN: CPT | Performed by: INTERNAL MEDICINE

## 2022-05-12 PROCEDURE — 91305 COVID-19,PF,PFIZER (12+ YRS): CPT | Performed by: INTERNAL MEDICINE

## 2022-05-12 PROCEDURE — 0054A COVID-19,PF,PFIZER (12+ YRS): CPT | Performed by: INTERNAL MEDICINE

## 2022-05-12 PROCEDURE — 99214 OFFICE O/P EST MOD 30 MIN: CPT | Mod: 25 | Performed by: INTERNAL MEDICINE

## 2022-05-12 PROCEDURE — 83540 ASSAY OF IRON: CPT | Performed by: INTERNAL MEDICINE

## 2022-05-12 PROCEDURE — 93005 ELECTROCARDIOGRAM TRACING: CPT | Mod: 59 | Performed by: INTERNAL MEDICINE

## 2022-05-12 PROCEDURE — 36415 COLL VENOUS BLD VENIPUNCTURE: CPT | Performed by: INTERNAL MEDICINE

## 2022-05-12 PROCEDURE — 84484 ASSAY OF TROPONIN QUANT: CPT | Performed by: INTERNAL MEDICINE

## 2022-05-12 PROCEDURE — 20610 DRAIN/INJ JOINT/BURSA W/O US: CPT | Performed by: INTERNAL MEDICINE

## 2022-05-12 PROCEDURE — 80053 COMPREHEN METABOLIC PANEL: CPT | Performed by: INTERNAL MEDICINE

## 2022-05-12 RX ORDER — LORAZEPAM 1 MG/1
2 TABLET ORAL
Qty: 2 TABLET | Refills: 0 | Status: SHIPPED | OUTPATIENT
Start: 2022-05-12 | End: 2022-08-09

## 2022-05-12 RX ORDER — NITROGLYCERIN 0.4 MG/1
TABLET SUBLINGUAL
Qty: 25 TABLET | Refills: 4 | Status: SHIPPED | OUTPATIENT
Start: 2022-05-12 | End: 2022-11-11

## 2022-05-12 RX ORDER — TRIAMCINOLONE ACETONIDE 40 MG/ML
80 INJECTION, SUSPENSION INTRA-ARTICULAR; INTRAMUSCULAR ONCE
Status: COMPLETED | OUTPATIENT
Start: 2022-05-12 | End: 2022-05-12

## 2022-05-12 RX ADMIN — TRIAMCINOLONE ACETONIDE 80 MG: 40 INJECTION, SUSPENSION INTRA-ARTICULAR; INTRAMUSCULAR at 10:38

## 2022-05-12 NOTE — PROGRESS NOTES
[facility]    Comprehensive and complex medical care - Chronic disease management - Shared decision making - Care coordination - Compassionate care    Patient advocacy - Rational deprescribing - Minimally disruptive medicine - Ethical focus - Customized care         Date of Service: 5/12/2022  Primary Provider: Donaldo Vernon    Patient Care Team:  Donaldo Vernon MD as PCP - General (Internal Medicine)  Mariella Coyne MD as MD (Neurology)  Bennett Varela MD as MD (Orthopaedic Surgery)  Beka Adams MD as MD  Donaldo Vernon MD as Assigned PCP          Patient's Pharmacy:    [id number] [place], [place] - [address] [place] [address]  [address] [place] [id number]  Phone: [phone] Fax: [phone]    [facility] - [place], [place] - [address] AT [facility]  [address]  [facility] [address]  Phone: [phone] Fax: [phone]     Patient's Contacts:  Name Home Phone Work Phone Mobile Phone Relationship Lgl Grd   RADHA ORTIZ [phone]   Son    JENNIFER ORTIZ [phone]   Other      Patient's Insurance:    Payor: BCBS / Plan: [place] / Product Type: Indemnity /           Active Problem List:  Problem List as of 5/12/2022 Reviewed: 1/7/2022  2:37 PM by Donaldo Vernon MD       High    Former smoker - quit in 2018    Chronic obstructive pulmonary disease, unspecified COPD type (H), thought to be mild on PFTs 2018       Medium    HTN (hypertension)    Last Assessment & Plan 3/11/2021 Office Visit - [facility] Written 3/11/2021 11:22 AM by Shawna Moses NP     - INCREASE losartan from 25 mg to 50 mg daily            Severe obstructive sleep apnea on CPAP    MO (iron deficiency anemia) - Dec 2021, also 2015       Low    HLD (hyperlipidemia)    Dysphagia    Prediabetes    Obesity (BMI 35.0-39.9) with comorbidity (H)    Fatty liver    GERD (gastroesophageal reflux disease)    Loss of  hearing    Lumbar back pain           Current Outpatient Medications   Medication Instructions     Fluticasone-Umeclidin-Vilanterol (TRELEGY ELLIPTA) 200-62.5-25 MCG/INH oral inhaler 1 puff, Inhalation, DAILY     ipratropium - albuterol 0.5 mg/2.5 mg/3 mL (DUONEB) 0.5-2.5 (3) MG/3ML neb solution 3 mLs, Nebulization, EVERY 6 HOURS PRN     LORazepam (ATIVAN) 2 mg, Oral, ONCE PRN     losartan (COZAAR) 25 MG tablet TAKE 1 TABLET DAILY     meclizine (ANTIVERT) 25 mg, Oral, 3 TIMES DAILY PRN     multivitamin (MULTIPLE VITAMINS ORAL) [MULTIVITAMIN (MULTIPLE VITAMINS ORAL)] Take by mouth.     mv-mn/iron/folic acid/herb 190 (VITAMIN D3 COMPLETE ORAL) Oral     nitroGLYcerin (NITROSTAT) 0.4 MG sublingual tablet For chest pain place 1 tablet under the tongue every 5 minutes for 3 doses. If symptoms persist 5 minutes after 1st dose call 911.     omeprazole (PRILOSEC) 20 mg, Oral, EVERY MORNING     rosuvastatin (CRESTOR) 40 MG tablet TAKE 1 TABLET AT BEDTIME     Social History     Social History Narrative    . Retired. Likes to play golf.  3 children. Son has lung cancer.  Was a . Lives in [place] for 6 months and [place] for 6 months of the years.       Subjective:     Kori Leach is a 81 year old female who comes in today for:    Chief Complaint   Patient presents with     Chest Pain     Comes and goes, pain will go into jaw      Patient comes in today for a number of issues.    We first reviewed her issue with hearing loss and work-up through [facility].  She had MRI of her head.  Her hearing issue which was acute and associated with tinnitus resolved within the same day.  Her MRI showed a right parotid gland lesion but was otherwise okay.  She is scheduled for a FNA of this lesion.  She needs lorazepam for the procedure because of anxiety.    She has lost her son in the interim as a result of lung cancer.  This has been hard on her given that she has lost her  in the recent past as  well.    She is having issues with chest pressure and some jaw pain.  This occurred Monday.  She had it most of the day but it waxed and waned.  It also occurred at night and at 1 AM she woke up with it and had some chicken salad which seemed to help with it.  It was gone after half hour.  She had nuclear stress test a year and a half ago which was normal.  This was not pleuritic in nature.  It was not consistent with acid reflux.  There was no tenderness with pressure in the chest.  Previous lung CT also reviewed.  She was not short of breath and did not have a cough.  She had COVID back in January.    We talked about the probability of this still being a heart issue.  We are doing blood work today.  She did do an EKG today which looked okay.  We still cannot exclude a heart etiology.  This was discussed with her.  We could have her go into the emergency room, do a stat cardiology consult, or consider other imaging such as CT angiogram.  She declines this at this time.  If the tests are positive, she is willing to proceed with this.  She was given nitro today.  She will go into the emergency room if this recurs, she says.    She has been having ongoing issues with her right shoulder impingement and she would like this injected today if possible.    Her blood pressure has been running higher in the recent past.  She has been under increased stress.    She would like to follow-up on her iron deficiency anemia.  She is still taking iron Monday Wednesday Friday at this time.    We reviewed her other issues noted in the assessment but not specifically addressed in the HPI above.     Objective:     Wt Readings from Last 3 Encounters:   05/12/22 90.7 kg (200 lb)   01/09/22 92.5 kg (204 lb)   01/06/22 91.6 kg (202 lb)     BP Readings from Last 3 Encounters:   05/12/22 138/74   01/09/22 (!) 166/80   01/06/22 (!) 152/64     /74   Pulse 90   Resp 18   Wt 90.7 kg (200 lb)   LMP 08/06/1988   SpO2 97%   BMI 35.43  kg/m     The patient is comfortable, no acute distress.  Mood good, but a little bit down.  Insight good.  Eyes are nonicteric.  Neck is supple without mass.  No cervical adenopathy.  No thyromegaly. Heart regular rate and rhythm.  Lungs clear to auscultation bilaterally.  Respiratory effort is good.  Abdomen soft and nontender.  No hepatosplenomegaly.  Extremities trace edema.  No chest wall discomfort with palpation.  Positive impingement signs on the right shoulder.      Diagnostics:     Lab on 01/14/2022   Component Date Value Ref Range Status     WBC Count 01/14/2022 8.9  4.0 - 11.0 10e3/uL Final     RBC Count 01/14/2022 4.86  3.80 - 5.20 10e6/uL Corrected    This is a corrected result. Previous result was 4.85 10e6/uL on 1/14/2022 at 12:19 PM CST     Hemoglobin 01/14/2022 11.6 (A) 11.7 - 15.7 g/dL Corrected    This is a corrected result. Previous result was 11.5 g/dL on 1/14/2022 at 12:19 PM CST     Hematocrit 01/14/2022 39.7  35.0 - 47.0 % Corrected    This is a corrected result. Previous result was 39.2 % on 1/14/2022 at 12:19 PM CST     MCV 01/14/2022 82  78 - 100 fL Corrected    This is a corrected result. Previous result was 81 fL on 1/14/2022 at 12:19 PM CST     MCH 01/14/2022 23.9 (A) 26.5 - 33.0 pg Corrected    This is a corrected result. Previous result was 23.7 pg on 1/14/2022 at 12:19 PM CST     MCHC 01/14/2022 29.2 (A) 31.5 - 36.5 g/dL Corrected    This is a corrected result. Previous result was 29.3 g/dL on 1/14/2022 at 12:19 PM CST     RDW 01/14/2022 23.4 (A) 10.0 - 15.0 % Corrected    This is a corrected result. Previous result was 22.8 % on 1/14/2022 at 12:19 PM CST     Platelet Count 01/14/2022 397  150 - 450 10e3/uL Corrected    This is a corrected result. Previous result was 392 10e3/uL on 1/14/2022 at 12:19 PM CST     NRBCs per 100 WBC 01/14/2022 0  <1 /100 Final    This is an appended report.  These results have been appended to a previously final verified report.     Absolute NRBCs  01/14/2022 0.0  10e3/uL Final    This is an appended report.  These results have been appended to a previously final verified report.     Iron 01/14/2022 42  42 - 175 ug/dL Final     Transferrin 01/14/2022 338  212 - 360 mg/dL Final     Transferrin Saturation, Calculated 01/14/2022 10 (A) 20 - 50 % Final     Transferrin IBC, Calculated 01/14/2022 423  313 - 563 ug/dL Final     Ferritin 01/14/2022 55  10 - 130 ng/mL Final       Results for orders placed or performed in visit on 05/12/22   CBC with platelets     Status: Normal   Result Value Ref Range    WBC Count 6.6 4.0 - 11.0 10e3/uL    RBC Count 5.04 3.80 - 5.20 10e6/uL    Hemoglobin 14.6 11.7 - 15.7 g/dL    Hematocrit 44.6 35.0 - 47.0 %    MCV 89 78 - 100 fL    MCH 29.0 26.5 - 33.0 pg    MCHC 32.7 31.5 - 36.5 g/dL    RDW 14.1 10.0 - 15.0 %    Platelet Count 223 150 - 450 10e3/uL   EKG 12-lead, tracing only     Status: None   Result Value Ref Range    Systolic Blood Pressure  mmHg    Diastolic Blood Pressure  mmHg    Ventricular Rate 83 BPM    Atrial Rate 83 BPM    LA Interval 134 ms    QRS Duration 80 ms     ms    QTc 430 ms    P Axis 61 degrees    R AXIS 41 degrees    T Axis 39 degrees    Interpretation ECG       Sinus rhythm  Normal ECG  When compared with ECG of 15-DEC-2020 11:55,  No significant change was found  Confirmed by ESTELA GARCIA, LES LOC: ([id number]) on 5/12/2022 9:42:45 AM         Assessment:     1. Chest pain, unspecified type    2. Claustrophobia    3. Impingement syndrome, shoulder, right    4. Iron deficiency anemia, unspecified iron deficiency anemia type    5. Primary hypertension        Plan:     1. EKG and blood work done today.  2. Cannot rule out a coronary cause of her chest pain.  3. She will consider more urgent follow-up based on the results of her tests.  4. She should go into the emergency room sooner if issues.  She declines further work-up at this point beyond what we did today.  5. Corticosteroid injection done into the right  shoulder.  6. Given a lorazepam prescription for her parotid biopsy.  She will have a ride.  7. Consider increasing blood pressure management in the future.  8. COVID vaccine given today.  9. Continue current medications otherwise.  10. Follow up sooner if issues.       Donaldo Vernon MD  General Internal Medicine  [facility]    Return in about 3 months (around 8/9/2022), or if symptoms worsen or fail to improve, for annual wellness visit.     Future Appointments   Date Time Provider Department Center   5/20/2022  2:00 PM [facility]   [facility] 10[id number]   8/9/2022  9:30 AM Donaldo Vernon MD [facility]       Wt Readings from Last 20 Encounters:   05/12/22 90.7 kg (200 lb)   01/09/22 92.5 kg (204 lb)   01/06/22 91.6 kg (202 lb)   12/06/21 93.9 kg (207 lb)   08/06/21 93.1 kg (205 lb 3.2 oz)   03/11/21 96.6 kg (213 lb)   12/21/20 96.6 kg (213 lb)   12/15/20 95.7 kg (211 lb)   11/30/20 96.2 kg (212 lb)   10/28/20 97.1 kg (214 lb)   08/31/20 94.3 kg (208 lb)   08/26/20 94.3 kg (208 lb)   06/08/20 93.4 kg (206 lb)   12/03/19 97.5 kg (215 lb)   06/26/19 92.5 kg (204 lb)   10/08/18 85.7 kg (189 lb)   09/04/18 88 kg (194 lb)   06/29/18 85.2 kg (187 lb 14.4 oz)   06/04/18 90.4 kg (199 lb 3.2 oz)   04/26/18 88.9 kg (196 lb)     BP Readings from Last 20 Encounters:   05/12/22 138/74   01/09/22 (!) 166/80   01/06/22 (!) 152/64   12/06/21 (!) 164/60   08/06/21 120/62   03/11/21 (!) 154/72   02/02/21 (!) 158/76   12/21/20 126/80   12/15/20 122/62   11/30/20 132/64   08/31/20 122/72   06/08/20 130/70   12/03/19 126/60      Pulse Readings from Last 20 Encounters:   05/12/22 90   01/09/22 90   01/06/22 93   12/06/21 82   08/06/21 78   03/11/21 88   02/02/21 70   12/21/20 90   12/15/20 77   11/30/20 82   08/31/20 70   06/08/20 80   12/03/19 76     SpO2 Readings from Last 20 Encounters:   05/12/22 97%   01/09/22 93%   01/06/22 97%   12/06/21 96%   08/06/21 94%    12/21/20 98%   12/15/20 98%   11/30/20 97%   12/03/19 96%

## 2022-05-12 NOTE — PROGRESS NOTES
RIGHT SHOULDER SUBACROMIAL INJECTION    Date/Time: 5/12/2022 10:41 AM  Performed by: Donaldo Vernon MD  Authorized by: Donaldo Vernon MD          Procedure:  Right subacromial corticosteroid injection.    Diagnosis:  Shoulder impingement syndrome.    Description of procedure:    Patient was given informed consent prior to proceeding with corticosteroid injection.  Patient agreed to proceed knowing risks and benefits.  A posterior approach was marked and prepped with alcohol.  Using a combination of 80 mg of Kenalog and 1 ml of lidocaine without epinephrine, the subacromial space was found using a 25G 1.5 inch needle.  The space was then injected and the fluid flowed easily.  Bandage applied and aftercare instructions given.  No immediate complications.

## 2022-05-12 NOTE — PATIENT INSTRUCTIONS
Blood work done today.    Follow up sooner if issues.      ______________________________________________________________________     Future Appointments   Date Time Provider Department [place]   5/20/2022  2:00 PM [facility]   6/7/2022 10:15 AM [facility] DE [facility]   8/9/2022  9:30 [facility]

## 2022-05-20 ENCOUNTER — HOSPITAL ENCOUNTER (OUTPATIENT)
Dept: ULTRASOUND IMAGING | Facility: CLINIC | Age: 82
Discharge: HOME | End: 2022-05-20
Attending: OTOLARYNGOLOGY | Admitting: RADIOLOGY
Payer: MEDICARE

## 2022-05-20 DIAGNOSIS — D37.030 NEOPLASM OF UNCERTAIN BEHAVIOR OF PAROTID SALIVARY GLAND: ICD-10-CM

## 2022-05-20 PROCEDURE — 84999 UNLISTED CHEMISTRY PROCEDURE: CPT

## 2022-05-20 PROCEDURE — 272N000431 US BIOPSY PAROTID FINE NEEDLE ASPIRATION

## 2022-05-20 PROCEDURE — 88173 CYTOPATH EVAL FNA REPORT: CPT | Mod: TC | Performed by: OTOLARYNGOLOGY

## 2022-05-20 PROCEDURE — 88368 INSITU HYBRIDIZATION MANUAL: CPT

## 2022-05-20 PROCEDURE — 88377 M/PHMTRC ALYS ISHQUANT/SEMIQ: CPT

## 2022-05-25 PROCEDURE — 88341 IMHCHEM/IMCYTCHM EA ADD ANTB: CPT | Mod: 26 | Performed by: PATHOLOGY

## 2022-05-25 PROCEDURE — 88342 IMHCHEM/IMCYTCHM 1ST ANTB: CPT | Mod: 26 | Performed by: PATHOLOGY

## 2022-05-25 PROCEDURE — 88365 INSITU HYBRIDIZATION (FISH): CPT | Mod: 26 | Performed by: PATHOLOGY

## 2022-05-25 PROCEDURE — 88172 CYTP DX EVAL FNA 1ST EA SITE: CPT | Mod: 26 | Performed by: PATHOLOGY

## 2022-05-25 PROCEDURE — 88173 CYTOPATH EVAL FNA REPORT: CPT | Mod: 26 | Performed by: PATHOLOGY

## 2022-05-25 PROCEDURE — 88364 INSITU HYBRIDIZATION (FISH): CPT | Mod: 26 | Performed by: PATHOLOGY

## 2022-05-25 PROCEDURE — 88305 TISSUE EXAM BY PATHOLOGIST: CPT | Mod: 26 | Performed by: PATHOLOGY

## 2022-05-31 LAB
PATH REPORT.ADDENDUM SPEC: NORMAL
PATH REPORT.COMMENTS IMP SPEC: NORMAL
PATH REPORT.FINAL DX SPEC: NORMAL
PATH REPORT.GROSS SPEC: NORMAL
PATH REPORT.RELEVANT HX SPEC: NORMAL

## 2022-06-07 ENCOUNTER — ANCILLARY PROCEDURE (OUTPATIENT)
Dept: MAMMOGRAPHY | Facility: CLINIC | Age: 82
End: 2022-06-07
Attending: INTERNAL MEDICINE
Payer: MEDICARE

## 2022-06-07 DIAGNOSIS — Z12.31 VISIT FOR SCREENING MAMMOGRAM: ICD-10-CM

## 2022-06-07 PROCEDURE — 77067 SCR MAMMO BI INCL CAD: CPT

## 2022-06-09 ENCOUNTER — TRANSFERRED RECORDS (OUTPATIENT)
Dept: HEALTH INFORMATION MANAGEMENT | Facility: CLINIC | Age: 82
End: 2022-06-09
Payer: MEDICARE

## 2022-06-10 ENCOUNTER — ANCILLARY PROCEDURE (OUTPATIENT)
Dept: MAMMOGRAPHY | Facility: CLINIC | Age: 82
End: 2022-06-10
Attending: INTERNAL MEDICINE
Payer: MEDICARE

## 2022-06-10 DIAGNOSIS — N64.89 BREAST ASYMMETRY: ICD-10-CM

## 2022-06-10 PROCEDURE — 76642 ULTRASOUND BREAST LIMITED: CPT | Mod: LT

## 2022-08-06 ASSESSMENT — ENCOUNTER SYMPTOMS
ARTHRALGIAS: 1
COUGH: 0
MYALGIAS: 0
FEVER: 0
HEADACHES: 0
CHILLS: 0
DIARRHEA: 1
PALPITATIONS: 0
ABDOMINAL PAIN: 0
CONSTIPATION: 0
JOINT SWELLING: 0
WEAKNESS: 0
SHORTNESS OF BREATH: 1
EYE PAIN: 0
DIZZINESS: 1
PARESTHESIAS: 0
FREQUENCY: 0
NERVOUS/ANXIOUS: 0
DYSURIA: 0
HEMATOCHEZIA: 0
HEMATURIA: 0
SORE THROAT: 0
NAUSEA: 0
HEARTBURN: 0
BREAST MASS: 0

## 2022-08-06 ASSESSMENT — ACTIVITIES OF DAILY LIVING (ADL): CURRENT_FUNCTION: HOUSEWORK REQUIRES ASSISTANCE

## 2022-08-09 ENCOUNTER — OFFICE VISIT (OUTPATIENT)
Dept: INTERNAL MEDICINE | Facility: CLINIC | Age: 82
End: 2022-08-09
Payer: MEDICARE

## 2022-08-09 VITALS
HEIGHT: 62 IN | DIASTOLIC BLOOD PRESSURE: 60 MMHG | WEIGHT: 199 LBS | OXYGEN SATURATION: 96 % | SYSTOLIC BLOOD PRESSURE: 138 MMHG | RESPIRATION RATE: 18 BRPM | HEART RATE: 72 BPM | BODY MASS INDEX: 36.62 KG/M2

## 2022-08-09 DIAGNOSIS — R19.5 LOOSE STOOLS: ICD-10-CM

## 2022-08-09 DIAGNOSIS — D50.9 IRON DEFICIENCY ANEMIA, UNSPECIFIED IRON DEFICIENCY ANEMIA TYPE: ICD-10-CM

## 2022-08-09 DIAGNOSIS — Z00.00 MEDICARE ANNUAL WELLNESS VISIT, SUBSEQUENT: Primary | ICD-10-CM

## 2022-08-09 DIAGNOSIS — J44.9 CHRONIC OBSTRUCTIVE PULMONARY DISEASE, UNSPECIFIED COPD TYPE (H): ICD-10-CM

## 2022-08-09 DIAGNOSIS — E66.01 MORBID OBESITY (H): ICD-10-CM

## 2022-08-09 DIAGNOSIS — G47.33 SEVERE OBSTRUCTIVE SLEEP APNEA: ICD-10-CM

## 2022-08-09 DIAGNOSIS — E78.2 MIXED HYPERLIPIDEMIA: ICD-10-CM

## 2022-08-09 DIAGNOSIS — M54.2 NECK PAIN: ICD-10-CM

## 2022-08-09 DIAGNOSIS — I10 PRIMARY HYPERTENSION: ICD-10-CM

## 2022-08-09 DIAGNOSIS — R42 LIGHTHEADEDNESS: ICD-10-CM

## 2022-08-09 LAB
ALBUMIN SERPL BCG-MCNC: 4.6 G/DL (ref 3.5–5.2)
ALP SERPL-CCNC: 110 U/L (ref 35–104)
ALT SERPL W P-5'-P-CCNC: 24 U/L (ref 10–35)
ANION GAP SERPL CALCULATED.3IONS-SCNC: 10 MMOL/L (ref 7–15)
AST SERPL W P-5'-P-CCNC: 24 U/L (ref 10–35)
BILIRUB SERPL-MCNC: 0.3 MG/DL
BUN SERPL-MCNC: 8.6 MG/DL (ref 8–23)
CALCIUM SERPL-MCNC: 9.4 MG/DL (ref 8.8–10.2)
CHLORIDE SERPL-SCNC: 107 MMOL/L (ref 98–107)
CHOLEST SERPL-MCNC: 197 MG/DL
CREAT SERPL-MCNC: 0.72 MG/DL (ref 0.51–0.95)
CRP SERPL-MCNC: <3 MG/L
DEPRECATED HCO3 PLAS-SCNC: 28 MMOL/L (ref 22–29)
ERYTHROCYTE [DISTWIDTH] IN BLOOD BY AUTOMATED COUNT: 13 % (ref 10–15)
GFR SERPL CREATININE-BSD FRML MDRD: 83 ML/MIN/1.73M2
GLUCOSE SERPL-MCNC: 111 MG/DL (ref 70–99)
HCT VFR BLD AUTO: 43.4 % (ref 35–47)
HDLC SERPL-MCNC: 67 MG/DL
HGB BLD-MCNC: 14.8 G/DL (ref 11.7–15.7)
LDLC SERPL CALC-MCNC: 78 MG/DL
MAGNESIUM SERPL-MCNC: 2 MG/DL (ref 1.7–2.3)
MCH RBC QN AUTO: 31.4 PG (ref 26.5–33)
MCHC RBC AUTO-ENTMCNC: 34.1 G/DL (ref 31.5–36.5)
MCV RBC AUTO: 92 FL (ref 78–100)
NONHDLC SERPL-MCNC: 130 MG/DL
PLATELET # BLD AUTO: 229 10E3/UL (ref 150–450)
POTASSIUM SERPL-SCNC: 4.2 MMOL/L (ref 3.4–5.3)
PROT SERPL-MCNC: 7.2 G/DL (ref 6.4–8.3)
RBC # BLD AUTO: 4.71 10E6/UL (ref 3.8–5.2)
SODIUM SERPL-SCNC: 145 MMOL/L (ref 136–145)
TRIGL SERPL-MCNC: 262 MG/DL
TTG IGA SER-ACNC: 0.3 U/ML
TTG IGG SER-ACNC: <0.6 U/ML
VIT B12 SERPL-MCNC: 1356 PG/ML (ref 232–1245)
WBC # BLD AUTO: 5.4 10E3/UL (ref 4–11)

## 2022-08-09 PROCEDURE — G0439 PPPS, SUBSEQ VISIT: HCPCS | Performed by: INTERNAL MEDICINE

## 2022-08-09 PROCEDURE — 86364 TISS TRNSGLTMNASE EA IG CLAS: CPT | Performed by: INTERNAL MEDICINE

## 2022-08-09 PROCEDURE — 36415 COLL VENOUS BLD VENIPUNCTURE: CPT | Performed by: INTERNAL MEDICINE

## 2022-08-09 PROCEDURE — 86140 C-REACTIVE PROTEIN: CPT | Performed by: INTERNAL MEDICINE

## 2022-08-09 PROCEDURE — 83735 ASSAY OF MAGNESIUM: CPT | Performed by: INTERNAL MEDICINE

## 2022-08-09 PROCEDURE — 99214 OFFICE O/P EST MOD 30 MIN: CPT | Mod: 25 | Performed by: INTERNAL MEDICINE

## 2022-08-09 PROCEDURE — 80053 COMPREHEN METABOLIC PANEL: CPT | Performed by: INTERNAL MEDICINE

## 2022-08-09 PROCEDURE — 82607 VITAMIN B-12: CPT | Performed by: INTERNAL MEDICINE

## 2022-08-09 PROCEDURE — 80061 LIPID PANEL: CPT | Performed by: INTERNAL MEDICINE

## 2022-08-09 PROCEDURE — 85027 COMPLETE CBC AUTOMATED: CPT | Performed by: INTERNAL MEDICINE

## 2022-08-09 NOTE — PATIENT INSTRUCTIONS
----- Message from Kaiden Cifuentes MD sent at 4/12/2017 11:58 AM CDT -----  Increase levothyroxine to 175 µg daily. Check TSH in 6 weeks.   Preventive Health Recommendations    See your health care provider every year (or as recommended) to:  Review health changes.   Discuss preventive care.    Review your medicines and supplements.  Consider having a mammogram at least every 2 years between the ages of 50 and 75 years old.  Yearly if desired.  Consider colon cancer screening between the ages of 50 and 75 years old if necessary.    Cholesterol and diabetes testing as necessary.  At age 65, consider haveing a bone density scan (DEXA) to check for osteoporosis.    Shots:  COVID vaccination if you have not had it yet.  Get a flu shot each year.  Get a tetanus shot every 10 years if you are under the age of 80 years old.  Talk to your doctor about a one time pneumonia vaccine that is recommended at the age of 65 years old.  Talk to your pharmacist about the shingles vaccine.  This is often better covered in the pharmacy through your insurance than if you have it in the clinic.    Lifestyle  Exercise at least 150 minutes a week (30 minutes a day, 5 days a week) if you are able. This will help you control your weight and prevent disease.  Limit alcohol to one drink per day.  No smoking.   Wear sunscreen to prevent skin cancer.   See your dentist twice a year for an exam and cleaning.  See your eye doctor every 1 to 2 years to screen for conditions such as glaucoma, macular degeneration and cataracts.    Personalized Prevention Plan  You are due for the preventive services outlined below.  Your care team is available to assist you in scheduling these services.  If you have already completed any of these items, please share that information with your care team to update in your medical record.    Health Maintenance Due   Topic Date Due    ANNUAL REVIEW OF HM ORDERS  Never done    ZOSTER IMMUNIZATION (2 of 2) 12/10/2015

## 2022-08-09 NOTE — PROGRESS NOTES
HPI  Do you feel safe in your environment? Yes  Fall risk  Fallen 2 or more times in the past year?: No  Any fall with injury in the past year?: No    Cognitive Screening   1) Repeat 3 items (Leader, Season, Table)    2) Clock draw: NORMAL  3) 3 item recall: Recalls 2 objects   Results: NORMAL clock, 1-2 items recalled: COGNITIVE IMPAIRMENT LESS LIKELY    Mini-CogTM Copyright HELEN Plasencia. Licensed by the author for use in [facility]; reprinted with permission (glen@.[email or web address]). All rights reserved.

## 2022-08-09 NOTE — PROGRESS NOTES
[facility]    Comprehensive and complex medical care - Chronic disease management - Shared decision making - Care coordination - Compassionate care    Patient advocacy - Rational deprescribing - Minimally disruptive medicine - Ethical focus - Customized care         Date of Service: 2022  Primary Provider: Donaldo Vernon    Patient Care Team:  Donaldo Vernon MD as PCP - General (Internal Medicine)  Mariella Coyne MD as MD (Neurology)  Bennett Varela MD as MD (Orthopaedic Surgery)  Beka Adams MD as MD  Donaldo Vernon MD as Assigned PCP          Patient's Pharmacy:    [id number] - [place], [place] - [address] [place] [address]  [address] [place] [id number]  Phone: [phone] Fax: [phone]    [facility] - [place], [place] - [address] AT [facility]  [address]  [facility] [address]  Phone: [phone] Fax: [phone]     Patient's Contacts:  Name Home Phone Work Phone Mobile Phone Relationship Lgl Grd   RADHA LEACH [phone]   Son    JENNIFER LEACH [phone]   Other      Patient's Insurance:    Payor: BCBS / Plan: [place] / Product Type: Indemnity /      Subjective:     History of present illness:    Kori Leach is an 82 year old female here for an annual wellness visit.    The issues she would like to address at today's visit include the following:    Chief Complaint   Patient presents with     Annual Visit     RECHECK     Breathing when is walking O2 will drop down to the 90-94, and dizziness starting to think it is more HTN then vertigo     Diarrhea     Since Bryan  has had loose stools       Patient comes in today for annual wellness visit and for other issues.    We first reviewed lightheadedness she has been having.  She has been noticing it more in the last few months.  Her blood pressure has ranged between 134 and 175/68 and 85.  We reviewed her  record related to this.    Is when she first gets up out of bed.  It can occur when sitting at the side of the bed and when standing.  He can be off and on throughout the day.  It is not associated with other symptoms.  However, she does get some fatigue.  Most recently she had a golfing episode where she was lightheaded while golfing and then came home and slept for quite a while.  She is not had any syncope or presyncope.  She has not had any falls.    Reviewed loose stools.  She has sudden urgent loose stools.  This can occur 2-3 times a day.  She has had a cholecystectomy.  She has not noticed any other problems with her abdomen.    Reviewed her COPD and things are stable here.  She uses the Trelegy inhaler.  She does neb as needed.    She is noted pain in the base of her neck.  This occurs every 2 to 3 days and can be fairly significant.  It is usually right-sided.  There is no radiculopathic type symptoms.    Reviewed her sleep apnea and things are stable here.    We reviewed her other issues noted in the assessment but not specifically addressed in the HPI above.           Active Problem List:  Problem List as of 8/9/2022 Reviewed: 8/9/2022  2:42 PM by Donaldo Vernon MD       High    Former smoker - quit in 2018    Chronic obstructive pulmonary disease, unspecified COPD type (H), thought to be mild on PFTs 2018       Medium    HTN (hypertension)    Last Assessment & Plan 3/11/2021 Office Visit - [facility] Written 3/11/2021 11:22 AM by Shawna Moses NP     - INCREASE losartan from 25 mg to 50 mg daily            Severe obstructive sleep apnea on CPAP    MO (iron deficiency anemia) - Dec 2021, also 2015       Low    HLD (hyperlipidemia)    Dysphagia    Prediabetes    Obesity (BMI 35.0-39.9) with comorbidity (H)    Fatty liver    GERD (gastroesophageal reflux disease)    Loss of hearing    Lumbar back pain           Past Medical History:   Diagnosis Date     Arthritis     knees and hip replacement     Chronic  obstructive pulmonary disease, unspecified COPD type (H) 06/04/2018    PFT Complete  Performed 5/21/2018 Final result Study Result FEV1/FVC is 69 and is reduced. FEV1 is 66% predicted and is reduced. FVC is 74% predicted and reduced. There was improvement in spirometry after a single inhaled dose of bronchodilator. TLC is 110% predicted and is normal. RV is 141% predicted and is increased. DLCO is 77% predicted and is reduced when it  is corrected for hemoglobin.  Im     Degenerative disc disease, cervical      Fatty liver      Former smoker      GERD (gastroesophageal reflux disease)      History of nicotine use 06/04/2018     HLD (hyperlipidemia)      HTN (hypertension)      MO (iron deficiency anemia) 04/24/2015     Inverted nipple     bilateral     Loss of hearing      Lumbar back pain      Morbid obesity (H) 09/04/2018     Pre-diabetes      Severe obstructive sleep apnea 04/18/2018    10/06/17 where we ordered a sleep study which showed severe TAMAR with an RDI of 30 and she had a titration study that showed CPAP of 9 cmH20 to be effective. She was set up with the CPAP on 10/25/17 through [facility].      Urinary incontinence       Past Surgical History:   Procedure Laterality Date     APPENDECTOMY       ARTHROPLASTY KNEE BILATERAL       CATARACT EXTRACTION       CERVICAL SPINE SURGERY  2015     JOINT REPLACEMENT      bilateral knees and right hip     LAPAROSCOPIC CHOLECYSTECTOMY N/A 6/29/2018    Procedure: CHOLECYSTECTOMY, LAPAROSCOPIC;  Surgeon: Jose Armando Humphries MD;  [place] [facility] [place]  Service:      TONSILLECTOMY       WISDOM TOOTH EXTRACTION        Family History   Problem Relation Age of Onset     Breast Cancer Mother 45     Diabetes Mother      Heart Disease Mother      Hyperlipidemia Mother      Hypertension Mother      Depression Father      Alcoholism Father      Hyperlipidemia Sister      Depression Sister      Obesity Sister      Obesity Daughter      Lung Cancer Son      Cerebrovascular Disease  Son       Family history is otherwise noncontributory.    Social History     Occupational History     Not on file   Tobacco Use     Smoking status: Former Smoker     Types: Cigarettes, Cigarettes     Smokeless tobacco: Never Used     Tobacco comment: 1-2 cigarettes per day on average   Substance and Sexual Activity     Alcohol use: Yes     Comment: Alcoholic Drinks/day: less than 1 drink per month      Drug use: No     Sexual activity: Not Currently     Partners: Male      Social History     Social History Narrative    . Retired. Likes to play golf.  3 children. Son has lung cancer.  Was a . Lives in [place] for 6 months and [place] for 6 months of the years.      Current Outpatient Medications   Medication Instructions     Fluticasone-Umeclidin-Vilanterol (TRELEGY ELLIPTA) 200-62.5-25 MCG/INH oral inhaler 1 puff, Inhalation, DAILY     ipratropium - albuterol 0.5 mg/2.5 mg/3 mL (DUONEB) 0.5-2.5 (3) MG/3ML neb solution 3 mLs, Nebulization, EVERY 6 HOURS PRN     losartan (COZAAR) 25 MG tablet TAKE 1 TABLET DAILY     meclizine (ANTIVERT) 25 mg, Oral, 3 TIMES DAILY PRN     multivitamin (MULTIPLE VITAMINS ORAL) [MULTIVITAMIN (MULTIPLE VITAMINS ORAL)] Take by mouth.     mv-mn/iron/folic acid/herb 190 (VITAMIN D3 COMPLETE ORAL) Oral     nitroGLYcerin (NITROSTAT) 0.4 MG sublingual tablet For chest pain place 1 tablet under the tongue every 5 minutes for 3 doses. If symptoms persist 5 minutes after 1st dose call 911.     omeprazole (PRILOSEC) 20 mg, Oral, EVERY MORNING     rosuvastatin (CRESTOR) 40 MG tablet TAKE 1 TABLET AT BEDTIME      Allergies: Lisinopril     Immunization History   Administered Date(s) Administered     COVID-19,PF,Pfizer (12+ Yrs) 02/04/2021, 02/25/2021, 10/08/2021     COVID-19,PF,Pfizer 12+ Yrs (2022 and After) 05/12/2022     Influenza (High Dose) 3 valent vaccine 10/10/2016, 10/11/2017, 10/08/2018, 09/25/2019     Influenza, Quad, High Dose, Pf, 65yr+ (Fluzone HD) 08/31/2020,  "12/06/2021     Pneumo Conj 13-V (2010&after) 05/30/2017     Pneumococcal 23 valent 06/04/2018     Tdap (Adacel,Boostrix) 05/30/2017      Objective:     Wt Readings from Last 3 Encounters:   08/09/22 90.3 kg (199 lb)   05/12/22 90.7 kg (200 lb)   01/09/22 92.5 kg (204 lb)     BP Readings from Last 3 Encounters:   08/09/22 138/60   05/12/22 138/74   01/09/22 (!) 166/80       PHYSICAL EXAM  /60   Pulse 72   Resp 18   Ht 1.575 m (5' 2\")   Wt 90.3 kg (199 lb)   LMP 08/06/1988   SpO2 96%   BMI 36.40 kg/m     The patient is comfortable, no acute distress.  Mood good.  Insight is good.  No skin lesions or nodules of concern.  Ears clear.  Eyes are nonicteric.  Pupils equal and reactive.  Throat is clear.  Neck is supple without mass, no thyromegaly. No cervical or epitrochlear adenopathy.  No axillary lymph nodes. Heart regular rate and rhythm.  Lungs clear to auscultation bilaterally.  Respiratory effort good.  Abdomen soft and nontender.  No hepatosplenomegaly.  Extremities show no edema.  She does tend to close her eyes a bit when she does feel dizzy during the exam.    Diagnostics:     Hospital Outpatient Visit on 05/20/2022   Component Date Value Ref Range Status     Addendum 05/20/2022    Final                    Value:This result contains rich text formatting which cannot be displayed here.     Final Diagnosis 05/20/2022    Final                    Value:This result contains rich text formatting which cannot be displayed here.     Comment 05/20/2022    Final                    Value:This result contains rich text formatting which cannot be displayed here.     Clinical Information 05/20/2022    Final                    Value:This result contains rich text formatting which cannot be displayed here.     Rapid Onsite Evaluation 05/20/2022    Final                    Value:This result contains rich text formatting which cannot be displayed here.     Gross Description 05/20/2022    Final                    " Value:This result contains rich text formatting which cannot be displayed here.     Performing Labs 05/20/2022    Final                    Value:This result contains rich text formatting which cannot be displayed here.     Disclaimer 05/20/2022    Final                    Value:This result contains rich text formatting which cannot be displayed here.       Results for orders placed or performed in visit on 08/09/22   Vitamin B12     Status: Abnormal   Result Value Ref Range    Vitamin B12 1,356 (H) 232 - 1,245 pg/mL   CRP inflammation     Status: Normal   Result Value Ref Range    CRP Inflammation <3.00 <5.00 mg/L   Lipid panel reflex to direct LDL Non-fasting     Status: Abnormal   Result Value Ref Range    Cholesterol 197 <200 mg/dL    Triglycerides 262 (H) <150 mg/dL    Direct Measure HDL 67 >=50 mg/dL    LDL Cholesterol Calculated 78 <=100 mg/dL    Non HDL Cholesterol 130 (H) <130 mg/dL    Narrative    Cholesterol  Desirable:  <200 mg/dL    Triglycerides  Normal:  Less than 150 mg/dL  Borderline High:  150-199 mg/dL  High:  200-499 mg/dL  Very High:  Greater than or equal to 500 mg/dL    Direct Measure HDL  Female:  Greater than or equal to 50 mg/dL   Male:  Greater than or equal to 40 mg/dL    LDL Cholesterol  Desirable:  <100mg/dL  Above Desirable:  100-129 mg/dL   Borderline High:  130-159 mg/dL   High:  160-189 mg/dL   Very High:  >= 190 mg/dL    Non HDL Cholesterol  Desirable:  130 mg/dL  Above Desirable:  130-159 mg/dL  Borderline High:  160-189 mg/dL  High:  190-219 mg/dL  Very High:  Greater than or equal to 220 mg/dL   Comprehensive metabolic panel     Status: Abnormal   Result Value Ref Range    Sodium 145 136 - 145 mmol/L    Potassium 4.2 3.4 - 5.3 mmol/L    Creatinine 0.72 0.51 - 0.95 mg/dL    Urea Nitrogen 8.6 8.0 - 23.0 mg/dL    Chloride 107 98 - 107 mmol/L    Carbon Dioxide (CO2) 28 22 - 29 mmol/L    Anion Gap 10 7 - 15 mmol/L    Glucose 111 (H) 70 - 99 mg/dL    Calcium 9.4 8.8 - 10.2 mg/dL     Protein Total 7.2 6.4 - 8.3 g/dL    Albumin 4.6 3.5 - 5.2 g/dL    Bilirubin Total 0.3 <=1.2 mg/dL    Alkaline Phosphatase 110 (H) 35 - 104 U/L    AST 24 10 - 35 U/L    ALT 24 10 - 35 U/L    GFR Estimate 83 >60 mL/min/1.73m2   Magnesium     Status: Normal   Result Value Ref Range    Magnesium 2.0 1.7 - 2.3 mg/dL   CBC with platelets     Status: Normal   Result Value Ref Range    WBC Count 5.4 4.0 - 11.0 10e3/uL    RBC Count 4.71 3.80 - 5.20 10e6/uL    Hemoglobin 14.8 11.7 - 15.7 g/dL    Hematocrit 43.4 35.0 - 47.0 %    MCV 92 78 - 100 fL    MCH 31.4 26.5 - 33.0 pg    MCHC 34.1 31.5 - 36.5 g/dL    RDW 13.0 10.0 - 15.0 %    Platelet Count 229 150 - 450 10e3/uL       Assessment:     1. Medicare annual wellness visit, subsequent    2. Lightheadedness    3. Loose stools    4. Primary hypertension    5. Chronic obstructive pulmonary disease, unspecified COPD type (H), thought to be mild on PFTs 2018    6. Iron deficiency anemia, unspecified iron deficiency anemia type    7. Mixed hyperlipidemia    8. Severe obstructive sleep apnea on CPAP    9. Obesity (BMI 35.0-39.9) with comorbidity (H)    10. Neck pain         Plan:     1. Blood work done today.  2. Could consider increased blood pressure medications if needed.  3. Dizziness is orthostatic in nature.  4. Determine if any other management options are needed at this point.  5. Consider further work-up for diarrhea as needed.  Could consider colestipol.  6. Consider further work-up of the neck as needed.  7. Continue current medications  8. Follow up sooner if issues.    Orders Placed This Encounter   Procedures     CBC with platelets     Magnesium     Comprehensive metabolic panel     Lipid panel reflex to direct LDL Non-fasting     CRP inflammation     Tissue transglutaminase jamison IgA and IgG     Vitamin B12        A personalized health plan based on the identified health risks was provided to the patient on the AVS.       Donaldo Vernon MD  General Internal  [facility]      Return in about 1 year (around 8/9/2023), or if symptoms worsen or fail to improve, for annual wellness visit.     No future appointments.

## 2022-08-15 ENCOUNTER — TELEPHONE (OUTPATIENT)
Dept: INTERNAL MEDICINE | Facility: CLINIC | Age: 82
End: 2022-08-15

## 2022-08-15 DIAGNOSIS — M54.2 NECK PAIN: ICD-10-CM

## 2022-08-15 DIAGNOSIS — R42 DIZZINESS: ICD-10-CM

## 2022-08-15 DIAGNOSIS — R42 LIGHTHEADEDNESS: Primary | ICD-10-CM

## 2022-08-15 NOTE — TELEPHONE ENCOUNTER
Please call patient -    ______________________________________________________________________     Home phone:  [phone] (home) [phone] (work)    Cell phone:   Telephone Information:   Mobile [phone]       Other contacts:  Name Home Phone Work Phone Mobile Phone Relationship Lgl GrRADHA Hassan [phone]   Son    JENNIFER OTRIZ [phone]   Other      ______________________________________________________________________     Your vitamin B12 levels are very good.     There are no signs of a wheat gluten allergy (celiac disease) on your blood work.     Your blood counts are normal and you are not anemic. Your kidney tests are normal.  Your electrolytes are also normal.  There is no signs of diabetes.  Your liver tests are normal.      Your cholesterol is doing well.  Your markers of inflammation were normal.      Related to the diarrhea -     1.  We could try her on colestipol pill or powder daily and see if this helps with the diarrhea and she could adjust it to her needs.  (the pill is a bigger pill).  2.   She could use over the counter (OTC) imodium and try to see if this helps.  She could also use metamucil instead of the imodium and see how this goes.  3.  There is more testing available if this progresses.    Related to the dizziness:    1.  She could see a dizziness therapist through us to see if this would help.  2.  We could have her see a neurologist now or in the future.    Related to the high blood pressure:    We could increase the losartan (Cozaar) to 50 mg a day.    Related to the neck pain:    We could consider further imaging now or in the future if needed.    Please let me know what she decides.  Please have her follow up with me in clinic in the next 6-8 weeks.    Donaldo Vernon MD  [facility]  8/15/2022, 4:08 PM     ______________________________________________________________________    Recent Results (from the past 240 hour(s))    Vitamin B12    Collection Time: 08/09/22 10:10 AM   Result Value Ref Range    Vitamin B12 1,356 (H) 232 - 1,245 pg/mL   Tissue transglutaminase jamison IgA and IgG    Collection Time: 08/09/22 10:10 AM   Result Value Ref Range    Tissue Transglutaminase Antibody IgA 0.3 <7.0 U/mL    Tissue Transglutaminase Antibody IgG <0.6 <7.0 U/mL   CRP inflammation    Collection Time: 08/09/22 10:10 AM   Result Value Ref Range    CRP Inflammation <3.00 <5.00 mg/L   Lipid panel reflex to direct LDL Non-fasting    Collection Time: 08/09/22 10:10 AM   Result Value Ref Range    Cholesterol 197 <200 mg/dL    Triglycerides 262 (H) <150 mg/dL    Direct Measure HDL 67 >=50 mg/dL    LDL Cholesterol Calculated 78 <=100 mg/dL    Non HDL Cholesterol 130 (H) <130 mg/dL   Comprehensive metabolic panel    Collection Time: 08/09/22 10:10 AM   Result Value Ref Range    Sodium 145 136 - 145 mmol/L    Potassium 4.2 3.4 - 5.3 mmol/L    Creatinine 0.72 0.51 - 0.95 mg/dL    Urea Nitrogen 8.6 8.0 - 23.0 mg/dL    Chloride 107 98 - 107 mmol/L    Carbon Dioxide (CO2) 28 22 - 29 mmol/L    Anion Gap 10 7 - 15 mmol/L    Glucose 111 (H) 70 - 99 mg/dL    Calcium 9.4 8.8 - 10.2 mg/dL    Protein Total 7.2 6.4 - 8.3 g/dL    Albumin 4.6 3.5 - 5.2 g/dL    Bilirubin Total 0.3 <=1.2 mg/dL    Alkaline Phosphatase 110 (H) 35 - 104 U/L    AST 24 10 - 35 U/L    ALT 24 10 - 35 U/L    GFR Estimate 83 >60 mL/min/1.73m2   Magnesium    Collection Time: 08/09/22 10:10 AM   Result Value Ref Range    Magnesium 2.0 1.7 - 2.3 mg/dL   CBC with platelets    Collection Time: 08/09/22 10:10 AM   Result Value Ref Range    WBC Count 5.4 4.0 - 11.0 10e3/uL    RBC Count 4.71 3.80 - 5.20 10e6/uL    Hemoglobin 14.8 11.7 - 15.7 g/dL    Hematocrit 43.4 35.0 - 47.0 %    MCV 92 78 - 100 fL    MCH 31.4 26.5 - 33.0 pg    MCHC 34.1 31.5 - 36.5 g/dL    RDW 13.0 10.0 - 15.0 %    Platelet Count 229 150 - 450 10e3/uL      ______________________________________________________________________

## 2022-08-16 NOTE — TELEPHONE ENCOUNTER
Reviewed and noted.    Referral signed.    Donaldo Vernon MD  General Internal Medicine  [facility]  8/16/2022, 9:28 AM

## 2022-08-16 NOTE — TELEPHONE ENCOUNTER
Spoke to patient- relayed results, patient verbalized understanding.     Patient would like to start with the metamucil then see how it goes.  Clinical reference for the colestipol sent to pt in case she does not find any relief from metamucil/immodium    Patient would like to have referral for Neurology- if they are scheduling too far out, will call back & request therapy orders & neck imaging  (Neurology referral alyssa'd up for review)    Patient would like to check blood pressures at home the next 1-2 weeks then let us know how they are before increasing Losartan.     Scheduled follow up with Dr Vernon for 10/7/2022

## 2022-10-07 ENCOUNTER — HOSPITAL ENCOUNTER (OUTPATIENT)
Dept: GENERAL RADIOLOGY | Facility: HOSPITAL | Age: 82
Discharge: HOME | End: 2022-10-07
Attending: INTERNAL MEDICINE
Payer: MEDICARE

## 2022-10-07 ENCOUNTER — OFFICE VISIT (OUTPATIENT)
Dept: INTERNAL MEDICINE | Facility: CLINIC | Age: 82
End: 2022-10-07
Payer: MEDICARE

## 2022-10-07 VITALS
TEMPERATURE: 98.2 F | HEIGHT: 62 IN | SYSTOLIC BLOOD PRESSURE: 174 MMHG | BODY MASS INDEX: 36.7 KG/M2 | HEART RATE: 79 BPM | DIASTOLIC BLOOD PRESSURE: 79 MMHG | WEIGHT: 199.4 LBS | OXYGEN SATURATION: 95 %

## 2022-10-07 DIAGNOSIS — I10 PRIMARY HYPERTENSION: ICD-10-CM

## 2022-10-07 DIAGNOSIS — Z96.653 HISTORY OF BILATERAL KNEE REPLACEMENT: ICD-10-CM

## 2022-10-07 DIAGNOSIS — M25.561 CHRONIC PAIN OF BOTH KNEES: ICD-10-CM

## 2022-10-07 DIAGNOSIS — K52.9 CHRONIC DIARRHEA: Primary | ICD-10-CM

## 2022-10-07 DIAGNOSIS — G89.29 CHRONIC PAIN OF BOTH KNEES: ICD-10-CM

## 2022-10-07 DIAGNOSIS — M54.50 LUMBAR BACK PAIN: ICD-10-CM

## 2022-10-07 DIAGNOSIS — M25.562 CHRONIC PAIN OF BOTH KNEES: ICD-10-CM

## 2022-10-07 PROCEDURE — G0008 ADMIN INFLUENZA VIRUS VAC: HCPCS | Performed by: INTERNAL MEDICINE

## 2022-10-07 PROCEDURE — 72100 X-RAY EXAM L-S SPINE 2/3 VWS: CPT | Mod: FY

## 2022-10-07 PROCEDURE — 0124A COVID-19,PF,PFIZER BOOSTER BIVALENT: CPT | Performed by: INTERNAL MEDICINE

## 2022-10-07 PROCEDURE — 73562 X-RAY EXAM OF KNEE 3: CPT | Mod: 50,FY

## 2022-10-07 PROCEDURE — 99215 OFFICE O/P EST HI 40 MIN: CPT | Mod: 25 | Performed by: INTERNAL MEDICINE

## 2022-10-07 PROCEDURE — 90662 IIV NO PRSV INCREASED AG IM: CPT | Performed by: INTERNAL MEDICINE

## 2022-10-07 PROCEDURE — 91312 COVID-19,PF,PFIZER BOOSTER BIVALENT: CPT | Performed by: INTERNAL MEDICINE

## 2022-10-07 RX ORDER — LOSARTAN POTASSIUM AND HYDROCHLOROTHIAZIDE 12.5; 5 MG/1; MG/1
1 TABLET ORAL DAILY
Qty: 90 TABLET | Refills: 3 | Status: SHIPPED | OUTPATIENT
Start: 2022-10-07 | End: 2022-11-11

## 2022-10-07 NOTE — PROGRESS NOTES
[facility]    Comprehensive and complex medical care - Chronic disease management - Shared decision making - Care coordination - Compassionate care    Patient advocacy - Rational deprescribing - Minimally disruptive medicine - Ethical focus - Customized care         Date of Service: 10/7/2022  Primary Provider: Donaldo Vernon    Patient Care Team:  Donaldo Vernon MD as PCP - General (Internal Medicine)  Mariella Coyne MD as MD (Neurology)  Bennett Varela MD as MD (Orthopaedic Surgery)  Beka Adams MD as MD  Donaldo Vernon MD as Assigned PCP          Patient's Pharmacy:    [id number] [place], [place] - [address] [place] [address]  [address] [place] [address]  Phone: [phone] Fax: [phone]    [facility] - [place], [place] - [address] AT [address] [facility]  [address]  [facility] [address]  Phone: [phone] Fax: [phone]     Patient's Contacts:  Name Home Phone Work Phone Mobile Phone Relationship Lgl Grd   RADHA ORTIZ [phone]   Son    JENNIFER ORTIZ [phone]   Other      Patient's Insurance:    Payor: BCBS / Plan: [place] / Product Type: Indemnity /            Active Problem List:  Problem List as of 10/7/2022 Reviewed: 8/9/2022  2:44 PM by Donaldo Vernon MD       High    Former smoker - quit in 2018    Chronic obstructive pulmonary disease, unspecified COPD type (H), thought to be mild on PFTs 2018       Medium    HTN (hypertension)    MO (iron deficiency anemia) - Dec 2021, also 2015       Low    HLD (hyperlipidemia)    Dysphagia    Prediabetes    Obesity (BMI 35.0-39.9) with comorbidity (H)    Fatty liver    GERD (gastroesophageal reflux disease)    Loss of hearing    Lumbar back pain       Other    Severe obstructive sleep apnea on CPAP        Current Outpatient Medications   Medication Instructions     Fluticasone-Umeclidin-Vilanterol (TRELEGY ELLIPTA)  200-62.5-25 MCG/INH oral inhaler 1 puff, Inhalation, DAILY     ipratropium - albuterol 0.5 mg/2.5 mg/3 mL (DUONEB) 0.5-2.5 (3) MG/3ML neb solution 3 mLs, Nebulization, EVERY 6 HOURS PRN     losartan-hydrochlorothiazide (HYZAAR) 50-12.5 MG tablet 1 tablet, Oral, DAILY     meclizine (ANTIVERT) 25 mg, Oral, 3 TIMES DAILY PRN     multivitamin (MULTIPLE VITAMINS ORAL) [MULTIVITAMIN (MULTIPLE VITAMINS ORAL)] Take by mouth.     mv-mn/iron/folic acid/herb 190 (VITAMIN D3 COMPLETE ORAL) Oral     nitroGLYcerin (NITROSTAT) 0.4 MG sublingual tablet For chest pain place 1 tablet under the tongue every 5 minutes for 3 doses. If symptoms persist 5 minutes after 1st dose call 911.     omeprazole (PRILOSEC) 20 mg, Oral, EVERY MORNING     rosuvastatin (CRESTOR) 40 MG tablet TAKE 1 TABLET AT BEDTIME      Social History     Social History Narrative    . Retired. Likes to play golf.  3 children. Son has lung cancer.  Was a . Lives in [place] for 6 months and [place] for 6 months of the years.       Subjective:     Kori CAMILO Kinderman is a 82 year old female who comes in today for:    Chief Complaint   Patient presents with     RECHECK     Dizziness and Diarrhea still prevalent       In for follow up multiple issues.    Will be travelling to [place] in end of November.    Having issues with diarrhea still at this time.  Discussed at the last visit.  3 times a day.  Urgency with this.  No blood in the stool.    Continues to have issues with dizziness.  This is episodic and not consistent with vertigo.  More of a general sense of dizziness.  Taking too long to get into neurology at [facility].    Blood pressure has been running higher.  Previously on losartan/hydrochlorothiazide and stopped in 2018.  This did control the blood pressure well.  Blood pressures have generally been in the 140's and as high as the 170s and 180s.    No chest pain of chest pressure.    Back bothering her more.  Knees (both replaced) are bothering  "her more.  Recent C-reactive protein (CRP) normal.  Would like to consider physical therapy for this.  Right knee done in 2007 and left in 2008.  No radiculopathy with the back pain.    We reviewed her other issues noted in the assessment but not specifically addressed in the HPI above.     Objective:     Wt Readings from Last 3 Encounters:   10/07/22 90.4 kg (199 lb 6.4 oz)   08/09/22 90.3 kg (199 lb)   05/12/22 90.7 kg (200 lb)     BP Readings from Last 3 Encounters:   10/07/22 (!) 174/79   08/09/22 138/60   05/12/22 138/74     BP (!) 174/79 (BP Location: Right arm, Patient Position: Left side, Cuff Size: Adult Regular)   Pulse 79   Temp 98.2  F (36.8  C) (Oral)   Ht 1.575 m (5' 2\")   Wt 90.4 kg (199 lb 6.4 oz)   LMP 08/06/1988   SpO2 95%   Breastfeeding No   BMI 36.47 kg/m     The patient is comfortable, no acute distress.  Mood good.  Insight good.  Eyes are nonicteric.  Neck is supple without mass.  No cervical adenopathy.  No thyromegaly. Heart regular rate and rhythm.  Lungs clear to auscultation bilaterally.  Respiratory effort is good.  Extremities no edema.  No effusion of the knees.      Diagnostics:     Office Visit on 08/09/2022   Component Date Value Ref Range Status     Vitamin B12 08/09/2022 1,356 (H)  232 - 1,245 pg/mL Final     Tissue Transglutaminase Antibody I* 08/09/2022 0.3  <7.0 U/mL Final    Negative- The tTG-IgA assay has limited utility for patients with decreased levels of IgA. Screening for celiac disease should include IgA testing to rule out selective IgA deficiency and to guide selection and interpretation of serological testing. tTG-IgG testing may be positive in celiac disease patients with IgA deficiency.     Tissue Transglutaminase Antibody I* 08/09/2022 <0.6  <7.0 U/mL Final    Negative     CRP Inflammation 08/09/2022 <3.00  <5.00 mg/L Final     Cholesterol 08/09/2022 197  <200 mg/dL Final     Triglycerides 08/09/2022 262 (H)  <150 mg/dL Final     Direct Measure HDL " 08/09/2022 67  >=50 mg/dL Final     LDL Cholesterol Calculated 08/09/2022 78  <=100 mg/dL Final     Non HDL Cholesterol 08/09/2022 130 (H)  <130 mg/dL Final     Sodium 08/09/2022 145  136 - 145 mmol/L Final     Potassium 08/09/2022 4.2  3.4 - 5.3 mmol/L Final     Creatinine 08/09/2022 0.72  0.51 - 0.95 mg/dL Final     Urea Nitrogen 08/09/2022 8.6  8.0 - 23.0 mg/dL Final     Chloride 08/09/2022 107  98 - 107 mmol/L Final     Carbon Dioxide (CO2) 08/09/2022 28  22 - 29 mmol/L Final     Anion Gap 08/09/2022 10  7 - 15 mmol/L Final     Glucose 08/09/2022 111 (H)  70 - 99 mg/dL Final     Calcium 08/09/2022 9.4  8.8 - 10.2 mg/dL Final     Protein Total 08/09/2022 7.2  6.4 - 8.3 g/dL Final     Albumin 08/09/2022 4.6  3.5 - 5.2 g/dL Final     Bilirubin Total 08/09/2022 0.3  <=1.2 mg/dL Final     Alkaline Phosphatase 08/09/2022 110 (H)  35 - 104 U/L Final     AST 08/09/2022 24  10 - 35 U/L Final     ALT 08/09/2022 24  10 - 35 U/L Final     GFR Estimate 08/09/2022 83  >60 mL/min/1.73m2 Final    Effective December 21, 2021 eGFRcr in adults is calculated using the 2021 CKD-EPI creatinine equation which includes age and gender (Rojelio et al., NEJM, DOI: 10.1056/FFRTsa8145923)     Magnesium 08/09/2022 2.0  1.7 - 2.3 mg/dL Final     WBC Count 08/09/2022 5.4  4.0 - 11.0 10e3/uL Final     RBC Count 08/09/2022 4.71  3.80 - 5.20 10e6/uL Final     Hemoglobin 08/09/2022 14.8  11.7 - 15.7 g/dL Final     Hematocrit 08/09/2022 43.4  35.0 - 47.0 % Final     MCV 08/09/2022 92  78 - 100 fL Final     MCH 08/09/2022 31.4  26.5 - 33.0 pg Final     MCHC 08/09/2022 34.1  31.5 - 36.5 g/dL Final     RDW 08/09/2022 13.0  10.0 - 15.0 % Final     Platelet Count 08/09/2022 229  150 - 450 10e3/uL Final       Results for orders placed or performed during the hospital encounter of 10/07/22   XR Lumbar Spine 2/3 Views     Status: None    Narrative    EXAM: XR LUMBAR SPINE 2/3 VIEWS  LOCATION: [facility]  DATE/TIME: 10/7/2022 12:13  PM    INDICATION: Lumbar back pain.   COMPARISON: None  TECHNIQUE: Radiographs of lumbar spine in routine projections.    FINDINGS: Nomenclature based on 5 lumbar type vertebral bodies. Normal coronal and sagittal alignment. Normal height of vertebral bodies. No hardware. Moderate multilevel interbody degeneration. Moderate multi level facet arthropathy. Right hip   arthroplasty. Cholecystectomy clips.   Results for orders placed or performed during the hospital encounter of 10/07/22   XR Knee Bilateral 3 Views     Status: None    Narrative    EXAM: XR KNEE BILATERAL 3 VIEWS  LOCATION: [facility]  DATE/TIME: 10/7/2022 12:14 PM    INDICATION: History of bilateral knee replacement, Chronic pain of both knees.  COMPARISON: None.      Impression    IMPRESSION:   Postoperative changes bilateral total knee arthroplasty. Components appear well seated. Chronic enthesitis along the superior pole of the patellae bilaterally. Tiny effusions bilaterally. No evidence for fracture.        Assessment:     1. Chronic diarrhea    2. Primary hypertension    3. History of bilateral knee replacement    4. Chronic pain of both knees    5. Lumbar back pain        Plan:     1. Covid and flu shots done today   2. X-ray of the knees and the back.  3. See patient instructions for more information.  4. Try off omeprazole (Prilosec) and see if this helps with the diarrhea.  5. Continue current medications otherwise.  6. Follow up sooner if issues.    Patient Instructions   1. Stop the omeprazole (Prilosec)   2. Use TUMS or famotidine (Pepcid) for heartburn.  3. Stool test for Clostridium difficile (C. diff)   4. Update me in 1 month how you are doing.  5. Could try to get into the [facility] for your dizziness symptoms.  6. Change losartan (Cozaar) to losartan/hydrochlorothiazide 50/12.5 mg daily.  7. Physical therapy at [facility]      Orders Placed This Encounter   Procedures     XR Knee Bilateral 3 Views     XR Lumbar  Spine 2/3 Views     COVID-19,PF,PFIZER BOOSTER BIVALENT     INFLUENZA, QUAD, HD, PF, 65+ (FLUZONE HD)        40 minutes or greater was spent today on the patient's care on the day of service.      This includes time for chart preparation, reviewing medical tests done before or during the visit, talking with the patient, review of quality indicators, required documentation, and other elements of care.        Donalod Vernon MD  General Internal Medicine  [facility]    Return in about 7 months (around 5/7/2023), or if symptoms worsen or fail to improve, for follow up visit, visit and blood work.     Future Appointments   Date Time Provider Department Center   5/8/2023 10:20 AM Sharon Self MD [facility]

## 2022-10-10 ENCOUNTER — TRANSFERRED RECORDS (OUTPATIENT)
Dept: HEALTH INFORMATION MANAGEMENT | Facility: CLINIC | Age: 82
End: 2022-10-10

## 2022-10-10 NOTE — PATIENT INSTRUCTIONS
Stop the omeprazole (Prilosec)   Use TUMS or famotidine (Pepcid) for heartburn.  Stool test for Clostridium difficile (C. diff)   Update me in 1 month how you are doing.  Could try to get into the [facility] for your dizziness symptoms.  Change losartan (Cozaar) to losartan/hydrochlorothiazide 50/12.5 mg daily.  Physical therapy at [facility]

## 2022-10-24 ENCOUNTER — MYC MEDICAL ADVICE (OUTPATIENT)
Dept: INTERNAL MEDICINE | Facility: CLINIC | Age: 82
End: 2022-10-24

## 2022-10-24 DIAGNOSIS — M54.2 NECK PAIN: ICD-10-CM

## 2022-10-24 DIAGNOSIS — R42 VERTIGO: Primary | ICD-10-CM

## 2022-10-31 ENCOUNTER — TELEPHONE (OUTPATIENT)
Dept: INTERNAL MEDICINE | Facility: CLINIC | Age: 82
End: 2022-10-31

## 2022-10-31 NOTE — TELEPHONE ENCOUNTER
Reason for Call:  Other call back    Detailed comments: Paulo saw pt on Friday, symptoms are not consistent w/ Vertigo her heart rate is low. Paulo is requesting a f/u but will continue to monitor her while treating her for her back pain.    Phone Number Patient can be reached at: Cell number on file:    Telephone Information:   Mobile [phone]       Best Time: Anytime    Can we leave a detailed message on this number? YES    Call taken on 10/31/2022 at 10:11 AM by MICHEL FARRAR

## 2022-10-31 NOTE — TELEPHONE ENCOUNTER
Wt Readings from Last 20 Encounters:   10/07/22 90.4 kg (199 lb 6.4 oz)   08/09/22 90.3 kg (199 lb)   05/12/22 90.7 kg (200 lb)   01/09/22 92.5 kg (204 lb)   01/06/22 91.6 kg (202 lb)   12/06/21 93.9 kg (207 lb)   08/06/21 93.1 kg (205 lb 3.2 oz)   03/11/21 96.6 kg (213 lb)   12/21/20 96.6 kg (213 lb)   12/15/20 95.7 kg (211 lb)   11/30/20 96.2 kg (212 lb)   10/28/20 97.1 kg (214 lb)   08/31/20 94.3 kg (208 lb)   08/26/20 94.3 kg (208 lb)   06/08/20 93.4 kg (206 lb)   12/03/19 97.5 kg (215 lb)   06/26/19 92.5 kg (204 lb)   10/08/18 85.7 kg (189 lb)   09/04/18 88 kg (194 lb)   06/29/18 85.2 kg (187 lb 14.4 oz)     BP Readings from Last 20 Encounters:   10/07/22 (!) 174/79   08/09/22 138/60   05/12/22 138/74   01/09/22 (!) 166/80   01/06/22 (!) 152/64   12/06/21 (!) 164/60   08/06/21 120/62   03/11/21 (!) 154/72   02/02/21 (!) 158/76   12/21/20 126/80   12/15/20 122/62   11/30/20 132/64   08/31/20 122/72   06/08/20 130/70   12/03/19 126/60      Pulse Readings from Last 20 Encounters:   10/07/22 79   08/09/22 72   05/12/22 90   01/09/22 90   01/06/22 93   12/06/21 82   08/06/21 78   03/11/21 88   02/02/21 70   12/21/20 90   12/15/20 77   11/30/20 82   08/31/20 70   06/08/20 80   12/03/19 76     SpO2 Readings from Last 20 Encounters:   10/07/22 95%   08/09/22 96%   05/12/22 97%   01/09/22 93%   01/06/22 97%   12/06/21 96%   08/06/21 94%   12/21/20 98%   12/15/20 98%   11/30/20 97%   12/03/19 96%

## 2022-10-31 NOTE — TELEPHONE ENCOUNTER
Please get more information from the physical therapy.  They or the patient had requested the evaluation for vertigo.    Donaldo Vernon MD  General Internal Medicine  [facility]  10/31/2022, 2:39 PM

## 2022-11-01 NOTE — TELEPHONE ENCOUNTER
Left message for PT & for patient- please obtain more information related to this call  Per pt rosendohart note from 10/24/22- pt requested this therapy

## 2022-11-01 NOTE — TELEPHONE ENCOUNTER
Spoke to patient- PT suggested she be seen by PCP to discuss ongoing dizziness & low heart rate    Scheduled for 11/11/2022

## 2022-11-04 ENCOUNTER — HOSPITAL ENCOUNTER (EMERGENCY)
Facility: HOSPITAL | Age: 82
Discharge: HOME | End: 2022-11-04
Attending: EMERGENCY MEDICINE | Admitting: EMERGENCY MEDICINE
Payer: MEDICARE

## 2022-11-04 ENCOUNTER — APPOINTMENT (OUTPATIENT)
Dept: CT IMAGING | Facility: HOSPITAL | Age: 82
End: 2022-11-04
Attending: EMERGENCY MEDICINE
Payer: MEDICARE

## 2022-11-04 VITALS
HEART RATE: 86 BPM | TEMPERATURE: 98 F | RESPIRATION RATE: 27 BRPM | DIASTOLIC BLOOD PRESSURE: 54 MMHG | HEIGHT: 63 IN | SYSTOLIC BLOOD PRESSURE: 117 MMHG | OXYGEN SATURATION: 96 % | WEIGHT: 195 LBS | BODY MASS INDEX: 34.55 KG/M2

## 2022-11-04 DIAGNOSIS — R07.89 CHEST DISCOMFORT: ICD-10-CM

## 2022-11-04 DIAGNOSIS — R42 DIZZINESS: ICD-10-CM

## 2022-11-04 DIAGNOSIS — I48.91 ATRIAL FIBRILLATION, UNSPECIFIED TYPE (H): ICD-10-CM

## 2022-11-04 LAB
ANION GAP SERPL CALCULATED.3IONS-SCNC: 14 MMOL/L (ref 7–15)
BUN SERPL-MCNC: 15.8 MG/DL (ref 8–23)
CALCIUM SERPL-MCNC: 9 MG/DL (ref 8.8–10.2)
CHLORIDE SERPL-SCNC: 102 MMOL/L (ref 98–107)
CREAT SERPL-MCNC: 0.86 MG/DL (ref 0.51–0.95)
DEPRECATED HCO3 PLAS-SCNC: 24 MMOL/L (ref 22–29)
GFR SERPL CREATININE-BSD FRML MDRD: 67 ML/MIN/1.73M2
GLUCOSE SERPL-MCNC: 96 MG/DL (ref 70–99)
HOLD SPECIMEN: NORMAL
HOLD SPECIMEN: NORMAL
POTASSIUM SERPL-SCNC: 3.7 MMOL/L (ref 3.4–5.3)
SODIUM SERPL-SCNC: 140 MMOL/L (ref 136–145)
TROPONIN T SERPL HS-MCNC: 26 NG/L
TROPONIN T SERPL HS-MCNC: 26 NG/L

## 2022-11-04 PROCEDURE — 99285 EMERGENCY DEPT VISIT HI MDM: CPT | Mod: 25

## 2022-11-04 PROCEDURE — G1010 CDSM STANSON: HCPCS

## 2022-11-04 PROCEDURE — 36415 COLL VENOUS BLD VENIPUNCTURE: CPT | Performed by: EMERGENCY MEDICINE

## 2022-11-04 PROCEDURE — 258N000003 HC RX IP 258 OP 636: Performed by: EMERGENCY MEDICINE

## 2022-11-04 PROCEDURE — 84484 ASSAY OF TROPONIN QUANT: CPT | Performed by: EMERGENCY MEDICINE

## 2022-11-04 PROCEDURE — 96360 HYDRATION IV INFUSION INIT: CPT | Mod: 59

## 2022-11-04 PROCEDURE — 70496 CT ANGIOGRAPHY HEAD: CPT | Mod: MG

## 2022-11-04 PROCEDURE — 250N000011 HC RX IP 250 OP 636: Performed by: EMERGENCY MEDICINE

## 2022-11-04 PROCEDURE — 250N000013 HC RX MED GY IP 250 OP 250 PS 637: Performed by: EMERGENCY MEDICINE

## 2022-11-04 PROCEDURE — 80048 BASIC METABOLIC PNL TOTAL CA: CPT | Performed by: EMERGENCY MEDICINE

## 2022-11-04 PROCEDURE — 93005 ELECTROCARDIOGRAM TRACING: CPT | Performed by: EMERGENCY MEDICINE

## 2022-11-04 RX ORDER — MECLIZINE HCL 12.5 MG 12.5 MG/1
25 TABLET ORAL ONCE
Status: COMPLETED | OUTPATIENT
Start: 2022-11-04 | End: 2022-11-04

## 2022-11-04 RX ORDER — IOPAMIDOL 755 MG/ML
80 INJECTION, SOLUTION INTRAVASCULAR ONCE
Status: COMPLETED | OUTPATIENT
Start: 2022-11-04 | End: 2022-11-04

## 2022-11-04 RX ADMIN — SODIUM CHLORIDE 500 ML: 9 INJECTION, SOLUTION INTRAVENOUS at 20:41

## 2022-11-04 RX ADMIN — IOPAMIDOL 75 ML: 755 INJECTION, SOLUTION INTRAVENOUS at 20:53

## 2022-11-04 RX ADMIN — MECLIZINE 25 MG: 12.5 TABLET ORAL at 20:42

## 2022-11-04 ASSESSMENT — ACTIVITIES OF DAILY LIVING (ADL)
ADLS_ACUITY_SCORE: 35
ADLS_ACUITY_SCORE: 35

## 2022-11-04 NOTE — ED PROVIDER NOTES
EMERGENCY DEPARTMENT ENCOUNTER      NAME: Kori Leach  AGE: 82 year old female  YOB: 1940  MRN: [id number]  EVALUATION DATE & TIME: 11/4/2022  6:33 PM    PCP: Donaldo Vernon    ED PROVIDER: Angie Nielsen M.D.      Chief Complaint   Patient presents with     Irregular Heart Beat     FINAL IMPRESSION:  1. Atrial fibrillation, unspecified type (H)    2. Chest discomfort    3. Dizziness      ED COURSE & MEDICAL DECISION MAKING:    Pertinent Labs & Imaging studies reviewed. (See chart for details)  ED Course as of 11/04/22 2244   Fri Nov 04, 2022   1848 Patient is an 82-year-old female who comes in today for evaluation of some dizziness its been going on for about a month and then got worse in the last week or so.  She was seen by her physical therapist today and they did some vital signs and noticed that she was irregular and sent her into the emergency room.  They found her to be tachycardic in A. fib with RVR with a rate in the 170s.  It sounds like she slowed down there but they were concerned about a non-STEMI and gave her Lovenox and sent her here.  She reports that she not having any chest pain now.  She said she had some earlier but it is gone.  We ordered a high-sensitivity troponin which is 26.  It certainly not in the range that rules in for myocardial injury.  It we will need to repeat in about another hour.  As far as the dizziness goes, it sounds like there is some worsening with movement.  We could try some meclizine to see if there is a little peripheral vertigo with that.  Also get CT imaging of her head to evaluate for any posterior circulation narrowings or stroke since symptoms been going on for about a month now.  That seems to be her biggest concern.  My suspicion for a non-STEMI is not as high right now.  Patient is still in atrial fibrillation but is rate controlled.  I discussed the plan with her and she is in agreement.   2045 Repeat troponin is stable.  I do not think  this represents a non-STEMI   2205 Patient's CT scan came back unremarkable.  With her having symptoms on and off for the last month I do not think that we need to move on MRI imaging.  I discussed the results with her and we will work on getting her discharged home.  I think she be a good candidate for rapid access follow-up with the new A. fib and some intermittent chest pain.  She was in agreement with the plan.  I told her if the dizziness gets really bad again to come back and she agrees to do so.     6:47 PM I met with the patient, obtained history, performed an initial exam, and discussed options and plan for diagnostics and treatment here in the ED.     At the conclusion of the encounter I discussed  the results of all of the tests and the disposition with patient.   All questions were answered.  The patient acknowledged understanding and was involved in the decision making regarding the overall care plan.      I discussed with patient the utility, limitations and findings of the exam/interventions/studies done during this visit as well as the list of differential diagnosis and symptoms to monitor/return to ER for.  Additional verbal discharge instructions were provided.     MEDICATIONS GIVEN IN THE EMERGENCY:  Medications   meclizine (ANTIVERT) tablet 25 mg (25 mg Oral Given 11/4/22 2042)   0.9% sodium chloride BOLUS (0 mLs Intravenous Stopped 11/4/22 2217)   iopamidol (ISOVUE-370) solution 80 mL (75 mLs Intravenous Given 11/4/22 2053)       NEW PRESCRIPTIONS STARTED AT TODAY'S ER VISIT  Discharge Medication List as of 11/4/2022 10:18 PM             =================================================================    HPI    Triage Note: Pt presents to ED from [facility] and [facility]. Pt was seen there for weakness and dizziness. Had positive troponin at [facility] and was found to be in afib with RVR. Pt was given lovenox at [facility]. Pt spontaneously converted to NSR. Denies pain    Patient information was obtained from: patient      Use of : N/A      Kori Leach is a 82 year old female who presents with lightheadedness and atrial fibrillation.    Earlier today the patient was at physical therapy for back pain and while she was there they hooked her up to a heart rate monitor and the physical therapist was concerned with her blood pressure, heart rate, and oxygen level. She was taken to [facility] and they found that she was in atrial fibrillation- her heart rate was in the 170s. She also had chest pain there but this has now resolved. She has no previous history of atrial fibrillation. She notes that she also feels slightly short of breath that is worse when she is moving around but has gotten better since she laid down in the bed. She does have COPD and uses CPAP at night but does not use oxygen in the day yet.    The patient's greatest concern is actually lightheadedness that she has had for 1.5-2 months that is constant but has worsened in the last few days. The lightheadedness is worse when she is standing and moving around and it is hard for her to describe but states that it also feels like a little bit of dizziness. Her physical therapist performed an Epley maneuver that has not improved her symptoms.     She denies headache, fever, chills, nausea, vomiting, current chest pain or any other complaints at this time.       REVIEW OF SYSTEMS   Except as stated in the HPI all other systems reviewed and are negative.    PAST MEDICAL HISTORY:  Past Medical History:   Diagnosis Date     Arthritis     knees and hip replacement     Chronic obstructive pulmonary disease, unspecified COPD type (H) 06/04/2018    PFT Complete  Performed 5/21/2018 Final result Study Result FEV1/FVC is 69 and is reduced. FEV1 is 66% predicted and is reduced. FVC is 74% predicted and reduced. There was improvement in spirometry after a single inhaled dose of bronchodilator. TLC is 110% predicted and is normal. RV is 141% predicted and is  increased. DLCO is 77% predicted and is reduced when it  is corrected for hemoglobin.  Im     Degenerative disc disease, cervical      Fatty liver      Former smoker      GERD (gastroesophageal reflux disease)      History of nicotine use 06/04/2018     HLD (hyperlipidemia)      HTN (hypertension)      MO (iron deficiency anemia) 04/24/2015     Inverted nipple     bilateral     Loss of hearing      Lumbar back pain      Morbid obesity (H) 09/04/2018     Pre-diabetes      Severe obstructive sleep apnea 04/18/2018    10/06/17 where we ordered a sleep study which showed severe TAMAR with an RDI of 30 and she had a titration study that showed CPAP of 9 cmH20 to be effective. She was set up with the CPAP on 10/25/17 through [facility].      Urinary incontinence        PAST SURGICAL HISTORY:  Past Surgical History:   Procedure Laterality Date     APPENDECTOMY       ARTHROPLASTY KNEE BILATERAL       CATARACT EXTRACTION       CERVICAL SPINE SURGERY  2015     JOINT REPLACEMENT      bilateral knees and right hip     LAPAROSCOPIC CHOLECYSTECTOMY N/A 6/29/2018    Procedure: CHOLECYSTECTOMY, LAPAROSCOPIC;  Surgeon: Jose Armando Humphries MD;  Location: [facility] [place];  Service:      TONSILLECTOMY       WISDOM TOOTH EXTRACTION         CURRENT MEDICATIONS:    No current facility-administered medications for this encounter.    Current Outpatient Medications:      Fluticasone-Umeclidin-Vilanterol (TRELEGY ELLIPTA) 200-62.5-25 MCG/INH oral inhaler, Inhale 1 puff into the lungs daily, Disp: 60 each, Rfl: 11     ipratropium - albuterol 0.5 mg/2.5 mg/3 mL (DUONEB) 0.5-2.5 (3) MG/3ML neb solution, Take 1 vial (3 mLs) by nebulization every 6 hours as needed for shortness of breath / dyspnea or wheezing, Disp: 360 mL, Rfl: 11     losartan-hydrochlorothiazide (HYZAAR) 50-12.5 MG tablet, Take 1 tablet by mouth daily, Disp: 90 tablet, Rfl: 3     meclizine (ANTIVERT) 25 mg tablet, [MECLIZINE (ANTIVERT) 25 MG TABLET] Take 1 tablet (25 mg total) by  "mouth 3 (three) times a day as needed., Disp: 30 tablet, Rfl: 0     multivitamin (MULTIPLE VITAMINS ORAL), [MULTIVITAMIN (MULTIPLE VITAMINS ORAL)] Take by mouth., Disp: , Rfl:      mv-mn/iron/folic acid/herb 190 (VITAMIN D3 COMPLETE ORAL), , Disp: , Rfl:      nitroGLYcerin (NITROSTAT) 0.4 MG sublingual tablet, For chest pain place 1 tablet under the tongue every 5 minutes for 3 doses. If symptoms persist 5 minutes after 1st dose call 911., Disp: 25 tablet, Rfl: 4     omeprazole (PRILOSEC) 20 MG DR capsule, Take 1 capsule (20 mg) by mouth every morning, Disp: 90 capsule, Rfl: 3     rosuvastatin (CRESTOR) 40 MG tablet, TAKE 1 TABLET AT BEDTIME, Disp: 90 tablet, Rfl: 3    ALLERGIES:  Allergies   Allergen Reactions     Lisinopril Cough       FAMILY HISTORY:  Family History   Problem Relation Age of Onset     Breast Cancer Mother 45     Diabetes Mother      Heart Disease Mother      Hyperlipidemia Mother      Hypertension Mother      Depression Father      Alcoholism Father      Hyperlipidemia Sister      Depression Sister      Obesity Sister      Obesity Daughter      Lung Cancer Son      Cerebrovascular Disease Son        SOCIAL HISTORY:   Social History     Socioeconomic History     Marital status:      Number of children: 3   Tobacco Use     Smoking status: Former     Types: Cigarettes     Smokeless tobacco: Never     Tobacco comments:     1-2 cigarettes per day on average   Substance and Sexual Activity     Alcohol use: Yes     Comment: Alcoholic Drinks/day: less than 1 drink per month      Drug use: No     Sexual activity: Not Currently     Partners: Male   Social History Narrative    . Retired. Likes to play golf.  3 children. Son has lung cancer.  Was a . Lives in [place] for 6 months and [place] for 6 months of the years.       PHYSICAL EXAM    VITAL SIGNS: /54   Pulse 86   Temp 98  F (36.7  C) (Oral)   Resp 27   Ht 1.6 m (5' 3\")   Wt 88.5 kg (195 lb)   LMP 08/06/1988   SpO2 " 96%   BMI 34.54 kg/m     GENERAL: Awake, Alert, answering questions, No acute distress, Well nourished  HEENT: Normal cephalic, Atraumatic, bilateral external ears normal, No scleral icterus, mask in place  NECK: No obvious swelling or abnormality, No stridor  PULMONARY:Normal and symmetric breath sounds, No respiratory distress, Lungs clear to auscultation bilaterally. No wheezing  CARDIOVASCULAR: Regular rate and rhythm, Distal pulses present and normal.  ABDOMINAL: Soft, Nondistended, Nontender, No flank tenderness, No palpable masses  BACK: No tenderness.  EXTREMITIES: Moves all extremities spontaneously, warm, no edema, No major deformities  NEURO: No facial droop, normal motor function, Normal speech   PSYCH: Normal mood and affect  SKIN: No rashes on visualized skin, dry, warm     LAB:  All pertinent labs reviewed and interpreted.  Results for orders placed or performed during the hospital encounter of 11/04/22   CTA Head Neck with Contrast    Impression    IMPRESSION:   HEAD CT:  1.  No CT evidence for acute intracranial process.  2.  Brain atrophy and presumed chronic microvascular ischemic changes as above.    HEAD CTA:   1.  Normal CTA [place].    NECK CTA:  1.  Normal neck vessels.  2.  Indeterminant 11.0 x 14.6 mm right parotid lesion. This may be a pleomorphic adenoma. Recommend FNA.   Troponin T, High Sensitivity (now)   Result Value Ref Range    Troponin T, High Sensitivity 26 (H) <=14 ng/L   Extra Red Top Tube   Result Value Ref Range    Hold Specimen JIC    Extra Purple Top Tube   Result Value Ref Range    Hold Specimen JIC    Basic metabolic panel   Result Value Ref Range    Sodium 140 136 - 145 mmol/L    Potassium 3.7 3.4 - 5.3 mmol/L    Chloride 102 98 - 107 mmol/L    Carbon Dioxide (CO2) 24 22 - 29 mmol/L    Anion Gap 14 7 - 15 mmol/L    Urea Nitrogen 15.8 8.0 - 23.0 mg/dL    Creatinine 0.86 0.51 - 0.95 mg/dL    Calcium 9.0 8.8 - 10.2 mg/dL    Glucose 96 70 - 99 mg/dL    GFR Estimate  67 >60 mL/min/1.73m2   Troponin T, High Sensitivity (now)   Result Value Ref Range    Troponin T, High Sensitivity 26 (H) <=14 ng/L       RADIOLOGY:  CTA Head Neck with Contrast   Final Result   IMPRESSION:    HEAD CT:   1.  No CT evidence for acute intracranial process.   2.  Brain atrophy and presumed chronic microvascular ischemic changes as above.      HEAD CTA:    1.  Normal CTA [place].      NECK CTA:   1.  Normal neck vessels.   2.  Indeterminant 11.0 x 14.6 mm right parotid lesion. This may be a pleomorphic adenoma. Recommend FNA.          EKG:    Date and time: November 4, 2022 at 1713  Rate: 98 bpm  Rhythm: Atrial fibrillation  QRS interval: 78 ms  QT/QTc: 362/462 ms  ST changes or T wave changes: No acute ST or T wave abnormality  Change from prior ECG: Atrial fibrillation has replaced sinus rhythm.  I have independently reviewed and interpreted this EKG.     I, Tomy Sy, am serving as a scribe to document services personally performed by Dr. Nielsen based on my observation and the provider's statements to me. I, Angie Nielsen MD attest that Tomy Sy is acting in a scribe capacity, has observed my performance of the services and has documented them in accordance with my direction.    Angie Nielsen M.D.  Emergency Medicine  [facility] EMERGENCY DEPARTMENT  [facility] [facility] [address]  [phone]  Dept: [phone]     Angie Nielsen MD  11/04/22 1362

## 2022-11-04 NOTE — ED TRIAGE NOTES
Pt presents to ED from [facility] and [facility]. Pt was seen there for weakness and dizziness. Had positive troponin at [facility] and was found to be in afib with RVR. Pt was given lovenox at [facility]. Pt spontaneously converted to NSR. Denies pain

## 2022-11-04 NOTE — ED NOTES
"ED Triage Provider Note  [facility] EMERGENCY DEPARTMENT  Encounter Date: Nov 4, 2022    History:  Chief Complaint   Patient presents with     Irregular Heart Beat     Kori Leach is a 82 year old female who presents to the ED with elevated troponin.    Paramedics tell me patient comes from [facility].  Went in for dizziness, feeling weak with some chest pain.  Was found to be A. fib with RVR.  Spontaneously converted on her own.    Per chart review of emergency room records chest x-ray unremarkable.  CBC, BMP, LFTs, INR, D-dimer, urinalysis unremarkable.  Troponin elevated at 0.049.  Given dose of Lovenox.    Patient denies any chest pain currently.  No symptoms currently.  Is not dizzy.  Patient states went to physical therapy this morning for her back which she has been doing.  Blood pressure was elevated and they were concerned so sent her to the emergency room for evaluation of high blood pressure.    History of hypertension hyperlipidemia COPD.  No known history of A. fib.  No known history of heart disease.  Per chart review December 2020 stress test was unremarkable.    Review of Systems:  Chest pain  lightheadedness    Exam:  /77   Pulse 88   Temp 98  F (36.7  C) (Oral)   Resp 18   Ht 1.6 m (5' 3\")   Wt 88.5 kg (195 lb)   LMP 08/06/1988   SpO2 96%   BMI 34.54 kg/m    General: No acute distress. Appears stated age.   Cardio: normal rate, extremities well perfused  Resp: Normal work of breathing, grossly normal respiratory rate  Neuro: Alert. Facial movement grossly symmetric. Grossly intact strength.       Medical Decision Making:  Patient arriving to the ED with problem as above. A medical screening exam was performed. Initial differential diagnosis includes but not limited to New onset A. fib, paroxysmal A. fib, ACS.    Ekg, repeat orders initiated from Triage. The patient is most appropriate to be treated in the rapid treatment area.        Juany Mckeon, " MD  11/4/2022 at 4:57 PM     Juany Mckeon MD  11/04/22 1700

## 2022-11-04 NOTE — ED NOTES
Expected Patient Referral to ED  4:14 PM    Referring Clinic/Provider:      Reason for referral/Clinical facts:  Lightheaded, exertional SOB And chest pain worse over the last week. Rate 170 a fib with RVR. Cardioverted on her own. Slight trop elevation. Still with intermittent chest pain and dizziness. D dimer is negative.  Patient given a dose of Lovenox.    Recommendations provided:  Send to ED for further evaluation    Caller was informed that this institution does possess the capabilities and/or resources to provide for patient and should be transferred to our facility.    Angie Nielsen MD  [facility] EMERGENCY DEPARTMENT  [address] [place] [id number]  [phone]       Angie Nielsen MD  11/04/22 2342

## 2022-11-11 ENCOUNTER — OFFICE VISIT (OUTPATIENT)
Dept: CARDIOLOGY | Facility: CLINIC | Age: 82
End: 2022-11-11
Attending: EMERGENCY MEDICINE
Payer: MEDICARE

## 2022-11-11 ENCOUNTER — OFFICE VISIT (OUTPATIENT)
Dept: INTERNAL MEDICINE | Facility: CLINIC | Age: 82
End: 2022-11-11
Payer: MEDICARE

## 2022-11-11 VITALS
BODY MASS INDEX: 34.38 KG/M2 | OXYGEN SATURATION: 95 % | DIASTOLIC BLOOD PRESSURE: 52 MMHG | SYSTOLIC BLOOD PRESSURE: 102 MMHG | WEIGHT: 194 LBS | TEMPERATURE: 98 F | RESPIRATION RATE: 18 BRPM | HEART RATE: 74 BPM | HEIGHT: 63 IN

## 2022-11-11 VITALS
SYSTOLIC BLOOD PRESSURE: 122 MMHG | DIASTOLIC BLOOD PRESSURE: 62 MMHG | HEIGHT: 63 IN | WEIGHT: 199.4 LBS | BODY MASS INDEX: 35.33 KG/M2 | RESPIRATION RATE: 16 BRPM | HEART RATE: 77 BPM

## 2022-11-11 DIAGNOSIS — R42 DIZZINESS: ICD-10-CM

## 2022-11-11 DIAGNOSIS — R07.89 CHEST DISCOMFORT: ICD-10-CM

## 2022-11-11 DIAGNOSIS — I48.91 NEW ONSET ATRIAL FIBRILLATION (H): Primary | ICD-10-CM

## 2022-11-11 DIAGNOSIS — I48.91 ATRIAL FIBRILLATION, UNSPECIFIED TYPE (H): Primary | ICD-10-CM

## 2022-11-11 DIAGNOSIS — R03.1 LOW BLOOD PRESSURE READING: ICD-10-CM

## 2022-11-11 DIAGNOSIS — K52.9 CHRONIC DIARRHEA: ICD-10-CM

## 2022-11-11 DIAGNOSIS — M75.41 IMPINGEMENT SYNDROME OF RIGHT SHOULDER: ICD-10-CM

## 2022-11-11 PROCEDURE — 99213 OFFICE O/P EST LOW 20 MIN: CPT | Mod: 25 | Performed by: INTERNAL MEDICINE

## 2022-11-11 PROCEDURE — 20610 DRAIN/INJ JOINT/BURSA W/O US: CPT | Mod: RT | Performed by: INTERNAL MEDICINE

## 2022-11-11 PROCEDURE — 99204 OFFICE O/P NEW MOD 45 MIN: CPT | Performed by: INTERNAL MEDICINE

## 2022-11-11 RX ORDER — DILTIAZEM HYDROCHLORIDE 120 MG/1
120 CAPSULE, EXTENDED RELEASE ORAL DAILY
Qty: 30 CAPSULE | Refills: 11 | Status: SHIPPED | OUTPATIENT
Start: 2022-11-11 | End: 2023-06-12

## 2022-11-11 RX ORDER — TRIAMCINOLONE ACETONIDE 40 MG/ML
80 INJECTION, SUSPENSION INTRA-ARTICULAR; INTRAMUSCULAR ONCE
Status: COMPLETED | OUTPATIENT
Start: 2022-11-11 | End: 2022-11-11

## 2022-11-11 RX ADMIN — TRIAMCINOLONE ACETONIDE 80 MG: 40 INJECTION, SUSPENSION INTRA-ARTICULAR; INTRAMUSCULAR at 17:14

## 2022-11-11 ASSESSMENT — PAIN SCALES - GENERAL: PAINLEVEL: EXTREME PAIN (8)

## 2022-11-11 NOTE — PROGRESS NOTES
RIGHT SUBACROMIAL CORTICOSTEROID INJECTION    Date/Time: 11/11/2022 5:15 PM  Performed by: Donaldo Vernon MD  Authorized by: Donaldo Vernon MD            Procedure:  Right subacromial corticosteroid injection.    Diagnosis:  Shoulder impingement syndrome.    Description of procedure:    Patient was given informed consent prior to proceeding with corticosteroid injection.  Patient agreed to proceed knowing risks and benefits.  A posterior approach was marked and prepped with alcohol.  Using a combination of 80 mg of Kenalog and 1 ml of lidocaine without epinephrine, the subacromial space was found using a 25G 1.5 inch needle.  The space was then injected and the fluid flowed easily.  Bandage applied and aftercare instructions given.  No immediate complications.

## 2022-11-11 NOTE — LETTER
11/11/2022    Donaldo Vernon MD  [address]  [facility] [place] [address]    RE: Kori Leach       Dear Colleague,     I had the pleasure of seeing Kori Leach in the [facility].        Thank you, Dr. Nielsen, for asking [facility] to evaluate Ms. Kori Leach.      Assessment/Recommendations   Assessment:    Paroxysmal atrial fibrillation, first documented episode, resolved  Frequent PACs  Dizziness, suspect primarily related to antihypertensive medications/orthostatic hypotension  Hypertension  COPD    Plan:  Stop Hyzaar  Start diltiazem 120 mg a day for blood pressure and A. Fib.  Holter and echo  Adjust dose diltiazem based on the results of Holter and blood pressure response    We discussed the importance of stroke prophylaxis.  I gave her prescription for Eliquis 5 mg twice a day    Follow-up when the results of the tests available     History of Present Illness    Ms. Kori Leach is a 82 year old female who comes in for initial cardiac evaluation because of newly documented atrial fibrillation.  She has had chronic dizziness which has become more bothersome in the last few weeks.  She was undergoing physical therapy when she was noted to be tachycardic.  She was unaware of irregular heart rhythm.  She went to the emergency room and was found to be in atrial fibrillation.  She spontaneously converted to sinus rhythm with frequent PACs.  She has no history of atrial fibrillation in the past.  She denies syncope or near syncope.  She stated that she had felt dizzy for a number of months now.  Dizziness became worse when blood pressure medications were changed lately.  Dizziness is most bothersome when she is in upright position.  She denies exertional chest pains  She has not had weight, PND, orthopnea    ECG: Personally reviewed.  #1 atrial fibrillation with RVR #2 sinus rhythm frequent PACs no ischemic changes    Echocardiogram: September  "2017    Left ventricle ejection fraction is normal. The calculated left ventricular ejection fraction is 65%.    No hemodynamically significant valvular heart abnormalities.    No previous study for comparison.  Stress Test: December 2020     The nuclear stress test is negative for inducible myocardial ischemia or infarction.     The left ventricular ejection fraction at stress is 67%.         Physical Examination Review of Systems   /62 (BP Location: Right arm, Patient Position: Sitting, Cuff Size: Adult Regular)   Pulse 77   Resp 16   Ht 1.6 m (5' 3\")   Wt 90.4 kg (199 lb 6.4 oz)   LMP 08/06/1988   BMI 35.32 kg/m    Body mass index is 35.32 kg/m .  Wt Readings from Last 3 Encounters:   11/11/22 90.4 kg (199 lb 6.4 oz)   11/04/22 88.5 kg (195 lb)   10/07/22 90.4 kg (199 lb 6.4 oz)     General Appearance:   Alert, cooperative, no distress, appears stated age   Head/ENT: Normocephalic, without obvious abnormality. Membranes moist      EYES:  no scleral icterus, normal conjunctivae   Neck: Supple, symmetrical, trachea midline, no adenopathy, thyroid: not enlarged, symmetric, no carotid bruit or JVD   Chest/Lungs:   Lungs are clear to auscultation, respirations unlabored. No tenderness or deformity    Cardiovascular:   Regular rhythm with ectopies, S1, S2 normal, no murmur, rub or gallop.   Abdomen:  Soft, non-tender, bowel sounds active all four quadrants,  no masses, no organomegaly   Extremities: no cyanosis or clubbing. No edema   Skin: Skin color, texture, turgor normal, no rashes or lesions.    Psychiatric: Normal affect, calm   Neurologic: Alert and oriented x 3, moving all four extremities.     Enc Vitals  BP: 122/62  Pulse: 77  Resp: 16  Weight: 90.4 kg (199 lb 6.4 oz)  Height: 160 cm (5' 3\")                                          Lab Results    Chemistry/lipid CBC Cardiac Enzymes/BNP/TSH/INR   Recent Labs   Lab Test 08/09/22  1010   CHOL 197   HDL 67   LDL 78   TRIG 262*     Recent Labs   Lab " Test 08/09/22  1010 08/04/21  0942 06/08/20  1233   LDL 78 74 77     Recent Labs   Lab Test 11/04/22  1754      POTASSIUM 3.7   CHLORIDE 102   CO2 24   GLC 96   BUN 15.8   CR 0.86   GFRESTIMATED 67   AMINA 9.0     Recent Labs   Lab Test 11/04/22  1754 08/09/22  1010 05/12/22  1012   CR 0.86 0.72 0.82     Recent Labs   Lab Test 12/06/21  1639 11/04/20  0926 06/08/20  1233   A1C 5.9* 6.1* 6.3*          Recent Labs   Lab Test 08/09/22  1010   WBC 5.4   HGB 14.8   HCT 43.4   MCV 92        Recent Labs   Lab Test 08/09/22  1010 05/12/22  1012 01/14/22  1124   HGB 14.8 14.6 11.6*    Recent Labs   Lab Test 05/12/22  1012   TROPONINI <0.01     Recent Labs   Lab Test 12/06/21  1639 11/30/20  1138   BNP 51 34     No results for input(s): TSH in the last 66494 hours.  Recent Labs   Lab Test 06/29/18 2010   INR 0.97        Medical History  Surgical History Family History Social History   Past Medical History:   Diagnosis Date     Arthritis     knees and hip replacement     Chronic obstructive pulmonary disease, unspecified COPD type (H) 06/04/2018    PFT Complete  Performed 5/21/2018 Final result Study Result FEV1/FVC is 69 and is reduced. FEV1 is 66% predicted and is reduced. FVC is 74% predicted and reduced. There was improvement in spirometry after a single inhaled dose of bronchodilator. TLC is 110% predicted and is normal. RV is 141% predicted and is increased. DLCO is 77% predicted and is reduced when it  is corrected for hemoglobin.  Im     Degenerative disc disease, cervical      Fatty liver      Former smoker      GERD (gastroesophageal reflux disease)      History of nicotine use 06/04/2018     HLD (hyperlipidemia)      HTN (hypertension)      MO (iron deficiency anemia) 04/24/2015     Inverted nipple     bilateral     Loss of hearing      Lumbar back pain      Morbid obesity (H) 09/04/2018     Pre-diabetes      Severe obstructive sleep apnea 04/18/2018    10/06/17 where we ordered a sleep study which  showed severe TAMAR with an RDI of 30 and she had a titration study that showed CPAP of 9 cmH20 to be effective. She was set up with the CPAP on 10/25/17 through [facility].      Urinary incontinence      Past Surgical History:   Procedure Laterality Date     APPENDECTOMY       ARTHROPLASTY KNEE BILATERAL       CATARACT EXTRACTION       CERVICAL SPINE SURGERY  2015     JOINT REPLACEMENT      bilateral knees and right hip     LAPAROSCOPIC CHOLECYSTECTOMY N/A 6/29/2018    Procedure: CHOLECYSTECTOMY, LAPAROSCOPIC;  Surgeon: Jose Armando Humphries MD;  Location: [facility];  Service:      TONSILLECTOMY       WISDOM TOOTH EXTRACTION       No premature CAD, SCD,cardiomyopathy   Social History     Socioeconomic History     Marital status:      Spouse name: Not on file     Number of children: 3     Years of education: Not on file     Highest education level: Not on file   Occupational History     Not on file   Tobacco Use     Smoking status: Former     Types: Cigarettes     Smokeless tobacco: Never     Tobacco comments:     1-2 cigarettes per day on average   Substance and Sexual Activity     Alcohol use: Yes     Comment: Alcoholic Drinks/day: less than 1 drink per month      Drug use: No     Sexual activity: Not Currently     Partners: Male   Other Topics Concern     Not on file   Social History Narrative    . Retired. Likes to play golf.  3 children. Son has lung cancer.  Was a . Lives in [place] for 6 months and [place] for 6 months of the years.     Social Determinants of Health     Financial Resource Strain: Not on file   Food Insecurity: Not on file   Transportation Needs: Not on file   Physical Activity: Not on file   Stress: Not on file   Social Connections: Not on file   Intimate Partner Violence: Not on file   Housing Stability: Not on file         Medications  Allergies   Current Outpatient Medications   Medication Sig Dispense Refill     apixaban ANTICOAGULANT (ELIQUIS ANTICOAGULANT) 5 MG  tablet Take 1 tablet (5 mg) by mouth 2 times daily 60 tablet 11     diltiazem ER (DILT-XR) 120 MG 24 hr capsule Take 1 capsule (120 mg) by mouth daily 30 capsule 11     Fluticasone-Umeclidin-Vilanterol (TRELEGY ELLIPTA) 200-62.5-25 MCG/INH oral inhaler Inhale 1 puff into the lungs daily 60 each 11     ipratropium - albuterol 0.5 mg/2.5 mg/3 mL (DUONEB) 0.5-2.5 (3) MG/3ML neb solution Take 1 vial (3 mLs) by nebulization every 6 hours as needed for shortness of breath / dyspnea or wheezing 360 mL 11     meclizine (ANTIVERT) 25 mg tablet [MECLIZINE (ANTIVERT) 25 MG TABLET] Take 1 tablet (25 mg total) by mouth 3 (three) times a day as needed. 30 tablet 0     multivitamin (MULTIPLE VITAMINS ORAL) [MULTIVITAMIN (MULTIPLE VITAMINS ORAL)] Take by mouth.       mv-mn/iron/folic acid/herb 190 (VITAMIN D3 COMPLETE ORAL)        rosuvastatin (CRESTOR) 40 MG tablet TAKE 1 TABLET AT BEDTIME 90 tablet 3        Allergies   Allergen Reactions     Lisinopril Cough                       Thank you for allowing me to participate in the care of your patient.      Sincerely,     Daryl Rodríguez MD     [facility]  cc:   Angie Nielsen MD  [address] [place] [address]

## 2022-11-11 NOTE — PROGRESS NOTES
[facility]    Comprehensive and complex medical care - Chronic disease management - Shared decision making - Care coordination - Compassionate care    Patient advocacy - Rational deprescribing - Minimally disruptive medicine - Ethical focus - Customized care         Date of Service: 11/11/2022  Primary Provider: Donaldo Vernon    Patient Care Team:  Donaldo Vernon MD as PCP - General (Internal Medicine)  Mariella Coyne MD as MD (Neurology)  Bennett Varela MD as MD (Orthopaedic Surgery)  Beka Adams MD as Donaldo Quinones MD as Assigned PCP  Daryl Rodríguez MD as MD (Cardiovascular Disease)          Patient's Pharmacy:    [id number] [place], [place] - [address] [place] [address]  Phone: [phone] Fax: [phone]    [facility]  [place] RODRIGES [address]  Phone: [phone] Fax: [phone]     Patient's Contacts:  Name Home Phone Work Phone Mobile Phone Relationship Lgl Grd   RADHA ORTIZ [phone]   Son    JENNIFER ORTIZ [phone]   Daughter      Patient's Insurance:    Payor: BCBS / Plan: [place]BS [place] / Product Type: Indemnity /            Active Problem List:  Problem List as of 11/11/2022 Reviewed: 8/9/2022  2:44 PM by Donaldo Vernon MD       High    Former smoker - quit in 2018    Chronic obstructive pulmonary disease, unspecified COPD type (H), thought to be mild on PFTs 2018    New onset atrial fibrillation (H)       Medium    HTN (hypertension)    Severe obstructive sleep apnea on CPAP    MO (iron deficiency anemia) - Dec 2021, also 2015       Low    HLD (hyperlipidemia)    Dysphagia    Prediabetes    Obesity (BMI 35.0-39.9) with comorbidity (H)    Fatty liver    GERD (gastroesophageal reflux disease)    Loss of hearing    Lumbar back pain        Current Outpatient  Medications   Medication Instructions     apixaban ANTICOAGULANT (ELIQUIS ANTICOAGULANT) 5 mg, Oral, 2 TIMES DAILY     diltiazem ER (DILT-XR) 120 mg, Oral, DAILY     Fluticasone-Umeclidin-Vilanterol (TRELEGY ELLIPTA) 200-62.5-25 MCG/INH oral inhaler 1 puff, Inhalation, DAILY     ipratropium - albuterol 0.5 mg/2.5 mg/3 mL (DUONEB) 0.5-2.5 (3) MG/3ML neb solution 3 mLs, Nebulization, EVERY 6 HOURS PRN     meclizine (ANTIVERT) 25 mg, Oral, 3 TIMES DAILY PRN     multivitamin (MULTIPLE VITAMINS ORAL) [MULTIVITAMIN (MULTIPLE VITAMINS ORAL)] Take by mouth.     mv-mn/iron/folic acid/herb 190 (VITAMIN D3 COMPLETE ORAL) Oral     rosuvastatin (CRESTOR) 40 MG tablet TAKE 1 TABLET AT BEDTIME     Social History     Social History Narrative    . Retired. Likes to play golf.  3 children. Son has lung cancer.  Was a . Lives in [place] for 6 months and [place] for 6 months of the years.       Subjective:     Kori Leach is a 82 year old female who comes in today for:    Chief Complaint   Patient presents with     Dizziness     On 11/4 was seen in urgent care and was found to be in A-fib was put on new meds. Hoping to get an injection in shoulder today.       Comes in today for follow up multiple issues.    First in today for follow up of emergency room (ER) visit and new onset atrial fibrillation.  First time documented and now out of it.  Did see cardiology (Dr. Rodríguez) in clinic today and this evaluation is progressing.  Taken off of losartan/hydrochlorothiazide and started diltiazem.  Started on apixaban (Eliquis) for her anticoagulation.  She understands the plan.    Still having issues with dizziness and disequilibrium.  Describes her current issue as a drunk sensation.  Did have a CTA with recent emergency room (ER) visit and this was normal.  She has a visit with neurology way in the future and she could go to [facility] if she would like to explore this sooner.    Right shoulder impingement reviewed and  "she would like to proceed with a corticosteroid injection related to this.  Symptoms are much worse.    No falls.  Blood pressure lower than previous.     Diarrhea doing much better off of the omeprazole (Prilosec) and uses TUMS sparingly for gastroesophageal reflux disease (GERD) at this time.    We reviewed her other issues noted in the assessment but not specifically addressed in the HPI above.     Objective:     Wt Readings from Last 3 Encounters:   11/11/22 88 kg (194 lb)   11/11/22 90.4 kg (199 lb 6.4 oz)   11/04/22 88.5 kg (195 lb)     BP Readings from Last 3 Encounters:   11/11/22 102/52   11/11/22 122/62   11/04/22 117/54     /52 (BP Location: Right arm, Patient Position: Sitting, Cuff Size: Adult Regular)   Pulse 74   Temp 98  F (36.7  C) (Oral)   Resp 18   Ht 1.6 m (5' 3\")   Wt 88 kg (194 lb)   LMP 08/06/1988   SpO2 95%   Breastfeeding No   BMI 34.37 kg/m     The patient is comfortable, no acute distress.  Mood good.  Insight good.  Eyes are nonicteric.  Neck is supple without mass.  No cervical adenopathy.  No thyromegaly. Heart regular rate and rhythm.  Lungs clear to auscultation bilaterally.  Respiratory effort is good.  Extremities no edema.   Positive impingement signs in the right shoulder.    Diagnostics:     Admission on 11/04/2022, Discharged on 11/04/2022   Component Date Value Ref Range Status     Troponin T, High Sensitivity 11/04/2022 26 (H)  <=14 ng/L Final    Either a High Sensitivity Troponin T baseline (0 hours) value = 100 ng/mL, or an increase in High Sensitivity Troponin T = 7 ng/mL at 2 hours compared to 0 hours (2-0 hours), suggests myocardial injury, and urgent clinical attention is required.    If the 2-0 hours increase is<7 ng/mL, a High Sensitivity Troponin T result above gender-specific reference ranges warrants further evaluation.   Recommendations for further evaluation include correlation with clinical decision-making tool (e.g., HEART), a 3rd High Sensitivity " Troponin T test 2 hours after the 2nd (a 20% change from baseline would represent concern), admission for observation, close PCC/cardiology follow-up, or urgent outpatient provocative testing.     Ventricular Rate 11/04/2022 98  BPM Incomplete     Atrial Rate 11/04/2022 96  BPM Incomplete     QRS Duration 11/04/2022 78  ms Incomplete     QT 11/04/2022 362  ms Incomplete     QTc 11/04/2022 462  ms Incomplete     R AXIS 11/04/2022 40  degrees Incomplete     T Axis 11/04/2022 56  degrees Incomplete     Interpretation ECG 11/04/2022    Incomplete                    Value:Atrial fibrillation  Cannot rule out Anterior infarct (cited on or before 04-NOV-2022)  Abnormal ECG  When compared with ECG of 04-NOV-2022 17:12,  Atrial fibrillation has replaced Sinus rhythm       Hold Specimen 11/04/2022 JIC   Final     Hold Specimen 11/04/2022 JIC   Final     Sodium 11/04/2022 140  136 - 145 mmol/L Final     Potassium 11/04/2022 3.7  3.4 - 5.3 mmol/L Final     Chloride 11/04/2022 102  98 - 107 mmol/L Final     Carbon Dioxide (CO2) 11/04/2022 24  22 - 29 mmol/L Final     Anion Gap 11/04/2022 14  7 - 15 mmol/L Final     Urea Nitrogen 11/04/2022 15.8  8.0 - 23.0 mg/dL Final     Creatinine 11/04/2022 0.86  0.51 - 0.95 mg/dL Final     Calcium 11/04/2022 9.0  8.8 - 10.2 mg/dL Final     Glucose 11/04/2022 96  70 - 99 mg/dL Final     GFR Estimate 11/04/2022 67  >60 mL/min/1.73m2 Final    Effective December 21, 2021 eGFRcr in adults is calculated using the 2021 CKD-EPI creatinine equation which includes age and gender (Rojelio et al., NEJM, DOI: 10.1056/TYYAqb5257868)     Troponin T, High Sensitivity 11/04/2022 26 (H)  <=14 ng/L Final    Either a High Sensitivity Troponin T baseline (0 hours) value = 100 ng/mL, or an increase in High Sensitivity Troponin T = 7 ng/mL at 2 hours compared to 0 hours (2-0 hours), suggests myocardial injury, and urgent clinical attention is required.    If the 2-0 hours increase is<7 ng/mL, a High Sensitivity  Troponin T result above gender-specific reference ranges warrants further evaluation.   Recommendations for further evaluation include correlation with clinical decision-making tool (e.g., HEART), a 3rd High Sensitivity Troponin T test 2 hours after the 2nd (a 20% change from baseline would represent concern), admission for observation, close PCC/cardiology follow-up, or urgent outpatient provocative testing.      Assessment:     1. New onset atrial fibrillation (H)    2. Impingement syndrome of right shoulder    3. Dizziness    4. Chronic diarrhea    5. Low blood pressure reading         Plan:     1. Corticosteroid injection of the right shoulder today.    2. Follow up scheduled.  3. Follow up for cardiac evaluation as scheduled.  4. Follow up with neurology as scheduled or [facility].  5. Monitor blood pressure.  6. Continue current medications otherwise.  7. Follow up sooner if issues.    DIAGNOSTIC/THERAPEUTIC ORDERS:  Orders Placed This Encounter   Procedures     DRAIN/INJECT LARGE JOINT/BURSA     RIGHT SUBACROMIAL CORTICOSTEROID INJECTION     MEDICATION ORDERS:  Orders Placed This Encounter   Medications     triamcinolone (KENALOG-40) injection 80 mg        Donaldo Vernon MD  General Internal Medicine  [facility]    Return in about 20 days (around 12/1/2022), or if symptoms worsen or fail to improve, for follow up visit.     Future Appointments   Date Time Provider Department Center   11/17/2022  2:00 PM WW ECHO OP 2 [facility]   11/17/2022  3:00 PM WW HC MON [facility]   12/1/2022  2:30 PM [facility]   5/8/2023 10:20 AM [facility]

## 2022-11-11 NOTE — PROGRESS NOTES
Thank you, Dr. Nielsen, for asking [facility] [place] to evaluate Ms. Kori Leach.      Assessment/Recommendations   Assessment:    Paroxysmal atrial fibrillation, first documented episode, resolved  Frequent PACs  Dizziness, suspect primarily related to antihypertensive medications/orthostatic hypotension  Hypertension  COPD    Plan:  Stop Hyzaar  Start diltiazem 120 mg a day for blood pressure and A. Fib.  Holter and echo  Adjust dose diltiazem based on the results of Holter and blood pressure response    We discussed the importance of stroke prophylaxis.  I gave her prescription for Eliquis 5 mg twice a day    Follow-up when the results of the tests available     History of Present Illness    Ms. Kori Leach is a 82 year old female who comes in for initial cardiac evaluation because of newly documented atrial fibrillation.  She has had chronic dizziness which has become more bothersome in the last few weeks.  She was undergoing physical therapy when she was noted to be tachycardic.  She was unaware of irregular heart rhythm.  She went to the emergency room and was found to be in atrial fibrillation.  She spontaneously converted to sinus rhythm with frequent PACs.  She has no history of atrial fibrillation in the past.  She denies syncope or near syncope.  She stated that she had felt dizzy for a number of months now.  Dizziness became worse when blood pressure medications were changed lately.  Dizziness is most bothersome when she is in upright position.  She denies exertional chest pains  She has not had weight, PND, orthopnea    ECG: Personally reviewed.  #1 atrial fibrillation with RVR #2 sinus rhythm frequent PACs no ischemic changes    Echocardiogram: September 2017    Left ventricle ejection fraction is normal. The calculated left ventricular ejection fraction is 65%.    No hemodynamically significant valvular heart abnormalities.    No previous study for comparison.  Stress  "Test: December 2020     The nuclear stress test is negative for inducible myocardial ischemia or infarction.     The left ventricular ejection fraction at stress is 67%.         Physical Examination Review of Systems   /62 (BP Location: Right arm, Patient Position: Sitting, Cuff Size: Adult Regular)   Pulse 77   Resp 16   Ht 1.6 m (5' 3\")   Wt 90.4 kg (199 lb 6.4 oz)   LMP 08/06/1988   BMI 35.32 kg/m    Body mass index is 35.32 kg/m .  Wt Readings from Last 3 Encounters:   11/11/22 90.4 kg (199 lb 6.4 oz)   11/04/22 88.5 kg (195 lb)   10/07/22 90.4 kg (199 lb 6.4 oz)     General Appearance:   Alert, cooperative, no distress, appears stated age   Head/ENT: Normocephalic, without obvious abnormality. Membranes moist      EYES:  no scleral icterus, normal conjunctivae   Neck: Supple, symmetrical, trachea midline, no adenopathy, thyroid: not enlarged, symmetric, no carotid bruit or JVD   Chest/Lungs:   Lungs are clear to auscultation, respirations unlabored. No tenderness or deformity    Cardiovascular:   Regular rhythm with ectopies, S1, S2 normal, no murmur, rub or gallop.   Abdomen:  Soft, non-tender, bowel sounds active all four quadrants,  no masses, no organomegaly   Extremities: no cyanosis or clubbing. No edema   Skin: Skin color, texture, turgor normal, no rashes or lesions.    Psychiatric: Normal affect, calm   Neurologic: Alert and oriented x 3, moving all four extremities.     Enc Vitals  BP: 122/62  Pulse: 77  Resp: 16  Weight: 90.4 kg (199 lb 6.4 oz)  Height: 160 cm (5' 3\")                                          Lab Results    Chemistry/lipid CBC Cardiac Enzymes/BNP/TSH/INR   Recent Labs   Lab Test 08/09/22  1010   CHOL 197   HDL 67   LDL 78   TRIG 262*     Recent Labs   Lab Test 08/09/22  1010 08/04/21  0942 06/08/20  1233   LDL 78 74 77     Recent Labs   Lab Test 11/04/22  1754      POTASSIUM 3.7   CHLORIDE 102   CO2 24   GLC 96   BUN 15.8   CR 0.86   GFRESTIMATED 67   AMINA 9.0 "     Recent Labs   Lab Test 11/04/22  1754 08/09/22  1010 05/12/22  1012   CR 0.86 0.72 0.82     Recent Labs   Lab Test 12/06/21  1639 11/04/20  0926 06/08/20  1233   A1C 5.9* 6.1* 6.3*          Recent Labs   Lab Test 08/09/22  1010   WBC 5.4   HGB 14.8   HCT 43.4   MCV 92        Recent Labs   Lab Test 08/09/22  1010 05/12/22  1012 01/14/22  1124   HGB 14.8 14.6 11.6*    Recent Labs   Lab Test 05/12/22  1012   TROPONINI <0.01     Recent Labs   Lab Test 12/06/21  1639 11/30/20  1138   BNP 51 34     No results for input(s): TSH in the last 13625 hours.  Recent Labs   Lab Test 06/29/18 2010   INR 0.97        Medical History  Surgical History Family History Social History   Past Medical History:   Diagnosis Date     Arthritis     knees and hip replacement     Chronic obstructive pulmonary disease, unspecified COPD type (H) 06/04/2018    PFT Complete  Performed 5/21/2018 Final result Study Result FEV1/FVC is 69 and is reduced. FEV1 is 66% predicted and is reduced. FVC is 74% predicted and reduced. There was improvement in spirometry after a single inhaled dose of bronchodilator. TLC is 110% predicted and is normal. RV is 141% predicted and is increased. DLCO is 77% predicted and is reduced when it  is corrected for hemoglobin.  Im     Degenerative disc disease, cervical      Fatty liver      Former smoker      GERD (gastroesophageal reflux disease)      History of nicotine use 06/04/2018     HLD (hyperlipidemia)      HTN (hypertension)      MO (iron deficiency anemia) 04/24/2015     Inverted nipple     bilateral     Loss of hearing      Lumbar back pain      Morbid obesity (H) 09/04/2018     Pre-diabetes      Severe obstructive sleep apnea 04/18/2018    10/06/17 where we ordered a sleep study which showed severe TAMAR with an RDI of 30 and she had a titration study that showed CPAP of 9 cmH20 to be effective. She was set up with the CPAP on 10/25/17 through [facility].      Urinary incontinence      Past Surgical  History:   Procedure Laterality Date     APPENDECTOMY       ARTHROPLASTY KNEE BILATERAL       CATARACT EXTRACTION       CERVICAL SPINE SURGERY  2015     JOINT REPLACEMENT      bilateral knees and right hip     LAPAROSCOPIC CHOLECYSTECTOMY N/A 6/29/2018    Procedure: CHOLECYSTECTOMY, LAPAROSCOPIC;  Surgeon: Jose Armando Humphries MD;  Location: [facility] OR;  Service:      TONSILLECTOMY       WISDOM TOOTH EXTRACTION       No premature CAD, SCD,cardiomyopathy   Social History     Socioeconomic History     Marital status:      Spouse name: Not on file     Number of children: 3     Years of education: Not on file     Highest education level: Not on file   Occupational History     Not on file   Tobacco Use     Smoking status: Former     Types: Cigarettes     Smokeless tobacco: Never     Tobacco comments:     1-2 cigarettes per day on average   Substance and Sexual Activity     Alcohol use: Yes     Comment: Alcoholic Drinks/day: less than 1 drink per month      Drug use: No     Sexual activity: Not Currently     Partners: Male   Other Topics Concern     Not on file   Social History Narrative    . Retired. Likes to play golf.  3 children. Son has lung cancer.  Was a . Lives in [place] for 6 months and [place] for 6 months of the years.     Social Determinants of Health     Financial Resource Strain: Not on file   Food Insecurity: Not on file   Transportation Needs: Not on file   Physical Activity: Not on file   Stress: Not on file   Social Connections: Not on file   Intimate Partner Violence: Not on file   Housing Stability: Not on file         Medications  Allergies   Current Outpatient Medications   Medication Sig Dispense Refill     apixaban ANTICOAGULANT (ELIQUIS ANTICOAGULANT) 5 MG tablet Take 1 tablet (5 mg) by mouth 2 times daily 60 tablet 11     diltiazem ER (DILT-XR) 120 MG 24 hr capsule Take 1 capsule (120 mg) by mouth daily 30 capsule 11     Fluticasone-Umeclidin-Vilanterol (TRELEGY  ELLIPTA) 200-62.5-25 MCG/INH oral inhaler Inhale 1 puff into the lungs daily 60 each 11     ipratropium - albuterol 0.5 mg/2.5 mg/3 mL (DUONEB) 0.5-2.5 (3) MG/3ML neb solution Take 1 vial (3 mLs) by nebulization every 6 hours as needed for shortness of breath / dyspnea or wheezing 360 mL 11     meclizine (ANTIVERT) 25 mg tablet [MECLIZINE (ANTIVERT) 25 MG TABLET] Take 1 tablet (25 mg total) by mouth 3 (three) times a day as needed. 30 tablet 0     multivitamin (MULTIPLE VITAMINS ORAL) [MULTIVITAMIN (MULTIPLE VITAMINS ORAL)] Take by mouth.       mv-mn/iron/folic acid/herb 190 (VITAMIN D3 COMPLETE ORAL)        rosuvastatin (CRESTOR) 40 MG tablet TAKE 1 TABLET AT BEDTIME 90 tablet 3        Allergies   Allergen Reactions     Lisinopril Cough

## 2022-11-11 NOTE — PATIENT INSTRUCTIONS
Kori Leach,    It was a pleasure to see you today at the [facility].     My recommendations after this visit include:    Stop hyaar  Start diltiazem for BP and afib  Start eliquis for stroke protection  Echo and heart monitor    JIN Rodríguez MD, FACC, [facility]

## 2022-11-12 NOTE — PATIENT INSTRUCTIONS
Please follow up if you have any further issues.    You may contact me by phone or MyChart if you are worsening or if things are not improving.    ______________________________________________________________________     You can call [phone] during weekday hours to get a hold of our team coordinators if you need to get a message to me.    There are 3 people in our building taking phone calls for me.  Be sure to listen to the prompts to get a hold of them.    You first press 1 for English (press another number for a different language) and then press 2 to get the .    They can then take your message and forward it onto me.    ______________________________________________________________________     Please remember that you can call [phone] ANYTIME to schedule an appointment.     You can schedule appointments 24 hours a day, 7 days a week.      Sometimes the best time to schedule an appointment is after clinic hours when less people are calling in.      Weekends are another option for calling in to schedule appointments.     ______________________________________________________________________     Future Appointments   Date Time Provider Department Center   11/17/2022  2:00 PM WW ECHO OP 2 [facility]   11/17/2022  3:00 PM WW HC MON [facility]   12/1/2022  2:30 PM Donaldo Vernon MD [facility]   5/8/2023 10:20 AM Sharon Self MD [facility]

## 2022-11-17 ENCOUNTER — HOSPITAL ENCOUNTER (OUTPATIENT)
Dept: CARDIOLOGY | Facility: CLINIC | Age: 82
Discharge: HOME | End: 2022-11-17
Attending: INTERNAL MEDICINE
Payer: MEDICARE

## 2022-11-17 DIAGNOSIS — I48.91 ATRIAL FIBRILLATION, UNSPECIFIED TYPE (H): ICD-10-CM

## 2022-11-17 LAB — LVEF ECHO: NORMAL

## 2022-11-17 PROCEDURE — 93306 TTE W/DOPPLER COMPLETE: CPT

## 2022-11-17 PROCEDURE — 93306 TTE W/DOPPLER COMPLETE: CPT | Mod: 26 | Performed by: INTERNAL MEDICINE

## 2022-11-17 PROCEDURE — 93226 XTRNL ECG REC<48 HR SCAN A/R: CPT

## 2022-11-17 PROCEDURE — 93227 XTRNL ECG REC<48 HR R&I: CPT | Performed by: INTERNAL MEDICINE

## 2022-11-22 DIAGNOSIS — I48.91 ATRIAL FIBRILLATION, UNSPECIFIED TYPE (H): Primary | ICD-10-CM

## 2022-11-22 NOTE — PROGRESS NOTES
Daryl Rodríguez MD   11/22/2022 12:09 PM CST Back to Top      Benign ectopies and atrial tachycardia only, continue current medications, follow-up in 3 months         ==follow up order placed. -[facility]

## 2022-12-01 ENCOUNTER — OFFICE VISIT (OUTPATIENT)
Dept: INTERNAL MEDICINE | Facility: CLINIC | Age: 82
End: 2022-12-01
Payer: MEDICARE

## 2022-12-01 DIAGNOSIS — R42 DIZZINESS: ICD-10-CM

## 2022-12-01 DIAGNOSIS — D69.9 BLEEDING DIATHESIS (H): ICD-10-CM

## 2022-12-01 DIAGNOSIS — I48.91 NEW ONSET ATRIAL FIBRILLATION (H): ICD-10-CM

## 2022-12-01 DIAGNOSIS — I10 PRIMARY HYPERTENSION: ICD-10-CM

## 2022-12-01 DIAGNOSIS — M75.41 IMPINGEMENT SYNDROME OF RIGHT SHOULDER: Primary | ICD-10-CM

## 2022-12-01 PROCEDURE — 99214 OFFICE O/P EST MOD 30 MIN: CPT | Performed by: INTERNAL MEDICINE

## 2022-12-01 ASSESSMENT — PAIN SCALES - GENERAL: PAINLEVEL: NO PAIN (0)

## 2022-12-01 NOTE — PATIENT INSTRUCTIONS
Please follow up if you have any further issues.    You may contact me by phone or MyChart if you are worsening or if things are not improving.    ______________________________________________________________________     You can call [phone] during weekday hours to get a hold of our team coordinators if you need to get a message to me.    There are 3 people in our building taking phone calls for me.  Be sure to listen to the prompts to get a hold of them.    You first press 1 for English (press another number for a different language) and then press 2 to get the .    They can then take your message and forward it onto me.    ______________________________________________________________________     Please remember that you can call [phone] ANYTIME to schedule an appointment.     You can schedule appointments 24 hours a day, 7 days a week.      Sometimes the best time to schedule an appointment is after clinic hours when less people are calling in.      Weekends are another option for calling in to schedule appointments.     ______________________________________________________________________     Future Appointments   Date Time Provider Department Center   5/8/2023 10:20 AM Sharon Self MD [facility]   5/26/2023 10:30 AM [facility] Vernon [facility]

## 2022-12-01 NOTE — PROGRESS NOTES
[facility]    Comprehensive and complex medical care - Chronic disease management - Shared decision making - Care coordination - Compassionate care    Patient advocacy - Rational deprescribing - Minimally disruptive medicine - Ethical focus - Customized care         Date of Service: 12/1/2022  Primary Provider: Donaldo Vernon    Patient Care Team:  Donaldo Vernon MD as PCP - General (Internal Medicine)  Mraiella Coyne MD as MD (Neurology)  Bennett Varela MD as MD (Orthopaedic Surgery)  Beka Adams MD as MD  Donaldo Vernon MD as Assigned PCP  Daryl Rodríguez MD as MD (Cardiovascular Disease)  Daryl Rodríguez MD as Assigned Heart and Vascular Provider          Patient's Pharmacy:    [id number] [address] [address]  Phone: [phone] Fax: [phone]    [facility] AT [facility]  [place] [address]  Phone: [phone] Fax: [phone]     Patient's Contacts:  Name Home Phone Work Phone Mobile Phone Relationship Lgl GrRADHA Hassan [phone]   Son    JENNIFER ORTIZ [phone]   Daughter      Patient's Insurance:    Payor: MEDICARE / Plan: MEDICARE / Product Type: Medicare /           Active Problem List:  Problem List as of 12/1/2022 Reviewed: 8/9/2022  2:44 PM by Donaldo Vernon MD       High    Former smoker - quit in 2018    Chronic obstructive pulmonary disease, unspecified COPD type (H), thought to be mild on PFTs 2018    New onset atrial fibrillation (H)       Medium    HTN (hypertension)    MO (iron deficiency anemia) - Dec 2021, also 2015       Low    HLD (hyperlipidemia)    Dysphagia    Prediabetes    Obesity (BMI 35.0-39.9) with comorbidity (H)    Fatty liver    GERD (gastroesophageal reflux disease)    Loss of hearing    Lumbar back pain       Other     Severe obstructive sleep apnea on CPAP        Current Outpatient Medications   Medication Instructions     apixaban ANTICOAGULANT (ELIQUIS ANTICOAGULANT) 5 mg, Oral, 2 TIMES DAILY     diltiazem ER (DILT-XR) 120 mg, Oral, DAILY     Fluticasone-Umeclidin-Vilanterol (TRELEGY ELLIPTA) 200-62.5-25 MCG/INH oral inhaler 1 puff, Inhalation, DAILY     ipratropium - albuterol 0.5 mg/2.5 mg/3 mL (DUONEB) 0.5-2.5 (3) MG/3ML neb solution 3 mLs, Nebulization, EVERY 6 HOURS PRN     meclizine (ANTIVERT) 25 mg, Oral, 3 TIMES DAILY PRN     multivitamin (MULTIPLE VITAMINS ORAL) [MULTIVITAMIN (MULTIPLE VITAMINS ORAL)] Take by mouth.     mv-mn/iron/folic acid/herb 190 (VITAMIN D3 COMPLETE ORAL) Oral     rosuvastatin (CRESTOR) 40 MG tablet TAKE 1 TABLET AT BEDTIME     Social History     Social History Narrative    . Retired. Likes to play golf.  3 children. Son has lung cancer.  Was a . Lives in [place] for 6 months and [place] for 6 months of the years.       Subjective:     Kori Leach is a 82 year old female who comes in today for:    Chief Complaint   Patient presents with     RECHECK     Follow up from 11/11-AFib      Follow up multiple issues.    First reviewed her atrial fibrillation.  She is not having any issues with this at this time.  On apixaban (Eliquis) and did have a cut on hand that bled quite a bit.  No other issues.    Reviewed her blood pressure as well off of more medications.    Her shoulder is doing well after injection.    Will be seeing neurology in the spring related to her dizziness.    We reviewed her other issues noted in the assessment but not specifically addressed in the HPI above.     Objective:     Wt Readings from Last 3 Encounters:   12/01/22 89 kg (196 lb 1.6 oz)   11/11/22 88 kg (194 lb)   11/11/22 90.4 kg (199 lb 6.4 oz)     BP Readings from Last 3 Encounters:   12/01/22 (!) 152/81   11/11/22 102/52   11/11/22 122/62     BP (!) 152/81 (BP Location: Right arm, Patient  "Position: Sitting, Cuff Size: Adult Regular)   Pulse 86   Temp 98.1  F (36.7  C) (Oral)   Ht 1.6 m (5' 3\")   Wt 89 kg (196 lb 1.6 oz)   LMP 08/06/1988   SpO2 95%   Breastfeeding No   BMI 34.74 kg/m     The patient is comfortable, no acute distress.  Mood good.  Insight good.  Eyes are nonicteric.  Neck is supple without mass.  No cervical adenopathy.  No thyromegaly. Heart regular rate and rhythm.  Lungs clear to auscultation bilaterally.  Respiratory effort is good.  Abdomen soft and nontender.  No hepatosplenomegaly.  Extremities no edema.      Diagnostics:     Hospital Outpatient Visit on 11/17/2022   Component Date Value Ref Range Status     LVEF  11/17/2022 60-65%   Final       No results found for any visits on 12/01/22.    Assessment:     1. Impingement syndrome of right shoulder    2. New onset atrial fibrillation (H)    3. Dizziness    4. Primary hypertension    5. Bleeding diathesis (H) - due to ELIQUIS        Plan:     1. Continue current medications.  2. Going to [place] and follow up there if ongoing issues.  3. Blood work next visit to monitor.  4. Continue current medications otherwise.  5. Follow up sooner if issues.    No orders of the defined types were placed in this encounter.          Donaldo Vernon MD  General Internal Medicine  [facility]    Return in about 6 months (around 5/26/2023) for follow up visit.     Future Appointments   Date Time Provider Department Center   5[facility]2023 10:20 AM [facility]   5/26/2023 10:30 AM [facility]         "

## 2022-12-03 VITALS
OXYGEN SATURATION: 95 % | HEART RATE: 86 BPM | WEIGHT: 196.1 LBS | HEIGHT: 63 IN | SYSTOLIC BLOOD PRESSURE: 132 MMHG | TEMPERATURE: 98.1 F | BODY MASS INDEX: 34.75 KG/M2 | DIASTOLIC BLOOD PRESSURE: 80 MMHG

## 2022-12-05 ENCOUNTER — PATIENT OUTREACH (OUTPATIENT)
Dept: CARE COORDINATION | Facility: CLINIC | Age: 82
End: 2022-12-05

## 2022-12-05 DIAGNOSIS — M54.50 LUMBAR BACK PAIN: ICD-10-CM

## 2022-12-05 DIAGNOSIS — I48.91 NEW ONSET ATRIAL FIBRILLATION (H): Primary | ICD-10-CM

## 2022-12-05 DIAGNOSIS — E66.01 MORBID OBESITY (H): ICD-10-CM

## 2022-12-05 NOTE — PROGRESS NOTES
Clinic Care Coordination Contact  Community Health Worker Initial Outreach    CHW Note:    CHW contacted patient regarding a referral to CCC. CHW introduced self and role of CCC.    Patient states she is doing good at this time and declined CCC. CHW informed patient of CCC introduction letter that would be sent via [employer] and encouraged patient to reach out if she needs assistance in the future. Patient was thankful for the call and agreed to reach out to CCC if she felt necessary.     Patient accepts CC: No, patient states she is doing good at this time . Patient will be sent Care Coordination introduction letter for future reference.       Miroslava Hyde  Community Health Worker   [facility] Care Coordination  [facility]   [email or web address]  Office: [phone]

## 2022-12-05 NOTE — LETTER
[facility] CARE COORDINATION  [facility]   December 5, 2022    Kori Leach  [address] [place] [id number]      Dear Kori,        I am a clinic care coordinator who works with Donaldo Vernon MD with the [facility]. I wanted to thank you for spending the time to talk with me.  Below is a description of clinic care coordination and how I can further assist you.       The clinic care coordination team is made up of a registered nurse, , financial resource worker and community health worker who understand the health care system. The goal of clinic care coordination is to help you manage your health and improve access to the health care system. Our team works alongside your provider to assist you in determining your health and social needs. We can help you obtain health care and community resources, providing you with necessary information and education. We can work with you through any barriers and develop a care plan that helps coordinate and strengthen the communication between you and your care team.    Please feel free to contact me with any questions or concerns regarding care coordination and what we can offer.      We are focused on providing you with the highest-quality healthcare experience possible.    Sincerely,     Mirolsava Hyde  Community Health Worker   [facility] Care Coordination  [facility]   [email or web address]  Office: [phone]

## 2022-12-06 ENCOUNTER — TRANSFERRED RECORDS (OUTPATIENT)
Dept: HEALTH INFORMATION MANAGEMENT | Facility: CLINIC | Age: 82
End: 2022-12-06

## 2023-01-12 LAB
ATRIAL RATE - MUSE: 96 BPM
DIASTOLIC BLOOD PRESSURE - MUSE: NORMAL MMHG
INTERPRETATION ECG - MUSE: NORMAL
P AXIS - MUSE: NORMAL DEGREES
PR INTERVAL - MUSE: NORMAL MS
QRS DURATION - MUSE: 78 MS
QT - MUSE: 362 MS
QTC - MUSE: 462 MS
R AXIS - MUSE: 40 DEGREES
SYSTOLIC BLOOD PRESSURE - MUSE: NORMAL MMHG
T AXIS - MUSE: 56 DEGREES
VENTRICULAR RATE- MUSE: 98 BPM

## 2023-06-12 ENCOUNTER — OFFICE VISIT (OUTPATIENT)
Dept: INTERNAL MEDICINE | Facility: CLINIC | Age: 83
End: 2023-06-12
Payer: MEDICARE

## 2023-06-12 VITALS
OXYGEN SATURATION: 95 % | DIASTOLIC BLOOD PRESSURE: 75 MMHG | TEMPERATURE: 98.3 F | HEIGHT: 63 IN | HEART RATE: 62 BPM | BODY MASS INDEX: 33.47 KG/M2 | SYSTOLIC BLOOD PRESSURE: 130 MMHG | WEIGHT: 188.9 LBS | RESPIRATION RATE: 20 BRPM

## 2023-06-12 DIAGNOSIS — I48.20 CHRONIC ATRIAL FIBRILLATION (H): ICD-10-CM

## 2023-06-12 DIAGNOSIS — I48.91 NEW ONSET ATRIAL FIBRILLATION (H): ICD-10-CM

## 2023-06-12 DIAGNOSIS — M75.41 IMPINGEMENT SYNDROME OF SHOULDER REGION, RIGHT: Primary | ICD-10-CM

## 2023-06-12 DIAGNOSIS — R20.2 PARESTHESIAS: ICD-10-CM

## 2023-06-12 PROCEDURE — 20610 DRAIN/INJ JOINT/BURSA W/O US: CPT | Performed by: NURSE PRACTITIONER

## 2023-06-12 PROCEDURE — 99213 OFFICE O/P EST LOW 20 MIN: CPT | Mod: 25 | Performed by: NURSE PRACTITIONER

## 2023-06-12 RX ORDER — TRIAMCINOLONE ACETONIDE 40 MG/ML
80 INJECTION, SUSPENSION INTRA-ARTICULAR; INTRAMUSCULAR ONCE
Status: COMPLETED | OUTPATIENT
Start: 2023-06-12 | End: 2023-06-12

## 2023-06-12 RX ORDER — METOPROLOL TARTRATE 50 MG
50 TABLET ORAL 2 TIMES DAILY
COMMUNITY
End: 2023-08-08

## 2023-06-12 RX ADMIN — TRIAMCINOLONE ACETONIDE 80 MG: 40 INJECTION, SUSPENSION INTRA-ARTICULAR; INTRAMUSCULAR at 14:40

## 2023-06-12 ASSESSMENT — PATIENT HEALTH QUESTIONNAIRE - PHQ9
SUM OF ALL RESPONSES TO PHQ QUESTIONS 1-9: 14
10. IF YOU CHECKED OFF ANY PROBLEMS, HOW DIFFICULT HAVE THESE PROBLEMS MADE IT FOR YOU TO DO YOUR WORK, TAKE CARE OF THINGS AT HOME, OR GET ALONG WITH OTHER PEOPLE: SOMEWHAT DIFFICULT
SUM OF ALL RESPONSES TO PHQ QUESTIONS 1-9: 14

## 2023-06-12 ASSESSMENT — PAIN SCALES - GENERAL: PAINLEVEL: MILD PAIN (2)

## 2023-06-12 NOTE — PROGRESS NOTES
"  Assessment & Plan   Problem List Items Addressed This Visit        Nervous and Auditory    Paresthesias, left post auricular and occiput      Try topical lidocaine gel on the area            Circulatory    Chronic atrial fibrillation (H)     Continue anticoagulation. Rate is controlled          Relevant Medications    rivaroxaban ANTICOAGULANT (XARELTO) 20 MG TABS tablet   Other Visit Diagnoses     Impingement syndrome of shoulder region, right    -  Primary    Relevant Medications    triamcinolone (KENALOG-40) injection 80 mg    Other Relevant Orders    Large Joint/Bursa injection and/or drainage (Shoulder, Knee) (Completed)                    BMI:   Estimated body mass index is 33.46 kg/m  as calculated from the following:    Height as of this encounter: 1.6 m (5' 3\").    Weight as of this encounter: 85.7 kg (188 lb 14.4 oz).       Depression Screening Follow Up        6/12/2023     1:28 PM   PHQ   PHQ-9 Total Score 14   Q9: Thoughts of better off dead/self-harm past 2 weeks Several days   F/U: Thoughts of suicide or self-harm No   F/U: Safety concerns No         Shawna Moses NP  [facility]    Sergio Sheikh is a 82 year old, presenting for the following health issues:  Shoulder Pain (Possible injection) and Recheck Medication (Go over medication list)        6/12/2023     1:34 PM   Additional Questions   Roomed by DASIA Dalton   Accompanied by TEAGAN     Shoulder Pain    History of Present Illness       Reason for visit:  Shoulder painShe consumes 1 sweetened beverage(s) daily.   She is taking medications regularly.    Today's PHQ-9         PHQ-9 Total Score: 14    PHQ-9 Q9 Thoughts of better off dead/self-harm past 2 weeks :   Several days  Thoughts of suicide or self harm: (P) No  Self-harm Plan:     Self-harm Action:       Safety concerns for self or others: (P) No    How difficult have these problems made it for you to do your work, take care of things at home, or get along with " "other people: Somewhat difficult       Pain History:  When did you first notice your pain? Couple years   Have you seen this provider for your pain in the past?   Yes   Where in your body do you have pain? Left Shoulder  Are you seeing anyone else for your pain? Yes - Dr. Vernon        6/12/2023     1:28 PM   PHQ-9 SCORE   PHQ-9 Total Score Arnol 14 (Moderate depression)   PHQ-9 Total Score 14           6/12/2023     1:45 PM   PEG Score   PEG Total Score 0.67      Over the winter she was living in [place] but her house had black mold so she had to move out while having work done.     While in [place], she went to the ED for general malaise and sweating, was found to be in afib with RVR by her report. She was cardioverted and she was started on metoprolol instead of diltiazem. Her anticoagulation medication was also changed from apixaban to rivaraxaban due to cost.    She had a right shoulder corticosteroid injection done 11/11/22. She gets about 6 months of relief, she notes that last month it was bothering her again.     She feels a pins and needles sensation behind the left ear for the past 2 months. No head injury. Occasionally she gets a sharp pain in the occipital area of the head this started about 1.5 years ago- this lasts about 1-1.5 minutes and then resolves.     She is smoking 1/4 pack per day again.         Objective    /75 (BP Location: Right arm, Patient Position: Sitting, Cuff Size: Adult Regular)   Pulse 62   Temp 98.3  F (36.8  C) (Oral)   Resp 20   Ht 1.6 m (5' 3\")   Wt 85.7 kg (188 lb 14.4 oz)   LMP 08/06/1988   SpO2 95%   Breastfeeding No   BMI 33.46 kg/m    Body mass index is 33.46 kg/m .     Wt Readings from Last 5 Encounters:   06/12/23 85.7 kg (188 lb 14.4 oz)   12/01/22 89 kg (196 lb 1.6 oz)   11/11/22 88 kg (194 lb)   11/11/22 90.4 kg (199 lb 6.4 oz)   11/04/22 88.5 kg (195 lb)       Physical Exam   MSK: right shoulder impingement       Procedure: Verbal consent for a " steroid injection was obtained. Posterior border of the acromion was sterilely prepped and a mixture of 1 mL lidocaine 1% and 2 mL kenalog (40 mg/mL) was injected to the subacromial space of the right shoulder.   Patient tolerated the procedure well.   Patient instructed to avoid excessive use of the shoulder over the next 2 weeks but in particular the next 3 days. Monitor for signs of allergic reaction. Contact the office for any concerns.

## 2023-06-12 NOTE — PATIENT INSTRUCTIONS
Check out [facility] and see if they have a patient prescription program to help financially with the medication

## 2023-06-16 PROBLEM — I48.20 CHRONIC ATRIAL FIBRILLATION (H): Status: ACTIVE | Noted: 2022-11-11

## 2023-07-17 DIAGNOSIS — E78.2 MIXED HYPERLIPIDEMIA: ICD-10-CM

## 2023-07-18 RX ORDER — ROSUVASTATIN CALCIUM 40 MG/1
TABLET, COATED ORAL
Qty: 90 TABLET | Refills: 0 | Status: SHIPPED | OUTPATIENT
Start: 2023-07-18 | End: 2023-09-11

## 2023-07-18 NOTE — TELEPHONE ENCOUNTER
"Last Written Prescription Date:  7/28/2022  Last Fill Quantity: 90,  # refills: 3   Last office visit provider:  6/12/2023     Requested Prescriptions   Pending Prescriptions Disp Refills     rosuvastatin (CRESTOR) 40 MG tablet [Pharmacy Med Name: ROSUVASTATIN TAB 40MG] 90 tablet 3     Sig: TAKE 1 TABLET AT BEDTIME       Statins Protocol Passed - 7/17/2023  7:05 PM        Passed - LDL on file in past 12 months     Recent Labs   Lab Test 08/09/22  1010   LDL 78             Passed - No abnormal creatine kinase in past 12 months     No lab results found.             Passed - Recent (12 mo) or future (30 days) visit within the authorizing provider's specialty     Patient has had an office visit with the authorizing provider or a provider within the authorizing providers department within the previous 12 mos or has a future within next 30 days. See \"Patient Info\" tab in inbasket, or \"Choose Columns\" in Meds & Orders section of the refill encounter.              Passed - Medication is active on med list        Passed - Patient is age 18 or older        Passed - No active pregnancy on record        Passed - No positive pregnancy test in past 12 months             America Cartagena RN 07/18/23 1:48 PM  "

## 2023-08-07 ENCOUNTER — HOSPITAL ENCOUNTER (OUTPATIENT)
Facility: HOSPITAL | Age: 83
Setting detail: OBSERVATION
Discharge: HOME | End: 2023-08-08
Attending: STUDENT IN AN ORGANIZED HEALTH CARE EDUCATION/TRAINING PROGRAM | Admitting: STUDENT IN AN ORGANIZED HEALTH CARE EDUCATION/TRAINING PROGRAM
Payer: MEDICARE

## 2023-08-07 ENCOUNTER — APPOINTMENT (OUTPATIENT)
Dept: MRI IMAGING | Facility: HOSPITAL | Age: 83
End: 2023-08-07
Attending: STUDENT IN AN ORGANIZED HEALTH CARE EDUCATION/TRAINING PROGRAM
Payer: MEDICARE

## 2023-08-07 ENCOUNTER — APPOINTMENT (OUTPATIENT)
Dept: RADIOLOGY | Facility: HOSPITAL | Age: 83
End: 2023-08-07
Attending: STUDENT IN AN ORGANIZED HEALTH CARE EDUCATION/TRAINING PROGRAM
Payer: MEDICARE

## 2023-08-07 ENCOUNTER — APPOINTMENT (OUTPATIENT)
Dept: CT IMAGING | Facility: HOSPITAL | Age: 83
End: 2023-08-07
Attending: STUDENT IN AN ORGANIZED HEALTH CARE EDUCATION/TRAINING PROGRAM
Payer: MEDICARE

## 2023-08-07 DIAGNOSIS — R42 VERTIGO: ICD-10-CM

## 2023-08-07 DIAGNOSIS — R06.02 SHORTNESS OF BREATH: ICD-10-CM

## 2023-08-07 DIAGNOSIS — R42 DIZZINESS: ICD-10-CM

## 2023-08-07 DIAGNOSIS — I48.91 ATRIAL FIBRILLATION WITH RAPID VENTRICULAR RESPONSE (H): Primary | ICD-10-CM

## 2023-08-07 LAB
ALBUMIN SERPL BCG-MCNC: 4.5 G/DL (ref 3.5–5.2)
ALP SERPL-CCNC: 117 U/L (ref 35–104)
ALT SERPL W P-5'-P-CCNC: 20 U/L (ref 0–50)
ANION GAP SERPL CALCULATED.3IONS-SCNC: 12 MMOL/L (ref 7–15)
AST SERPL W P-5'-P-CCNC: 18 U/L (ref 0–45)
BASOPHILS # BLD AUTO: 0.1 10E3/UL (ref 0–0.2)
BASOPHILS NFR BLD AUTO: 1 %
BILIRUB SERPL-MCNC: 0.3 MG/DL
BUN SERPL-MCNC: 21.7 MG/DL (ref 8–23)
CALCIUM SERPL-MCNC: 9.3 MG/DL (ref 8.8–10.2)
CHLORIDE SERPL-SCNC: 109 MMOL/L (ref 98–107)
CREAT SERPL-MCNC: 1.17 MG/DL (ref 0.51–0.95)
DEPRECATED HCO3 PLAS-SCNC: 23 MMOL/L (ref 22–29)
EOSINOPHIL # BLD AUTO: 0.1 10E3/UL (ref 0–0.7)
EOSINOPHIL NFR BLD AUTO: 2 %
ERYTHROCYTE [DISTWIDTH] IN BLOOD BY AUTOMATED COUNT: 13.2 % (ref 10–15)
GFR SERPL CREATININE-BSD FRML MDRD: 46 ML/MIN/1.73M2
GLUCOSE SERPL-MCNC: 116 MG/DL (ref 70–99)
HCT VFR BLD AUTO: 47 % (ref 35–47)
HGB BLD-MCNC: 15.2 G/DL (ref 11.7–15.7)
HOLD SPECIMEN: NORMAL
IMM GRANULOCYTES # BLD: 0 10E3/UL
IMM GRANULOCYTES NFR BLD: 0 %
INR PPP: 1.22 (ref 0.85–1.15)
LYMPHOCYTES # BLD AUTO: 1.3 10E3/UL (ref 0.8–5.3)
LYMPHOCYTES NFR BLD AUTO: 18 %
MAGNESIUM SERPL-MCNC: 2.1 MG/DL (ref 1.7–2.3)
MCH RBC QN AUTO: 30.3 PG (ref 26.5–33)
MCHC RBC AUTO-ENTMCNC: 32.3 G/DL (ref 31.5–36.5)
MCV RBC AUTO: 94 FL (ref 78–100)
MONOCYTES # BLD AUTO: 0.7 10E3/UL (ref 0–1.3)
MONOCYTES NFR BLD AUTO: 10 %
NEUTROPHILS # BLD AUTO: 5 10E3/UL (ref 1.6–8.3)
NEUTROPHILS NFR BLD AUTO: 69 %
NRBC # BLD AUTO: 0 10E3/UL
NRBC BLD AUTO-RTO: 0 /100
NT-PROBNP SERPL-MCNC: 2254 PG/ML (ref 0–1800)
PLATELET # BLD AUTO: 279 10E3/UL (ref 150–450)
POTASSIUM SERPL-SCNC: 4.5 MMOL/L (ref 3.4–5.3)
PROT SERPL-MCNC: 7 G/DL (ref 6.4–8.3)
RBC # BLD AUTO: 5.01 10E6/UL (ref 3.8–5.2)
SODIUM SERPL-SCNC: 144 MMOL/L (ref 136–145)
TROPONIN T SERPL HS-MCNC: 11 NG/L
TROPONIN T SERPL HS-MCNC: 9 NG/L
TSH SERPL DL<=0.005 MIU/L-ACNC: 1.48 UIU/ML (ref 0.3–4.2)
WBC # BLD AUTO: 7.1 10E3/UL (ref 4–11)

## 2023-08-07 PROCEDURE — 36415 COLL VENOUS BLD VENIPUNCTURE: CPT | Performed by: STUDENT IN AN ORGANIZED HEALTH CARE EDUCATION/TRAINING PROGRAM

## 2023-08-07 PROCEDURE — 250N000013 HC RX MED GY IP 250 OP 250 PS 637: Performed by: STUDENT IN AN ORGANIZED HEALTH CARE EDUCATION/TRAINING PROGRAM

## 2023-08-07 PROCEDURE — 250N000009 HC RX 250: Performed by: STUDENT IN AN ORGANIZED HEALTH CARE EDUCATION/TRAINING PROGRAM

## 2023-08-07 PROCEDURE — 85025 COMPLETE CBC W/AUTO DIFF WBC: CPT | Performed by: STUDENT IN AN ORGANIZED HEALTH CARE EDUCATION/TRAINING PROGRAM

## 2023-08-07 PROCEDURE — 83880 ASSAY OF NATRIURETIC PEPTIDE: CPT | Performed by: STUDENT IN AN ORGANIZED HEALTH CARE EDUCATION/TRAINING PROGRAM

## 2023-08-07 PROCEDURE — 92960 CARDIOVERSION ELECTRIC EXT: CPT

## 2023-08-07 PROCEDURE — 70553 MRI BRAIN STEM W/O & W/DYE: CPT | Mod: MG

## 2023-08-07 PROCEDURE — 84443 ASSAY THYROID STIM HORMONE: CPT | Performed by: STUDENT IN AN ORGANIZED HEALTH CARE EDUCATION/TRAINING PROGRAM

## 2023-08-07 PROCEDURE — 250N000011 HC RX IP 250 OP 636: Mod: JZ | Performed by: STUDENT IN AN ORGANIZED HEALTH CARE EDUCATION/TRAINING PROGRAM

## 2023-08-07 PROCEDURE — A9585 GADOBUTROL INJECTION: HCPCS | Mod: JZ | Performed by: STUDENT IN AN ORGANIZED HEALTH CARE EDUCATION/TRAINING PROGRAM

## 2023-08-07 PROCEDURE — G1010 CDSM STANSON: HCPCS

## 2023-08-07 PROCEDURE — 96361 HYDRATE IV INFUSION ADD-ON: CPT

## 2023-08-07 PROCEDURE — 84484 ASSAY OF TROPONIN QUANT: CPT | Mod: 91 | Performed by: STUDENT IN AN ORGANIZED HEALTH CARE EDUCATION/TRAINING PROGRAM

## 2023-08-07 PROCEDURE — 85610 PROTHROMBIN TIME: CPT | Performed by: STUDENT IN AN ORGANIZED HEALTH CARE EDUCATION/TRAINING PROGRAM

## 2023-08-07 PROCEDURE — 272N000240 HC CARDIOVERT/DEFIB/PACER SUPP

## 2023-08-07 PROCEDURE — 80053 COMPREHEN METABOLIC PANEL: CPT | Performed by: STUDENT IN AN ORGANIZED HEALTH CARE EDUCATION/TRAINING PROGRAM

## 2023-08-07 PROCEDURE — 999N000157 HC STATISTIC RCP TIME EA 10 MIN

## 2023-08-07 PROCEDURE — G0378 HOSPITAL OBSERVATION PER HR: HCPCS

## 2023-08-07 PROCEDURE — 99223 1ST HOSP IP/OBS HIGH 75: CPT | Performed by: FAMILY MEDICINE

## 2023-08-07 PROCEDURE — 255N000002 HC RX 255 OP 636: Mod: JZ | Performed by: STUDENT IN AN ORGANIZED HEALTH CARE EDUCATION/TRAINING PROGRAM

## 2023-08-07 PROCEDURE — 70496 CT ANGIOGRAPHY HEAD: CPT | Mod: MA

## 2023-08-07 PROCEDURE — 93005 ELECTROCARDIOGRAM TRACING: CPT | Mod: 59 | Performed by: STUDENT IN AN ORGANIZED HEALTH CARE EDUCATION/TRAINING PROGRAM

## 2023-08-07 PROCEDURE — 83735 ASSAY OF MAGNESIUM: CPT | Performed by: STUDENT IN AN ORGANIZED HEALTH CARE EDUCATION/TRAINING PROGRAM

## 2023-08-07 PROCEDURE — 71045 X-RAY EXAM CHEST 1 VIEW: CPT

## 2023-08-07 PROCEDURE — 70498 CT ANGIOGRAPHY NECK: CPT | Mod: MA

## 2023-08-07 PROCEDURE — 999N000043 HC STATISTIC CTO2 CONT OXYGEN TECH TIME EA 90 MIN

## 2023-08-07 PROCEDURE — 258N000003 HC RX IP 258 OP 636: Performed by: STUDENT IN AN ORGANIZED HEALTH CARE EDUCATION/TRAINING PROGRAM

## 2023-08-07 PROCEDURE — 99285 EMERGENCY DEPT VISIT HI MDM: CPT | Mod: 25

## 2023-08-07 PROCEDURE — 99152 MOD SED SAME PHYS/QHP 5/>YRS: CPT

## 2023-08-07 PROCEDURE — 96374 THER/PROPH/DIAG INJ IV PUSH: CPT | Mod: XU

## 2023-08-07 PROCEDURE — 99207 PR NO CHARGE LOS: CPT | Performed by: PHYSICIAN ASSISTANT

## 2023-08-07 RX ORDER — METOPROLOL TARTRATE 1 MG/ML
5 INJECTION, SOLUTION INTRAVENOUS ONCE
Status: COMPLETED | OUTPATIENT
Start: 2023-08-07 | End: 2023-08-07

## 2023-08-07 RX ORDER — GADOBUTROL 604.72 MG/ML
8.5 INJECTION INTRAVENOUS ONCE
Status: COMPLETED | OUTPATIENT
Start: 2023-08-07 | End: 2023-08-07

## 2023-08-07 RX ORDER — IOPAMIDOL 755 MG/ML
75 INJECTION, SOLUTION INTRAVASCULAR ONCE
Status: COMPLETED | OUTPATIENT
Start: 2023-08-07 | End: 2023-08-07

## 2023-08-07 RX ORDER — FLUMAZENIL 0.1 MG/ML
0.2 INJECTION, SOLUTION INTRAVENOUS
Status: ACTIVE | OUTPATIENT
Start: 2023-08-07 | End: 2023-08-08

## 2023-08-07 RX ORDER — MECLIZINE HCL 12.5 MG 12.5 MG/1
25 TABLET ORAL ONCE
Status: COMPLETED | OUTPATIENT
Start: 2023-08-07 | End: 2023-08-07

## 2023-08-07 RX ORDER — ONDANSETRON 2 MG/ML
4 INJECTION INTRAMUSCULAR; INTRAVENOUS EVERY 6 HOURS PRN
Status: DISCONTINUED | OUTPATIENT
Start: 2023-08-07 | End: 2023-08-08 | Stop reason: HOSPADM

## 2023-08-07 RX ORDER — FUROSEMIDE 20 MG
20 TABLET ORAL ONCE
Status: COMPLETED | OUTPATIENT
Start: 2023-08-07 | End: 2023-08-08

## 2023-08-07 RX ORDER — MECLIZINE HCL 12.5 MG 12.5 MG/1
12.5 TABLET ORAL EVERY 6 HOURS SCHEDULED
Status: DISCONTINUED | OUTPATIENT
Start: 2023-08-08 | End: 2023-08-08

## 2023-08-07 RX ORDER — ONDANSETRON 4 MG/1
4 TABLET, ORALLY DISINTEGRATING ORAL EVERY 6 HOURS PRN
Status: DISCONTINUED | OUTPATIENT
Start: 2023-08-07 | End: 2023-08-08 | Stop reason: HOSPADM

## 2023-08-07 RX ORDER — FERROUS SULFATE 325(65) MG
325 TABLET, DELAYED RELEASE (ENTERIC COATED) ORAL
COMMUNITY

## 2023-08-07 RX ORDER — ETOMIDATE 2 MG/ML
0.1 INJECTION INTRAVENOUS ONCE
Status: COMPLETED | OUTPATIENT
Start: 2023-08-07 | End: 2023-08-07

## 2023-08-07 RX ORDER — LORAZEPAM 0.5 MG/1
1 TABLET ORAL ONCE
Status: COMPLETED | OUTPATIENT
Start: 2023-08-07 | End: 2023-08-07

## 2023-08-07 RX ADMIN — MECLIZINE 25 MG: 12.5 TABLET ORAL at 15:21

## 2023-08-07 RX ADMIN — METOPROLOL TARTRATE 5 MG: 1 INJECTION, SOLUTION INTRAVENOUS at 12:00

## 2023-08-07 RX ADMIN — IOPAMIDOL 75 ML: 755 INJECTION, SOLUTION INTRAVENOUS at 15:08

## 2023-08-07 RX ADMIN — SODIUM CHLORIDE 500 ML: 9 INJECTION, SOLUTION INTRAVENOUS at 13:16

## 2023-08-07 RX ADMIN — GADOBUTROL 8.5 ML: 604.72 INJECTION INTRAVENOUS at 19:08

## 2023-08-07 RX ADMIN — LORAZEPAM 1 MG: 0.5 TABLET ORAL at 18:18

## 2023-08-07 RX ADMIN — ETOMIDATE 8.6 MG: 2 INJECTION INTRAVENOUS at 13:18

## 2023-08-07 ASSESSMENT — ACTIVITIES OF DAILY LIVING (ADL)
ADLS_ACUITY_SCORE: 35

## 2023-08-07 NOTE — ED NOTES
"Pt requesting to sit up at side of bed. She reports \"feeling a little dizzy\" since cardioversion  reports \"room spinning dizziness when sitting up at side of bed. Provider updated.  "

## 2023-08-07 NOTE — PROGRESS NOTES
RESPIRATORY CARE NOTE     Patient was on 2lpm prior to cardioversion. Ambu bag and mask, suction, and oral airway at bedside. Patients end tidal, respiratory rate, heart rate, and sp02 monitored throughout the procedure and remained normal. Jaw thrust utilized for one minute and patient awoke.     Enedelia Calvo, RT

## 2023-08-07 NOTE — ED TRIAGE NOTES
Patient presents here for evaluation of shortness of breath, chest tightness and palpitations that she noticed through the night. Hx of atrial fibrillation in the past, which she has converted with medications and electrocardioversion.      Triage Assessment       Row Name 08/07/23 1134       Triage Assessment (Adult)    Airway WDL WDL       Respiratory WDL    Respiratory WDL WDL       Skin Circulation/Temperature WDL    Skin Circulation/Temperature WDL WDL       Cardiac WDL    Cardiac WDL WDL       Peripheral/Neurovascular WDL    Peripheral Neurovascular WDL WDL       Jeremy Coma Scale    Best Eye Response 4-->(E4) spontaneous    Best Motor Response 6-->(M6) obeys commands    Best Verbal Response 5-->(V5) oriented    Jeremy Coma Scale Score 15

## 2023-08-07 NOTE — DISCHARGE INSTRUCTIONS
Please follow up with cardiology and your primary care doctor as soon as possible. Continue with your medications as prescribed.    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 Recommend follow up with ENT, this may need a biopsy.

## 2023-08-07 NOTE — CONSULTS
"[facility]    Stroke Telephone Note    I was called by Gabriela Cruz on 08/07/23 regarding patient Kori Leach. The patient is a 83 year old female with PMH afib on Xarelto, HTN, HLD, TAMAR, presented with symptomatic afib with RVR today. She was cardioverted around 1315, reportedly woke up after and was initially OK, then had onset of room spinning dizziness. Per ED exam no nystagmus, no other clear focal deficits. She took her Xarelto today.      /58   Pulse 75   Temp 98  F (36.7  C) (Oral)   Resp 30   Ht 1.6 m (5' 3\")   Wt 85.7 kg (189 lb)   LMP 08/06/1988   SpO2 95%   BMI 33.48 kg/m       Stroke Code Data (for stroke code without tele)  Stroke code activated 08/07/23   1455   Stroke provider first response  08/07/23   1457            Last known normal 08/07/23   1315        Time of discovery   (or onset of symptoms) 08/07/23   1315   Head CT read by Stroke Neuro Dr/Provider 08/07/23   1512   Was stroke code de-escalated? Yes 08/07/23 1512          Imaging Findings  NONCONTRAST HEAD CT:  1.  No CT evidence for acute intracranial process.  2.  Brain atrophy and presumed chronic microvascular ischemic changes as above.     HEAD CTA:   1.  No significant stenosis, aneurysm, or high flow vascular malformation identified.     NECK CTA:  1.  No hemodynamically significant stenosis in the neck vessels.   2.  No evidence for dissection.  3.  Mildly increased size of an indeterminate mass in the right parotid gland. A neoplastic process is suspected. Recommend otolaryngology consult/follow-up. If not performed previously, tissue sampling should be considered.      Intravenous Thrombolysis  Not given due to:   - DOAC dose within 48 hours or INR > 1.7    Endovascular Treatment  Not initiated due to absence of proximal vessel occlusion    Impression  Vertigo following cardioversion - consider posterior circulation infarct vs other    Recommendations   -MRI brain w/wo contrast - " "[place] notify stroke neurology when MRI is complete to review and make further recommendations at that time  - ENT consult for R parotid mass    Case discussed with vascular neurology attending Dr. Javier.    My recommendations are based on the information provided over the phone by Kori Leach's in-person providers. They are not intended to replace the clinical judgment of her in-person providers. I was not requested to personally see or examine the patient at this time.    Jazmine Daniels PA-C  Vascular Neurology    To page me or covering stroke neurology team member, click here: [facility]  Choose \"On Call\" tab at top, then select \"NEUROLOGY/ALL SITES\" from middle drop-down box, press Enter, then look for \"stroke\" or \"telestroke\" for your site.    "

## 2023-08-07 NOTE — ED PROVIDER NOTES
NAME: Kori Leach  AGE: 83 year old female  YOB: 1940  MRN: [id number]  EVALUATION DATE & TIME: 2023 11:37 AM    PCP: Donaldo Vernon  ED PROVIDER: Gabriela Cruz MD.    Chief Complaint   Patient presents with    Tachycardia       FINAL IMPRESSION:  1. Atrial fibrillation with rapid ventricular response (H)    2. Dizziness    3. Shortness of breath        MEDICAL DECISION MAKIN:40 AM I met with the patient, obtained history, performed an initial exam, and discussed options and plan for diagnostics and treatment here in the ED.   12:34 PM Paged Cardiology  12:37 PM Discussed patient with Dr. Frazier with Cardiology.  12:38 PM Spoke to patient and her daughter regarding consent for cardioversion.  1:15 PM Conducted cardioversion procedure.  2:49 PM Rechecked patient. Called stroke code. Repeat neuro exam: Alert and oriented, CN II-XII grossly intact, speech clear. 5/5 strength in upper and lower extremities. Sensation to light touch intact in all 4 extremities. No pronator drift. No dysmetria with finger to nose bilaterally. No nystagmus  2:59 PM Discussed patient with Stroke Neurology.  3:12 PM Spoke to Stroke Team.  3:18 PM Discussed patient with [facility].  4:11 PM Updated patient. Patient signed out to Dr. Lalito Crowley    84 y/o F with h/o COPD, GERD, HLD, HTN, TAMAR, atrial fibrillation on xarelto who presents with shortness of breath. Noted to have some shortness of breath, palpitations and feeling off for the last couple of days and is concerned she is in atrial fibrillation. EKG shows afib with RVR. Rates in 110s-140s on arrival. CBC, magnesium, TSH are reassuring. CMP with mild ANGIE (fluids given). Considered ACS- initial troponin is 11 which is reassuring as she has had symptoms for several days but will get delta troponin as well. CXR without pneumonia or pneumothorax. Nature of pain not suggestive of dissection. No fevers or infectious symptoms. She was given IV metoprolol  without significant change in heart rate. She reports compliance with xarelto. I discussed with cardiology who agreed with proceeding with cardioversion. Risks/benefits were discussed and patient and her daughter were in agreement with plan and she was successfully cardioverted after 1 shock. Repeat EKG showed sinus rhythm. Of note her BNP came back elevated and she has mild edema on CXR, suspect from mild CHF in the setting of a fib with RVR, does not appear significantly volume up and is in no respiratory distress on room air. I considered PE but no tachycardia, hypoxia, pleuritic chest pain, findings of DVT on exam and has been compliant with DOAC.    About 1.5 hours after the cardioversion patient developed a room spinning sensation. No focal neurologic deficits on exam. In setting of cardioversion had concern for possible stroke or other central cause. Stroke code was called CTA head and neck without acute findings (incidental finding of parotid mass that will need follow up). She was signed out to Dr. Crowley pending MRI brain and delta troponin. Low threshold for admission but at this point patient would strongly prefer to go home. Meclizine ordered for symptoms as well.    Medical Decision Making    History:  Supplemental history from: Family Member/Significant Other  External Record(s) reviewed: Outpatient Record: 06/12/2023 Office Visit    Work Up:  Chart documentation includes differential considered and any EKGs or imaging interpreted by provider.  In additional to work up documented, I considered the following work up: Documented in chart, if applicable.    External consultation:  Discussion of management with another provider: Cardiology, Radiology (regarding imaging), and Other: Stroke Neurology    Complicating factors:  Care impacted by chronic illness: Anticoagulated State, Chronic Lung Disease, Hyperlipidemia, Hypertension, and Other: Chronic Atrial Fibrillation  Care affected by social determinants of  health: N/A    Disposition considerations: Admission considered. Patient was signed out to the oncoming physician, disposition pending.      MEDICATIONS GIVEN IN THE EMERGENCY:  Medications   flumazenil (ROMAZICON) injection 0.2 mg (has no administration in time range)   metoprolol (LOPRESSOR) injection 5 mg (5 mg Intravenous $Given 8/7/23 1200)   0.9% sodium chloride BOLUS (0 mLs Intravenous Stopped 8/7/23 1540)   etomidate (AMIDATE) injection 8.6 mg (8.6 mg Intravenous $Given 8/7/23 1318)   meclizine (ANTIVERT) tablet 25 mg (25 mg Oral $Given 8/7/23 1521)   iopamidol (ISOVUE-370) solution 75 mL (75 mLs Intravenous $Given 8/7/23 1508)       NEW PRESCRIPTIONS STARTED AT TODAY'S ER VISIT:  New Prescriptions    No medications on file        =================================================================  HPI    Patient information was obtained from: Patient and patient's daughter.  Use of : N/A      Kori Leach is a 83 year old female with a past medical history of hypertension, hyperlipidemia, COPD, GERD, and chronic atrial fibrillation on xarelto, who presents with tachycardia.    Per chart review, the patient was seen on 06/12/2023 for an Office Visit. Her rate was controlled at the time and she was directed to continue 20 mg Xarelto.    The patient reports fatigue for the past couple of days and states that she can feel that her heart is beating irregularly. She felt uneven heart rate all through last night and endorses some chest tightness. She took her metoprolol as prescribed today and reports her dose mg twice daily. She reports compliance with the xarelto without missed doses. She has been cardioverted once earlier this year in April.    She denies chest pain, abdominal pain, leg edema, nausea, vomiting, cough, fever and diarrhea.    REVIEW OF SYSTEMS   All systems reviewed, please see HPI for pertinent findings    PAST MEDICAL HISTORY:  Past Medical History:   Diagnosis Date    Arthritis      knees and hip replacement    Bleeding diathesis (H) - due to ELIQUIS     Chronic obstructive pulmonary disease, unspecified COPD type (H) 06/04/2018    PFT Complete  Performed 5/21/2018 Final result Study Result FEV1/FVC is 69 and is reduced. FEV1 is 66% predicted and is reduced. FVC is 74% predicted and reduced. There was improvement in spirometry after a single inhaled dose of bronchodilator. TLC is 110% predicted and is normal. RV is 141% predicted and is increased. DLCO is 77% predicted and is reduced when it  is corrected for hemoglobin.  Im    Degenerative disc disease, cervical     Fatty liver     Former smoker     GERD (gastroesophageal reflux disease)     History of nicotine use 06/04/2018    HLD (hyperlipidemia)     HTN (hypertension)     MO (iron deficiency anemia) 04/24/2015    Inverted nipple     bilateral    Loss of hearing     Lumbar back pain     Morbid obesity (H) 09/04/2018    Pre-diabetes     Severe obstructive sleep apnea 04/18/2018    10/06/17 where we ordered a sleep study which showed severe TAMAR with an RDI of 30 and she had a titration study that showed CPAP of 9 cmH20 to be effective. She was set up with the CPAP on 10/25/17 through  DME.     Urinary incontinence        PAST SURGICAL HISTORY:  Past Surgical History:   Procedure Laterality Date    APPENDECTOMY      ARTHROPLASTY KNEE BILATERAL      CATARACT EXTRACTION      CERVICAL SPINE SURGERY  2015    JOINT REPLACEMENT      bilateral knees and right hip    LAPAROSCOPIC CHOLECYSTECTOMY N/A 6/29/2018    Procedure: CHOLECYSTECTOMY, LAPAROSCOPIC;  Surgeon: Jose Armando Humphries MD;  Location: [facility] [place];  Service:     TONSILLECTOMY      WISDOM TOOTH EXTRACTION         CURRENT MEDICATIONS:      Current Facility-Administered Medications:     flumazenil (ROMAZICON) injection 0.2 mg, 0.2 mg, Intravenous, q1 min prn, Gabriela Cruz MD    Current Outpatient Medications:     Fluticasone-Umeclidin-Vilanterol (TRELEGY ELLIPTA) 200-62.5-25  MCG/INH oral inhaler, Inhale 1 puff into the lungs daily, Disp: 60 each, Rfl: 11    ipratropium - albuterol 0.5 mg/2.5 mg/3 mL (DUONEB) 0.5-2.5 (3) MG/3ML neb solution, Take 1 vial (3 mLs) by nebulization every 6 hours as needed for shortness of breath / dyspnea or wheezing, Disp: 360 mL, Rfl: 11    meclizine (ANTIVERT) 25 mg tablet, [MECLIZINE (ANTIVERT) 25 MG TABLET] Take 1 tablet (25 mg total) by mouth 3 (three) times a day as needed., Disp: 30 tablet, Rfl: 0    metoprolol tartrate (LOPRESSOR) 50 MG tablet, Take 50 mg by mouth 2 times daily, Disp: , Rfl:     multivitamin (MULTIPLE VITAMINS ORAL), [MULTIVITAMIN (MULTIPLE VITAMINS ORAL)] Take by mouth., Disp: , Rfl:     mv-mn/iron/folic acid/herb 190 (VITAMIN D3 COMPLETE ORAL), , Disp: , Rfl:     rivaroxaban ANTICOAGULANT (XARELTO) 20 MG TABS tablet, Take 1 tablet (20 mg) by mouth daily (with dinner), Disp: 90 tablet, Rfl: 1    rosuvastatin (CRESTOR) 40 MG tablet, TAKE 1 TABLET AT BEDTIME, Disp: 90 tablet, Rfl: 0    ALLERGIES:  Allergies   Allergen Reactions    Lisinopril Cough       FAMILY HISTORY:  Family History   Problem Relation Age of Onset    Breast Cancer Mother 45    Diabetes Mother     Heart Disease Mother     Hyperlipidemia Mother     Hypertension Mother     Depression Father     Alcoholism Father     Hyperlipidemia Sister     Depression Sister     Obesity Sister     Obesity Daughter     Lung Cancer Son     Cerebrovascular Disease Son        SOCIAL HISTORY:   Social History     Socioeconomic History    Marital status:     Number of children: 3   Tobacco Use    Smoking status: Former     Types: Cigarettes    Smokeless tobacco: Never    Tobacco comments:     1-2 cigarettes per day on average   Vaping Use    Vaping Use: Never used   Substance and Sexual Activity    Alcohol use: Yes     Comment: Alcoholic Drinks/day: less than 1 drink per month     Drug use: No    Sexual activity: Not Currently     Partners: Male   Social History Narrative    .  "Retired. Likes to play golf.  3 children. Son has lung cancer.  Was a . Lives in [place] for 6 months and [place] for 6 months of the years.       PHYSICAL EXAM:    Vitals: /60   Pulse 70   Temp 98  F (36.7  C) (Oral)   Resp 27   Ht 1.6 m (5' 3\")   Wt 85.7 kg (189 lb)   LMP 08/06/1988   SpO2 96%   BMI 33.48 kg/m     Constitutional: Well developed, well nourished. No acute distress.  HENT: Normocephalic, atraumatic. Neck-gross ROM intact.   Eyes: Pupils mid-range, sclera white  Respiratory: CTAB, no respiratory distress, speaks full sentences easily.  Cardiovascular: Tachycardic rate, irregularly irregular rhythm. No LE edema  GI: Soft, not distended, not tender to palpation,  Musculoskeletal: Moving all 4 extremities intentionally and without pain. No obvious deformity.  Skin: Warm, dry, no rash.  Neurologic: Alert & oriented, speech clear, Cns grossly intact, moving all extremities    LAB:  All pertinent labs reviewed and interpreted.  Labs Ordered and Resulted from Time of ED Arrival to Time of ED Departure   COMPREHENSIVE METABOLIC PANEL - Abnormal       Result Value    Sodium 144      Potassium 4.5      Chloride 109 (*)     Carbon Dioxide (CO2) 23      Anion Gap 12      Urea Nitrogen 21.7      Creatinine 1.17 (*)     Calcium 9.3      Glucose 116 (*)     Alkaline Phosphatase 117 (*)     AST 18      ALT 20      Protein Total 7.0      Albumin 4.5      Bilirubin Total 0.3      GFR Estimate 46 (*)    NT PROBNP INPATIENT - Abnormal    N terminal Pro BNP Inpatient 2,254 (*)    INR - Abnormal    INR 1.22 (*)    MAGNESIUM - Normal    Magnesium 2.1     TSH WITH FREE T4 REFLEX - Normal    TSH 1.48     TROPONIN T, HIGH SENSITIVITY - Normal    Troponin T, High Sensitivity 11     TROPONIN T, HIGH SENSITIVITY - Normal    Troponin T, High Sensitivity 9     CBC WITH PLATELETS AND DIFFERENTIAL    WBC Count 7.1      RBC Count 5.01      Hemoglobin 15.2      Hematocrit 47.0      MCV 94      MCH 30.3      " MCHC 32.3      RDW 13.2      Platelet Count 279      % Neutrophils 69      % Lymphocytes 18      % Monocytes 10      % Eosinophils 2      % Basophils 1      % Immature Granulocytes 0      NRBCs per 100 WBC 0      Absolute Neutrophils 5.0      Absolute Lymphocytes 1.3      Absolute Monocytes 0.7      Absolute Eosinophils 0.1      Absolute Basophils 0.1      Absolute Immature Granulocytes 0.0      Absolute NRBCs 0.0     GLUCOSE MONITOR NURSING POCT       RADIOLOGY:  CTA Head Neck with Contrast   Final Result   IMPRESSION:    NONCONTRAST HEAD CT:   1.  No CT evidence for acute intracranial process.   2.  Brain atrophy and presumed chronic microvascular ischemic changes as above.      HEAD CTA:    1.  No significant stenosis, aneurysm, or high flow vascular malformation identified.      NECK CTA:   1.  No hemodynamically significant stenosis in the neck vessels.    2.  No evidence for dissection.   3.  Mildly increased size of an indeterminate mass in the right parotid gland. A neoplastic process is suspected. Recommend otolaryngology consult/follow-up. If not performed previously, tissue sampling should be considered.      Findings were discussed with Dr. Gabriela Cruz by myself at approximately 3:17 PM 8/7/2023.         XR Chest Port 1 View   Final Result   IMPRESSION: No pleural fluid or pneumothorax. Reticular interstitial opacities in the lungs are likely from edema. A subtle opacity over the left lower lung could be minimal airspace disease or bronchial wall thickening. Normal size of the heart.    Anterior cervical fusion hardware is noted. There are degenerative changes in the spine.       MR Brain w/o & w Contrast    (Results Pending)       EKG:   Performed at: 11:44:07  Impression:   Atrial fibrillation with rapid ventricular response with premature ventricular or aberrantly conducted complexes  Abnormal ECG    Rate: 130 bpm  QRS Interval: 74 ms  QTc Interval: 453 ms  Comparison: No significant change found  when compared to ECG of 11/04/2022 17:13  I have independently reviewed and interpreted the EKG(s) documented above.     Performed at 1:23:09  Rate: 74  Rhythm: sinus  Intervals: , Qtc 415  Ischemic changes: no GRACY  Comparison: earlier today    I have independently reviewed and interpreted the EKG(s) documented above.    PROCEDURES:   Procedures       PROCEDURE: Electrical Cardioversion with Procedural Sedation   INDICATIONS: Sedation is required to allow for Cardioversion for Atrial Fibrilation    CARDIOVERSION TYPE: Biphasic, External   SEDATION PROVIDER: Dr Gabriela Cruz   CARDIOVERSION PROVIDER: Dr Gabriela Cruz   LEVEL OF SEDATION: Moderate Sedation    Defined as:  Minimal = Normal response to verbal  Moderate = Responds to verbal and light tactile stimulation  Deep = Responds after repeated painful stimulation   CONSENT: Risks, benefits and alternatives were discussed with and Written consent was obtained from Patient.   PROCEDURE SPECIFIC CHECKLIST COMPLETED: Yes   LAST ORAL INTAKE: Last food at 9 am (>4 hours ago)   ASA CLASS: 3 - Severe systemic disease, but not incapacitating   MALLAMPATI:  II - Faucial pillars and soft palate may be seen, but uvula is masked by the base of the tongue   TIME OUT: Universal protocol was followed. TIME OUT conducted just prior to starting procedure confirmed patient identity, site/side, procedure, patient position, and availability of correct equipment. Yes    Immediately prior to initiation of sedation, reassessment of clinical condition was performed which was unchanged.   MEDICATIONS GIVEN: Etomidate, 8.6 mg, IV    MONITORING: heart rate, cardiac monitor, continuous pulse oximeter, continuous capnometry (end tidal CO2), frequent blood pressure checks, level of consciousness checks, IV access, constant attendance by RN until patient is recovered, and constant attendance by MD until patient is stable   RESPONSE: vital signs stable, airway patent, and O2 saturations  remained >92%   POST-SEDATION ASSESSMENT/PROCEDURE NOTE: CARDIOVERSION: Trial 1: Synchronized shock at 150 joules was Successful    SEDATION:  Lowest level oxygen saturation reached was >92%    Post procedure patient was alert and responds to verbal stimuli    Patient was monitored during recovery and returned to pre-procedure baseline.   TOTAL MD DRUG ADMINISTRATION / MONITORING TIME: 5 minutes.   COMPLICATIONS: Patient tolerated procedure well, without complication    Of note later developed room spinning sensation, see MDM above       I, Kelton Gillis, am serving as a scribe to document services personally performed by Dr. Gabriela Cruz based on my observation and the provider's statements to me. I, Gabriela Cruz MD attest that Kelton Gillis is acting in a scribe capacity, has observed my performance of the services and has documented them in accordance with my direction.    Gabriela Cruz M.D.  Emergency Medicine  [facility] EMERGENCY DEPARTMENT  [address] [place] [id number]  [phone]  Dept: [phone]     Gabriela Cruz MD  08/07/23 5204

## 2023-08-08 ENCOUNTER — TELEPHONE (OUTPATIENT)
Dept: INTERNAL MEDICINE | Facility: CLINIC | Age: 83
End: 2023-08-08

## 2023-08-08 ENCOUNTER — APPOINTMENT (OUTPATIENT)
Dept: CARDIOLOGY | Facility: HOSPITAL | Age: 83
End: 2023-08-08
Attending: FAMILY MEDICINE
Payer: MEDICARE

## 2023-08-08 VITALS
BODY MASS INDEX: 33.49 KG/M2 | SYSTOLIC BLOOD PRESSURE: 118 MMHG | HEART RATE: 67 BPM | OXYGEN SATURATION: 92 % | TEMPERATURE: 98.2 F | DIASTOLIC BLOOD PRESSURE: 70 MMHG | RESPIRATION RATE: 24 BRPM | HEIGHT: 63 IN | WEIGHT: 189 LBS

## 2023-08-08 LAB
ANION GAP SERPL CALCULATED.3IONS-SCNC: 10 MMOL/L (ref 7–15)
BASOPHILS # BLD AUTO: 0 10E3/UL (ref 0–0.2)
BASOPHILS NFR BLD AUTO: 1 %
BUN SERPL-MCNC: 14.2 MG/DL (ref 8–23)
CALCIUM SERPL-MCNC: 8.6 MG/DL (ref 8.8–10.2)
CHLORIDE SERPL-SCNC: 106 MMOL/L (ref 98–107)
CREAT SERPL-MCNC: 0.89 MG/DL (ref 0.51–0.95)
DEPRECATED HCO3 PLAS-SCNC: 26 MMOL/L (ref 22–29)
EOSINOPHIL # BLD AUTO: 0.2 10E3/UL (ref 0–0.7)
EOSINOPHIL NFR BLD AUTO: 2 %
ERYTHROCYTE [DISTWIDTH] IN BLOOD BY AUTOMATED COUNT: 13.3 % (ref 10–15)
GFR SERPL CREATININE-BSD FRML MDRD: 64 ML/MIN/1.73M2
GLUCOSE SERPL-MCNC: 94 MG/DL (ref 70–99)
HCT VFR BLD AUTO: 41 % (ref 35–47)
HGB BLD-MCNC: 13.2 G/DL (ref 11.7–15.7)
IMM GRANULOCYTES # BLD: 0 10E3/UL
IMM GRANULOCYTES NFR BLD: 0 %
LVEF ECHO: NORMAL
LYMPHOCYTES # BLD AUTO: 1.5 10E3/UL (ref 0.8–5.3)
LYMPHOCYTES NFR BLD AUTO: 23 %
MCH RBC QN AUTO: 30.7 PG (ref 26.5–33)
MCHC RBC AUTO-ENTMCNC: 32.2 G/DL (ref 31.5–36.5)
MCV RBC AUTO: 95 FL (ref 78–100)
MONOCYTES # BLD AUTO: 0.6 10E3/UL (ref 0–1.3)
MONOCYTES NFR BLD AUTO: 9 %
NEUTROPHILS # BLD AUTO: 4.3 10E3/UL (ref 1.6–8.3)
NEUTROPHILS NFR BLD AUTO: 65 %
NRBC # BLD AUTO: 0 10E3/UL
NRBC BLD AUTO-RTO: 0 /100
PLATELET # BLD AUTO: 222 10E3/UL (ref 150–450)
POTASSIUM SERPL-SCNC: 4.1 MMOL/L (ref 3.4–5.3)
RBC # BLD AUTO: 4.3 10E6/UL (ref 3.8–5.2)
SODIUM SERPL-SCNC: 142 MMOL/L (ref 136–145)
WBC # BLD AUTO: 6.6 10E3/UL (ref 4–11)

## 2023-08-08 PROCEDURE — 94660 CPAP INITIATION&MGMT: CPT

## 2023-08-08 PROCEDURE — 250N000013 HC RX MED GY IP 250 OP 250 PS 637: Performed by: HOSPITALIST

## 2023-08-08 PROCEDURE — 250N000013 HC RX MED GY IP 250 OP 250 PS 637: Performed by: INTERNAL MEDICINE

## 2023-08-08 PROCEDURE — G0378 HOSPITAL OBSERVATION PER HR: HCPCS

## 2023-08-08 PROCEDURE — 999N000157 HC STATISTIC RCP TIME EA 10 MIN

## 2023-08-08 PROCEDURE — 36415 COLL VENOUS BLD VENIPUNCTURE: CPT | Performed by: FAMILY MEDICINE

## 2023-08-08 PROCEDURE — 99222 1ST HOSP IP/OBS MODERATE 55: CPT | Performed by: INTERNAL MEDICINE

## 2023-08-08 PROCEDURE — 85025 COMPLETE CBC W/AUTO DIFF WBC: CPT | Performed by: FAMILY MEDICINE

## 2023-08-08 PROCEDURE — 80048 BASIC METABOLIC PNL TOTAL CA: CPT | Performed by: FAMILY MEDICINE

## 2023-08-08 PROCEDURE — 93306 TTE W/DOPPLER COMPLETE: CPT

## 2023-08-08 PROCEDURE — 250N000013 HC RX MED GY IP 250 OP 250 PS 637: Performed by: FAMILY MEDICINE

## 2023-08-08 PROCEDURE — 99239 HOSP IP/OBS DSCHRG MGMT >30: CPT | Performed by: HOSPITALIST

## 2023-08-08 PROCEDURE — 93306 TTE W/DOPPLER COMPLETE: CPT | Mod: 26 | Performed by: INTERNAL MEDICINE

## 2023-08-08 PROCEDURE — 99205 OFFICE O/P NEW HI 60 MIN: CPT | Performed by: PSYCHIATRY & NEUROLOGY

## 2023-08-08 RX ORDER — SOTALOL HYDROCHLORIDE 80 MG/1
80 TABLET ORAL DAILY
Qty: 30 TABLET | Refills: 1 | Status: SHIPPED | OUTPATIENT
Start: 2023-08-08 | End: 2023-08-17

## 2023-08-08 RX ORDER — FERROUS SULFATE 325(65) MG
325 TABLET ORAL DAILY
Status: DISCONTINUED | OUTPATIENT
Start: 2023-08-08 | End: 2023-08-08 | Stop reason: HOSPADM

## 2023-08-08 RX ORDER — ROSUVASTATIN CALCIUM 40 MG/1
40 TABLET, COATED ORAL AT BEDTIME
Status: DISCONTINUED | OUTPATIENT
Start: 2023-08-08 | End: 2023-08-08 | Stop reason: HOSPADM

## 2023-08-08 RX ORDER — METOPROLOL TARTRATE 25 MG/1
50 TABLET, FILM COATED ORAL 2 TIMES DAILY
Status: DISCONTINUED | OUTPATIENT
Start: 2023-08-08 | End: 2023-08-08 | Stop reason: ALTCHOICE

## 2023-08-08 RX ORDER — FLUTICASONE FUROATE AND VILANTEROL 200; 25 UG/1; UG/1
1 POWDER RESPIRATORY (INHALATION) DAILY
Status: DISCONTINUED | OUTPATIENT
Start: 2023-08-08 | End: 2023-08-08 | Stop reason: HOSPADM

## 2023-08-08 RX ORDER — IPRATROPIUM BROMIDE AND ALBUTEROL SULFATE 2.5; .5 MG/3ML; MG/3ML
1 SOLUTION RESPIRATORY (INHALATION) EVERY 6 HOURS PRN
Status: DISCONTINUED | OUTPATIENT
Start: 2023-08-08 | End: 2023-08-08 | Stop reason: HOSPADM

## 2023-08-08 RX ORDER — SOTALOL HYDROCHLORIDE 80 MG/1
80 TABLET ORAL
Status: DISCONTINUED | OUTPATIENT
Start: 2023-08-08 | End: 2023-08-08 | Stop reason: HOSPADM

## 2023-08-08 RX ORDER — DIAZEPAM 5 MG
5 TABLET ORAL ONCE
Status: DISCONTINUED | OUTPATIENT
Start: 2023-08-08 | End: 2023-08-08

## 2023-08-08 RX ORDER — MECLIZINE HCL 12.5 MG 12.5 MG/1
25 TABLET ORAL 3 TIMES DAILY PRN
Status: DISCONTINUED | OUTPATIENT
Start: 2023-08-08 | End: 2023-08-08 | Stop reason: HOSPADM

## 2023-08-08 RX ORDER — DIAZEPAM 2 MG
2 TABLET ORAL ONCE
Status: COMPLETED | OUTPATIENT
Start: 2023-08-08 | End: 2023-08-08

## 2023-08-08 RX ADMIN — DIAZEPAM 2 MG: 2 TABLET ORAL at 12:47

## 2023-08-08 RX ADMIN — MECLIZINE 12.5 MG: 12.5 TABLET ORAL at 05:12

## 2023-08-08 RX ADMIN — FERROUS SULFATE TAB 325 MG (65 MG ELEMENTAL FE) 325 MG: 325 (65 FE) TAB at 08:53

## 2023-08-08 RX ADMIN — FUROSEMIDE 20 MG: 20 TABLET ORAL at 00:24

## 2023-08-08 RX ADMIN — UMECLIDINIUM 1 PUFF: 62.5 AEROSOL, POWDER ORAL at 08:55

## 2023-08-08 RX ADMIN — FLUTICASONE FUROATE AND VILANTEROL TRIFENATATE 1 PUFF: 200; 25 POWDER RESPIRATORY (INHALATION) at 08:55

## 2023-08-08 RX ADMIN — MECLIZINE 25 MG: 12.5 TABLET ORAL at 09:48

## 2023-08-08 RX ADMIN — SOTALOL HYDROCHLORIDE 80 MG: 80 TABLET ORAL at 18:15

## 2023-08-08 RX ADMIN — RIVAROXABAN 20 MG: 10 TABLET, FILM COATED ORAL at 08:53

## 2023-08-08 RX ADMIN — METOPROLOL TARTRATE 50 MG: 25 TABLET, FILM COATED ORAL at 08:53

## 2023-08-08 RX ADMIN — MECLIZINE 12.5 MG: 12.5 TABLET ORAL at 00:24

## 2023-08-08 ASSESSMENT — ACTIVITIES OF DAILY LIVING (ADL)
ADLS_ACUITY_SCORE: 37
ADLS_ACUITY_SCORE: 37
ADLS_ACUITY_SCORE: 35
ADLS_ACUITY_SCORE: 37
ADLS_ACUITY_SCORE: 35
ADLS_ACUITY_SCORE: 37
DEPENDENT_IADLS:: INDEPENDENT
ADLS_ACUITY_SCORE: 37

## 2023-08-08 NOTE — PROGRESS NOTES
"[facility]    Medicine Progress Note - Hospitalist Service    Date of Admission:  8/7/2023    Assessment & Plan   83 year old female with PMH significant for PAF on Eliquis, CHF, COPD, HTN and HLP who is admitted for observation on 8/7/2023 due to vertigo after undergoing a cardioversion for Afib with RVR in the ER. Patient had persistent vertigo despite meclizine. Tele-neuro was consulted. Underwent CTA head and neck and MRI brain which was negative for CVA.     Post cardioversion was treated with lasix for mild CHF. TTE showed normal heart function.    -Will get neurology input. Await clinical improvement    Vertigo s/p Cardioversion  -Still reports pretty significant persistent symptoms   -Does not seem to be directional and no nystagmus appreciated although Drayton-Hallpike not performed  -Neuro consulted, appreciate recs  -Trialed Valium  -Meclizine PRN    A.fib with RVR s/p Cardioversion  CHF exacerbation 2/2 Afib  ANGIE  HTN  Improved with Lasix.  -Cardiology recommends switching Metoprolol to Sotalol 80 mg at bedtime  -Xarelto    Right Parotid Gland  -Recommend outpatient follow up    COPD  -Continue inhalers (swapped Trelegy Ellipta inhouse)    MO  -Continue oral iron    TAMAR  CPAP at bedtime.    Obesity  -Recommend lifestyle modification       Diet: Combination Diet Low Saturated Fat Na <2400mg Diet, No Caffeine Diet    DVT Prophylaxis: DOAC  Prajapati Catheter: Not present  Lines: None     Cardiac Monitoring: None  Code Status: Full Code      Clinically Significant Risk Factors Present on Admission                 # Drug Induced Coagulation Defect: home medication list includes an anticoagulant medication      # Hypertension: Noted on problem list      # Obesity: Estimated body mass index is 33.48 kg/m  as calculated from the following:    Height as of this encounter: 1.6 m (5' 3\").    Weight as of this encounter: 85.7 kg (189 lb).            Disposition Plan      Expected Discharge Date: " 08/09/2023      Destination: home            Stoney Carmona MD  Hospitalist Service  [facility]  Securely message with [employer] ([employer]) (more info)  Text page via '[employer] Paging/Directory   ______________________________________________________________________    Interval History   No acute events overnight.     Patient seen and evaluated at bedside. Still having persistent symptoms.  Denies chest pain, shortness of breath or abdominal pain.    Physical Exam   Vital Signs: Temp: 98.2  F (36.8  C) Temp src: Oral BP: 113/61 Pulse: 67   Resp: 27 SpO2: 91 % O2 Device: None (Room air)    Weight: 189 lbs 0 oz    General: NAD  CV: +S1/S2, no pitting edema  Respiratory: clear to auscultation bilaterally  GI: soft, non-tender, non-distended  Neuro: alert    Medical Decision Making       50 MINUTES SPENT BY ME on the date of service doing chart review, history, exam, documentation & further activities per the note.      Data     I have personally reviewed the following data over the past 24 hrs:    6.6  \   13.2   / 222     142 106 14.2 /  94   4.1 26 0.89 \     Trop: N/A BNP: N/A     Imaging results reviewed over the past 24 hrs:   Recent Results (from the past 24 hour(s))   MR Brain w/o & w Contrast    Narrative    EXAM: MR BRAIN W/O and W CONTRAST  LOCATION: [facility]  DATE: 8/7/2023    INDICATION: dizziness concern for stroke  COMPARISON: CTA head and neck 08/07/2023  CONTRAST: 8.5ml Gadavist  TECHNIQUE: Routine multiplanar multisequence head MRI without and with intravenous contrast.    FINDINGS:  INTRACRANIAL CONTENTS: No acute or subacute infarct. No mass, acute hemorrhage, or extra-axial fluid collections. Scattered nonspecific T2/FLAIR hyperintensities within the cerebral white matter most consistent with mild chronic microvascular ischemic   change. Mild generalized cerebral atrophy. No hydrocephalus. Normal position of the cerebellar tonsils. No pathologic contrast  enhancement.    SELLA: No abnormality accounting for technique.    OSSEOUS STRUCTURES/SOFT TISSUES: Normal marrow signal. The major intracranial vascular flow voids are maintained. Nonspecific 1.7 cm enhancing mass at the superficial lobe right parotid gland.    ORBITS: Prior bilateral cataract surgery. Visualized portions of the orbits are otherwise unremarkable.     SINUSES/MASTOIDS: No paranasal sinus mucosal disease. No middle ear or mastoid effusion.       Impression    IMPRESSION:  1.  No acute intracranial process.  2.  Generalized brain atrophy and presumed microvascular ischemic changes as detailed above.  3.  Redemonstrated is 1.7 cm mass at the superficial lobe right parotid gland, malignancy not excluded.   Echocardiogram Complete   Result Value    LVEF  60-65%    Narrative    [id number]  [id number]  [id number]  [id number]^YRIS^[place] [address]     Name: RUPALI ORTIZ  MRN: [id number]  : 1940  Study Date: 2023 11:38 AM  Age: 83 yrs  Gender: Female  Patient Location: [facility]  Reason For Study: Pulmonary Edema  Ordering Physician: DINESH ARNDT  Performed By: TARA     BSA: 1.9 m2  Height: 63 in  Weight: 189 lb  HR: 70  BP: 123/60 mmHg  ______________________________________________________________________________  Procedure  Complete Portable Echo Adult. No hemodynamically significant valvular  abnormalities on 2D or color flow imaging. Compared to prior study, there is  no significant change.  ______________________________________________________________________________  Interpretation Summary     Left ventricular size, wall motion and function are normal. The ejection  fraction is 60-65%.  Normal right ventricle size and systolic function.  The left atrium is mildly dilated.  The right atrium is mildly dilated.  No hemodynamically significant valvular abnormalities on 2D or color flow  imaging.  Compared to prior study, there is no  significant change.  ______________________________________________________________________________  Left Ventricle  Left ventricular size, wall motion and function are normal. The ejection  fraction is 60-65%. There is normal left ventricular wall thickness. Left  ventricular diastolic function is normal. No regional wall motion  abnormalities noted.     Right Ventricle  Normal right ventricle size and systolic function.     Atria  The left atrium is mildly dilated. The right atrium is mildly dilated. There  is no color Doppler evidence of an atrial shunt.     Mitral Valve  Mitral valve leaflets appear normal. There is no evidence of mitral stenosis  or clinically significant mitral regurgitation.     Tricuspid Valve  The tricuspid valve is not well visualized. Right ventricle systolic pressure  estimate normal. There is trace tricuspid regurgitation.     Aortic Valve  Aortic valve leaflets appear normal. There is no evidence of aortic stenosis  or clinically significant aortic regurgitation.     Pulmonic Valve  The pulmonic valve is not well seen, but is grossly normal. This degree of  valvular regurgitation is within normal limits.     Vessels  The aorta root is normal. Normal size ascending aorta. IVC diameter <2.1 cm  collapsing >50% with sniff suggests a normal RA pressure of 3 mmHg.     Pericardium  There is no pericardial effusion.     ______________________________________________________________________________  MMode/2D Measurements & Calculations  IVSd: 0.87 cm  LVIDd: 5.0 cm  LVIDs: 3.5 cm  LVPWd: 0.86 cm  FS: 29.5 %     LV mass(C)d: 150.7 grams  LV mass(C)dI: 79.8 grams/m2  Ao root diam: 2.6 cm  LA dimension: 4.3 cm  LA/Ao: 1.7  LA Volume Indexed (AL/bp): 30.6 ml/m2  RV Base: 3.9 cm  RWT: 0.34  TAPSE: 1.8 cm     Time Measurements  MM HR: 62.0 BPM     Doppler Measurements & Calculations  MV E max lyndsay: 94.7 cm/sec  MV A max lyndsay: 34.7 cm/sec  MV E/A: 2.7  MV dec slope: 527.3 cm/sec2  MV dec time: 0.18  sec  LV V1 max P.6 mmHg  LV V1 max: 63.4 cm/sec  LV V1 VTI: 14.4 cm  PA acc time: 0.07 sec  TR max femi: 247.3 cm/sec  TR max P.5 mmHg  E/E' av.6  Lateral E/e': 10.4  Medial E/e': 18.9  RV S Femi: 11.1 cm/sec     ______________________________________________________________________________  Report approved by: Karri Perez 2023 03:20 PM

## 2023-08-08 NOTE — ED NOTES
Discharge education and instructions have been explained and provided in writing to patient. This RN did also speak to pt's son about the pt's appointments and assessments moving forward. Pt expressed a understanding to all information and felt steady and comfortable on feet to be at home.

## 2023-08-08 NOTE — CONSULTS
Cardiology Consult Note    Thank you, Dr. Carmona, for asking the [facility] team to see Kori Leach in consultation at [facility] ED to evaluate paroxysmal atrial fibrillation.      Assessment:   1.  Paroxysmal atrial fibrillation, resolved following elective DC cardioversion in the ED yesterday.  This is her third episode, the first following switch from diltiazem to metoprolol earlier this year while wintering in [place].  At this point cannot increase metoprolol further given sinus bradycardia.  Would favor switch to an antiarrhythmic such as sotalol which would provide beta-blocking effects.  Based on pharmacy recommendations, will dose 80 mg once daily.  Okay to discharge home today with plan to have her return to the office on Friday 8/11 for an ECG to assess her QT interval.  She should remain on anticoagulation although would prefer switching from Xarelto to Eliquis.  2.  Abnormal nuclear stress study.  Patient underwent nuclear stress imaging while in [place] demonstrating a small area of apical ischemia.  She was given the option of pursuing medical management versus undergoing coronary angiography but elected to continue with medical therapy.  Does have chronic exertional dyspnea which has not changed.  Denies any exertional chest discomfort.  At this point given low risk stress study, would favor continued medical management.  3.  TAMAR, treated with CPAP.  She states she is compliant with the use of her CPAP mask.  4.  Dizziness, etiology unclear.  Described spinning sensation beginning about 1-1/2 hours following cardioversion.  Stroke code protocol followed but no evidence of acute intracranial pathology either by CT or MRI.  Started empirically on meclizine.     Plan:   1.  Discontinue metoprolol and begin sotalol 80 mg every evening (she takes all of her other meds in the evening and has occasionally forgotten her morning dose of metoprolol so we will keep  dosing the same.  Patient should present to the [place] [facility] clinic on Friday 8/11 for ECG to assess QT interval.  2.  Continue anticoagulation  3.  Recommend follow-up in the A-fib clinic in 2 to 3 weeks    Primary cardiologist: Dr. Daryl Rodríguez     Current History:   Ms. Kori Leach is a 83 year old female with history of obesity, TAMAR treated with CPAP, degenerative disc disease, essential hypertension, hyperlipidemia and paroxysmal atrial fibrillation who presented to the ED yesterday because of palpitations.  She was found to be in atrial fibrillation with rapid ventricular response and because she has been compliant with her anticoagulation, it was elected to proceed with DC cardioversion.  Following 1 shock, sinus rhythm was restored and her rhythm has been stable overnight.  Patient's first episode of atrial fibrillation occurred in the fall 2022 and she was placed on combination of diltiazem for rate control and treatment of hypertension along with anticoagulation.  She wintered in [place] where she had another episode of atrial fibrillation which resolved following cardioversion.  At that time she was switched from diltiazem to metoprolol and switch from Eliquis to Xarelto due to expense.  This is her first recurrence of atrial fibrillation following those medication changes.  Does have chronic exertional dyspnea and underwent a nuclear stress test while in [place] demonstrating a small area of mild ischemia at the apex.  She did meet with her cardiologist there and was given the option of medical therapy versus some type of angiography.  Since she was planning on returning to the [place], she wanted to pursue medical therapy first.  Denies any new exertional chest discomfort.  Does have chronic exertional dyspnea which has not changed.    Past Medical History:     Past Medical History:   Diagnosis Date    Arthritis     knees and hip replacement    Bleeding diathesis (H) - due to ELIQUIS     Chronic  obstructive pulmonary disease, unspecified COPD type (H) 06/04/2018    PFT Complete  Performed 5/21/2018 Final result Study Result FEV1/FVC is 69 and is reduced. FEV1 is 66% predicted and is reduced. FVC is 74% predicted and reduced. There was improvement in spirometry after a single inhaled dose of bronchodilator. TLC is 110% predicted and is normal. RV is 141% predicted and is increased. DLCO is 77% predicted and is reduced when it  is corrected for hemoglobin.  Im    Degenerative disc disease, cervical     Fatty liver     Former smoker     GERD (gastroesophageal reflux disease)     History of nicotine use 06/04/2018    HLD (hyperlipidemia)     HTN (hypertension)     MO (iron deficiency anemia) 04/24/2015    Inverted nipple     bilateral    Loss of hearing     Lumbar back pain     Morbid obesity (H) 09/04/2018    Pre-diabetes     Severe obstructive sleep apnea 04/18/2018    10/06/17 where we ordered a sleep study which showed severe TAMAR with an RDI of 30 and she had a titration study that showed CPAP of 9 cmH20 to be effective. She was set up with the CPAP on 10/25/17 through [facility].     Urinary incontinence        Past Surgical History:     Past Surgical History:   Procedure Laterality Date    APPENDECTOMY      ARTHROPLASTY KNEE BILATERAL      CATARACT EXTRACTION      CERVICAL SPINE SURGERY  2015    JOINT REPLACEMENT      bilateral knees and right hip    LAPAROSCOPIC CHOLECYSTECTOMY N/A 6/29/2018    Procedure: CHOLECYSTECTOMY, LAPAROSCOPIC;  Surgeon: Jose Armando Humphries MD;  Location: [facility];  Service:     TONSILLECTOMY      WISDOM TOOTH EXTRACTION         Family History:     Family History   Problem Relation Age of Onset    Breast Cancer Mother 45    Diabetes Mother     Heart Disease Mother     Hyperlipidemia Mother     Hypertension Mother     Depression Father     Alcoholism Father     Hyperlipidemia Sister     Depression Sister     Obesity Sister     Obesity Daughter     Lung Cancer Son      Cerebrovascular Disease Son        Social History:    reports that she has quit smoking. Her smoking use included cigarettes. She has never used smokeless tobacco. She reports current alcohol use. She reports that she does not use drugs.    Meds:     Current Facility-Administered Medications:     diazepam (VALIUM) tablet 5 mg, 5 mg, Oral, Once, Stoney Carmona MD    ferrous sulfate (FEROSUL) tablet 325 mg, 325 mg, Oral, Daily, Stoney Carmona MD, 325 mg at 08/08/23 0853    fluticasone-vilanterol (BREO ELLIPTA) 200-25 MCG/ACT inhaler 1 puff, 1 puff, Inhalation, Daily, 1 puff at 08/08/23 0855 **AND** umeclidinium (INCRUSE ELLIPTA) 62.5 MCG/ACT inhaler 1 puff, 1 puff, Inhalation, Daily, Stoney Carmona MD, 1 puff at 08/08/23 0855    ipratropium - albuterol 0.5 mg/2.5 mg/3 mL (DUONEB) neb solution 3 mL, 1 vial, Nebulization, Q6H PRN, Stoney Carmona MD    meclizine (ANTIVERT) tablet 25 mg, 25 mg, Oral, TID PRN, Stoney Carmona MD, 25 mg at 08/08/23 0948    ondansetron (ZOFRAN ODT) ODT tab 4 mg, 4 mg, Oral, Q6H PRN **OR** ondansetron (ZOFRAN) injection 4 mg, 4 mg, Intravenous, Q6H PRN, Zayda Bee MD    rivaroxaban ANTICOAGULANT (XARELTO) tablet 20 mg, 20 mg, Oral, Daily with breakfast, Stoney Carmona MD, 20 mg at 08/08/23 0853    rosuvastatin (CRESTOR) tablet 40 mg, 40 mg, Oral, At Bedtime, Stoney Carmona MD    sotalol (BETAPACE) tablet 80 mg, 80 mg, Oral, Daily at 8 pm, Lana Chaudhari MD    Current Outpatient Medications:     ferrous sulfate (FE TABS) 325 (65 Fe) MG EC tablet, Take 325 mg by mouth daily, Disp: , Rfl:     Fluticasone-Umeclidin-Vilanterol (TRELEGY ELLIPTA) 200-62.5-25 MCG/INH oral inhaler, Inhale 1 puff into the lungs daily, Disp: 60 each, Rfl: 11    ipratropium - albuterol 0.5 mg/2.5 mg/3 mL (DUONEB) 0.5-2.5 (3) MG/3ML neb solution, Take 1 vial (3 mLs) by nebulization every 6 hours as needed for shortness of breath / dyspnea or wheezing, Disp: 360 mL, Rfl: 11    meclizine (ANTIVERT) 25 mg  "tablet, [MECLIZINE (ANTIVERT) 25 MG TABLET] Take 1 tablet (25 mg total) by mouth 3 (three) times a day as needed., Disp: 30 tablet, Rfl: 0    metoprolol tartrate (LOPRESSOR) 50 MG tablet, Take 50 mg by mouth 2 times daily, Disp: , Rfl:     multivitamin (MULTIPLE VITAMINS ORAL), [MULTIVITAMIN (MULTIPLE VITAMINS ORAL)] Take by mouth., Disp: , Rfl:     rivaroxaban ANTICOAGULANT (XARELTO) 20 MG TABS tablet, Take 1 tablet (20 mg) by mouth daily (with dinner), Disp: 90 tablet, Rfl: 1    rosuvastatin (CRESTOR) 40 MG tablet, TAKE 1 TABLET AT BEDTIME, Disp: 90 tablet, Rfl: 0   diazepam  5 mg Oral Once    ferrous sulfate  325 mg Oral Daily    fluticasone-vilanterol  1 puff Inhalation Daily    And    umeclidinium  1 puff Inhalation Daily    rivaroxaban ANTICOAGULANT  20 mg Oral Daily with breakfast    rosuvastatin  40 mg Oral At Bedtime    sotalol  80 mg Oral Daily at 8 pm       Allergies:   Lisinopril    Review of Systems:   A 12 point comprehensive review of systems was negative except as noted in the current history.    Objective:      Physical Exam  Body mass index is 33.48 kg/m .  /53   Pulse 56   Temp 98  F (36.7  C) (Oral)   Resp 24   Ht 1.6 m (5' 3\")   Wt 85.7 kg (189 lb)   LMP 08/06/1988   SpO2 96%   BMI 33.48 kg/m      General Appearance:   Well-developed, obese elderly female no acute distress   HEENT:  Normocephalic, atraumatic.  Sclera nonicteric.  Mucous membranes moist   Neck: No jugular venous distention or carotid bruits   Chest: Symmetric with normal AP diameter   Lungs:   Clear to auscultation and percussion bilaterally   Cardiovascular:   Regular rhythm.  S1, S2 normal.  No murmur or gallop   Abdomen:  Obese, soft, nondistended, nontender.  No organomegaly or mass   Extremities: No peripheral edema bilaterally.  No clubbing or cyanosis.  Distal pulses symmetric.   Skin: No rash or lesions   Neurologic: Alert and oriented x3.  No gross focal deficit.             Cardiographics (personally " reviewed)  ECG on presentation demonstrated atrial fibrillation with rapid ventricular response.  No ischemic changes.  Repeat ECG following cardioversion demonstrates sinus rhythm    Imaging (personally reviewed)  Echocardiogram currently pending.    Previous echo study demonstrated moderate biatrial chamber enlargement, borderline to mildly reduced LV function with ejection fraction of 50% which was global.  Mild MR, AI and TR.    Lab Review (personally reviewed all results)  Recent Labs   Lab Test 08/09/22  1010   CHOL 197   HDL 67   LDL 78   TRIG 262*     Recent Labs   Lab Test 08/09/22  1010 08/04/21  0942 06/08/20  1233   LDL 78 74 77     Recent Labs   Lab Test 08/08/23  0507      POTASSIUM 4.1   CHLORIDE 106   CO2 26   GLC 94   BUN 14.2   CR 0.89   GFRESTIMATED 64   AMINA 8.6*     Recent Labs   Lab Test 08/08/23  0507 08/07/23  1155 11/04/22  1754   CR 0.89 1.17* 0.86     Recent Labs   Lab Test 12/06/21  1639 11/04/20  0926 06/08/20  1233   A1C 5.9* 6.1* 6.3*          Recent Labs   Lab Test 08/08/23  0507   WBC 6.6   HGB 13.2   HCT 41.0   MCV 95        Recent Labs   Lab Test 08/08/23  0507 08/07/23  1155 08/09/22  1010   HGB 13.2 15.2 14.8    Recent Labs   Lab Test 05/12/22  1012   TROPONINI <0.01     Recent Labs   Lab Test 08/07/23  1155 12/06/21  1639 11/30/20  1138   BNP  --  51 34   NTBNPI 2,254*  --   --      Recent Labs   Lab Test 08/07/23  1155   TSH 1.48     Recent Labs   Lab Test 08/07/23  1524 06/29/18 2010   INR 1.22* 0.97

## 2023-08-08 NOTE — PROGRESS NOTES
Pt did not bring home unit and is placed on hospital CPAP; auto titration 5-20 cmH2O/21% FIO2.    Henry Mcdaniel, RT

## 2023-08-08 NOTE — H&P
"[facility]    History and Physical - Hospitalist Service       Date of Admission:  8/7/2023    Assessment & Plan      Kori Leach is a 83 year old female with PMH significant for PAF on Eliquis, CHF, HTN and HLP who is admitted for observation on 8/7/2023 due to Vertigo.    Vertigo- Admit for observation. This began 20 minutes after Cardioversion, therefore stroke work up was initiated in ED. CT head and MRI brain had no acute intracranial abnormality. PRN Meclizine. Fall precautions. Bedrest with bedside commode.    2. A.fib with RVR- NSR s/p Cardioversion in ED earlier today. Continue telemetry monitoring. Plan per Cardiology.    3. CHF exacerbation- Interstitial edema seen on imaging. KDM=5630. Lasix x1 dose ordered. Discontinue IV fluids started by ED. Follow up echocardiogram. EF 60-65% 11/2022.    4. ANGIE- S/p IV fluids in ED. Will discontinue due to CHF. Avoid nephrotoxic agents. Repeat renal function in a.m.    5. HTN- BP stable. Review home meds. Monitor vitals closely.    6. TAMAR- CPAP at bedtime.     Diet: Regular Diet Adult    DVT Prophylaxis: Chronically anticoagulated with Eliquis. SCDs.  Prajapati Catheter: Not present  Lines: None     Cardiac Monitoring: None  Code Status:  Full Code per patient.    Clinically Significant Risk Factors Present on Admission               # Drug Induced Coagulation Defect: home medication list includes an anticoagulant medication    # Hypertension: Noted on problem list      # Obesity: Estimated body mass index is 33.48 kg/m  as calculated from the following:    Height as of this encounter: 1.6 m (5' 3\").    Weight as of this encounter: 85.7 kg (189 lb).            Disposition Plan   Anticipate discharge home in <2 days if medically stable.       Zayda Peralta MD  Hospitalist Service  [facility]  Securely message with [employer] (more info)  Text page via [employer] Paging/Directory "     ______________________________________________________________________    Chief Complaint   Dizziness    History is obtained from the patient, ED physician and chart review.    History of Present Illness   Kori Leach is a 83 year old female with PMH significant for PAF on Eliquis, CHF, HTN and HLP who initially presented to ED due to A.fib with RVR, but remains for Vertigo. Patient was cardioverted in ED and converted to NSR. Thirty minutes after cardioversion patient developed lightheadedness and persistent dizziness. Stroke work up was initiated and negative for acute CVA. Patient received Meclizine x 1 dose with no improvement so far. She denies visual changes, chest pain, palpitations, shortness of breath, nausea, vomiting, diarrhea.      Past Medical History    Past Medical History:   Diagnosis Date    Arthritis     knees and hip replacement    Bleeding diathesis (H) - due to ELIQUIS     Chronic obstructive pulmonary disease, unspecified COPD type (H) 06/04/2018    PFT Complete  Performed 5/21/2018 Final result Study Result FEV1/FVC is 69 and is reduced. FEV1 is 66% predicted and is reduced. FVC is 74% predicted and reduced. There was improvement in spirometry after a single inhaled dose of bronchodilator. TLC is 110% predicted and is normal. RV is 141% predicted and is increased. DLCO is 77% predicted and is reduced when it  is corrected for hemoglobin.  Im    Degenerative disc disease, cervical     Fatty liver     Former smoker     GERD (gastroesophageal reflux disease)     History of nicotine use 06/04/2018    HLD (hyperlipidemia)     HTN (hypertension)     MO (iron deficiency anemia) 04/24/2015    Inverted nipple     bilateral    Loss of hearing     Lumbar back pain     Morbid obesity (H) 09/04/2018    Pre-diabetes     Severe obstructive sleep apnea 04/18/2018    10/06/17 where we ordered a sleep study which showed severe TAMAR with an RDI of 30 and she had a titration study that showed CPAP of 9  cmH20 to be effective. She was set up with the CPAP on 10/25/17 through  DME.     Urinary incontinence        Past Surgical History   Past Surgical History:   Procedure Laterality Date    APPENDECTOMY      ARTHROPLASTY KNEE BILATERAL      CATARACT EXTRACTION      CERVICAL SPINE SURGERY  2015    JOINT REPLACEMENT      bilateral knees and right hip    LAPAROSCOPIC CHOLECYSTECTOMY N/A 6/29/2018    Procedure: CHOLECYSTECTOMY, LAPAROSCOPIC;  Surgeon: Jose Armando Humphries MD;  Location: [facility] [place];  Service:     TONSILLECTOMY      WISDOM TOOTH EXTRACTION         Prior to Admission Medications   Prior to Admission Medications   Prescriptions Last Dose Informant Patient Reported? Taking?   Fluticasone-Umeclidin-Vilanterol (TRELEGY ELLIPTA) 200-62.5-25 MCG/INH oral inhaler   No No   Sig: Inhale 1 puff into the lungs daily   ipratropium - albuterol 0.5 mg/2.5 mg/3 mL (DUONEB) 0.5-2.5 (3) MG/3ML neb solution   No No   Sig: Take 1 vial (3 mLs) by nebulization every 6 hours as needed for shortness of breath / dyspnea or wheezing   meclizine (ANTIVERT) 25 mg tablet   No No   Sig: [MECLIZINE (ANTIVERT) 25 MG TABLET] Take 1 tablet (25 mg total) by mouth 3 (three) times a day as needed.   metoprolol tartrate (LOPRESSOR) 50 MG tablet   Yes No   Sig: Take 50 mg by mouth 2 times daily   multivitamin (MULTIPLE VITAMINS ORAL)   Yes No   Sig: [MULTIVITAMIN (MULTIPLE VITAMINS ORAL)] Take by mouth.   mv-mn/iron/folic acid/herb 190 (VITAMIN D3 COMPLETE ORAL)   Yes No   rivaroxaban ANTICOAGULANT (XARELTO) 20 MG TABS tablet   No No   Sig: Take 1 tablet (20 mg) by mouth daily (with dinner)   rosuvastatin (CRESTOR) 40 MG tablet   No No   Sig: TAKE 1 TABLET AT BEDTIME      Facility-Administered Medications: None        Review of Systems    The 10 point Review of Systems is negative other than noted in the HPI.    Social History   I have reviewed this patient's social history and updated it with pertinent information if needed.  Social  History     Tobacco Use    Smoking status: Former     Types: Cigarettes    Smokeless tobacco: Never    Tobacco comments:     1-2 cigarettes per day on average   Vaping Use    Vaping Use: Never used   Substance Use Topics    Alcohol use: Yes     Comment: Alcoholic Drinks/day: less than 1 drink per month     Drug use: No         Family History   I have reviewed this patient's family history and updated it with pertinent information if needed.  Family History   Problem Relation Age of Onset    Breast Cancer Mother 45    Diabetes Mother     Heart Disease Mother     Hyperlipidemia Mother     Hypertension Mother     Depression Father     Alcoholism Father     Hyperlipidemia Sister     Depression Sister     Obesity Sister     Obesity Daughter     Lung Cancer Son     Cerebrovascular Disease Son         Physical Exam   Vital Signs: Temp: 98  F (36.7  C) Temp src: Oral BP: 121/71 Pulse: 77   Resp: 24 SpO2: 95 % O2 Device: None (Room air) Oxygen Delivery: 2 LPM  Weight: 189 lbs 0 oz    General Appearance: AAOx3, NAD, Laying still to minimize dizziness  Respiratory: Clear, decreased breath sounds in bases  Cardiovascular: RRR, S1S2  GI: +BS, soft, NT, ND  Skin: No rash  Other: No pedal edema     Medical Decision Making       50 MINUTES SPENT BY ME on the date of service doing chart review, history, exam, documentation & further activities per the note.      Data     I have personally reviewed the following data over the past 24 hrs:    7.1  \   15.2   / 279     144 109 (H) 21.7 /  116 (H)   4.5 23 1.17 (H) \     ALT: 20 AST: 18 AP: 117 (H) TBILI: 0.3   ALB: 4.5 TOT PROTEIN: 7.0 LIPASE: N/A     Trop: 9 BNP: 2,254 (H)     TSH: 1.48 T4: N/A A1C: N/A     INR:  1.22 (H) PTT:  N/A   D-dimer:  N/A Fibrinogen:  N/A       Imaging results reviewed over the past 24 hrs:   Recent Results (from the past 24 hour(s))   XR Chest Port 1 View    Narrative    EXAM: XR CHEST PORT 1 VIEW  LOCATION: [facility]  DATE:  8/7/2023    INDICATION: chest tightness  COMPARISON: 12/02/2022       Impression    IMPRESSION: No pleural fluid or pneumothorax. Reticular interstitial opacities in the lungs are likely from edema. A subtle opacity over the left lower lung could be minimal airspace disease or bronchial wall thickening. Normal size of the heart.   Anterior cervical fusion hardware is noted. There are degenerative changes in the spine.    CTA Head Neck with Contrast    Narrative    EXAM: CTA HEAD NECK W CONTRAST  LOCATION: [facility]  DATE: 8/7/2023    INDICATION: Code Stroke to evaluate for potential thrombolysis and thrombectomy.    COMPARISON: 11/4/2022.    CONTRAST: 75 mL Isovue 370.    TECHNIQUE: Head and neck CT angiogram with IV contrast. Noncontrast head CT followed by axial helical CT images of the head and neck vessels obtained during the arterial phase of intravenous contrast administration. Axial 2D reconstructed images and   multiplanar 3D MIP reconstructed images of the head and neck vessels were performed by the technologist. Dose reduction techniques were used. All stenosis measurements made according to NASCET criteria unless otherwise specified.    FINDINGS:   NONCONTRAST HEAD CT:   INTRACRANIAL CONTENTS: No intracranial hemorrhage, extra-axial collection, or mass effect. No CT evidence of acute infarct. Mild presumed chronic small vessel ischemic changes. Mild generalized volume loss. No hydrocephalus.     VISUALIZED ORBITS/SINUSES/MASTOIDS: No intraorbital abnormality. No paranasal sinus mucosal disease. No middle ear or mastoid effusion.    BONES/SOFT TISSUES: No acute abnormality.    HEAD CTA:  There is atherosclerosis of the bilateral carotid siphons without flow-limiting stenosis. The major proximal branches of the anterior cerebral and middle cerebral arteries appear patent. The bilateral vertebral arteries and the basilar artery are patent.   The proximal branches of the posterior  cerebral arteries are patent. No intracranial aneurysm or high flow vascular malformation is identified.     NECK CTA:  RIGHT CAROTID: No measurable stenosis or dissection. Mild atherosclerosis of the carotid bifurcation.    LEFT CAROTID: No measurable stenosis or dissection.    VERTEBRAL ARTERIES: No focal stenosis or dissection. Balanced vertebral arteries.    AORTIC ARCH: Bovine origin left common carotid artery. No significant stenosis at the origin of the great vessels.    NONVASCULAR STRUCTURES: There appears to be mild increase in size of the previously demonstrated indeterminate soft tissue mass centered in the superficial lobe of the right parotid gland measuring up to 18-19 mm (series 11, image 92) compared to 15-16   mm previously. This is again suspicious for a neoplastic process, potentially a primary parotid neoplasm. A pathologically enlarged lymph node would also be in the differential. ACDF changes C3-C6. There also appears to be ankylosis across the posterior   elements at multiple levels in the cervical spine. Multilevel degenerative changes are noted in the cervical and upper thoracic spine. There appears to be developmental fusion at C7-T1. Partially visualized centrilobular emphysematous changes in the   upper lung zones.      Impression    IMPRESSION:   NONCONTRAST HEAD CT:  1.  No CT evidence for acute intracranial process.  2.  Brain atrophy and presumed chronic microvascular ischemic changes as above.    HEAD CTA:   1.  No significant stenosis, aneurysm, or high flow vascular malformation identified.    NECK CTA:  1.  No hemodynamically significant stenosis in the neck vessels.   2.  No evidence for dissection.  3.  Mildly increased size of an indeterminate mass in the right parotid gland. A neoplastic process is suspected. Recommend otolaryngology consult/follow-up. If not performed previously, tissue sampling should be considered.    Findings were discussed with Dr. Gabriela Cruz by myself  at approximately 3:17 PM 8/7/2023.     MR Brain w/o & w Contrast    Narrative    EXAM: MR BRAIN W/O and W CONTRAST  LOCATION: [facility]  DATE: 8/7/2023    INDICATION: dizziness concern for stroke  COMPARISON: CTA head and neck 08/07/2023  CONTRAST: 8.5ml Gadavist  TECHNIQUE: Routine multiplanar multisequence head MRI without and with intravenous contrast.    FINDINGS:  INTRACRANIAL CONTENTS: No acute or subacute infarct. No mass, acute hemorrhage, or extra-axial fluid collections. Scattered nonspecific T2/FLAIR hyperintensities within the cerebral white matter most consistent with mild chronic microvascular ischemic   change. Mild generalized cerebral atrophy. No hydrocephalus. Normal position of the cerebellar tonsils. No pathologic contrast enhancement.    SELLA: No abnormality accounting for technique.    OSSEOUS STRUCTURES/SOFT TISSUES: Normal marrow signal. The major intracranial vascular flow voids are maintained. Nonspecific 1.7 cm enhancing mass at the superficial lobe right parotid gland.    ORBITS: Prior bilateral cataract surgery. Visualized portions of the orbits are otherwise unremarkable.     SINUSES/MASTOIDS: No paranasal sinus mucosal disease. No middle ear or mastoid effusion.       Impression    IMPRESSION:  1.  No acute intracranial process.  2.  Generalized brain atrophy and presumed microvascular ischemic changes as detailed above.  3.  Redemonstrated is 1.7 cm mass at the superficial lobe right parotid gland, malignancy not excluded.

## 2023-08-08 NOTE — ED NOTES
Neurology, Dr. Lisa updated about pt's dizziness, room spinning since post cardioversion yesterday and 3 episodes of sharp headaches this AM with no other  neuro deficits noted

## 2023-08-08 NOTE — CONSULTS
NEUROLOGY CONSULTATION NOTE     Kori Leach,  1940, MRN [id number] Date: 2023     [facility]   Code status:  Full Code   PCP: Donaldo Vernon, [phone]      ASSESSMENT & PLAN   Diagnosis code: Vertigo    83-year-old woman with paroxysmal A-fib presenting yesterday with palpitations, underwent DC cardioversion and subsequently developed vertiginous symptoms    Brain MRI shows nothing acute.  CTA is without significant stenosis, no posterior narrowing.  She does have a parotid lesion which should be followed up as outpatient.  PT or OT evaluation for vestibular dysfunction.  This can be completed as outpatient.  Continue as needed meclizine, benzodiazepine.  Echocardiogram unremarkable.   She has been up with nursing walking around.  My exam was largely unremarkable.  Neurology will sign off.  If vertigo symptoms persist she should undergo outpatient physical therapy evaluation and an updated eye exam.     Chief Complaint   Patient presents with    Tachycardia        HISTORY OF PRESENT ILLNESS     We have been requested by Dr. Carmona to evaluate Kori Leach who is a 83 year old female for vertigo symptoms. Per chart review and discussion with nurse, the patient presented on 23 with palpitations and was found to be in A-fib with RVR.  She has known history of paroxysmal A-fib and is anticoagulated.  She underwent DC cardioversion.  Apparently she underwent a similar procedure in [place] over the winter.    After the cardioversion this admission the patient has been complaining about vertigo symptoms, worsened with movement.  Did not respond to meclizine or more recently Valium so far.    Additional history of tobacco use, obesity, TAMAR on CPAP, hypertension and hyperlipidemia.    Nasreen tells me that she still feels somewhat dizzy.  She is not sure if the medications have helped.  It does seem a little worse when she is up on her feet but she feels like she can function at home  with help from her sister who will stay with her.  She has had episodes of dizziness in the past.  She describes undergoing a vestibular evaluation and Epley maneuvers.  This current episode is lasting longer than previous.    She expresses interest in going home.     PAST MEDICAL & SURGICAL HISTORY     Medical History  Past Medical History:   Diagnosis Date    Arthritis     knees and hip replacement    Bleeding diathesis (H) - due to ELIQUIS     Chronic obstructive pulmonary disease, unspecified COPD type (H) 06/04/2018    PFT Complete  Performed 5/21/2018 Final result Study Result FEV1/FVC is 69 and is reduced. FEV1 is 66% predicted and is reduced. FVC is 74% predicted and reduced. There was improvement in spirometry after a single inhaled dose of bronchodilator. TLC is 110% predicted and is normal. RV is 141% predicted and is increased. DLCO is 77% predicted and is reduced when it  is corrected for hemoglobin.  Im    Degenerative disc disease, cervical     Fatty liver     Former smoker     GERD (gastroesophageal reflux disease)     History of nicotine use 06/04/2018    HLD (hyperlipidemia)     HTN (hypertension)     MO (iron deficiency anemia) 04/24/2015    Inverted nipple     bilateral    Loss of hearing     Lumbar back pain     Morbid obesity (H) 09/04/2018    Pre-diabetes     Severe obstructive sleep apnea 04/18/2018    10/06/17 where we ordered a sleep study which showed severe TAMAR with an RDI of 30 and she had a titration study that showed CPAP of 9 cmH20 to be effective. She was set up with the CPAP on 10/25/17 through [facility].     Urinary incontinence      Surgical History  Past Surgical History:   Procedure Laterality Date    APPENDECTOMY      ARTHROPLASTY KNEE BILATERAL      CATARACT EXTRACTION      CERVICAL SPINE SURGERY  2015    JOINT REPLACEMENT      bilateral knees and right hip    LAPAROSCOPIC CHOLECYSTECTOMY N/A 6/29/2018    Procedure: CHOLECYSTECTOMY, LAPAROSCOPIC;  Surgeon: Jose Armando Humphries MD;   Location: [facility] [place], [place];  Service:     TONSILLECTOMY      WISDOM TOOTH EXTRACTION          SOCIAL HISTORY     Social History      FAMILY HISTORY     Reviewed, and family history includes Alcoholism in her father; Breast Cancer (age of onset: 45) in her mother; Cerebrovascular Disease in her son; Depression in her father and sister; Diabetes in her mother; Heart Disease in her mother; Hyperlipidemia in her mother and sister; Hypertension in her mother; Lung Cancer in her son; Obesity in her daughter and sister.     ALLERGIES     Allergies   Allergen Reactions    Lisinopril Cough        REVIEW OF SYSTEMS     Pertinent items are noted in HPI.     HOME & HOSPITAL MEDICATIONS     Prior to Admission Medications  (Not in a hospital admission)      Hospital Medications   ferrous sulfate  325 mg Oral Daily    fluticasone-vilanterol  1 puff Inhalation Daily    And    umeclidinium  1 puff Inhalation Daily    rivaroxaban ANTICOAGULANT  20 mg Oral Daily with breakfast    rosuvastatin  40 mg Oral At Bedtime    sotalol  80 mg Oral Daily at 8 pm        PHYSICAL EXAM     Vital signs  Temp:  [98.2  F (36.8  C)] 98.2  F (36.8  C)  Pulse:  [56-83] 74  Resp:  [14-34] 32  BP: (103-139)/(53-77) 113/61  SpO2:  [92 %-100 %] 93 %    Weight:   [unfilled]    General Physical Exam: Patient is alert and oriented x 3. Vital signs were reviewed and are documented in EMR.   Neurological Exam:  Mental status: Attentive, interactive.   Speech: Fluent.   Cranial nerves:  2-12 intact. Complains of a little double vision with eye movements. HINTs testing unrevealing.   Motor: 5/5 in upper and lower extremities.  Reflexes: Slightly more brisk in the right patella compared to the left otherwise unremarkable.  Sensory: Intact to light touch throughout.  Coordination: Finger-nose-finger and heel-to-shin are without dysmetria bilaterally.  Gait: Cautious gait but otherwise unremarkable.     DIAGNOSTIC STUDIES     Pertinent Radiology   Radiology  Results: Personally reviewed image/s    CT/CTA:  IMPRESSION:   NONCONTRAST HEAD CT:  1.  No CT evidence for acute intracranial process.  2.  Brain atrophy and presumed chronic microvascular ischemic changes as above.     HEAD CTA:   1.  No significant stenosis, aneurysm, or high flow vascular malformation identified.     NECK CTA:  1.  No hemodynamically significant stenosis in the neck vessels.   2.  No evidence for dissection.  3.  Mildly increased size of an indeterminate mass in the right parotid gland. A neoplastic process is suspected. Recommend otolaryngology consult/follow-up. If not performed previously, tissue sampling should be considered.       MRI:  IMPRESSION:  1.  No acute intracranial process.  2.  Generalized brain atrophy and presumed microvascular ischemic changes as detailed above.  3.  Redemonstrated is 1.7 cm mass at the superficial lobe right parotid gland, malignancy not excluded.    Pertinent Labs   Lab Results: personally reviewed.   Recent Results (from the past 24 hour(s))   INR    Collection Time: 08/07/23  3:24 PM   Result Value Ref Range    INR 1.22 (H) 0.85 - 1.15   Extra Purple Top Tube    Collection Time: 08/07/23  3:24 PM   Result Value Ref Range    Hold Specimen JIC    Troponin T, High Sensitivity (now)    Collection Time: 08/07/23  4:39 PM   Result Value Ref Range    Troponin T, High Sensitivity 9 <=14 ng/L   Basic metabolic panel    Collection Time: 08/08/23  5:07 AM   Result Value Ref Range    Sodium 142 136 - 145 mmol/L    Potassium 4.1 3.4 - 5.3 mmol/L    Chloride 106 98 - 107 mmol/L    Carbon Dioxide (CO2) 26 22 - 29 mmol/L    Anion Gap 10 7 - 15 mmol/L    Urea Nitrogen 14.2 8.0 - 23.0 mg/dL    Creatinine 0.89 0.51 - 0.95 mg/dL    Calcium 8.6 (L) 8.8 - 10.2 mg/dL    Glucose 94 70 - 99 mg/dL    GFR Estimate 64 >60 mL/min/1.73m2   CBC with platelets and differential    Collection Time: 08/08/23  5:07 AM   Result Value Ref Range    WBC Count 6.6 4.0 - 11.0 10e3/uL    RBC Count  4.30 3.80 - 5.20 10e6/uL    Hemoglobin 13.2 11.7 - 15.7 g/dL    Hematocrit 41.0 35.0 - 47.0 %    MCV 95 78 - 100 fL    MCH 30.7 26.5 - 33.0 pg    MCHC 32.2 31.5 - 36.5 g/dL    RDW 13.3 10.0 - 15.0 %    Platelet Count 222 150 - 450 10e3/uL    % Neutrophils 65 %    % Lymphocytes 23 %    % Monocytes 9 %    % Eosinophils 2 %    % Basophils 1 %    % Immature Granulocytes 0 %    NRBCs per 100 WBC 0 <1 /100    Absolute Neutrophils 4.3 1.6 - 8.3 10e3/uL    Absolute Lymphocytes 1.5 0.8 - 5.3 10e3/uL    Absolute Monocytes 0.6 0.0 - 1.3 10e3/uL    Absolute Eosinophils 0.2 0.0 - 0.7 10e3/uL    Absolute Basophils 0.0 0.0 - 0.2 10e3/uL    Absolute Immature Granulocytes 0.0 <=0.4 10e3/uL    Absolute NRBCs 0.0 10e3/uL       Total time spent for face to face visit, reviewing labs/imaging studies, counseling and coordination of care was: 1 Hour 15 Minutes. More than 50% of this time was spent on counseling and coordination of care.    Dragon software used in the dictation of this note.    Sharon Lisa MD  [facility] - [address], [address]  [place] [address]  [phone]

## 2023-08-08 NOTE — MEDICATION SCRIBE - ADMISSION MEDICATION HISTORY
Medication Scribe Admission Medication History    Admission medication history is complete. The information provided in this note is only as accurate as the sources available at the time of the update.    Medication reconciliation/reorder completed by provider prior to medication history? No    Information Source(s): Patient via in-person    Pertinent Information: Patient brought her medications in little plastic bag.    Changes made to PTA medication list:  Added: Ferrous [id number] mg tablet  Deleted: Vitamin D3 complete  Changed: None    Medication Affordability:  Not including over the counter (OTC) medications, was there a time in the past 3 months when you did not take your medications as prescribed because of cost?: No    Allergies reviewed with patient and updates made in EHR: yes    Medication History Completed By: Mirella Raymond 8/7/2023 9:50 PM    Prior to Admission medications    Medication Sig Last Dose Taking? Auth Provider Long Term End Date   ferrous sulfate (FE TABS) 325 (65 Fe) MG EC tablet Take 325 mg by mouth daily 8/7/2023 at am Yes Reported, Patient     Fluticasone-Umeclidin-Vilanterol (TRELEGY ELLIPTA) 200-62.5-25 MCG/INH oral inhaler Inhale 1 puff into the lungs daily 8/7/2023 at am Yes Donaldo Vernon MD Yes    ipratropium - albuterol 0.5 mg/2.5 mg/3 mL (DUONEB) 0.5-2.5 (3) MG/3ML neb solution Take 1 vial (3 mLs) by nebulization every 6 hours as needed for shortness of breath / dyspnea or wheezing Unknown at prn Yes Donaldo Vernon MD Yes    meclizine (ANTIVERT) 25 mg tablet [MECLIZINE (ANTIVERT) 25 MG TABLET] Take 1 tablet (25 mg total) by mouth 3 (three) times a day as needed. Unknown at prn Yes Shawna Moses NP     metoprolol tartrate (LOPRESSOR) 50 MG tablet Take 50 mg by mouth 2 times daily 8/7/2023 at am Yes Reported, Patient Yes    multivitamin (MULTIPLE VITAMINS ORAL) [MULTIVITAMIN (MULTIPLE VITAMINS ORAL)] Take by mouth. 8/7/2023 at am Yes Provider, Historical     rivaroxaban  ANTICOAGULANT (XARELTO) 20 MG TABS tablet Take 1 tablet (20 mg) by mouth daily (with dinner) 8/7/2023 at am Yes Shawna Moses NP Yes    rosuvastatin (CRESTOR) 40 MG tablet TAKE 1 TABLET AT BEDTIME 8/6/2023 at hs Yes Donaldo Vernon MD Yes

## 2023-08-08 NOTE — DISCHARGE SUMMARY
[facility]  Hospitalist Discharge Summary      Date of Admission:  8/7/2023  Date of Discharge:  8/8/2023  Discharging Provider: Stoney Carmona MD  Discharge Service: Hospitalist Service    Discharge Diagnoses   Vertigo    Follow-ups Needed After Discharge   Follow-up Appointments     Follow-up and recommended labs and tests       Follow up with primary care provider, Donaldo Vernon, within 7 days for   hospital follow- up.  The following labs/tests are recommended: BMP,   Magnesium.            Unresulted Labs Ordered in the Past 30 Days of this Admission       No orders found for last 31 day(s).        These results will be followed up by PCP    Discharge Disposition   Discharged to home  Condition at discharge: Stable    Hospital Course   83 year old female with PMH significant for PAF on Eliquis, CHF, COPD, HTN and HLP who is admitted for observation on 8/7/2023 due to vertigo after undergoing a cardioversion for Afib with RVR in the ER. Patient had persistent vertigo despite meclizine. Tele-neuro was consulted. Underwent CTA head and neck and MRI brain which was negative for CVA.     Post cardioversion was treated with lasix for mild CHF. TTE showed normal heart function.    Patient ambulated around the unit and did okay and discharged home. Neurology evaluated the patient. Placed referral for outpatient physical therapy.    Vertigo s/p Cardioversion  A.fib with RVR s/p Cardioversion  CHF exacerbation 2/2 Afib  ANGIE  HTN  Improved with Lasix.  -Cardiology recommends switching Metoprolol to Sotalol 80 mg at bedtime, new prescription sent but will need further refills  -Xarelto    Right Parotid Gland  -Recommend outpatient follow up    COPD  -Continue inhalers (swapped Trelegy Ellipta inhouse)    MO  -Continue oral iron    TAMAR  CPAP at bedtime.    Obesity  -Recommend lifestyle modification    Consultations This Hospital Stay   CARDIOLOGY IP CONSULT  NEUROLOGY IP CONSULT  CARE MANAGEMENT /  SOCIAL WORK IP CONSULT    Code Status   Full Code    Time Spent on this Encounter   I, Stoney Carmona MD, personally saw the patient today and spent greater than 30 minutes discharging this patient.       Stoney Carmona MD  [facility] EMERGENCY DEPARTMENT  [facility] [facility] [address]  Phone: [phone]  ______________________________________________________________________    Physical Exam   Vital Signs: Temp: 98.2  F (36.8  C) Temp src: Oral BP: 113/61 Pulse: 67   Resp: 27 SpO2: 91 % O2 Device: None (Room air)    Weight: 189 lbs 0 oz    General: NAD  CV: +S1/S2, no pitting edema  Respiratory: clear to auscultation bilaterally  GI: soft, non-tender, non-distended  Neuro: alert       Primary Care Physician   Donaldo Vernon    Discharge Orders      [facility]  Referral      Physical Therapy Referral      Reason for your hospital stay    You were hospitalized with dizziness (vertigo) after undergoing a cardioversion.  You received a cardiologist who recommended switching from metoprolol to a medication called sotalol to try to control your heart rhythm.     Follow-up and recommended labs and tests     Follow up with primary care provider, Donaldo Vernon, within 7 days for hospital follow- up.  The following labs/tests are recommended: BMP, Magnesium.     Activity    Your activity upon discharge: activity as tolerated     Diet    Follow this diet upon discharge: Low Saturated Fat Na <2400mg Diet       Significant Results and Procedures   Most Recent 3 CBC's:  Recent Labs   Lab Test 08/08/23  0507 08/07/23  1155 08/09/22  1010   WBC 6.6 7.1 5.4   HGB 13.2 15.2 14.8   MCV 95 94 92    279 229     Most Recent 3 BMP's:  Recent Labs   Lab Test 08/08/23  0507 08/07/23  1155 11/04/22  1754    144 140   POTASSIUM 4.1 4.5 3.7   CHLORIDE 106 109* 102   CO2 26 23 24   BUN 14.2 21.7 15.8   CR 0.89 1.17* 0.86   ANIONGAP 10 12 14   AMINA 8.6* 9.3 9.0   GLC 94 116*  96     Most Recent 2 LFT's:  Recent Labs   Lab Test 08/07/23  1155 08/09/22  1010   AST 18 24   ALT 20 24   ALKPHOS 117* 110*   BILITOTAL 0.3 0.3     Most Recent 3 INR's:  Recent Labs   Lab Test 08/07/23  1524 06/29/18 2010   INR 1.22* 0.97   ,   Results for orders placed or performed during the hospital encounter of 08/07/23   XR Chest Port 1 View    Narrative    EXAM: XR CHEST PORT 1 VIEW  LOCATION: [facility]  DATE: 8/7/2023    INDICATION: chest tightness  COMPARISON: 12/02/2022       Impression    IMPRESSION: No pleural fluid or pneumothorax. Reticular interstitial opacities in the lungs are likely from edema. A subtle opacity over the left lower lung could be minimal airspace disease or bronchial wall thickening. Normal size of the heart.   Anterior cervical fusion hardware is noted. There are degenerative changes in the spine.    CTA Head Neck with Contrast    Narrative    EXAM: CTA HEAD NECK W CONTRAST  LOCATION: [facility]  DATE: 8/7/2023    INDICATION: Code Stroke to evaluate for potential thrombolysis and thrombectomy.    COMPARISON: 11/4/2022.    CONTRAST: 75 mL Isovue 370.    TECHNIQUE: Head and neck CT angiogram with IV contrast. Noncontrast head CT followed by axial helical CT images of the head and neck vessels obtained during the arterial phase of intravenous contrast administration. Axial 2D reconstructed images and   multiplanar 3D MIP reconstructed images of the head and neck vessels were performed by the technologist. Dose reduction techniques were used. All stenosis measurements made according to NASCET criteria unless otherwise specified.    FINDINGS:   NONCONTRAST HEAD CT:   INTRACRANIAL CONTENTS: No intracranial hemorrhage, extra-axial collection, or mass effect. No CT evidence of acute infarct. Mild presumed chronic small vessel ischemic changes. Mild generalized volume loss. No hydrocephalus.     VISUALIZED ORBITS/SINUSES/MASTOIDS: No  intraorbital abnormality. No paranasal sinus mucosal disease. No middle ear or mastoid effusion.    BONES/SOFT TISSUES: No acute abnormality.    HEAD CTA:  There is atherosclerosis of the bilateral carotid siphons without flow-limiting stenosis. The major proximal branches of the anterior cerebral and middle cerebral arteries appear patent. The bilateral vertebral arteries and the basilar artery are patent.   The proximal branches of the posterior cerebral arteries are patent. No intracranial aneurysm or high flow vascular malformation is identified.     NECK CTA:  RIGHT CAROTID: No measurable stenosis or dissection. Mild atherosclerosis of the carotid bifurcation.    LEFT CAROTID: No measurable stenosis or dissection.    VERTEBRAL ARTERIES: No focal stenosis or dissection. Balanced vertebral arteries.    AORTIC ARCH: Bovine origin left common carotid artery. No significant stenosis at the origin of the great vessels.    NONVASCULAR STRUCTURES: There appears to be mild increase in size of the previously demonstrated indeterminate soft tissue mass centered in the superficial lobe of the right parotid gland measuring up to 18-19 mm (series 11, image 92) compared to 15-16   mm previously. This is again suspicious for a neoplastic process, potentially a primary parotid neoplasm. A pathologically enlarged lymph node would also be in the differential. ACDF changes C3-C6. There also appears to be ankylosis across the posterior   elements at multiple levels in the cervical spine. Multilevel degenerative changes are noted in the cervical and upper thoracic spine. There appears to be developmental fusion at C7-T1. Partially visualized centrilobular emphysematous changes in the   upper lung zones.      Impression    IMPRESSION:   NONCONTRAST HEAD CT:  1.  No CT evidence for acute intracranial process.  2.  Brain atrophy and presumed chronic microvascular ischemic changes as above.    HEAD CTA:   1.  No significant stenosis,  aneurysm, or high flow vascular malformation identified.    NECK CTA:  1.  No hemodynamically significant stenosis in the neck vessels.   2.  No evidence for dissection.  3.  Mildly increased size of an indeterminate mass in the right parotid gland. A neoplastic process is suspected. Recommend otolaryngology consult/follow-up. If not performed previously, tissue sampling should be considered.    Findings were discussed with Dr. Gabriela Cruz by myself at approximately 3:17 PM 8/7/2023.     MR Brain w/o & w Contrast    Narrative    EXAM: MR BRAIN W/O and W CONTRAST  LOCATION: [facility]  DATE: 8/7/2023    INDICATION: dizziness concern for stroke  COMPARISON: CTA head and neck 08/07/2023  CONTRAST: 8.5ml Gadavist  TECHNIQUE: Routine multiplanar multisequence head MRI without and with intravenous contrast.    FINDINGS:  INTRACRANIAL CONTENTS: No acute or subacute infarct. No mass, acute hemorrhage, or extra-axial fluid collections. Scattered nonspecific T2/FLAIR hyperintensities within the cerebral white matter most consistent with mild chronic microvascular ischemic   change. Mild generalized cerebral atrophy. No hydrocephalus. Normal position of the cerebellar tonsils. No pathologic contrast enhancement.    SELLA: No abnormality accounting for technique.    OSSEOUS STRUCTURES/SOFT TISSUES: Normal marrow signal. The major intracranial vascular flow voids are maintained. Nonspecific 1.7 cm enhancing mass at the superficial lobe right parotid gland.    ORBITS: Prior bilateral cataract surgery. Visualized portions of the orbits are otherwise unremarkable.     SINUSES/MASTOIDS: No paranasal sinus mucosal disease. No middle ear or mastoid effusion.       Impression    IMPRESSION:  1.  No acute intracranial process.  2.  Generalized brain atrophy and presumed microvascular ischemic changes as detailed above.  3.  Redemonstrated is 1.7 cm mass at the superficial lobe right parotid gland, malignancy  not excluded.   Echocardiogram Complete     Value    LVEF  60-65%    [facility]    [id number]  [id number]  [id number]  [id number]^[address]IS[address] [address]     Name: RUPALI ORTIZ  MRN: [id number]  : 1940  Study Date: 2023 11:38 AM  Age: 83 yrs  Gender: Female  Patient Location: [facility]  Reason For Study: Pulmonary Edema  Ordering Physician: DINESH ARNDT  Performed By: TARA     BSA: 1.9 m2  Height: 63 in  Weight: 189 lb  HR: 70  BP: 123/60 mmHg  ______________________________________________________________________________  Procedure  Complete Portable Echo Adult. No hemodynamically significant valvular  abnormalities on 2D or color flow imaging. Compared to prior study, there is  no significant change.  ______________________________________________________________________________  Interpretation Summary     Left ventricular size, wall motion and function are normal. The ejection  fraction is 60-65%.  Normal right ventricle size and systolic function.  The left atrium is mildly dilated.  The right atrium is mildly dilated.  No hemodynamically significant valvular abnormalities on 2D or color flow  imaging.  Compared to prior study, there is no significant change.  ______________________________________________________________________________  Left Ventricle  Left ventricular size, wall motion and function are normal. The ejection  fraction is 60-65%. There is normal left ventricular wall thickness. Left  ventricular diastolic function is normal. No regional wall motion  abnormalities noted.     Right Ventricle  Normal right ventricle size and systolic function.     Atria  The left atrium is mildly dilated. The right atrium is mildly dilated. There  is no color Doppler evidence of an atrial shunt.     Mitral Valve  Mitral valve leaflets appear normal. There is no evidence of mitral stenosis  or clinically significant mitral regurgitation.     Tricuspid  Valve  The tricuspid valve is not well visualized. Right ventricle systolic pressure  estimate normal. There is trace tricuspid regurgitation.     Aortic Valve  Aortic valve leaflets appear normal. There is no evidence of aortic stenosis  or clinically significant aortic regurgitation.     Pulmonic Valve  The pulmonic valve is not well seen, but is grossly normal. This degree of  valvular regurgitation is within normal limits.     Vessels  The aorta root is normal. Normal size ascending aorta. IVC diameter <2.1 cm  collapsing >50% with sniff suggests a normal RA pressure of 3 mmHg.     Pericardium  There is no pericardial effusion.     ______________________________________________________________________________  MMode/2D Measurements & Calculations  IVSd: 0.87 cm  LVIDd: 5.0 cm  LVIDs: 3.5 cm  LVPWd: 0.86 cm  FS: 29.5 %     LV mass(C)d: 150.7 grams  LV mass(C)dI: 79.8 grams/m2  Ao root diam: 2.6 cm  LA dimension: 4.3 cm  LA/Ao: 1.7  LA Volume Indexed (AL/bp): 30.6 ml/m2  RV Base: 3.9 cm  RWT: 0.34  TAPSE: 1.8 cm     Time Measurements  MM HR: 62.0 BPM     Doppler Measurements & Calculations  MV E max femi: 94.7 cm/sec  MV A max femi: 34.7 cm/sec  MV E/A: 2.7  MV dec slope: 527.3 cm/sec2  MV dec time: 0.18 sec  LV V1 max P.6 mmHg  LV V1 max: 63.4 cm/sec  LV V1 VTI: 14.4 cm  PA acc time: 0.07 sec  TR max femi: 247.3 cm/sec  TR max P.5 mmHg  E/E' av.6  Lateral E/e': 10.4  Medial E/e': 18.9  RV S Femi: 11.1 cm/sec     ______________________________________________________________________________  Report approved by: Karri Perez 2023 03:20 PM             Discharge Medications   Current Discharge Medication List        START taking these medications    Details   sotalol (BETAPACE) 80 MG tablet Take 1 tablet (80 mg) by mouth daily Will need more refills from cardiology  Qty: 30 tablet, Refills: 1    Associated Diagnoses: Atrial fibrillation with rapid ventricular response (H)           CONTINUE  these medications which have NOT CHANGED    Details   ferrous sulfate (FE TABS) 325 (65 Fe) MG EC tablet Take 325 mg by mouth daily      Fluticasone-Umeclidin-Vilanterol (TRELEGY ELLIPTA) 200-62.5-25 MCG/INH oral inhaler Inhale 1 puff into the lungs daily  Qty: 60 each, Refills: 11    Associated Diagnoses: Chronic obstructive pulmonary disease, unspecified COPD type (H)      ipratropium - albuterol 0.5 mg/2.5 mg/3 mL (DUONEB) 0.5-2.5 (3) MG/3ML neb solution Take 1 vial (3 mLs) by nebulization every 6 hours as needed for shortness of breath / dyspnea or wheezing  Qty: 360 mL, Refills: 11    Associated Diagnoses: Chronic obstructive pulmonary disease, unspecified COPD type (H)      meclizine (ANTIVERT) 25 mg tablet [MECLIZINE (ANTIVERT) 25 MG TABLET] Take 1 tablet (25 mg total) by mouth 3 (three) times a day as needed.  Qty: 30 tablet, Refills: 0    Associated Diagnoses: Vertigo      multivitamin (MULTIPLE VITAMINS ORAL) [MULTIVITAMIN (MULTIPLE VITAMINS ORAL)] Take by mouth.      rivaroxaban ANTICOAGULANT (XARELTO) 20 MG TABS tablet Take 1 tablet (20 mg) by mouth daily (with dinner)  Qty: 90 tablet, Refills: 1    Associated Diagnoses: New onset atrial fibrillation (H)      rosuvastatin (CRESTOR) 40 MG tablet TAKE 1 TABLET AT BEDTIME  Qty: 90 tablet, Refills: 0    Associated Diagnoses: Mixed hyperlipidemia           STOP taking these medications       metoprolol tartrate (LOPRESSOR) 50 MG tablet Comments:   Reason for Stopping:             Allergies   Allergies   Allergen Reactions    Lisinopril Cough

## 2023-08-08 NOTE — ED NOTES
Pt is steady on feet and is able to walk up and down the hallways steady on feet with no mobility devices needed.

## 2023-08-08 NOTE — ED NOTES
Pt does not feel any change with her dizziness post Valium.  IV access was pulled out due to leaking and occlusion.  Pt refuses reinsertion of IV and agreeable to reinsert if there is a need.  Dr. Carmona, Hospitalist came and saw pt.  Aware about ETA of Dr. Lisa, Neurologist as well as pt wanting to go home today

## 2023-08-08 NOTE — CONSULTS
Care Management Initial Consult    General Information  Assessment completed with: Patient,    Type of CM/SW Visit: Initial Assessment    Primary Care Provider verified and updated as needed: Yes   Readmission within the last 30 days: no previous admission in last 30 days      Reason for Consult: discharge planning  Advance Care Planning: Advance Care Planning Reviewed: verified with patient, no concerns identified          Communication Assessment  Patient's communication style: spoken language (English or Bilingual)             Cognitive  Cognitive/Neuro/Behavioral: WDL  Level of Consciousness: alert  Arousal Level: opens eyes spontaneously  Orientation: oriented x 4  Mood/Behavior: calm, cooperative  Best Language: 0 - No aphasia  Speech: clear, spontaneous, logical    Living Environment:   People in home: alone     Current living Arrangements: house      Able to return to prior arrangements: yes       Family/Social Support:  Care provided by: self  Provides care for: no one  Marital Status:   Children          Description of Support System: Supportive, Involved    Support Assessment: Patient communicates needs well met, Adequate family and caregiver support, Adequate social supports    Current Resources:   Patient receiving home care services: No     Community Resources: None  Equipment currently used at home:    Supplies currently used at home: Other (CPAP supplies)    Employment/Financial:  Employment Status: retired        Financial Concerns:             Does the patient's insurance plan have a 3 day qualifying hospital stay waiver?  No    Lifestyle & Psychosocial Needs:  Social Determinants of Health     Tobacco Use: Medium Risk (6/16/2023)    Patient History     Smoking Tobacco Use: Former     Smokeless Tobacco Use: Never     Passive Exposure: Not on file   Alcohol Use: Not on file   Financial Resource Strain: Not on file   Food Insecurity: Not on file   Transportation Needs: Not on file   Physical  Activity: Not on file   Stress: Not on file   Social Connections: Not on file   Intimate Partner Violence: Not on file   Depression: At risk (6/12/2023)    PHQ-2     PHQ-2 Score: 4   Housing Stability: Not on file       Functional Status:  Prior to admission patient needed assistance:   Dependent ADLs:: Independent  Dependent IADLs:: Independent       Mental Health Status:          Chemical Dependency Status:                Values/Beliefs:  Spiritual, Cultural Beliefs, Mosque Practices, Values that affect care:                 Additional Information:  Kori lives alone in her home. She is independent with all her I/ADLS, drives. VINCENT discussed. Likely no discharge needs. Family to transport home.     Thalia Dent RN

## 2023-08-08 NOTE — PLAN OF CARE
PRIMARY DIAGNOSIS: VERTIGO, SOB    OUTPATIENT/OBSERVATION GOALS TO BE MET BEFORE DISCHARGE  1. Orthostatic performed: No                                  2. Completion of appropriate imaging: Yes    3. Tolerating PO medications: Yes    4. Return to near baseline physical activity: Yes    5. Cleared for discharge by consultants (if involved): No    Discharge Planner Nurse   Safe discharge environment identified: Yes  Barriers to discharge: Yes       Entered by: Meaghan Vigil RN 08/08/2023 4:54 AM     Please review provider order for any additional goals.   Nurse to notify provider when observation goals have been met and patient is ready for discharge.

## 2023-08-09 LAB
ATRIAL RATE - MUSE: 150 BPM
ATRIAL RATE - MUSE: 74 BPM
DIASTOLIC BLOOD PRESSURE - MUSE: 69 MMHG
DIASTOLIC BLOOD PRESSURE - MUSE: NORMAL MMHG
INTERPRETATION ECG - MUSE: NORMAL
INTERPRETATION ECG - MUSE: NORMAL
P AXIS - MUSE: 74 DEGREES
P AXIS - MUSE: NORMAL DEGREES
PR INTERVAL - MUSE: 148 MS
PR INTERVAL - MUSE: NORMAL MS
QRS DURATION - MUSE: 74 MS
QRS DURATION - MUSE: 82 MS
QT - MUSE: 308 MS
QT - MUSE: 374 MS
QTC - MUSE: 415 MS
QTC - MUSE: 453 MS
R AXIS - MUSE: 62 DEGREES
R AXIS - MUSE: 67 DEGREES
SYSTOLIC BLOOD PRESSURE - MUSE: 126 MMHG
SYSTOLIC BLOOD PRESSURE - MUSE: NORMAL MMHG
T AXIS - MUSE: 61 DEGREES
T AXIS - MUSE: 63 DEGREES
VENTRICULAR RATE- MUSE: 130 BPM
VENTRICULAR RATE- MUSE: 74 BPM

## 2023-08-09 NOTE — TELEPHONE ENCOUNTER
Please call patient -    ______________________________________________________________________     Home phone:  [phone] (home) [phone] (work)    Cell phone:   Telephone Information:   Mobile [phone]       Other contacts:  Name Home Phone Work Phone Mobile Phone Relationship Lgl GrRADHA Hassan [phone]   Son    JENNIFER ORTIZ [phone]   Daughter      ______________________________________________________________________     Saw that she was discharged from the hospital.    Would like to see if she would like to move up her annual wellness visit to Monday August 21st a 4:30 pm and also do a hospital follow up visit.    Could just do a hospital follow up visit at that time and keep the other annual wellness visit if she would prefer.    Let me know if concerns.    Donaldo Vernon MD  [facility]  8/8/2023, 8:45 PM

## 2023-08-10 ENCOUNTER — OFFICE VISIT (OUTPATIENT)
Dept: INTERNAL MEDICINE | Facility: CLINIC | Age: 83
End: 2023-08-10
Payer: MEDICARE

## 2023-08-10 ENCOUNTER — TELEPHONE (OUTPATIENT)
Dept: INTERNAL MEDICINE | Facility: CLINIC | Age: 83
End: 2023-08-10

## 2023-08-10 VITALS
WEIGHT: 184.3 LBS | HEART RATE: 60 BPM | HEIGHT: 62 IN | RESPIRATION RATE: 20 BRPM | BODY MASS INDEX: 33.92 KG/M2 | TEMPERATURE: 98 F | SYSTOLIC BLOOD PRESSURE: 154 MMHG | DIASTOLIC BLOOD PRESSURE: 79 MMHG | OXYGEN SATURATION: 94 %

## 2023-08-10 DIAGNOSIS — Z09 HOSPITAL DISCHARGE FOLLOW-UP: ICD-10-CM

## 2023-08-10 DIAGNOSIS — J44.9 CHRONIC OBSTRUCTIVE PULMONARY DISEASE, UNSPECIFIED COPD TYPE (H): ICD-10-CM

## 2023-08-10 DIAGNOSIS — I10 PRIMARY HYPERTENSION: ICD-10-CM

## 2023-08-10 DIAGNOSIS — I48.91 ATRIAL FIBRILLATION WITH RAPID VENTRICULAR RESPONSE (H): ICD-10-CM

## 2023-08-10 DIAGNOSIS — D69.9 BLEEDING DIATHESIS (H): ICD-10-CM

## 2023-08-10 DIAGNOSIS — K11.8 MASS OF RIGHT PAROTID GLAND: ICD-10-CM

## 2023-08-10 DIAGNOSIS — R42 VERTIGO: ICD-10-CM

## 2023-08-10 DIAGNOSIS — R06.02 MILD SHORTNESS OF BREATH: ICD-10-CM

## 2023-08-10 DIAGNOSIS — I48.0 PAROXYSMAL ATRIAL FIBRILLATION (H): Primary | ICD-10-CM

## 2023-08-10 DIAGNOSIS — R41.3 MEMORY DIFFICULTY: ICD-10-CM

## 2023-08-10 PROBLEM — E66.01 MORBID OBESITY (H): Status: RESOLVED | Noted: 2018-09-04 | Resolved: 2023-08-10

## 2023-08-10 LAB
ANION GAP SERPL CALCULATED.3IONS-SCNC: 9 MMOL/L (ref 7–15)
BUN SERPL-MCNC: 13.2 MG/DL (ref 8–23)
CALCIUM SERPL-MCNC: 8.9 MG/DL (ref 8.8–10.2)
CHLORIDE SERPL-SCNC: 106 MMOL/L (ref 98–107)
CREAT SERPL-MCNC: 0.89 MG/DL (ref 0.51–0.95)
DEPRECATED HCO3 PLAS-SCNC: 26 MMOL/L (ref 22–29)
GFR SERPL CREATININE-BSD FRML MDRD: 64 ML/MIN/1.73M2
GLUCOSE SERPL-MCNC: 93 MG/DL (ref 70–99)
MAGNESIUM SERPL-MCNC: 2.3 MG/DL (ref 1.7–2.3)
NT-PROBNP SERPL-MCNC: 194 PG/ML (ref 0–1800)
POTASSIUM SERPL-SCNC: 4.6 MMOL/L (ref 3.4–5.3)
SODIUM SERPL-SCNC: 141 MMOL/L (ref 136–145)

## 2023-08-10 PROCEDURE — 80048 BASIC METABOLIC PNL TOTAL CA: CPT | Performed by: INTERNAL MEDICINE

## 2023-08-10 PROCEDURE — 99215 OFFICE O/P EST HI 40 MIN: CPT | Performed by: INTERNAL MEDICINE

## 2023-08-10 PROCEDURE — 83735 ASSAY OF MAGNESIUM: CPT | Performed by: INTERNAL MEDICINE

## 2023-08-10 PROCEDURE — 83880 ASSAY OF NATRIURETIC PEPTIDE: CPT | Performed by: INTERNAL MEDICINE

## 2023-08-10 PROCEDURE — 36415 COLL VENOUS BLD VENIPUNCTURE: CPT | Performed by: INTERNAL MEDICINE

## 2023-08-10 PROCEDURE — 99417 PROLNG OP E/M EACH 15 MIN: CPT | Performed by: INTERNAL MEDICINE

## 2023-08-10 RX ORDER — MECLIZINE HYDROCHLORIDE 25 MG/1
25 TABLET ORAL 3 TIMES DAILY PRN
Qty: 30 TABLET | Refills: 1 | Status: SHIPPED | OUTPATIENT
Start: 2023-08-10 | End: 2024-07-12

## 2023-08-10 ASSESSMENT — PAIN SCALES - GENERAL: PAINLEVEL: NO PAIN (0)

## 2023-08-10 NOTE — TELEPHONE ENCOUNTER
FYI - Status Update    Who is Calling: family member, Daughter Radha    Update: Patient has appointment today, and daughter would like Dr. Vernon to bring up discussion of memory issues and the need for testing.    Daughter would like Dr. Vernon to take the lead and not indicate that she is raising the concern as it upsets the patient.      Does caller want a call/response back: No

## 2023-08-10 NOTE — PROGRESS NOTES
"  {PROVIDER CHARTING PREFERENCE:778678}    Subjective   Kori is a 83 year old, presenting for the following health issues:  ER F/U (Tachycardia) and Blood Draw (BMP and magnesium)        8/10/2023    11:06 AM   Additional Questions   Roomed by DASIA Dalton   Accompanied by Daughter       HPI     ED/UC Followup:    Facility:  [facility] ED  Date of visit: 8/7/2023-8/8/2023 (30 hours)  Reason for visit: Tachycardia   Current Status: Has been dizzy since they shocked her. She checked herself out of the hospital and her children did not agree with that.   {additonal problems for provider to add (Optional):988897}      Review of Systems   {ROS COMP (Optional):180389}      Objective    BP (!) 154/79 (BP Location: Right arm, Patient Position: Sitting, Cuff Size: Adult Regular)   Pulse 60   Temp 98  F (36.7  C) (Oral)   Resp 20   Ht 1.575 m (5' 2\")   Wt 83.6 kg (184 lb 4.8 oz)   LMP 08/06/1988   SpO2 94%   BMI 33.71 kg/m    Body mass index is 33.71 kg/m .  Physical Exam   {Exam List (Optional):658829}    {Diagnostic Test Results (Optional):642940}    {AMBULATORY ATTESTATION (Optional):087079}              "

## 2023-08-11 ENCOUNTER — THERAPY VISIT (OUTPATIENT)
Dept: PHYSICAL THERAPY | Facility: REHABILITATION | Age: 83
End: 2023-08-11
Payer: MEDICARE

## 2023-08-11 DIAGNOSIS — R42 DIZZINESS: Primary | ICD-10-CM

## 2023-08-11 PROBLEM — K11.8 MASS OF RIGHT PAROTID GLAND: Status: ACTIVE | Noted: 2023-08-11

## 2023-08-11 PROBLEM — I48.91 ATRIAL FIBRILLATION WITH RAPID VENTRICULAR RESPONSE (H): Status: RESOLVED | Noted: 2023-08-07 | Resolved: 2023-08-11

## 2023-08-11 PROBLEM — R41.3 MEMORY DIFFICULTY: Status: ACTIVE | Noted: 2023-08-11

## 2023-08-11 PROCEDURE — 97161 PT EVAL LOW COMPLEX 20 MIN: CPT | Mod: GP | Performed by: PHYSICAL THERAPIST

## 2023-08-11 PROCEDURE — 97112 NEUROMUSCULAR REEDUCATION: CPT | Mod: GP | Performed by: PHYSICAL THERAPIST

## 2023-08-11 NOTE — PATIENT INSTRUCTIONS
Future Appointments   Date Time Provider Department Center   8/11/2023  3:00 PM John Edwards, PT [facility]   8/17/2023  9:50 AM [facility]   9/11/2023  9:00 AM [facility]   9/11/2023  2:30 PM Jesus Guan PT [facility]

## 2023-08-11 NOTE — PROGRESS NOTES
[facility] - [facility] Specialists    Comprehensive and complex medical care - Chronic disease management - Shared decision making - Care coordination - Compassionate care    Patient advocacy - Rational deprescribing - Minimally disruptive medicine - Ethical focus - Customized care         Date of Service: 8/10/2023  Primary Provider: Donaldo Vernon    Patient Care Team:  Donaldo Vernon MD as PCP - General (Internal Medicine)  Mariella Coyne MD as MD (Neurology)  Bennett Varela MD as MD (Orthopaedic Surgery)  Beka Adams MD as MD  Donaldo Vernon MD as Assigned PCP  Daryl Rodríguez MD as MD (Cardiovascular Disease)  Daryl Rodríguez MD as Assigned Heart and Vascular Provider  Lana Chaudhari MD as MD (Cardiovascular Disease)          Patient's Pharmacy:    [id number] [place], [place] - [address] [place] [id number]  Phone: [phone] Fax: [phone]    [facility] ZAHIRA Patten - [facility] AT Portal to [facility] Sites  [facility]  [place] [address]  Phone: [phone] Fax: [phone]     Patient's Contacts:  Name Home Phone Work Phone Mobile Phone Relationship Lgl GrRADHA Hassan [phone]   Son    JENNIFER ORTIZ [phone]   Daughter      Patient's Insurance:    Payor: MEDICARE / Plan: MEDICARE / Product Type: Medicare /            Active Problem List:  Problem List as of 8/10/2023 Reviewed: 6/16/2023  9:25 AM by Shawna Moses NP         High    Former smoker - quit in 2018    Chronic obstructive pulmonary disease, unspecified COPD type (H), thought to be mild on PFTs 2018    Chronic atrial fibrillation (H)    Last Assessment & Plan 6/12/2023 Office Visit Written 6/16/2023  9:30 AM by Shawna Moses NP     Continue anticoagulation. Rate is controlled             Medium    HTN (hypertension)    Severe obstructive sleep apnea on CPAP    MO (iron deficiency anemia) - Dec  2021, also 2015    Paresthesias, left post auricular and occiput     Last Assessment & Plan 6/12/2023 Office Visit Written 6/12/2023  2:30 PM by Shawna Moses NP     Try topical lidocaine gel on the area         Shortness of breath    Vertigo    Atrial fibrillation with rapid ventricular response (H)       Low    HLD (hyperlipidemia)    Dysphagia    Prediabetes    Fatty liver    GERD (gastroesophageal reflux disease)    Loss of hearing    Lumbar back pain    Bleeding diathesis (H) - due to ELIQUIS        Current Outpatient Medications   Medication Instructions    ferrous sulfate (FE TABS) 325 mg, Oral, DAILY    Fluticasone-Umeclidin-Vilanterol (TRELEGY ELLIPTA) 200-62.5-25 MCG/INH oral inhaler 1 puff, Inhalation, DAILY    ipratropium - albuterol 0.5 mg/2.5 mg/3 mL (DUONEB) 0.5-2.5 (3) MG/3ML neb solution 3 mLs, Nebulization, EVERY 6 HOURS PRN    meclizine (ANTIVERT) 25 mg, Oral, 3 TIMES DAILY PRN    meclizine (ANTIVERT) 25 mg, Oral, 3 TIMES DAILY PRN    multivitamin (MULTIPLE VITAMINS ORAL) Oral    rivaroxaban ANTICOAGULANT (XARELTO) 20 mg, Oral, DAILY WITH SUPPER    rosuvastatin (CRESTOR) 40 MG tablet TAKE 1 TABLET AT BEDTIME    sotalol (BETAPACE) 80 mg, Oral, DAILY, Will need more refills from cardiology      Social History     Social History Narrative    . Retired. Likes to play golf.  3 children. Son has lung cancer.  Was a . Lives in [place] for 6 months and [place] for 6 months of the years.       Subjective:     Kori Leach is a 83 year old female who comes in today for:    Chief Complaint   Patient presents with    ER F/U     Tachycardia    Blood Draw     BMP and magnesium          8/10/2023    11:06 AM   Additional Questions   Roomed by DASIA Dalton   Accompanied by Daughter     Patient comes in today with her daughter Radha.    We reviewed her hospitalization and A-fib.  She has had some serious episodes of paroxysmal atrial fibrillation.  She had 1 in [place] and we scanned in  "the records related to this today.  Her echocardiogram overall looked good and she did have a very minimally positive nuclear stress test.  We reviewed the notes from her hospital stay.  She was started on sotalol.  She is so far doing okay with this.  She has follow-up with cardiology related to this.    When she went in the hospital, she was feeling short of breath.  She was sweating and felt dizzy.  She felt sick to her stomach.  She has noticed when she has A-fib.  She had a Holter in [place] as well and this was reviewed.    She had an elevated BNP and abnormal chest x-ray with some fluid in her lungs.  She was not sent home with diuretics.  We will check up on this again today.  Her breathing does feel better.    She has and did have vertigo when she was in the hospital.  This was after she had the cardioversion for the A-fib.  MRI of the brain was good.    We reviewed her right parotid lesion seen on the MRI.  This was previously biopsied by ENT and felt to be benign.    We reviewed her memory issues.  Apparently she did not remember much of what initially happened in the hospital according to her daughter.  We did go through and do cognitive testing today.  This appears to be okay.  She does have some issues with things at times or staying organized.  However she is still managing her finances.    We reviewed her other issues noted in the assessment but not specifically addressed in the HPI above.     Objective:     Wt Readings from Last 3 Encounters:   08/10/23 83.6 kg (184 lb 4.8 oz)   08/07/23 85.7 kg (189 lb)   06/12/23 85.7 kg (188 lb 14.4 oz)     BP Readings from Last 3 Encounters:   08/10/23 (!) 154/79   08/08/23 118/70   06/12/23 130/75     BP (!) 154/79 (BP Location: Right arm, Patient Position: Sitting, Cuff Size: Adult Regular)   Pulse 60   Temp 98  F (36.7  C) (Oral)   Resp 20   Ht 1.575 m (5' 2\")   Wt 83.6 kg (184 lb 4.8 oz)   LMP 08/06/1988   SpO2 94%   BMI 33.71 kg/m     The patient is " comfortable, no acute distress.  Mood good.  Insight good.  Eyes are nonicteric.  Neck is supple without mass.  No cervical adenopathy.  No thyromegaly. Heart regular rate and rhythm.  Lungs clear to auscultation bilaterally.  Respiratory effort is good.  Abdomen soft and nontender.  No hepatosplenomegaly.  Extremities no edema.  She did really well with a MOCA of 28/30.      Diagnostics:     Admission on 08/07/2023, Discharged on 08/08/2023   Component Date Value Ref Range Status    Ventricular Rate 08/07/2023 130  BPM Final    Atrial Rate 08/07/2023 150  BPM Final    QRS Duration 08/07/2023 74  ms Final    QT 08/07/2023 308  ms Final    QTc 08/07/2023 453  ms Final    R AXIS 08/07/2023 67  degrees Final    T Axis 08/07/2023 61  degrees Final    Interpretation ECG 08/07/2023    Final                    Value:Atrial fibrillation with rapid ventricular response with premature ventricular or aberrantly conducted complexes  Abnormal ECG  When compared with ECG of 04-NOV-2022 17:13,  No significant change was found  Confirmed by SEE ED PROVIDER NOTE FOR, ECG INTERPRETATION (4000),  YONAS VALLECILLO ([id number]) on 8/9/2023 1:33:15 PM      Hold Specimen 08/07/2023 J[facility]   Final    Hold Specimen 08/07/2023 JIC   Final    Hold Specimen 08/07/2023 J[facility]   Final    Hold Specimen 08/07/2023 [facility]   Final    Sodium 08/07/2023 144  136 - 145 mmol/L Final    Potassium 08/07/2023 4.5  3.4 - 5.3 mmol/L Final    Chloride 08/07/2023 109 (H)  98 - 107 mmol/L Final    Carbon Dioxide (CO2) 08/07/2023 23  22 - 29 mmol/L Final    Anion Gap 08/07/2023 12  7 - 15 mmol/L Final    Urea Nitrogen 08/07/2023 21.7  8.0 - 23.0 mg/dL Final    Creatinine 08/07/2023 1.17 (H)  0.51 - 0.95 mg/dL Final    Calcium 08/07/2023 9.3  8.8 - 10.2 mg/dL Final    Glucose 08/07/2023 116 (H)  70 - 99 mg/dL Final    Alkaline Phosphatase 08/07/2023 117 (H)  35 - 104 U/L Final    AST 08/07/2023 18  0 - 45 U/L Final    Reference intervals for this test were updated on  6/12/2023 to more accurately reflect our healthy population. There may be differences in the flagging of prior results with similar values performed with this method. Interpretation of those prior results can be made in the context of the updated reference intervals.    ALT 08/07/2023 20  0 - 50 U/L Final    Reference intervals for this test were updated on 6/12/2023 to more accurately reflect our healthy population. There may be differences in the flagging of prior results with similar values performed with this method. Interpretation of those prior results can be made in the context of the updated reference intervals.      Protein Total 08/07/2023 7.0  6.4 - 8.3 g/dL Final    Albumin 08/07/2023 4.5  3.5 - 5.2 g/dL Final    Bilirubin Total 08/07/2023 0.3  <=1.2 mg/dL Final    GFR Estimate 08/07/2023 46 (L)  >60 mL/min/1.73m2 Final    Magnesium 08/07/2023 2.1  1.7 - 2.3 mg/dL Final    WBC Count 08/07/2023 7.1  4.0 - 11.0 10e3/uL Final    RBC Count 08/07/2023 5.01  3.80 - 5.20 10e6/uL Final    Hemoglobin 08/07/2023 15.2  11.7 - 15.7 g/dL Final    Hematocrit 08/07/2023 47.0  35.0 - 47.0 % Final    MCV 08/07/2023 94  78 - 100 fL Final    MCH 08/07/2023 30.3  26.5 - 33.0 pg Final    MCHC 08/07/2023 32.3  31.5 - 36.5 g/dL Final    RDW 08/07/2023 13.2  10.0 - 15.0 % Final    Platelet Count 08/07/2023 279  150 - 450 10e3/uL Final    % Neutrophils 08/07/2023 69  % Final    % Lymphocytes 08/07/2023 18  % Final    % Monocytes 08/07/2023 10  % Final    % Eosinophils 08/07/2023 2  % Final    % Basophils 08/07/2023 1  % Final    % Immature Granulocytes 08/07/2023 0  % Final    NRBCs per 100 WBC 08/07/2023 0  <1 /100 Final    Absolute Neutrophils 08/07/2023 5.0  1.6 - 8.3 10e3/uL Final    Absolute Lymphocytes 08/07/2023 1.3  0.8 - 5.3 10e3/uL Final    Absolute Monocytes 08/07/2023 0.7  0.0 - 1.3 10e3/uL Final    Absolute Eosinophils 08/07/2023 0.1  0.0 - 0.7 10e3/uL Final    Absolute Basophils 08/07/2023 0.1  0.0 - 0.2 10e3/uL  Final    Absolute Immature Granulocytes 08/07/2023 0.0  <=0.4 10e3/uL Final    Absolute NRBCs 08/07/2023 0.0  10e3/uL Final    TSH 08/07/2023 1.48  0.30 - 4.20 uIU/mL Final    Troponin T, High Sensitivity 08/07/2023 11  <=14 ng/L Final    Either a High Sensitivity Troponin T baseline (0 hours) value = 100 ng/L, or an increase in High Sensitivity Troponin T = 7 ng/L at 2 hours compared to 0 hours (2-0 hours), suggests myocardial injury, and urgent clinical attention is required.    If the 2-0 hours increase is <7 ng/L, a High Sensitivity Troponin T result above gender-specific reference ranges warrants further evaluation.   Recommendations for further evaluation include correlation with clinical decision-making tool (e.g., HEART), a 3rd High Sensitivity Troponin T test 2 hours after the 2nd (a 20% change from baseline would represent concern), admission for observation, close PCC/cardiology follow-up, or urgent outpatient provocative testing.    N terminal Pro BNP Inpatient 08/07/2023 2,254 (H)  0 - 1,800 pg/mL Final    Reference range shown and results flagged as abnormal are suggested inpatient cut points for confirming diagnosis if CHF in an acute setting. Establishing a baseline value for each individual patient is useful for follow-up. An inpatient or emergency department NT-proPBNP <300 pg/mL effectively rules out acute CHF, with 99% negative predictive value.    The outpatient non-acute reference range for ruling out CHF is:  0-125 pg/mL (age 18 to less than 75)  0-450 pg/mL (age 75 yrs and older)     Troponin T, High Sensitivity 08/07/2023 9  <=14 ng/L Final    Either a High Sensitivity Troponin T baseline (0 hours) value = 100 ng/L, or an increase in High Sensitivity Troponin T = 7 ng/L at 2 hours compared to 0 hours (2-0 hours), suggests myocardial injury, and urgent clinical attention is required.    If the 2-0 hours increase is <7 ng/L, a High Sensitivity Troponin T result above gender-specific reference  ranges warrants further evaluation.   Recommendations for further evaluation include correlation with clinical decision-making tool (e.g., HEART), a 3rd High Sensitivity Troponin T test 2 hours after the 2nd (a 20% change from baseline would represent concern), admission for observation, close PCC/cardiology follow-up, or urgent outpatient provocative testing.    Systolic Blood Pressure 08/07/2023 126  mmHg Final    Diastolic Blood Pressure 08/07/2023 69  mmHg Final    Ventricular Rate 08/07/2023 74  BPM Final    Atrial Rate 08/07/2023 74  BPM Final    GA Interval 08/07/2023 148  ms Final    QRS Duration 08/07/2023 82  ms Final    QT 08/07/2023 374  ms Final    QTc 08/07/2023 415  ms Final    P Axis 08/07/2023 74  degrees Final    R AXIS 08/07/2023 62  degrees Final    T [place] 08/07/2023 63  degrees Final    Interpretation ECG 08/07/2023    Final                    Value:Sinus rhythm  Normal ECG  When compared with ECG of 07-AUG-2023 11:44,  Sinus rhythm has replaced Atrial fibrillation  Vent. rate has decreased BY  56 BPM  Confirmed by SEE ED PROVIDER NOTE FOR, ECG INTERPRETATION (4000),  YONAS VALLECILLO ([id number]) on 8/9/2023 1:32:09 PM      INR 08/07/2023 1.22 (H)  0.85 - 1.15 Final    Hold Specimen 08/07/2023 [facility]   Final    Sodium 08/08/2023 142  136 - 145 mmol/L Final    Potassium 08/08/2023 4.1  3.4 - 5.3 mmol/L Final    Chloride 08/08/2023 106  98 - 107 mmol/L Final    Carbon Dioxide (CO2) 08/08/2023 26  22 - 29 mmol/L Final    Anion Gap 08/08/2023 10  7 - 15 mmol/L Final    Urea Nitrogen 08/08/2023 14.2  8.0 - 23.0 mg/dL Final    Creatinine 08/08/2023 0.89  0.51 - 0.95 mg/dL Final    Calcium 08/08/2023 8.6 (L)  8.8 - 10.2 mg/dL Final    Glucose 08/08/2023 94  70 - 99 mg/dL Final    GFR Estimate 08/08/2023 64  >60 mL/min/1.73m2 Final    LVEF  08/08/2023 60-65%   Final    WBC Count 08/08/2023 6.6  4.0 - 11.0 10e3/uL Final    RBC Count 08/08/2023 4.30  3.80 - 5.20 10e6/uL Final    Hemoglobin 08/08/2023 13.2   11.7 - 15.7 g/dL Final    Hematocrit 08/08/2023 41.0  35.0 - 47.0 % Final    MCV 08/08/2023 95  78 - 100 fL Final    MCH 08/08/2023 30.7  26.5 - 33.0 pg Final    MCHC 08/08/2023 32.2  31.5 - 36.5 g/dL Final    RDW 08/08/2023 13.3  10.0 - 15.0 % Final    Platelet Count 08/08/2023 222  150 - 450 10e3/uL Final    % Neutrophils 08/08/2023 65  % Final    % Lymphocytes 08/08/2023 23  % Final    % Monocytes 08/08/2023 9  % Final    % Eosinophils 08/08/2023 2  % Final    % Basophils 08/08/2023 1  % Final    % Immature Granulocytes 08/08/2023 0  % Final    NRBCs per 100 WBC 08/08/2023 0  <1 /100 Final    Absolute Neutrophils 08/08/2023 4.3  1.6 - 8.3 10e3/uL Final    Absolute Lymphocytes 08/08/2023 1.5  0.8 - 5.3 10e3/uL Final    Absolute Monocytes 08/08/2023 0.6  0.0 - 1.3 10e3/uL Final    Absolute Eosinophils 08/08/2023 0.2  0.0 - 0.7 10e3/uL Final    Absolute Basophils 08/08/2023 0.0  0.0 - 0.2 10e3/uL Final    Absolute Immature Granulocytes 08/08/2023 0.0  <=0.4 10e3/uL Final    Absolute NRBCs 08/08/2023 0.0  10e3/uL Final       Results for orders placed or performed in visit on 08/10/23   N terminal pro BNP outpatient     Status: Normal   Result Value Ref Range    N Terminal Pro BNP Outpatient 194 0 - 1,800 pg/mL   Basic metabolic panel     Status: Normal   Result Value Ref Range    Sodium 141 136 - 145 mmol/L    Potassium 4.6 3.4 - 5.3 mmol/L    Chloride 106 98 - 107 mmol/L    Carbon Dioxide (CO2) 26 22 - 29 mmol/L    Anion Gap 9 7 - 15 mmol/L    Urea Nitrogen 13.2 8.0 - 23.0 mg/dL    Creatinine 0.89 0.51 - 0.95 mg/dL    Calcium 8.9 8.8 - 10.2 mg/dL    Glucose 93 70 - 99 mg/dL    GFR Estimate 64 >60 mL/min/1.73m2   Magnesium     Status: Normal   Result Value Ref Range    Magnesium 2.3 1.7 - 2.3 mg/dL      ______________________________________________________________________     Pertinent radiology for this visit includes the following:    Echocardiogram Complete  [id number]  [id number]  [id number]  [id number]^YRIS^[place] [address]     Name: RUPALI ORTIZ  MRN: [id number]  : 1940  Study Date: 2023 11:38 AM  Age: 83 yrs  Gender: Female  Patient Location: [facility]  Reason For Study: Pulmonary Edema  Ordering Physician: DINESH ARNDT  Performed By: TARA     BSA: 1.9 m2  Height: 63 in  Weight: 189 lb  HR: 70  BP: 123/60 mmHg  ______________________________________________________________________________  Procedure  Complete Portable Echo Adult. No hemodynamically significant valvular  abnormalities on 2D or color flow imaging. Compared to prior study, there is  no significant change.  ______________________________________________________________________________  Interpretation Summary     Left ventricular size, wall motion and function are normal. The ejection  fraction is 60-65%.  Normal right ventricle size and systolic function.  The left atrium is mildly dilated.  The right atrium is mildly dilated.  No hemodynamically significant valvular abnormalities on 2D or color flow  imaging.  Compared to prior study, there is no significant change.  ______________________________________________________________________________  Left Ventricle  Left ventricular size, wall motion and function are normal. The ejection  fraction is 60-65%. There is normal left ventricular wall thickness. Left  ventricular diastolic function is normal. No regional wall motion  abnormalities noted.     Right Ventricle  Normal right ventricle size and systolic function.     Atria  The left atrium is mildly dilated. The right atrium is mildly dilated. There  is no color Doppler evidence of an atrial shunt.     Mitral Valve  Mitral valve leaflets appear normal. There is no evidence of mitral stenosis  or clinically significant mitral regurgitation.     Tricuspid Valve  The tricuspid valve is not well visualized. Right ventricle systolic pressure  estimate normal. There is trace tricuspid  regurgitation.     Aortic Valve  Aortic valve leaflets appear normal. There is no evidence of aortic stenosis  or clinically significant aortic regurgitation.     Pulmonic Valve  The pulmonic valve is not well seen, but is grossly normal. This degree of  valvular regurgitation is within normal limits.     Vessels  The aorta root is normal. Normal size ascending aorta. IVC diameter <2.1 cm  collapsing >50% with sniff suggests a normal RA pressure of 3 mmHg.     Pericardium  There is no pericardial effusion.     ______________________________________________________________________________  MMode/2D Measurements & Calculations  IVSd: 0.87 cm  LVIDd: 5.0 cm  LVIDs: 3.5 cm  LVPWd: 0.86 cm  FS: 29.5 %     LV mass(C)d: 150.7 grams  LV mass(C)dI: 79.8 grams/m2  Ao root diam: 2.6 cm  LA dimension: 4.3 cm  LA/Ao: 1.7  LA Volume Indexed (AL/bp): 30.6 ml/m2  RV Base: 3.9 cm  RWT: 0.34  TAPSE: 1.8 cm     Time Measurements  MM HR: 62.0 BPM     Doppler Measurements & Calculations  MV E max femi: 94.7 cm/sec  MV A max femi: 34.7 cm/sec  MV E/A: 2.7  MV dec slope: 527.3 cm/sec2  MV dec time: 0.18 sec  LV V1 max P.6 mmHg  LV V1 max: 63.4 cm/sec  LV V1 VTI: 14.4 cm  PA acc time: 0.07 sec  TR max femi: 247.3 cm/sec  TR max P.5 mmHg  E/E' av.6  Lateral E/e': 10.4  Medial E/e': 18.9  RV S Femi: 11.1 cm/sec     ______________________________________________________________________________  Report approved by: Karri Perez 2023 03:20 PM             ______________________________________________________________________    Assessment:     1. Paroxysmal atrial fibrillation (H)   Just started on sotalol.  Follow-up with cardiology.  Continue to monitor.   2. Chronic obstructive pulmonary disease, unspecified COPD type (H)   Stable at this time.  Continue to monitor.   3. Bleeding diathesis (H)   No current issues with active bleeding.  Continue current anticoagulant.   4. Vertigo    5. Primary hypertension    6.  Mild shortness of breath    7. Mass of right parotid gland    8. Memory difficulty    9. Atrial fibrillation with rapid ventricular response (H)    10. Hospital discharge follow-up        Plan:     Blood work done today.  We reviewed vertigo and therapy for this.  No obvious overt cognitive decline on testing.  Follow-up with me in cardiology as scheduled.  Given a prescription for meclizine as needed.  Continue current medications otherwise.  Follow up sooner if issues.    Orders Placed This Encounter   Procedures    N terminal pro BNP outpatient    Basic metabolic panel    Magnesium        70 minutes or greater was spent today on the patient's care on the day of service.      This includes time for chart preparation, reviewing medical tests done before or during the visit, talking with the patient, review of quality indicators, required documentation, and other elements of care.        Donaldo Vernon MD  General Internal Medicine  [facility]    Return in about 1 month (around 9/11/2023) for annual wellness visit, follow up visit.     Future Appointments   Date Time Provider Department Center   8/11/2023  3:00 PM John Edwards, PT [facility]   8/17/2023  9:50 AM Lana Chaudhari MD [facility]   9/11/2023  9:00 AM Donaldo Vernon MD MDINTM [facility]   9/11/2023  2:30 PM Jessu Guan PT [facility]

## 2023-08-11 NOTE — PROGRESS NOTES
PHYSICAL THERAPY EVALUATION  Type of Visit: Evaluation    See electronic medical record for Abuse and Falls Screening details.    Subjective   Pt is an 83 year-old woman with chief complaint dizziness. PMH significant for a-fib, COPD, HTN, TAMAR. Pt was seen in ED on 8/7-8/8/23 and treated with cardioversion procedure (successful); she then became dizzy with room spinning sensation and was seen by stroke team without acute findings; brain MRI noted generalized brain atrophy without evidence of CVA. 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 Pt was recommended to stay in the hospital, but she wished to leave. Pt reported that the room spinning dizziness happened when she would stand up - felt light headed. It was long lasting if she was standing. It has gotten better day by day. The spinning is less intense. She was told to get meclizine, but hasn't yet. It will still last as long as she is standing/walking. Standing still is better. It is not happening when lying in bed or moving in bed.       Presenting condition or subjective complaint: Dizziness after afib episode  Date of onset: 08/07/23    Relevant medical history:     Dates & types of surgery: Three back surgeries,  fiona surgery, two knee replacements, one hip replacement, gall bladder    Prior diagnostic imaging/testing results:       Prior therapy history for the same diagnosis, illness or injury: No      Prior Level of Function  Transfers: Independent  Ambulation: Independent  ADL: Independent      Living Environment  Social support: Alone   Type of home: House; Multi-level   Stairs to enter the home: No       Ramp: No   Stairs inside the home: Yes 22 Is there a railing: Yes   Help at home: None  Equipment owned:       Employment: No    Hobbies/Interests: Golf, reading,music    Patient goals for therapy:      Pain assessment: Pain denied     Objective   Cognitive Status Examination  Orientation: Oriented to  person, place and time   Level of Consciousness: Alert  Follows Commands and Answers Questions: 100% of the time  Personal Safety and Judgement: Intact      BED MOBILITY: Independent    TRANSFERS: mod I with STS using UE support    Vitals:   Seated BP at right wrist: 157/70, HR 64 bpm  Standing BP at right wrist: 157/74, HR 66 bpm    GAIT:   Level of [place]: Independent  Assistive Device(s): None  Gait Deviations: slight veering to the right, light headedness    BALANCE:     SPECIAL TESTS  Functional Gait Assessment (FGA)      10 Meter Walk Test (Comfortable)     10 Meter Walk Test (Fast)     6 Minute Walk Test (6MWT)           Al[place]uer Balance Scale (BBS)     5 Times Sit-to-Stand (5TSTS)  18.7 sec  (norm is 10.8 sec)     Dynamic Gait Index (DGI)     Timed Up and Go (TUG) - sec 11.9 sec, no use of device, light headedness reported   Single Leg Stance Right (sec)    Single Leg Stance Left (sec)    Modified CTSIB Conditions (sec) Cond 1: 30   Cond 2:  30  Cond 4:  30 with CGA  Cond 5 : 3.5-6.1 sec with multiple trials   Romberg  (sec)    Sharpened Romberg (sec)    30 Second Sit to Stand (reps/height)                   VESTIBULAR EVALUATION  ADDITIONAL HISTORY:  Description of symptoms: Attacks of dizziness  Dizzy attacks:   Start:     Last attack: Currently   Frequency of occurrences:     Length of attack:    Difficulty hearing:    Noise in ears? Yes Ringing  Alleviates symptoms: Wait it out  Worsens symptoms:    Activities that bring on symptoms:         DHI: Total Score: 30    Cervicogenic Screen    Neck ROM Flx WNL, ext limited by 50%, right rotation limited by 25%, left rotation limited by 50%, right side bending limited by 35%, left side bending limited by 40%      Oculomotor Screen    Ocular ROM Normal   Smooth Pursuit Normal   Saccades Normal   VOR Abnormal; felt symptoms with horizontal and vertical head movements   VOR Cancellation    Head Impulse Test Abnormal; multiple catch-up saccades B   Convergence  Testing         Infrared Goggle Exam Vestibular Suppressant in Last 24 Hours? No  Exam Completed With: Infrared goggles   Spontaneous Nystagmus Negative   Gaze Evoked Nystagmus Horizontal R with right gaze   Head Shake Horizontal Nystagmus Negative   Positional Testing Negative   Supine Head-Hanging Test DNT    Left Right   Calixto-Hallpike Negative Negative   Sidelying Test DNT DNT   [facility] Roll Test Negative Negative     Assessment & Plan   CLINICAL IMPRESSIONS  Medical Diagnosis: Vertigo    Treatment Diagnosis: Dizziness   Impression/Assessment: Pt is an 83 year-old woman with chief complaint dizziness. PMH significant for a-fib, COPD, HTN, TAMAR. Dizziness began after recent cardioversion in the hospital, but has gotten better each day. 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 She reports dizziness/light headedness when standing/walking. She has follow up with cardiologist next week and encouraged to follow their advice as her symptoms could be more related to cardiac issues than vestibular. She is appropriate for short course of therapy to instruct in HEP and assist with returning to prior level of function.     Clinical Decision Making (Complexity):  Clinical Presentation: Stable/Uncomplicated  Clinical Presentation Rationale: based on medical and personal factors listed in PT evaluation  Clinical Decision Making (Complexity): Low complexity    PLAN OF CARE  Treatment Interventions:  Interventions: Gait Training, Manual Therapy, Neuromuscular Re-education, Therapeutic Activity, Therapeutic Exercise, Self-Care/Home Management, Canalith Repositioning    Long Term Goals     PT Goal 1  Goal Identifier: Dizziness  Goal Description: Pt will report no dizziness with standing/walking to return to prior level of function.  Target Date: 11/08/23      Frequency of  Treatment: 1/month  Duration of Treatment: 3 visits, up to 90 days           Risks and benefits of evaluation/treatment have been explained.   Patient/Family/caregiver agrees with Plan of Care.     Evaluation Time:     PT Eval, Low Complexity Minutes (00901): 34       Signing Clinician: BELKIS Ceja [facility]                                                                                   OUTPATIENT PHYSICAL THERAPY      PLAN OF TREATMENT FOR OUTPATIENT REHABILITATION   Patient's Last Name, First Name, Kori Andre YOB: 1940   Provider's Name   [facility]   Medical Record No.  [id number]     Onset Date: 08/07/23  Start of Care Date: 08/11/23     Medical Diagnosis:  Vertigo      PT Treatment Diagnosis:  Dizziness Plan of Treatment  Frequency/Duration: 1/month/ 3 visits, up to 90 days    Certification date from 08/11/23 to 11/08/23         See note for plan of treatment details and functional goals     John Edwards PT                         I CERTIFY THE NEED FOR THESE SERVICES FURNISHED UNDER        THIS PLAN OF TREATMENT AND WHILE UNDER MY CARE     (Physician attestation of this document indicates review and certification of the therapy plan).                Referring Provider:  Stoney Carmona      Initial Assessment  See Epic Evaluation- Start of Care Date: 08/11/23

## 2023-08-17 ENCOUNTER — OFFICE VISIT (OUTPATIENT)
Dept: CARDIOLOGY | Facility: CLINIC | Age: 83
End: 2023-08-17
Payer: MEDICARE

## 2023-08-17 VITALS
SYSTOLIC BLOOD PRESSURE: 146 MMHG | RESPIRATION RATE: 14 BRPM | DIASTOLIC BLOOD PRESSURE: 58 MMHG | BODY MASS INDEX: 34.75 KG/M2 | HEART RATE: 60 BPM | WEIGHT: 190 LBS

## 2023-08-17 DIAGNOSIS — G47.33 OBSTRUCTIVE SLEEP APNEA: ICD-10-CM

## 2023-08-17 DIAGNOSIS — I10 PRIMARY HYPERTENSION: ICD-10-CM

## 2023-08-17 DIAGNOSIS — I49.5 SICK SINUS SYNDROME (H): ICD-10-CM

## 2023-08-17 DIAGNOSIS — I48.91 ATRIAL FIBRILLATION WITH RAPID VENTRICULAR RESPONSE (H): Primary | ICD-10-CM

## 2023-08-17 DIAGNOSIS — Z51.81 ENCOUNTER FOR MEDICATION MONITORING: ICD-10-CM

## 2023-08-17 LAB
ATRIAL RATE - MUSE: 49 BPM
DIASTOLIC BLOOD PRESSURE - MUSE: NORMAL MMHG
INTERPRETATION ECG - MUSE: NORMAL
P AXIS - MUSE: 70 DEGREES
PR INTERVAL - MUSE: 138 MS
QRS DURATION - MUSE: 82 MS
QT - MUSE: 448 MS
QTC - MUSE: 404 MS
R AXIS - MUSE: 32 DEGREES
SYSTOLIC BLOOD PRESSURE - MUSE: NORMAL MMHG
T AXIS - MUSE: 34 DEGREES
VENTRICULAR RATE- MUSE: 49 BPM

## 2023-08-17 PROCEDURE — 93000 ELECTROCARDIOGRAM COMPLETE: CPT | Performed by: INTERNAL MEDICINE

## 2023-08-17 PROCEDURE — 99215 OFFICE O/P EST HI 40 MIN: CPT | Mod: 25 | Performed by: INTERNAL MEDICINE

## 2023-08-17 RX ORDER — SOTALOL HYDROCHLORIDE 80 MG/1
40 TABLET ORAL DAILY
Qty: 30 TABLET | Refills: 1 | Status: SHIPPED | OUTPATIENT
Start: 2023-08-17 | End: 2023-08-31 | Stop reason: SINTOL

## 2023-08-17 NOTE — LETTER
8/17/2023    Donaldo Vernon MD  [address]  [place] [address]    RE: Kori Leach       Dear Colleague,     I had the pleasure of seeing Kori Leach in the [facility].      Thank you, Dr. Donaldo Vernon, for asking the [facility] team to see Ms. Kori Leach to follow-up on paroxysmal atrial fibrillation with rapid ventricular response.    Assessment/Recommendations   Assessment:    1.  Paroxysmal atrial fibrillation with rapid ventricular response, resolved following elective cardioversion in the ED.  I suspect her atrial fibrillation may be a component of sick sinus syndrome as she is bradycardic in the office today on low-dose sotalol.  At this point we will decrease dose of sotalol to 40 mg daily.  Continue anticoagulation although she would prefer to switch from Xarelto to Eliquis which is reasonable.  I will have her follow-up with the EP in the next several weeks to discuss possible A-fib ablation versus potential need for permanent pacemaker and ongoing antiarrhythmic therapy.  2.  TAMAR, treated with CPAP  3.  Essential hypertension, controlled although blood pressure elevated today.  This may be due to some anxiety.      Plan:  1.  Patient to decrease sotalol to 40 mg once daily  2.  Follow-up in EP clinic to discuss alternative treatment options.       History of Present Illness    Ms. Kori Leach is a 83 year old female with history of paroxysmal atrial fibrillation, essential hypertension, TAMAR treated with CPAP who I met recently at [facility] ED following a third episode of atrial fibrillation with rapid ventricular response while on metoprolol and Xarelto anticoagulation.  Because she had been compliant with her anticoagulation, decision was made to pursue elective cardioversion in the ED which was successful at restoring sinus rhythm.  Because her heart rate was at the lower range of normal, I could not increase her  beta-blocker further and suggested switching the metoprolol to sotalol in the hopes that this would better suppress her atrial fibrillation.  She was thus initiated on sotalol 80 mg once daily and is here today for follow-up visit.  Since undergoing her cardioversion, she reports symptoms of lightheadedness.  She has had symptoms suggestive of vertigo for which she had been given meclizine but states this lightheadedness is different and she has felt it almost continuously since being seen in the ED.  Has occasionally felt near syncopal but no geovany syncopal episodes.    ECG (personally reviewed): ECG today demonstrates profound sinus bradycardia, rate 49.  QTc interval normal.    Cardiac Imaging Studies (personally reviewed): No recent imaging     Physical Examination Review of Systems   BP (!) 146/58 (BP Location: Right arm, Patient Position: Sitting, Cuff Size: Adult Regular)   Pulse 60   Resp 14   Wt 86.2 kg (190 lb)   LMP 08/06/1988   BMI 34.75 kg/m    Body mass index is 34.75 kg/m .  Wt Readings from Last 3 Encounters:   08/17/23 86.2 kg (190 lb)   08/10/23 83.6 kg (184 lb 4.8 oz)   08/07/23 85.7 kg (189 lb)     General Appearance:   Awake, Alert, No acute distress.   HEENT:  No scleral icterus; the mucous membranes were pink and moist.   Neck: No cervical bruits or jugular venous distention    Chest: The spine was straight. The chest was symmetric.   Lungs:   Respirations unlabored; the lungs are clear to auscultation. No wheezing   Cardiovascular:   Regular rhythm which is bradycardic.  S1, S2 normal.  No murmur or gallop.   Abdomen:  No organomegaly, masses, bruits, or tenderness. Bowels sounds are present   Extremities: No peripheral edema bilaterally   Skin: No xanthelasma. Warm, Dry.   Musculoskeletal: No tenderness.   Neurologic: Mood and affect are appropriate.    Enc Vitals  BP: (!) 146/58  Pulse: 60  Resp: 14  Weight: 86.2 kg (190 lb)                                         Medical History   Surgical History Family History Social History   Past Medical History:   Diagnosis Date    Arthritis     knees and hip replacement    Bleeding diathesis (H) - due to ELIQUIS     Chronic obstructive pulmonary disease, unspecified COPD type (H) 06/04/2018    PFT Complete  Performed 5/21/2018 Final result Study Result FEV1/FVC is 69 and is reduced. FEV1 is 66% predicted and is reduced. FVC is 74% predicted and reduced. There was improvement in spirometry after a single inhaled dose of bronchodilator. TLC is 110% predicted and is normal. RV is 141% predicted and is increased. DLCO is 77% predicted and is reduced when it  is corrected for hemoglobin.  Im    Degenerative disc disease, cervical     Fatty liver     Former smoker     GERD (gastroesophageal reflux disease)     History of nicotine use 06/04/2018    HLD (hyperlipidemia)     HTN (hypertension)     MO (iron deficiency anemia) 04/24/2015    Inverted nipple     bilateral    Loss of hearing     Lumbar back pain     Morbid obesity (H) 09/04/2018    Pre-diabetes     Severe obstructive sleep apnea 04/18/2018    10/06/17 where we ordered a sleep study which showed severe TAMAR with an RDI of 30 and she had a titration study that showed CPAP of 9 cmH20 to be effective. She was set up with the CPAP on 10/25/17 through [facility].     Urinary incontinence     Past Surgical History:   Procedure Laterality Date    APPENDECTOMY      ARTHROPLASTY KNEE BILATERAL      CATARACT EXTRACTION      CERVICAL SPINE SURGERY  2015    JOINT REPLACEMENT      bilateral knees and right hip    LAPAROSCOPIC CHOLECYSTECTOMY N/A 6/29/2018    Procedure: CHOLECYSTECTOMY, LAPAROSCOPIC;  Surgeon: Jose Armando Humphries MD;  Location: [facility];  Service:     TONSILLECTOMY      WISDOM TOOTH EXTRACTION      Family History   Problem Relation Age of Onset    Breast Cancer Mother 45    Diabetes Mother     Heart Disease Mother     Hyperlipidemia Mother     Hypertension Mother     Depression Father      Alcoholism Father     Hyperlipidemia Sister     Depression Sister     Obesity Sister     Obesity Daughter     Lung Cancer Son     Cerebrovascular Disease Son     Social History     Socioeconomic History    Marital status:      Spouse name: Not on file    Number of children: 3    Years of education: Not on file    Highest education level: Not on file   Occupational History    Not on file   Tobacco Use    Smoking status: Former     Types: Cigarettes    Smokeless tobacco: Never    Tobacco comments:     1-2 cigarettes per day on average   Vaping Use    Vaping Use: Never used   Substance and Sexual Activity    Alcohol use: Yes     Comment: Alcoholic Drinks/day: less than 1 drink per month     Drug use: No    Sexual activity: Not Currently     Partners: Male   Other Topics Concern    Not on file   Social History Narrative    . Retired. Likes to play golf.  3 children. Son has lung cancer.  Was a . Lives in [place] for 6 months and [place] for 6 months of the years.     Social Determinants of Health     Financial Resource Strain: Not on file   Food Insecurity: Not on file   Transportation Needs: Not on file   Physical Activity: Not on file   Stress: Not on file   Social Connections: Not on file   Intimate Partner Violence: Not on file   Housing Stability: Not on file          Medications  Allergies   Current Outpatient Medications   Medication Sig Dispense Refill    apixaban ANTICOAGULANT (ELIQUIS ANTICOAGULANT) 5 MG tablet Take 1 tablet (5 mg) by mouth 2 times daily 60 tablet 6    ferrous sulfate (FE TABS) 325 (65 Fe) MG EC tablet Take 325 mg by mouth three times a week      Fluticasone-Umeclidin-Vilanterol (TRELEGY ELLIPTA) 200-62.5-25 MCG/INH oral inhaler Inhale 1 puff into the lungs daily 60 each 11    ipratropium - albuterol 0.5 mg/2.5 mg/3 mL (DUONEB) 0.5-2.5 (3) MG/3ML neb solution Take 1 vial (3 mLs) by nebulization every 6 hours as needed for shortness of breath / dyspnea or wheezing 360  mL 11    meclizine (ANTIVERT) 25 MG tablet Take 1 tablet (25 mg) by mouth 3 times daily as needed for dizziness 30 tablet 1    multivitamin (MULTIPLE VITAMINS ORAL) [MULTIVITAMIN (MULTIPLE VITAMINS ORAL)] Take by mouth.      rosuvastatin (CRESTOR) 40 MG tablet TAKE 1 TABLET AT BEDTIME 90 tablet 0    sotalol (BETAPACE) 80 MG tablet Take 0.5 tablets (40 mg) by mouth daily Will need more refills from cardiology 30 tablet 1      Allergies   Allergen Reactions    Lisinopril Cough         Lab Results    Chemistry/lipid CBC Cardiac Enzymes/BNP/TSH/INR   Recent Labs   Lab Test 08/10/23  1249 11/04/22  1754 08/09/22  1010   TRIG  --   --  262*   LDL  --   --  78   BUN 13.2   < > 8.6      < > 145   CO2 26   < > 28    < > = values in this interval not displayed.    Recent Labs   Lab Test 08/08/23  0507   WBC 6.6   HGB 13.2   HCT 41.0   MCV 95       Recent Labs   Lab Test 08/07/23  1524 08/07/23  1155 05/12/22  1012 12/06/21  1639   TROPONINI  --   --  <0.01  --    BNP  --   --   --  51   TSH  --  1.48  --   --    INR 1.22*  --   --   --         A total of 60 minutes was spent reviewing patient's medical records, obtaining history and performing examination, as well as discussing diagnoses/ recommendations with patient and answering all questions.                        Thank you for allowing me to participate in the care of your patient.      Sincerely,     Lana Chaudhari MD     [facility]  cc:   Gabriela Cruz MD  [facility] [place] [address]

## 2023-08-17 NOTE — PATIENT INSTRUCTIONS
Decrease Sotalol to half tablet or 40 mg daily as heart rate running low.  Follow up in our EP clinic regarding possible ablation therapy

## 2023-08-17 NOTE — PROGRESS NOTES
Thank you, Dr. Donaldo Vernon, for asking the [facility] Heart Care team to see Ms. Kori Leach to follow-up on paroxysmal atrial fibrillation with rapid ventricular response.    Assessment/Recommendations   Assessment:    1.  Paroxysmal atrial fibrillation with rapid ventricular response, resolved following elective cardioversion in the ED.  I suspect her atrial fibrillation may be a component of sick sinus syndrome as she is bradycardic in the office today on low-dose sotalol.  At this point we will decrease dose of sotalol to 40 mg daily.  Continue anticoagulation although she would prefer to switch from Xarelto to Eliquis which is reasonable.  I will have her follow-up with the EP in the next several weeks to discuss possible A-fib ablation versus potential need for permanent pacemaker and ongoing antiarrhythmic therapy.  2.  TAMAR, treated with CPAP  3.  Essential hypertension, controlled although blood pressure elevated today.  This may be due to some anxiety.      Plan:  1.  Patient to decrease sotalol to 40 mg once daily  2.  Follow-up in EP clinic to discuss alternative treatment options.       History of Present Illness    Ms. Kori Leach is a 83 year old female with history of paroxysmal atrial fibrillation, essential hypertension, TAMAR treated with CPAP who I met recently at [facility] ED following a third episode of atrial fibrillation with rapid ventricular response while on metoprolol and Xarelto anticoagulation.  Because she had been compliant with her anticoagulation, decision was made to pursue elective cardioversion in the ED which was successful at restoring sinus rhythm.  Because her heart rate was at the lower range of normal, I could not increase her beta-blocker further and suggested switching the metoprolol to sotalol in the hopes that this would better suppress her atrial fibrillation.  She was thus initiated on sotalol 80 mg once daily and is here today for  follow-up visit.  Since undergoing her cardioversion, she reports symptoms of lightheadedness.  She has had symptoms suggestive of vertigo for which she had been given meclizine but states this lightheadedness is different and she has felt it almost continuously since being seen in the ED.  Has occasionally felt near syncopal but no geovany syncopal episodes.    ECG (personally reviewed): ECG today demonstrates profound sinus bradycardia, rate 49.  QTc interval normal.    Cardiac Imaging Studies (personally reviewed): No recent imaging     Physical Examination Review of Systems   BP (!) 146/58 (BP Location: Right arm, Patient Position: Sitting, Cuff Size: Adult Regular)   Pulse 60   Resp 14   Wt 86.2 kg (190 lb)   LMP 08/06/1988   BMI 34.75 kg/m    Body mass index is 34.75 kg/m .  Wt Readings from Last 3 Encounters:   08/17/23 86.2 kg (190 lb)   08/10/23 83.6 kg (184 lb 4.8 oz)   08/07/23 85.7 kg (189 lb)     General Appearance:   Awake, Alert, No acute distress.   HEENT:  No scleral icterus; the mucous membranes were pink and moist.   Neck: No cervical bruits or jugular venous distention    Chest: The spine was straight. The chest was symmetric.   Lungs:   Respirations unlabored; the lungs are clear to auscultation. No wheezing   Cardiovascular:   Regular rhythm which is bradycardic.  S1, S2 normal.  No murmur or gallop.   Abdomen:  No organomegaly, masses, bruits, or tenderness. Bowels sounds are present   Extremities: No peripheral edema bilaterally   Skin: No xanthelasma. Warm, Dry.   Musculoskeletal: No tenderness.   Neurologic: Mood and affect are appropriate.    Enc Vitals  BP: (!) 146/58  Pulse: 60  Resp: 14  Weight: 86.2 kg (190 lb)                                         Medical History  Surgical History Family History Social History   Past Medical History:   Diagnosis Date    Arthritis     knees and hip replacement    Bleeding diathesis (H) - due to ELIQUIS     Chronic obstructive pulmonary disease,  unspecified COPD type (H) 06/04/2018    PFT Complete  Performed 5/21/2018 Final result Study Result FEV1/FVC is 69 and is reduced. FEV1 is 66% predicted and is reduced. FVC is 74% predicted and reduced. There was improvement in spirometry after a single inhaled dose of bronchodilator. TLC is 110% predicted and is normal. RV is 141% predicted and is increased. DLCO is 77% predicted and is reduced when it  is corrected for hemoglobin.  Im    Degenerative disc disease, cervical     Fatty liver     Former smoker     GERD (gastroesophageal reflux disease)     History of nicotine use 06/04/2018    HLD (hyperlipidemia)     HTN (hypertension)     MO (iron deficiency anemia) 04/24/2015    Inverted nipple     bilateral    Loss of hearing     Lumbar back pain     Morbid obesity (H) 09/04/2018    Pre-diabetes     Severe obstructive sleep apnea 04/18/2018    10/06/17 where we ordered a sleep study which showed severe TAMAR with an RDI of 30 and she had a titration study that showed CPAP of 9 cmH20 to be effective. She was set up with the CPAP on 10/25/17 through [facility].     Urinary incontinence     Past Surgical History:   Procedure Laterality Date    APPENDECTOMY      ARTHROPLASTY KNEE BILATERAL      CATARACT EXTRACTION      CERVICAL SPINE SURGERY  2015    JOINT REPLACEMENT      bilateral knees and right hip    LAPAROSCOPIC CHOLECYSTECTOMY N/A 6/29/2018    Procedure: CHOLECYSTECTOMY, LAPAROSCOPIC;  Surgeon: Jose Armando Humphries MD;  Location: [facility] [place];  Service:     TONSILLECTOMY      WISDOM TOOTH EXTRACTION      Family History   Problem Relation Age of Onset    Breast Cancer Mother 45    Diabetes Mother     Heart Disease Mother     Hyperlipidemia Mother     Hypertension Mother     Depression Father     Alcoholism Father     Hyperlipidemia Sister     Depression Sister     Obesity Sister     Obesity Daughter     Lung Cancer Son     Cerebrovascular Disease Son     Social History     Socioeconomic History    Marital status:       Spouse name: Not on file    Number of children: 3    Years of education: Not on file    Highest education level: Not on file   Occupational History    Not on file   Tobacco Use    Smoking status: Former     Types: Cigarettes    Smokeless tobacco: Never    Tobacco comments:     1-2 cigarettes per day on average   Vaping Use    Vaping Use: Never used   Substance and Sexual Activity    Alcohol use: Yes     Comment: Alcoholic Drinks/day: less than 1 drink per month     Drug use: No    Sexual activity: Not Currently     Partners: Male   Other Topics Concern    Not on file   Social History Narrative    . Retired. Likes to play golf.  3 children. Son has lung cancer.  Was a . Lives in [place] for 6 months and [place] for 6 months of the years.     Social Determinants of Health     Financial Resource Strain: Not on file   Food Insecurity: Not on file   Transportation Needs: Not on file   Physical Activity: Not on file   Stress: Not on file   Social Connections: Not on file   Intimate Partner Violence: Not on file   Housing Stability: Not on file          Medications  Allergies   Current Outpatient Medications   Medication Sig Dispense Refill    apixaban ANTICOAGULANT (ELIQUIS ANTICOAGULANT) 5 MG tablet Take 1 tablet (5 mg) by mouth 2 times daily 60 tablet 6    ferrous sulfate (FE TABS) 325 (65 Fe) MG EC tablet Take 325 mg by mouth three times a week      Fluticasone-Umeclidin-Vilanterol (TRELEGY ELLIPTA) 200-62.5-25 MCG/INH oral inhaler Inhale 1 puff into the lungs daily 60 each 11    ipratropium - albuterol 0.5 mg/2.5 mg/3 mL (DUONEB) 0.5-2.5 (3) MG/3ML neb solution Take 1 vial (3 mLs) by nebulization every 6 hours as needed for shortness of breath / dyspnea or wheezing 360 mL 11    meclizine (ANTIVERT) 25 MG tablet Take 1 tablet (25 mg) by mouth 3 times daily as needed for dizziness 30 tablet 1    multivitamin (MULTIPLE VITAMINS ORAL) [MULTIVITAMIN (MULTIPLE VITAMINS ORAL)] Take by mouth.       rosuvastatin (CRESTOR) 40 MG tablet TAKE 1 TABLET AT BEDTIME 90 tablet 0    sotalol (BETAPACE) 80 MG tablet Take 0.5 tablets (40 mg) by mouth daily Will need more refills from cardiology 30 tablet 1      Allergies   Allergen Reactions    Lisinopril Cough         Lab Results    Chemistry/lipid CBC Cardiac Enzymes/BNP/TSH/INR   Recent Labs   Lab Test 08/10/23  1249 11/04/22  1754 08/09/22  1010   TRIG  --   --  262*   LDL  --   --  78   BUN 13.2   < > 8.6      < > 145   CO2 26   < > 28    < > = values in this interval not displayed.    Recent Labs   Lab Test 08/08/23  0507   WBC 6.6   HGB 13.2   HCT 41.0   MCV 95       Recent Labs   Lab Test 08/07/23  1524 08/07/23  1155 05/12/22  1012 12/06/21  1639   TROPONINI  --   --  <0.01  --    BNP  --   --   --  51   TSH  --  1.48  --   --    INR 1.22*  --   --   --         A total of 60 minutes was spent reviewing patient's medical records, obtaining history and performing examination, as well as discussing diagnoses/ recommendations with patient and answering all questions.

## 2023-08-23 ENCOUNTER — TELEPHONE (OUTPATIENT)
Dept: CARDIOLOGY | Facility: CLINIC | Age: 83
End: 2023-08-23
Payer: MEDICARE

## 2023-08-23 DIAGNOSIS — I48.91 ATRIAL FIBRILLATION WITH RAPID VENTRICULAR RESPONSE (H): Primary | ICD-10-CM

## 2023-08-23 NOTE — TELEPHONE ENCOUNTER
Dr. Chaudhari, we received request from patient pharmacy to switch back to Xarelto from Eliquis. She forgot that Eliquis is a twice a day medication and would prefer once a day Xarelto. Okay to refill Xarelto 20 mg daily and discontinue Eliquis? -timothyb

## 2023-08-28 NOTE — TELEPHONE ENCOUNTER
----- Message -----  From: Lana Chaudhari MD  Sent: 8/27/2023   9:54 PM CDT  To: Chelsy Kumar RN    Yes, OK to switch to Xarelto.    KML      ==  Med list updated. Patient notified that updated prescription sent to [facility]. -drake

## 2023-08-31 ENCOUNTER — TELEPHONE (OUTPATIENT)
Dept: CARDIOLOGY | Facility: CLINIC | Age: 83
End: 2023-08-31

## 2023-08-31 ENCOUNTER — OFFICE VISIT (OUTPATIENT)
Dept: CARDIOLOGY | Facility: CLINIC | Age: 83
End: 2023-08-31
Attending: INTERNAL MEDICINE
Payer: MEDICARE

## 2023-08-31 VITALS
DIASTOLIC BLOOD PRESSURE: 62 MMHG | RESPIRATION RATE: 18 BRPM | BODY MASS INDEX: 34.57 KG/M2 | HEART RATE: 62 BPM | WEIGHT: 189 LBS | SYSTOLIC BLOOD PRESSURE: 166 MMHG | OXYGEN SATURATION: 96 %

## 2023-08-31 DIAGNOSIS — I48.91 ATRIAL FIBRILLATION WITH RAPID VENTRICULAR RESPONSE (H): ICD-10-CM

## 2023-08-31 PROCEDURE — 99205 OFFICE O/P NEW HI 60 MIN: CPT | Performed by: INTERNAL MEDICINE

## 2023-08-31 RX ORDER — METOPROLOL SUCCINATE 25 MG/1
25 TABLET, EXTENDED RELEASE ORAL DAILY
Qty: 30 TABLET | Refills: 3 | Status: SHIPPED | OUTPATIENT
Start: 2023-08-31 | End: 2024-07-12

## 2023-08-31 NOTE — TELEPHONE ENCOUNTER
----- Message from Komal Britt MD sent at 8/31/2023 10:02 AM CDT -----  Please call Kori next week to see if she is interested in an AF ablation and watchman please.    If she does want an ablation    General Anesthesia  CARTO mapping system  Hold Metoprolol 3 days prior to ablation  Continue anticoagulation  CBC, BMP  on day of procedure    ThanksVADIM

## 2023-08-31 NOTE — LETTER
8/31/2023    Donaldo Vernon MD  [address]  [place] [address]    RE: Kori Leach       Dear Colleague,     I had the pleasure of seeing Kori Leach in the [facility].    HEART CARE ENCOUNTER CONSULTATON NOTE      [facility]  [phone]      Assessment/Recommendations   Assessment/Plan:    Kori Leach is a very pleasant 83 year old female with PMH of paroxysmal atrial fibrillation, essential hypertension, TAMAR treated with CPAP who presents today to the EP clinic.    Paroxysmal atrial fibrillation  - We reviewed the pathophysiology of atrial fibrillation and management considerations including stroke risk and anticoagulation, rate control, cardioversion, antiarrhythmic drug therapy, and catheter ablation. We discussed atrial fibrillation ablation procedures, anticipated success rates, the potential need for re-do ablation vs addition of anti-arrhythmic drugs, procedural risks (including groin bleeding, tamponade, phrenic or esophageal injury, stroke, pulmonary vein stenosis) and recovery expectations.  - will stop Sotalol given the side effects  - will start Metoprolol succinate 25 mg daily  - we discussed the ongoing importance of lifestyle modification (maintaining a healthy weight, sleep apnea diagnosis and management, alcohol avoidance) as part of a long term strategy for atrial fibrillation management    2. Anticoagulation  - CHADSVASC score 4, HAS BLED 2  - On Xarelto  - Information provided about Watchman    Time spent: 60 minutes spent on the date of the encounter doing chart review, history and exam, documentation and further activities as noted above.       History of Present Illness/Subjective    HPI: Kori Leach is a very pleasant 83 year old female with PMH of paroxysmal atrial fibrillation, essential hypertension, TAMAR treated with CPAP who presents today to the EP clinic.    Kori has had three episodes of symptomatic  AF. She underwent a cardioversion for the last episode in the ER recently. She was then started on Sotalol 80 mg BID which was subsequently decreased to 40 mg BID due to lightheadedness and bradycardia. She however still continues to have dizziness on Sotalol and would like to stop it.    She does report easy and frequent bruising on the Xarelto.      Recent Echocardiogram Results (personally reviewed):  August 2023    Interpretation Summary     Left ventricular size, wall motion and function are normal. The ejection  fraction is 60-65%.  Normal right ventricle size and systolic function.  The left atrium is mildly dilated.  The right atrium is mildly dilated.  No hemodynamically significant valvular abnormalities on 2D or color flow  imaging.  Compared to prior study, there is no significant change.      Labs below reviewed personally     Physical Examination  Review of Systems   Vitals: BP (!) 166/62 (BP Location: Right arm, Patient Position: Sitting, Cuff Size: Adult Large)   Pulse 62   Resp 18   Wt 85.7 kg (189 lb)   LMP 08/06/1988   SpO2 96%   BMI 34.57 kg/m    BMI= Body mass index is 34.57 kg/m .  Wt Readings from Last 3 Encounters:   08/31/23 85.7 kg (189 lb)   08/17/23 86.2 kg (190 lb)   08/10/23 83.6 kg (184 lb 4.8 oz)       General Appearance:   no distress, normal body habitus   ENT/Mouth: membranes moist, no oral lesions or bleeding gums.      EYES:  no scleral icterus, normal conjunctivae   Neck: no carotid bruits or thyromegaly   Chest/Lungs:   lungs are clear to auscultation, no rales or wheezing, no sternal scar, equal chest wall expansion    Cardiovascular:   Regular. Normal first and second heart sounds with no murmurs, rubs, or gallops; the carotid, radial and posterior tibial pulses are intact, no edema bilaterally    Abdomen:  no organomegaly, masses, bruits, or tenderness; bowel sounds are present   Extremities: no cyanosis or clubbing   Skin: no xanthelasma, warm.    Neurologic: normal   bilateral, no tremors     Psychiatric: alert and oriented x3, calm        Please refer above for cardiac ROS details.        Medical History  Surgical History Family History Social History   Past Medical History:   Diagnosis Date    Arthritis     knees and hip replacement    Bleeding diathesis (H) - due to ELIQUIS     Chronic obstructive pulmonary disease, unspecified COPD type (H) 06/04/2018    PFT Complete  Performed 5/21/2018 Final result Study Result FEV1/FVC is 69 and is reduced. FEV1 is 66% predicted and is reduced. FVC is 74% predicted and reduced. There was improvement in spirometry after a single inhaled dose of bronchodilator. TLC is 110% predicted and is normal. RV is 141% predicted and is increased. DLCO is 77% predicted and is reduced when it  is corrected for hemoglobin.  Im    Degenerative disc disease, cervical     Fatty liver     Former smoker     GERD (gastroesophageal reflux disease)     History of nicotine use 06/04/2018    HLD (hyperlipidemia)     HTN (hypertension)     MO (iron deficiency anemia) 04/24/2015    Inverted nipple     bilateral    Loss of hearing     Lumbar back pain     Morbid obesity (H) 09/04/2018    Pre-diabetes     Severe obstructive sleep apnea 04/18/2018    10/06/17 where we ordered a sleep study which showed severe TAMAR with an RDI of 30 and she had a titration study that showed CPAP of 9 cmH20 to be effective. She was set up with the CPAP on 10/25/17 through [facility].     Urinary incontinence      Past Surgical History:   Procedure Laterality Date    APPENDECTOMY      ARTHROPLASTY KNEE BILATERAL      CATARACT EXTRACTION      CERVICAL SPINE SURGERY  2015    JOINT REPLACEMENT      bilateral knees and right hip    LAPAROSCOPIC CHOLECYSTECTOMY N/A 6/29/2018    Procedure: CHOLECYSTECTOMY, LAPAROSCOPIC;  Surgeon: Jose Armando Humphries MD;  [place] [facility] [place]  Service:     TONSILLECTOMY      WISDOM TOOTH EXTRACTION       Family History   Problem Relation Age of Onset     Breast Cancer Mother 45    Diabetes Mother     Heart Disease Mother     Hyperlipidemia Mother     Hypertension Mother     Depression Father     Alcoholism Father     Hyperlipidemia Sister     Depression Sister     Obesity Sister     Obesity Daughter     Lung Cancer Son     Cerebrovascular Disease Son         Social History     Socioeconomic History    Marital status:      Spouse name: Not on file    Number of children: 3    Years of education: Not on file    Highest education level: Not on file   Occupational History    Not on file   Tobacco Use    Smoking status: Former     Types: Cigarettes    Smokeless tobacco: Never    Tobacco comments:     1-2 cigarettes per day on average   Vaping Use    Vaping Use: Never used   Substance and Sexual Activity    Alcohol use: Yes     Comment: Alcoholic Drinks/day: less than 1 drink per month     Drug use: No    Sexual activity: Not Currently     Partners: Male   Other Topics Concern    Not on file   Social History Narrative    . Retired. Likes to play golf.  3 children. Son has lung cancer.  Was a . Lives in [place] for 6 months and [place] for 6 months of the years.     Social Determinants of Health     Financial Resource Strain: Not on file   Food Insecurity: Not on file   Transportation Needs: Not on file   Physical Activity: Not on file   Stress: Not on file   Social Connections: Not on file   Intimate Partner Violence: Not on file   Housing Stability: Not on file           Medications  Allergies   Current Outpatient Medications   Medication Sig Dispense Refill    ferrous sulfate (FE TABS) 325 (65 Fe) MG EC tablet Take 325 mg by mouth three times a week      Fluticasone-Umeclidin-Vilanterol (TRELEGY ELLIPTA) 200-62.5-25 MCG/INH oral inhaler Inhale 1 puff into the lungs daily 60 each 11    ipratropium - albuterol 0.5 mg/2.5 mg/3 mL (DUONEB) 0.5-2.5 (3) MG/3ML neb solution Take 1 vial (3 mLs) by nebulization every 6 hours as needed for shortness of  breath / dyspnea or wheezing 360 mL 11    meclizine (ANTIVERT) 25 MG tablet Take 1 tablet (25 mg) by mouth 3 times daily as needed for dizziness 30 tablet 1    metoprolol succinate ER (TOPROL XL) 25 MG 24 hr tablet Take 1 tablet (25 mg) by mouth daily 30 tablet 3    multivitamin (MULTIPLE VITAMINS ORAL) [MULTIVITAMIN (MULTIPLE VITAMINS ORAL)] Take by mouth.      rivaroxaban ANTICOAGULANT (XARELTO) 20 MG TABS tablet Take 1 tablet (20 mg) by mouth daily (with dinner) 90 tablet 3    rosuvastatin (CRESTOR) 40 MG tablet TAKE 1 TABLET AT BEDTIME 90 tablet 0       Allergies   Allergen Reactions    Lisinopril Cough          Lab Results    Chemistry/lipid CBC Cardiac Enzymes/BNP/TSH/INR   Recent Labs   Lab Test 08/09/22  1010   CHOL 197   HDL 67   LDL 78   TRIG 262*     Recent Labs   Lab Test 08/09/22  1010 08/04/21  0942 06/08/20  1233   LDL 78 74 77     Recent Labs   Lab Test 08/10/23  1249      POTASSIUM 4.6   CHLORIDE 106   CO2 26   GLC 93   BUN 13.2   CR 0.89   GFRESTIMATED 64   AMINA 8.9     Recent Labs   Lab Test 08/10/23  1249 08/08/23  0507 08/07/23  1155   CR 0.89 0.89 1.17*     Recent Labs   Lab Test 12/06/21  1639 11/04/20  0926 06/08/20  1233   A1C 5.9* 6.1* 6.3*          Recent Labs   Lab Test 08/08/23  0507   WBC 6.6   HGB 13.2   HCT 41.0   MCV 95        Recent Labs   Lab Test 08/08/23  0507 08/07/23  1155 08/09/22  1010   HGB 13.2 15.2 14.8    Recent Labs   Lab Test 05/12/22  1012   TROPONINI <0.01     Recent Labs   Lab Test 08/10/23  1249 08/07/23  1155 12/06/21  1639 11/30/20  1138   BNP  --   --  51 34   NTBNPI  --  2,254*  --   --    NTBNP 194  --   --   --      Recent Labs   Lab Test 08/07/23  1155   TSH 1.48     Recent Labs   Lab Test 08/07/23  1524 06/29/18 2010   INR 1.22* 0.97        Komal Britt MD        Thank you for allowing me to participate in the care of your patient.      Sincerely,     Komal Britt MD     [facility]  cc:   Lana Chaudhari MD  [address], [address]  [place] [address]

## 2023-08-31 NOTE — PATIENT INSTRUCTIONS
[facility]  Cardiac Electrophysiology  [address]  [place] [address]   Office: [phone]  Fax: [phone]       Thank you for seeing us in clinic today - it is a pleasure to be a part of your care team.  Below is a summary of our plan from today's visit.       You have paroxysmal atrial fibrillation, presently being managed with Metoprolol.  We reviewed physiology and management options including antiarrhythmic drug therapy, catheter ablation and lifestyle modification.  We will plan for the following:  - consider options further, and let us know with any questions or decision as to how you would like to proceed  - continue Metoprolol for now  - continue Xarelto.     Please do not hesitate to be in touch with our office at [phone] with any questions that may arise.       Thank you for trusting us with your care,    Komal Britt MD  Clinical Cardiac Electrophysiology  [facility]  [address]  [place] [address]   Office: [phone]  Fax: [phone]                 ATRIAL FIBRILLATION: Patient Information     What is atrial fibrillation?  Atrial fibrillation (AF, A-fib) is a common heart rhythm problem (arrhythmia) occurring within the upper chambers of the heart (the atria).  In normal rhythm, the upper and lower chambers of the heart are electrically driven to contract in a coordinated sequence.  In atrial fibrillation, the atria lose their ability to contract because of rapid and chaotic electrical activity.  The lower chambers of the heart (the ventricles) continue to pump blood throughout the body, though with irregular and often faster rate due to the chaotic activity within the atria.          How do I know if I have atrial fibrillation?   Some people may feel their heart beating faster, harder, or irregularly while in atrial fibrillation.  Others may be lightheaded, fatigued, feel weak or tired or become more  short of breath especially with activities.  Some patients have no symptoms at all.  Atrial fibrillation may be found due to an irregular pulse or on an electrocardiogram (ECG). Atrial fibrillation can start and stop on its own, and episodes can last from seconds to several months.       How common is atrial fibrillation?   An estimated 3-6 million people in the [place] have atrial fibrillation.  Atrial fibrillation is a common heart rhythm problem for older persons, affecting as estimated 12-15% of people over the age of 65 years of age.     What causes atrial fibrillation?   Age is the most important risk factor for atrial fibrillation.  Atrial fibrillation is more common in people with other heart disease, high blood pressure, diabetes, obesity, sleep apnea and in people who regularly consume alcohol.  Surgery, lung disease, or thyroid problems can lead to atrial fibrillation.  Atrial fibrillation has multiple possible causes, and in most cases a single cause cannot be found.  Atrial fibrillation is a progressive condition, usually starting with at an early stage with short and infrequent episodes.  In later stages of disease, more frequent and longer lasting episodes of atrial fibrillation occur, ultimately culminating in episodes which do not spontaneously terminate.  Generally, more enlargement and scarring within the upper chambers of the heart is observed as atrial fibrillation progresses from early to late-stage disease.     How is atrial fibrillation diagnosed and evaluated?    Because of its start-stop nature, atrial fibrillation can be challenging to diagnose.  Atrial fibrillation is most commonly diagnosed via cardiac rhythm recordings - either an ECG or wearable cardiac rhythm monitor.  For patients with pacemakers, defibrillators or implantable loop recorders, atrial fibrillation may be recorded via these devices.  Recently, commercially available devices (eg. Apple Watch, Kardia device, certain  [employer] devices, others) can allow patients to take 30 second cardiac rhythm recordings which may document atrial fibrillation.  Once atrial fibrillation is diagnosed, additional tests include blood tests and an echocardiogram.  The echocardiogram uses ultrasound to look at your heart to assess your cardiac function and evaluate for other heart disease.  Additional evaluation may include CT or MRI studies.     Is atrial fibrillation dangerous?   Atrial fibrillation is not usually a life-threatening arrhythmia.  The most serious consequences of atrial fibrillation including stroke and worsening of overall cardiac function.  While in atrial fibrillation, the upper cardiac chambers do not contract normally, resulting in slower blood flow and increased risk of clot formation.  If this blood clot becomes detached from the heart a stroke can occur.  Unfortunately, stroke can be the first sign of atrial fibrillation for some people.  With a stroke, you may notice abnormal sensation, weakness on one side of the body or face, changes in your vision or speech.  If you have any of these signs, you should contact EMS and be evaluated in an emergency room as soon as possible.       How is atrial fibrillation treated?     Several treatment options exist for suppressing atrial fibrillation - however, it is not an easily curable arrhythmia.  The first goal in managing atrial fibrillation is to minimize stroke risk.  The second goal is to improve symptoms associated with atrial fibrillation.  Finally, in patients with reduced cardiac function, maintaining normal rhythm can help improve cardiac function.       Blood thinners are used to reduce the risk of stroke in patients with high estimated stroke risk related to atrial fibrillation.  For patients at higher risk of bleeding related to blood thinner use, implantable devices may be an option to reduce stroke risk without the need for long term blood thinner use.       Atrial  fibrillation can be managed via two strategies: rate control and rhythm control.  In a rate control strategy, continued atrial fibrillation is accepted and medications (eg. beta-blockers or calcium channel blockers) are used to control the lower chamber rate.  In a rhythm control strategy, anti-arrhythmic medications or catheter ablation are used to maintain normal cardiac rhythm and slow disease progression by suppressing atrial fibrillation.  A procedure called a cardioversion, in which an electric shock is delivered through patches placed on the chest wall while under deep sedation, can be performed to temporarily restore normal cardiac rhythm, though does not address the chance of atrial fibrillation recurrence.  Treatments are more effective for earlier rather than later stage atrial fibrillation.  Lifestyle modifications (maintaining a healthy weight, aerobic exercise, diagnosing and treating sleep apnea, and minimizing alcohol intake) are important elements of atrial fibrillation rhythm control.      What is catheter ablation for atrial fibrillation?  Cardiac catheter ablation is a commonly performed, minimally invasive procedure performed by a cardiac electrophysiologist to treat many different cardiac rhythm abnormalities.  During catheter ablation, long, thin, flexible tubes are advanced into the heart via small sheaths inserted into the femoral veins and thermal energy (either heating or cooling) is applied within the heart to disrupt abnormal electrical activity.  Atrial fibrillation ablation is performed under general anesthesia, with procedures generally taking approximately 2-3 hours.  Patients are typically observed for 3-5 hours after the ablation, and in most cases can be discharged home the same day.  Atrial fibrillation ablation is associated with better outcomes (mortality, cardiovascular hospitalizations, atrial arrhythmia recurrences) compared to antiarrhythmic drug therapy.  However, atrial  fibrillation recurrences are not uncommon, and repeat catheter ablation may be offered.  Your electrophysiology team can review atrial fibrillation ablation, anticipated success rates, risks, and recovery expectations with you.     What are anti-arrhythmic medications?  Anti-arrhythmic medications are specialized drugs which alter cardiac electrical functioning to suppress arrhythmias.  There are several anti-arrhythmic medications available, each with its own success rate and side effects.  Some anti-arrhythmic medications are less effective though safer to use, others are more effective though have serious potential toxicities.  Atrial fibrillation recurrences are common and may require dose adjustment or change in antiarrhythmic therapy.  Your electrophysiology team will carefully consider which medication would be the best and safest for your particular case.       Can I live a normal life?    The goal of atrial fibrillation management is for patients to live normal lives without being limited by symptoms related to atrial fibrillation.     Are any additional educational resources available?  There are a number of excellent atrial fibrillation education resources available to you online.  A few options you may wish to review include:  hrsonline.org/guide-atrial-fibrillation  afibmatters.org  getsmartaboutafib.com  stopaf.com     What comes next?    Consider your management options and let us know how we can help in your decision process.  Please take medications as they have been prescribed.  You should also get any tests that may have been ordered for you.       When to Call Your Doctor or seek emergency care:  Call your doctor or seek emergency care if you have any significant changes with the following:  Weakness  Dizziness  Fainting  Fatigue  Shortness of breath  Chest pain with increased activity  If you are concerned that your heart rate is too fast or too slow  Bleeding that does not stop in 10  minutes  Coughing or throwing up blood  Bloody diarrhea or bleeding hemorrhoids  Dark-colored urine or black stool  Allergic reactions:  Rash  Itching  Swelling  Trouble breathing or swallowing        Please call the [facility] at [phone] if you have concerns about your symptoms, your medicines, or your follow-up appointments.

## 2023-08-31 NOTE — PROGRESS NOTES
HEART CARE ENCOUNTER CONSULTATON NOTE      [facility]  [phone]      Assessment/Recommendations   Assessment/Plan:    Kori Leach is a very pleasant 83 year old female with PMH of paroxysmal atrial fibrillation, essential hypertension, TAMAR treated with CPAP who presents today to the EP clinic.    Paroxysmal atrial fibrillation  - We reviewed the pathophysiology of atrial fibrillation and management considerations including stroke risk and anticoagulation, rate control, cardioversion, antiarrhythmic drug therapy, and catheter ablation. We discussed atrial fibrillation ablation procedures, anticipated success rates, the potential need for re-do ablation vs addition of anti-arrhythmic drugs, procedural risks (including groin bleeding, tamponade, phrenic or esophageal injury, stroke, pulmonary vein stenosis) and recovery expectations.  - will stop Sotalol given the side effects  - will start Metoprolol succinate 25 mg daily  - we discussed the ongoing importance of lifestyle modification (maintaining a healthy weight, sleep apnea diagnosis and management, alcohol avoidance) as part of a long term strategy for atrial fibrillation management    2. Anticoagulation  - CHADSVASC score 4, HAS BLED 2  - On Xarelto  - Information provided about Watchman    Time spent: 60 minutes spent on the date of the encounter doing chart review, history and exam, documentation and further activities as noted above.       History of Present Illness/Subjective    HPI: Kori Leach is a very pleasant 83 year old female with PMH of paroxysmal atrial fibrillation, essential hypertension, TAMAR treated with CPAP who presents today to the EP clinic.    Kori has had three episodes of symptomatic AF. She underwent a cardioversion for the last episode in the ER recently. She was then started on Sotalol 80 mg BID which was subsequently decreased to 40 mg BID due to lightheadedness and bradycardia. She however  still continues to have dizziness on Sotalol and would like to stop it.    She does report easy and frequent bruising on the Xarelto.      Recent Echocardiogram Results (personally reviewed):  August 2023    Interpretation Summary     Left ventricular size, wall motion and function are normal. The ejection  fraction is 60-65%.  Normal right ventricle size and systolic function.  The left atrium is mildly dilated.  The right atrium is mildly dilated.  No hemodynamically significant valvular abnormalities on 2D or color flow  imaging.  Compared to prior study, there is no significant change.      Labs below reviewed personally     Physical Examination  Review of Systems   Vitals: BP (!) 166/62 (BP Location: Right arm, Patient Position: Sitting, Cuff Size: Adult Large)   Pulse 62   Resp 18   Wt 85.7 kg (189 lb)   LMP 08/06/1988   SpO2 96%   BMI 34.57 kg/m    BMI= Body mass index is 34.57 kg/m .  Wt Readings from Last 3 Encounters:   08/31/23 85.7 kg (189 lb)   08/17/23 86.2 kg (190 lb)   08/10/23 83.6 kg (184 lb 4.8 oz)       General Appearance:   no distress, normal body habitus   ENT/Mouth: membranes moist, no oral lesions or bleeding gums.      EYES:  no scleral icterus, normal conjunctivae   Neck: no carotid bruits or thyromegaly   Chest/Lungs:   lungs are clear to auscultation, no rales or wheezing, no sternal scar, equal chest wall expansion    Cardiovascular:   Regular. Normal first and second heart sounds with no murmurs, rubs, or gallops; the carotid, radial and posterior tibial pulses are intact, no edema bilaterally    Abdomen:  no organomegaly, masses, bruits, or tenderness; bowel sounds are present   Extremities: no cyanosis or clubbing   Skin: no xanthelasma, warm.    Neurologic: normal  bilateral, no tremors     Psychiatric: alert and oriented x3, calm        Please refer above for cardiac ROS details.        Medical History  Surgical History Family History Social History   Past Medical  History:   Diagnosis Date    Arthritis     knees and hip replacement    Bleeding diathesis (H) - due to ELIQUIS     Chronic obstructive pulmonary disease, unspecified COPD type (H) 06/04/2018    PFT Complete  Performed 5/21/2018 Final result Study Result FEV1/FVC is 69 and is reduced. FEV1 is 66% predicted and is reduced. FVC is 74% predicted and reduced. There was improvement in spirometry after a single inhaled dose of bronchodilator. TLC is 110% predicted and is normal. RV is 141% predicted and is increased. DLCO is 77% predicted and is reduced when it  is corrected for hemoglobin.  Im    Degenerative disc disease, cervical     Fatty liver     Former smoker     GERD (gastroesophageal reflux disease)     History of nicotine use 06/04/2018    HLD (hyperlipidemia)     HTN (hypertension)     MO (iron deficiency anemia) 04/24/2015    Inverted nipple     bilateral    Loss of hearing     Lumbar back pain     Morbid obesity (H) 09/04/2018    Pre-diabetes     Severe obstructive sleep apnea 04/18/2018    10/06/17 where we ordered a sleep study which showed severe TAMAR with an RDI of 30 and she had a titration study that showed CPAP of 9 cmH20 to be effective. She was set up with the CPAP on 10/25/17 through  DME.     Urinary incontinence      Past Surgical History:   Procedure Laterality Date    APPENDECTOMY      ARTHROPLASTY KNEE BILATERAL      CATARACT EXTRACTION      CERVICAL SPINE SURGERY  2015    JOINT REPLACEMENT      bilateral knees and right hip    LAPAROSCOPIC CHOLECYSTECTOMY N/A 6/29/2018    Procedure: CHOLECYSTECTOMY, LAPAROSCOPIC;  Surgeon: Jose Armando Humphries MD;  Location: [facility];  Service:     TONSILLECTOMY      WISDOM TOOTH EXTRACTION       Family History   Problem Relation Age of Onset    Breast Cancer Mother 45    Diabetes Mother     Heart Disease Mother     Hyperlipidemia Mother     Hypertension Mother     Depression Father     Alcoholism Father     Hyperlipidemia Sister     Depression Sister      Obesity Sister     Obesity Daughter     Lung Cancer Son     Cerebrovascular Disease Son         Social History     Socioeconomic History    Marital status:      Spouse name: Not on file    Number of children: 3    Years of education: Not on file    Highest education level: Not on file   Occupational History    Not on file   Tobacco Use    Smoking status: Former     Types: Cigarettes    Smokeless tobacco: Never    Tobacco comments:     1-2 cigarettes per day on average   Vaping Use    Vaping Use: Never used   Substance and Sexual Activity    Alcohol use: Yes     Comment: Alcoholic Drinks/day: less than 1 drink per month     Drug use: No    Sexual activity: Not Currently     Partners: Male   Other Topics Concern    Not on file   Social History Narrative    . Retired. Likes to play golf.  3 children. Son has lung cancer.  Was a . Lives in [place] for 6 months and [place] for 6 months of the years.     Social Determinants of Health     Financial Resource Strain: Not on file   Food Insecurity: Not on file   Transportation Needs: Not on file   Physical Activity: Not on file   Stress: Not on file   Social Connections: Not on file   Intimate Partner Violence: Not on file   Housing Stability: Not on file           Medications  Allergies   Current Outpatient Medications   Medication Sig Dispense Refill    ferrous sulfate (FE TABS) 325 (65 Fe) MG EC tablet Take 325 mg by mouth three times a week      Fluticasone-Umeclidin-Vilanterol (TRELEGY ELLIPTA) 200-62.5-25 MCG/INH oral inhaler Inhale 1 puff into the lungs daily 60 each 11    ipratropium - albuterol 0.5 mg/2.5 mg/3 mL (DUONEB) 0.5-2.5 (3) MG/3ML neb solution Take 1 vial (3 mLs) by nebulization every 6 hours as needed for shortness of breath / dyspnea or wheezing 360 mL 11    meclizine (ANTIVERT) 25 MG tablet Take 1 tablet (25 mg) by mouth 3 times daily as needed for dizziness 30 tablet 1    metoprolol succinate ER (TOPROL XL) 25 MG 24 hr tablet  Take 1 tablet (25 mg) by mouth daily 30 tablet 3    multivitamin (MULTIPLE VITAMINS ORAL) [MULTIVITAMIN (MULTIPLE VITAMINS ORAL)] Take by mouth.      rivaroxaban ANTICOAGULANT (XARELTO) 20 MG TABS tablet Take 1 tablet (20 mg) by mouth daily (with dinner) 90 tablet 3    rosuvastatin (CRESTOR) 40 MG tablet TAKE 1 TABLET AT BEDTIME 90 tablet 0       Allergies   Allergen Reactions    Lisinopril Cough          Lab Results    Chemistry/lipid CBC Cardiac Enzymes/BNP/TSH/INR   Recent Labs   Lab Test 08/09/22  1010   CHOL 197   HDL 67   LDL 78   TRIG 262*     Recent Labs   Lab Test 08/09/22  1010 08/04/21  0942 06/08/20  1233   LDL 78 74 77     Recent Labs   Lab Test 08/10/23  1249      POTASSIUM 4.6   CHLORIDE 106   CO2 26   GLC 93   BUN 13.2   CR 0.89   GFRESTIMATED 64   AMINA 8.9     Recent Labs   Lab Test 08/10/23  1249 08/08/23  0507 08/07/23  1155   CR 0.89 0.89 1.17*     Recent Labs   Lab Test 12/06/21  1639 11/04/20  0926 06/08/20  1233   A1C 5.9* 6.1* 6.3*          Recent Labs   Lab Test 08/08/23  0507   WBC 6.6   HGB 13.2   HCT 41.0   MCV 95        Recent Labs   Lab Test 08/08/23  0507 08/07/23  1155 08/09/22  1010   HGB 13.2 15.2 14.8    Recent Labs   Lab Test 05/12/22  1012   TROPONINI <0.01     Recent Labs   Lab Test 08/10/23  1249 08/07/23  1155 12/06/21  1639 11/30/20  1138   BNP  --   --  51 34   NTBNPI  --  2,254*  --   --    NTBNP 194  --   --   --      Recent Labs   Lab Test 08/07/23  1155   TSH 1.48     Recent Labs   Lab Test 08/07/23  1524 06/29/18 2010   INR 1.22* 0.97        Komal Britt MD

## 2023-09-05 ASSESSMENT — ENCOUNTER SYMPTOMS
DIZZINESS: 1
SHORTNESS OF BREATH: 1
NERVOUS/ANXIOUS: 1

## 2023-09-05 ASSESSMENT — ACTIVITIES OF DAILY LIVING (ADL): CURRENT_FUNCTION: HOUSEWORK REQUIRES ASSISTANCE

## 2023-09-05 NOTE — TELEPHONE ENCOUNTER
PC to pt  She is still unsure at this time if she would like to proceed  She had several questions regarding the recovery and follow-up apts post as she ashby down [facility]  Pt was provided our contact number to call once she has made a decision to proceed  Pt verbalized understanding, has no further questions or concerns at this time, and has our contact information if needed.  9/5/2023 10:11 AM  Fartun Zapien RN

## 2023-09-11 ENCOUNTER — OFFICE VISIT (OUTPATIENT)
Dept: INTERNAL MEDICINE | Facility: CLINIC | Age: 83
End: 2023-09-11
Payer: MEDICARE

## 2023-09-11 DIAGNOSIS — I48.0 PAROXYSMAL ATRIAL FIBRILLATION (H): ICD-10-CM

## 2023-09-11 DIAGNOSIS — I10 PRIMARY HYPERTENSION: Chronic | ICD-10-CM

## 2023-09-11 DIAGNOSIS — D69.9 BLEEDING DIATHESIS (H): ICD-10-CM

## 2023-09-11 DIAGNOSIS — E78.2 MIXED HYPERLIPIDEMIA: ICD-10-CM

## 2023-09-11 DIAGNOSIS — Z00.00 ENCOUNTER FOR MEDICARE ANNUAL WELLNESS EXAM: Primary | ICD-10-CM

## 2023-09-11 DIAGNOSIS — J44.9 CHRONIC OBSTRUCTIVE PULMONARY DISEASE, UNSPECIFIED COPD TYPE (H): ICD-10-CM

## 2023-09-11 DIAGNOSIS — G47.33 SEVERE OBSTRUCTIVE SLEEP APNEA: ICD-10-CM

## 2023-09-11 DIAGNOSIS — D50.9 IRON DEFICIENCY ANEMIA, UNSPECIFIED IRON DEFICIENCY ANEMIA TYPE: ICD-10-CM

## 2023-09-11 DIAGNOSIS — R42 VERTIGO: ICD-10-CM

## 2023-09-11 PROCEDURE — 99214 OFFICE O/P EST MOD 30 MIN: CPT | Mod: 25 | Performed by: INTERNAL MEDICINE

## 2023-09-11 PROCEDURE — G0439 PPPS, SUBSEQ VISIT: HCPCS | Performed by: INTERNAL MEDICINE

## 2023-09-11 PROCEDURE — G0008 ADMIN INFLUENZA VIRUS VAC: HCPCS | Performed by: INTERNAL MEDICINE

## 2023-09-11 PROCEDURE — 90662 IIV NO PRSV INCREASED AG IM: CPT | Performed by: INTERNAL MEDICINE

## 2023-09-11 RX ORDER — ROSUVASTATIN CALCIUM 40 MG/1
40 TABLET, COATED ORAL AT BEDTIME
Qty: 90 TABLET | Refills: 3 | Status: SHIPPED | OUTPATIENT
Start: 2023-09-11

## 2023-09-11 RX ORDER — TRIAMTERENE/HYDROCHLOROTHIAZID 37.5-25 MG
1 TABLET ORAL DAILY
Qty: 90 TABLET | Refills: 3 | Status: SHIPPED | OUTPATIENT
Start: 2023-09-11 | End: 2024-07-12

## 2023-09-11 ASSESSMENT — ACTIVITIES OF DAILY LIVING (ADL): CURRENT_FUNCTION: HOUSEWORK REQUIRES ASSISTANCE

## 2023-09-11 ASSESSMENT — PAIN SCALES - GENERAL: PAINLEVEL: NO PAIN (0)

## 2023-09-11 NOTE — PROGRESS NOTES
"  Are you in the first 12 months of your Medicare coverage?  No    Healthy Habits:     In general, how would you rate your overall health?  Good    Frequency of exercise:  None    Do you usually eat at least 4 servings of fruit and vegetables a day, include whole grains    & fiber and avoid regularly eating high fat or \"junk\" foods?  No    Taking medications regularly:  No    Barriers to taking medications:  None    Medication side effects:  Lightheadedness    Ability to successfully perform activities of daily living:  Housework requires assistance    Home Safety:  No safety concerns identified    Hearing Impairment:  Difficulty following a conversation in a noisy restaurant or crowded room, feel that people are mumbling or not speaking clearly, need to ask people to speak up or repeat themselves and difficulty understanding soft or whispered speech    In the past 6 months, have you been bothered by leaking of urine?  No    In general, how would you rate your overall mental or emotional health?  Good        Have you ever done Advance Care Planning? (For example, a Health Directive, POLST, or a discussion with a medical provider or your loved ones about your wishes): No, advance care planning information given to patient to review.  Patient plans to discuss their wishes with loved ones or provider.         Fall risk  Fallen 2 or more times in the past year?: No  Any fall with injury in the past year?: No  Cognitive Screening   1) Repeat 3 items (Leader, Season, Table)   2) Clock draw:NORMAL  3) 3 item recall:Recalls 3 objects  Results: 3 items recalled: COGNITIVE IMPAIRMENT LESS LIKELY    Mini-CogTM Copyright HELEN Plasencia. Licensed by the author for use in [facility]; reprinted with permission (glen@.[facility]). All rights reserved.    "

## 2023-09-11 NOTE — PROGRESS NOTES
[facility]    Comprehensive and complex medical care - Chronic disease management - Shared decision making - Care coordination - Compassionate care    Patient advocacy - Rational deprescribing - Minimally disruptive medicine - Ethical focus - Customized care         Date of Service: 9/11/2023  Primary Provider: Donaldo Vernon    Patient Care Team:  Donaldo Vernon MD as PCP - General (Internal Medicine)  Mariella Coyne MD as MD (Neurology)  Bennett Varela MD as MD (Orthopaedic Surgery)  Beka Adams MD as MD  Donaldo Vernon MD as Assigned PCP  Daryl Rodríguez MD as MD (Cardiovascular Disease)  Lana Chaudhari MD as MD (Cardiovascular Disease)  Lana Chaudhari MD as Assigned Heart and Vascular Provider          Patient's Pharmacy:    [id number] [place], [place] - [address] [place] [id number]  Phone: [phone] Fax: [phone]    [facility]IRA [facility]atten - [facility] AT Portal to Registered [facility] Sites  [facility]  [place] [address]  Phone: [phone] Fax: [phone]     Patient's Contacts:  Name Home Phone Work Phone Mobile Phone Relationship Lgl RADHA Weiner [phone]   Son    JENNIFER LEACH [phone]   Daughter      Patient's Insurance:    Payor: MEDICARE / Plan: MEDICARE / Product Type: Medicare /      Subjective:     History of present illness:    Kori Leach is an 83 year old here for an annual wellness visit.    The issues she would like to address at today's visit include the following:    Chief Complaint   Patient presents with    Wellness Visit    Labs Only     Pt is fasting    Recheck Medication     rosuvastatin (CRESTOR) 40 MG tablet - Pt stated it was accidentally sent to [place] as she forgot to change to [place] address           9/11/2023     8:46 AM   Additional Questions   Roomed by DASIA Beckford   Accompanied by Shawn Garcia      Patient comes in today for annual wellness visit and for other issues.    She is smoking a bit at this time and we reviewed this with her.    We reviewed her vertigo and she did do some therapy but this seemed to make things worse for her during the therapy, so she does not want to continue with it at this time.    We mainly spent most of our time today focusing on her atrial fibrillation.  She has had a couple of episodes requiring cardioversion, 1 in [place] and one here.  She had more symptoms with the episode in [place].  She did note a rapid heartbeat here but had more minimal symptoms.    She has met with electrophysiology here.  They talked about ablation and possible Watchman procedure.  She would like a second opinion on this.  Her  had care through [facility] and she would like a referral here.  She might consider procedure there.    She is due for a flu shot and we reviewed this as well.    Her blood pressure is a little bit high.  She did go off of losartan hydrochlorothiazide in the past.  We talked about possibly restarting hydrochlorothiazide alone.  She denies any side effects with this medication.    She is smoking a small amount at this time due to stress.    Her sleep apnea is stable at this time.    She has no signs of recurrence of her iron deficiency anemia.    We reviewed her other issues noted in the assessment but not specifically addressed in the HPI above.           Active Problem List:  Problem List as of 9/11/2023 Reviewed: 6/16/2023  9:25 AM by Shawna Moses NP         High    Former smoker - quit in 2018    Chronic obstructive pulmonary disease, unspecified COPD type (H), thought to be mild on PFTs 2018    Paroxysmal atrial fibrillation (H)    Last Assessment & Plan 6/12/2023 Office Visit Written 6/16/2023  9:30 AM by Shawna Moses NP     Continue anticoagulation. Rate is controlled             Medium    HTN (hypertension)    Severe obstructive sleep apnea on CPAP    MO  (iron deficiency anemia) - Dec 2021, also 2015    Paresthesias, left post auricular and occiput     Last Assessment & Plan 6/12/2023 Office Visit Written 6/12/2023  2:30 PM by Shawna Moses NP     Try topical lidocaine gel on the area         Shortness of breath    Dizziness    Memory difficulty       Low    HLD (hyperlipidemia)    Dysphagia    Prediabetes    Fatty liver    GERD (gastroesophageal reflux disease)    Loss of hearing    Lumbar back pain    Bleeding diathesis (H) - due to ELIQUIS    Mass of right parotid gland        Past Medical History:   Diagnosis Date    Arthritis     knees and hip replacement    Bleeding diathesis (H) - due to ELIQUIS     Chronic obstructive pulmonary disease, unspecified COPD type (H) 06/04/2018    PFT Complete  Performed 5/21/2018 Final result Study Result FEV1/FVC is 69 and is reduced. FEV1 is 66% predicted and is reduced. FVC is 74% predicted and reduced. There was improvement in spirometry after a single inhaled dose of bronchodilator. TLC is 110% predicted and is normal. RV is 141% predicted and is increased. DLCO is 77% predicted and is reduced when it  is corrected for hemoglobin.  Im    Degenerative disc disease, cervical     Fatty liver     Former smoker     GERD (gastroesophageal reflux disease)     History of nicotine use 06/04/2018    HLD (hyperlipidemia)     HTN (hypertension)     MO (iron deficiency anemia) 04/24/2015    Inverted nipple     bilateral    Loss of hearing     Lumbar back pain     Morbid obesity (H) 09/04/2018    Pre-diabetes     Severe obstructive sleep apnea 04/18/2018    10/06/17 where we ordered a sleep study which showed severe TAMAR with an RDI of 30 and she had a titration study that showed CPAP of 9 cmH20 to be effective. She was set up with the CPAP on 10/25/17 through [facility].     Urinary incontinence       Past Surgical History:   Procedure Laterality Date    APPENDECTOMY      ARTHROPLASTY KNEE BILATERAL      CATARACT EXTRACTION      CERVICAL  SPINE SURGERY  2015    JOINT REPLACEMENT      bilateral knees and right hip    LAPAROSCOPIC CHOLECYSTECTOMY N/A 6/29/2018    Procedure: CHOLECYSTECTOMY, LAPAROSCOPIC;  Surgeon: Jose Armando Humphries MD;  Location: [facility] [place];  Service:     TONSILLECTOMY      WISDOM TOOTH EXTRACTION        Family History   Problem Relation Age of Onset    Breast Cancer Mother 45    Diabetes Mother     Heart Disease Mother     Hyperlipidemia Mother     Hypertension Mother     Depression Father     Alcoholism Father     Hyperlipidemia Sister     Depression Sister     Obesity Sister     Obesity Daughter     Lung Cancer Son     Cerebrovascular Disease Son       Family history is otherwise noncontributory.    Social History     Occupational History    Not on file   Tobacco Use    Smoking status: Every Day     Types: Cigarettes    Smokeless tobacco: Never    Tobacco comments:     1-2 cigarettes per day on average   Vaping Use    Vaping Use: Never used   Substance and Sexual Activity    Alcohol use: Yes     Comment: Alcoholic Drinks/day: less than 1 drink per month     Drug use: No    Sexual activity: Not Currently     Partners: Male      Social History     Social History Narrative    . Retired. Likes to play golf.  3 children. Son has lung cancer.  Was a . Lives in [place] for 6 months and [place] for 6 months of the years.      Current Outpatient Medications   Medication Instructions    ferrous sulfate (FE TABS) 325 mg, Oral, THREE TIMES WEEKLY    Fluticasone-Umeclidin-Vilanterol (TRELEGY ELLIPTA) 200-62.5-25 MCG/INH oral inhaler 1 puff, Inhalation, DAILY    ipratropium - albuterol 0.5 mg/2.5 mg/3 mL (DUONEB) 0.5-2.5 (3) MG/3ML neb solution 3 mLs, Nebulization, EVERY 6 HOURS PRN    meclizine (ANTIVERT) 25 mg, Oral, 3 TIMES DAILY PRN    metoprolol succinate ER (TOPROL XL) 25 mg, Oral, DAILY    multivitamin (MULTIPLE VITAMINS ORAL) Oral    rivaroxaban ANTICOAGULANT (XARELTO) 20 mg, Oral, DAILY WITH SUPPER    rosuvastatin  "(CRESTOR) 40 mg, Oral, AT BEDTIME    triamterene-HCTZ (MAXZIDE-25) 37.5-25 MG tablet 1 tablet, Oral, DAILY      Allergies: Lisinopril     Immunization History   Administered Date(s) Administered    COVID-19 Bivalent 12+ (Pfizer) 10/07/2022    COVID-19 MONOVALENT 12+ (Pfizer) 02/04/2021, 02/25/2021, 10/08/2021    COVID-19 Monovalent 12+ (Pfizer 2022) 05/12/2022    Influenza (High Dose) 3 valent vaccine 10/10/2016, 10/11/2017, 10/08/2018, 09/25/2019    Influenza Vaccine 65+ (Fluzone HD) 08/31/2020, 12/06/2021, 10/07/2022, 09/11/2023    Pneumo Conj 13-V (2010&after) 05/30/2017    Pneumococcal 23 valent 06/04/2018    TDAP (Adacel,Boostrix) 05/30/2017      Objective:     Wt Readings from Last 3 Encounters:   09/11/23 87.2 kg (192 lb 3.2 oz)   08/31/23 85.7 kg (189 lb)   08/17/23 86.2 kg (190 lb)     BP Readings from Last 3 Encounters:   09/12/23 138/72   08/31/23 (!) 166/62   08/17/23 (!) 146/58       PHYSICAL EXAM  /72   Pulse 68   Temp 98.3  F (36.8  C) (Oral)   Resp 18   Ht 1.581 m (5' 2.24\")   Wt 87.2 kg (192 lb 3.2 oz)   LMP 08/06/1988   SpO2 94%   BMI 34.88 kg/m     The patient is comfortable, no acute distress.  Mood good.  Insight is good.  No skin lesions or nodules of concern.  Ears clear.  Eyes are nonicteric.  Pupils equal and reactive.  Throat is clear.  Neck is supple without mass, no thyromegaly. No cervical or epitrochlear adenopathy.  No axillary lymph nodes. Heart regular rate and rhythm.  Lungs clear to auscultation bilaterally.  Respiratory effort good.  Abdomen soft and nontender.  No hepatosplenomegaly.  Extremities show trace edema.      Diagnostics:     Office Visit on 08/17/2023   Component Date Value Ref Range Status    Ventricular Rate 08/17/2023 49  BPM Final    Atrial Rate 08/17/2023 49  BPM Final    WY Interval 08/17/2023 138  ms Final    QRS Duration 08/17/2023 82  ms Final    QT 08/17/2023 448  ms Final    QTc 08/17/2023 404  ms Final    P Axis 08/17/2023 70  degrees Final    " R AXIS 08/17/2023 32  degrees Final    T Axis 08/17/2023 34  degrees Final    Interpretation ECG 08/17/2023    Final                    Value:Sinus bradycardia  Otherwise normal ECG  When compared with ECG of 07-AUG-2023 13:23,  Vent. rate has decreased BY  25 BPM  Confirmed by MIGUELITO LOTT MD LOC:JN ([id number]) on 8/17/2023 3:27:58 PM       Component      Latest Ref Rng 8/8/2023  5:07 AM 8/10/2023  12:49 PM   WBC      4.0 - 11.0 10e3/uL 6.6     RBC Count      3.80 - 5.20 10e6/uL 4.30     Hemoglobin      11.7 - 15.7 g/dL 13.2     Hematocrit      35.0 - 47.0 % 41.0     MCV      78 - 100 fL 95     MCH      26.5 - 33.0 pg 30.7     MCHC      31.5 - 36.5 g/dL 32.2     RDW      10.0 - 15.0 % 13.3     Platelet Count      150 - 450 10e3/uL 222     % Neutrophils      % 65     % Lymphocytes      % 23     % Monocytes      % 9     % Eosinophils      % 2     % Basophils      % 1     % Immature Granulocytes      % 0     NRBCs per 100 WBC      <1 /100 0     Absolute Neutrophils      1.6 - 8.3 10e3/uL 4.3     Absolute Lymphocytes      0.8 - 5.3 10e3/uL 1.5     Absolute Monocytes      0.0 - 1.3 10e3/uL 0.6     Absolute Eosinophils      0.0 - 0.7 10e3/uL 0.2     Absolute Basophils      0.0 - 0.2 10e3/uL 0.0     Absolute Immature Granulocytes      <=0.4 10e3/uL 0.0     Absolute NRBCs      10e3/uL 0.0     Sodium      136 - 145 mmol/L 142  141    Potassium      3.4 - 5.3 mmol/L 4.1  4.6    Chloride      98 - 107 mmol/L 106  106    Carbon Dioxide (CO2)      22 - 29 mmol/L 26  26    Anion Gap      7 - 15 mmol/L 10  9    Urea Nitrogen      8.0 - 23.0 mg/dL 14.2  13.2    Creatinine      0.51 - 0.95 mg/dL 0.89  0.89    Calcium      8.8 - 10.2 mg/dL 8.6 (L)  8.9    Glucose      70 - 99 mg/dL 94  93    GFR Estimate      >60 mL/min/1.73m2 64  64    N-Terminal Pro Bnp      0 - 1,800 pg/mL  194    Magnesium      1.7 - 2.3 mg/dL  2.3       Legend:  (L) Low    Assessment:     1. Encounter for Medicare annual wellness exam    2. Paroxysmal atrial  fibrillation (H)    3. Mixed hyperlipidemia    4. Primary hypertension    5. Chronic obstructive pulmonary disease, unspecified COPD type (H), thought to be mild on PFTs 2018    6. Severe obstructive sleep apnea on CPAP    7. Iron deficiency anemia, unspecified iron deficiency anemia type    8. Bleeding diathesis (H) - due to ELIQUIS    9. Vertigo         Plan:     Flu shot done today.  Blood work reviewed.  Patient would like second opinion related to atrial fibrillation at [facility].  Issues to discuss are her episodes and medication versus ablative procedures.  She would also like to review possible Watchman device for stroke prevention versus anticoagulation.  Referral faxed to [facility] with this note and other information.  She should follow-up with me at least every 6 months ideally.  Follow-up sooner if issues.  She is going to just continue to manage with the vertigo at this time.  She did not prefer doing the therapy.  We added triamterene hydrochlorothiazide for her blood pressure.  Continue current medications  Follow up sooner if issues.    Orders Placed This Encounter   Procedures    INFLUENZA VACCINE 65+ (FLUZONE HD)        A personalized health plan based on the identified health risks was provided to the patient on the AVS.       Donaldo Vernon MD  General Internal Medicine  [facility]      No follow-ups on file.     No future appointments.      Health Maintenance   Topic Date Due    NICOTINE/TOBACCO CESSATION COUNSELING Q 1 YR  Never done    ANNUAL REVIEW OF  ORDERS  Never done    ZOSTER IMMUNIZATION (2 of 2) 12/10/2015    COVID-19 Vaccine (6 - Pfizer series) 02/07/2023    MEDICARE ANNUAL WELLNESS VISIT  09/11/2024    FALL RISK ASSESSMENT  09/11/2024    DTAP/TDAP/TD IMMUNIZATION (2 - Td or Tdap) 05/30/2027    ADVANCE CARE PLANNING  09/11/2028    DEXA  07/01/2034    SPIROMETRY  Completed    PHQ-2 (once per calendar year)  Completed    INFLUENZA VACCINE  Completed     Pneumococcal Vaccine: 65+ Years  Completed    COPD ACTION PLAN  Addressed    IPV IMMUNIZATION  Aged Out    HPV IMMUNIZATION  Aged Out    MENINGITIS IMMUNIZATION  Aged Out          I have reviewed Opioid Use Disorder and Substance Use Disorder risk factors and made any needed referrals.  This may not apply to this individual patient, but this documentation is required by Medicare.    She is at risk for lack of exercise and has been provided with information to increase physical activity for the benefit of her well-being.  The patient was counseled and encouraged to consider modifying their diet and eating habits. She was provided with information on recommended healthy diet options.  The patient reports that she has difficulty with activities of daily living. I have asked that the patient make a follow up appointment in 26 weeks where this issue will be further evaluated and addressed.  The patient was provided with written information regarding signs of hearing loss.

## 2023-09-11 NOTE — PATIENT INSTRUCTIONS
"Patient Education   Personalized Prevention Plan  You are due for the preventive services outlined below.  Your care team is available to assist you in scheduling these services.  If you have already completed any of these items, please share that information with your care team to update in your medical record.  Health Maintenance Due   Topic Date Due    Talk to your care team about options to quit tobacco use.  Never done    ANNUAL REVIEW OF HM ORDERS  Never done    Zoster (Shingles) Vaccine (2 of 2) 12/10/2015    COVID-19 Vaccine (6 - Pfizer series) 02/07/2023     Learning About Being Physically Active  What is physical activity?     Being physically active means doing any kind of activity that gets your body moving.  The types of physical activity that can help you get fit and stay healthy include:  Aerobic or \"cardio\" activities. These make your heart beat faster and make you breathe harder, such as brisk walking, riding a bike, or running. They strengthen your heart and lungs and build up your endurance.  Strength training activities. These make your muscles work against, or \"resist,\" something. Examples include lifting weights or doing push-ups. These activities help tone and strengthen your muscles and bones.  Stretches. These let you move your joints and muscles through their full range of motion. Stretching helps you be more flexible.  Reaching a balance between these three types of physical activity is important because each one contributes to your overall fitness.  What are the benefits of being active?  Being active is one of the best things you can do for your health. It helps you to:  Feel stronger and have more energy to do all the things you like to do.  Focus better at school or work.  Feel, think, and sleep better.  Reach and stay at a healthy weight.  Lose fat and build lean muscle.  Lower your risk for serious health problems, including diabetes, heart attack, high blood pressure, and some " "cancers.  Keep your heart, lungs, bones, muscles, and joints strong and healthy.  How can you make being active part of your life?  Start slowly. Make it your long-term goal to get at least 30 minutes of exercise on most days of the week. Walking is a good choice. You also may want to do other activities, such as running, swimming, cycling, or playing tennis or team sports.  Pick activities that you like--ones that make your heart beat faster, your muscles stronger, and your muscles and joints more flexible. If you find more than one thing you like doing, do them all. You don't have to do the same thing every day.  Get your heart pumping every day. Any activity that makes your heart beat faster and keeps it at that rate for a while counts.  Here are some great ways to get your heart beating faster:  Go for a brisk walk, run, or bike ride.  Go for a hike or swim.  Go in-line skating.  Play a game of touch football, basketball, or soccer.  Ride a bike.  Play tennis or racquetball.  Climb stairs.  Even some household chores can be aerobic--just do them at a faster pace. Vacuuming, raking or mowing the lawn, sweeping the garage, and washing and waxing the car all can help get your heart rate up.  Strengthen your muscles during the week. You don't have to lift heavy weights or grow big, bulky muscles to get stronger. Doing a few simple activities that make your muscles work against, or \"resist,\" something can help you get stronger.  For example, you can:  Do push-ups or sit-ups, which use your own body weight as resistance.  Lift weights or dumbbells or use stretch bands at home or in a gym or community center.  Stretch your muscles often. Stretching will help you as you become more active. It can help you stay flexible, loosen tight muscles, and avoid injury. It can also help improve your balance and posture and can be a great way to relax.  Be sure to stretch the muscles you'll be using when you work out. It's best to " "warm your muscles slightly before you stretch them. Walk or do some other light aerobic activity for a few minutes, and then start stretching.  When you stretch your muscles:  Do it slowly. Stretching is not about going fast or making sudden movements.  Don't push or bounce during a stretch.  Hold each stretch for at least 15 to 30 seconds, if you can. You should feel a stretch in the muscle, but not pain.  Breathe out as you do the stretch. Then breathe in as you hold the stretch. Don't hold your breath.  If you're worried about how more activity might affect your health, have a checkup before you start. Follow any special advice your doctor gives you for getting a smart start.  Where can you learn more?  Go to https://www.[employer].net/patiented  Enter W332 in the search box to learn more about \"Learning About Being Physically Active.\"  Current as of: October 10, 2022               Content Version: 13.7    6595-5666 [employer].   Care instructions adapted under license by your healthcare professional. If you have questions about a medical condition or this instruction, always ask your healthcare professional. [employer] disclaims any warranty or liability for your use of this information.      Learning About Dietary Guidelines  What are the Dietary Guidelines for Americans?     Dietary Guidelines for Americans provide tips for eating well and staying healthy. This helps reduce the risk for long-term (chronic) diseases.  These guidelines recommend that you:  Eat and drink the right amount for you. The U.S. government's food guide is called MyPlate. It can help you make your own well-balanced eating plan.  Try to balance your eating with your activity. This helps you stay at a healthy weight.  Drink alcohol in moderation, if at all.  Limit foods high in salt, saturated fat, trans fat, and added sugar.  These guidelines are from the U.S. Department of Agriculture and the U.S. Department " "of Health and Human Services. They are updated every 5 years.  What is MyPlate?  MyPlate is the U.S. government's food guide. It can help you make your own well-balanced eating plan. A balanced eating plan means that you eat enough, but not too much, and that your food gives you the nutrients you need to stay healthy.  MyPlate focuses on eating plenty of whole grains, fruits, and vegetables, and on limiting fat and sugar. It is available online at www.ChooseMyPlate.gov.  How can you get started?  If you're trying to eat healthier, you can slowly change your eating habits over time. You don't have to make big changes all at once. Start by adding one or two healthy foods to your meals each day.  Grains  Choose whole-grain breads and cereals and whole-wheat pasta and whole-grain crackers.  Vegetables  Eat a variety of vegetables every day. They have lots of nutrients and are part of a heart-healthy diet.  Fruits  Eat a variety of fruits every day. Fruits contain lots of nutrients. Choose fresh fruit instead of fruit juice.  Protein foods  Choose fish and lean poultry more often. Eat red meat and fried meats less often. Dried beans, tofu, and nuts are also good sources of protein.  Dairy  Choose low-fat or fat-free products from this food group. If you have problems digesting milk, try eating cheese or yogurt instead.  Fats and oils  Limit fats and oils if you're trying to cut calories. Choose healthy fats when you cook. These include canola oil and olive oil.  Where can you learn more?  Go to https://www.[employer]Hum[employer].net/patiented  Enter D676 in the search box to learn more about \"Learning About Dietary Guidelines.\"  Current as of: March 1, 2023               Content Version: 13.7    0109-6598 [employer].   Care instructions adapted under license by your healthcare professional. If you have questions about a medical condition or this instruction, always ask your healthcare professional. Healthwise, " Incorporated disclaims any warranty or liability for your use of this information.      Activities of Daily Living    Your Health Risk Assessment indicates you have difficulties with activities of daily living such as housework, bathing, preparing meals, taking medication, etc. Please make a follow up appointment for us to address this issue in more detail.  Hearing Loss: Care Instructions  Overview     Hearing loss is a sudden or slow decrease in how well you hear. It can range from slight to profound. Permanent hearing loss can occur with aging. It also can happen when you are exposed long-term to loud noise. Examples include listening to loud music, riding motorcycles, or being around other loud machines.  Hearing loss can affect your work and home life. It can make you feel lonely or depressed. You may feel that you have lost your independence. But hearing aids and other devices can help you hear better and feel connected to others.  Follow-up care is a key part of your treatment and safety. Be sure to make and go to all appointments, and call your doctor if you are having problems. It's also a good idea to know your test results and keep a list of the medicines you take.  How can you care for yourself at home?  Avoid loud noises whenever possible. This helps keep your hearing from getting worse.  Always wear hearing protection around loud noises.  Wear a hearing aid as directed.  A professional can help you pick a hearing aid that will work best for you.  You can also get hearing aids over the counter for mild to moderate hearing loss.  Have hearing tests as your doctor suggests. They can show whether your hearing has changed. Your hearing aid may need to be adjusted.  Use other devices as needed. These may include:  Telephone amplifiers and hearing aids that can connect to a television, stereo, radio, or microphone.  Devices that use lights or vibrations. These alert you to the doorbell, a ringing telephone,  "or a baby monitor.  Television closed-captioning. This shows the words at the bottom of the screen. Most new TVs can do this.  TTY (text telephone). This lets you type messages back and forth on the telephone instead of talking or listening. These devices are also called TDD. When messages are typed on the keyboard, they are sent over the phone line to a receiving TTY. The message is shown on a monitor.  Use text messaging, social media, and email if it is hard for you to communicate by telephone.  Try to learn a listening technique called speechreading. It is not lipreading. You pay attention to people's gestures, expressions, posture, and tone of voice. These clues can help you understand what a person is saying. Face the person you are talking to, and have them face you. Make sure the lighting is good. You need to see the other person's face clearly.  Think about counseling if you need help to adjust to your hearing loss.  When should you call for help?  Watch closely for changes in your health, and be sure to contact your doctor if:    You think your hearing is getting worse.     You have new symptoms, such as dizziness or nausea.   Where can you learn more?  Go to https://www.[employer].net/patiented  Enter R798 in the search box to learn more about \"Hearing Loss: Care Instructions.\"  Current as of: March 1, 2023               Content Version: 13.7    0647-4050 [employer].   Care instructions adapted under license by your healthcare professional. If you have questions about a medical condition or this instruction, always ask your healthcare professional. Healthwise, [employer] disclaims any warranty or liability for your use of this information.         "

## 2023-09-12 VITALS
DIASTOLIC BLOOD PRESSURE: 72 MMHG | TEMPERATURE: 98.3 F | BODY MASS INDEX: 35.37 KG/M2 | HEART RATE: 68 BPM | HEIGHT: 62 IN | WEIGHT: 192.2 LBS | OXYGEN SATURATION: 94 % | SYSTOLIC BLOOD PRESSURE: 138 MMHG | RESPIRATION RATE: 18 BRPM

## 2023-10-02 ENCOUNTER — TELEPHONE (OUTPATIENT)
Dept: CARDIOLOGY | Facility: CLINIC | Age: 83
End: 2023-10-02
Payer: MEDICARE

## 2023-10-02 NOTE — TELEPHONE ENCOUNTER
1st Attempt to contact pt, unable to leave voicemail.  Will attempt again  10/2/2023 12:01 PM  Fartun Mccann RN

## 2023-10-02 NOTE — TELEPHONE ENCOUNTER
[facility] Call Center    Phone Message    May a detailed message be left on voicemail: yes     Reason for Call: Symptoms or Concerns     If patient has red-flag symptoms, warm transfer to triage line    Current symptom or concern: Tired and lightheaded since start of Metoprolol. Pt stated lightheadedness is not a new symptom.      Action Taken: Other: Cardiology    Travel Screening: Not Applicable

## 2023-10-05 NOTE — TELEPHONE ENCOUNTER
Talked with pt regarding her concerns. She reports she felt fine until she received her CV and her medication was changed to Sotalol.    She had an appt with Dr. Chaudhari on 8/17 and decreased her sotalol.     She continued not to feel well despite medication decrease.     Pt then saw Dr. Britt on 8/31 and was changed from Sotalol to Metoprolol due to symptoms.     She reports her symptoms remain unchanged and can speak with Dr. Britt about this further at her upcoming appt on 10/9.    Pt also asked questions regarding an ablation and all questions answered. She would like to speak with provider further about this at her appt. She also reports her neck being very stiff believed to have slept on it wrong. Discussed warm packs and medications to assist.     No further questions or concerns.    Tamera Malloy RN

## 2023-10-09 ENCOUNTER — OFFICE VISIT (OUTPATIENT)
Dept: CARDIOLOGY | Facility: CLINIC | Age: 83
End: 2023-10-09
Payer: MEDICARE

## 2023-10-09 VITALS
HEIGHT: 62 IN | HEART RATE: 82 BPM | DIASTOLIC BLOOD PRESSURE: 52 MMHG | SYSTOLIC BLOOD PRESSURE: 118 MMHG | WEIGHT: 188 LBS | RESPIRATION RATE: 16 BRPM | BODY MASS INDEX: 34.6 KG/M2

## 2023-10-09 DIAGNOSIS — I48.0 PAROXYSMAL ATRIAL FIBRILLATION (H): Primary | ICD-10-CM

## 2023-10-09 DIAGNOSIS — I10 PRIMARY HYPERTENSION: Chronic | ICD-10-CM

## 2023-10-09 PROCEDURE — 99214 OFFICE O/P EST MOD 30 MIN: CPT | Performed by: INTERNAL MEDICINE

## 2023-10-09 NOTE — PROGRESS NOTES
HEART CARE ENCOUNTER CONSULTATON NOTE      [facility]  [phone]      Assessment/Recommendations   Assessment/Plan:    Kori Leach is a very pleasant 83 year old female with PMH of paroxysmal atrial fibrillation, essential hypertension, TAMAR treated with CPAP who presents today to the EP clinic.    Paroxysmal atrial fibrillation  - We reviewed the pathophysiology of atrial fibrillation and management considerations including stroke risk and anticoagulation, rate control, cardioversion, antiarrhythmic drug therapy, and catheter ablation. We discussed atrial fibrillation ablation procedures, anticipated success rates, the potential need for re-do ablation vs addition of anti-arrhythmic drugs, procedural risks (including groin bleeding, tamponade, phrenic or esophageal injury, stroke, pulmonary vein stenosis) and recovery expectations.  - has had significant side effects with both Sotalol and Metoprolol  - will plan an ablation  - we discussed the ongoing importance of lifestyle modification (maintaining a healthy weight, sleep apnea diagnosis and management, alcohol avoidance) as part of a long term strategy for atrial fibrillation management    2. Anticoagulation  - CHADSVASC score 4, HAS BLED 2  - On Xarelto  - Information provided about [facility]    3. HTN  - well controlled    4. TAMAR  - on CPAP    Time spent:30 minutes spent on the date of the encounter doing chart review, history and exam, documentation and further activities as noted above.       History of Present Illness/Subjective    HPI: Kori Leach is a very pleasant 83 year old female with PMH of paroxysmal atrial fibrillation, essential hypertension, TAMAR treated with CPAP who presents today to the EP clinic.    Kori has had three episodes of symptomatic AF. She underwent a cardioversion for the last episode in the ER recently. She was then started on Sotalol 80 mg BID which was subsequently decreased to 40 mg BID due  "to lightheadedness and bradycardia. She however still continues to have dizziness on Sotalol and would like to stop it. Sotalol was stopped and she was switched to Metoprolol. She continued to have similar symptoms.    She does report easy and frequent bruising on the Xarelto.      Recent Echocardiogram Results (personally reviewed):  August 2023    Interpretation Summary     Left ventricular size, wall motion and function are normal. The ejection  fraction is 60-65%.  Normal right ventricle size and systolic function.  The left atrium is mildly dilated.  The right atrium is mildly dilated.  No hemodynamically significant valvular abnormalities on 2D or color flow  imaging.  Compared to prior study, there is no significant change.      Labs below reviewed personally     Physical Examination  Review of Systems   Vitals: /52 (BP Location: Right arm, Patient Position: Sitting, Cuff Size: Adult Regular)   Pulse 82   Resp 16   Ht 1.581 m (5' 2.24\")   Wt 85.3 kg (188 lb)   LMP 08/06/1988   BMI 34.12 kg/m    BMI= Body mass index is 34.12 kg/m .  Wt Readings from Last 3 Encounters:   10/09/23 85.3 kg (188 lb)   09/11/23 87.2 kg (192 lb 3.2 oz)   08/31/23 85.7 kg (189 lb)       General Appearance:   no distress, normal body habitus   ENT/Mouth: membranes moist, no oral lesions or bleeding gums.      EYES:  no scleral icterus, normal conjunctivae   Neck: no carotid bruits or thyromegaly   Chest/Lungs:   lungs are clear to auscultation, no rales or wheezing, no sternal scar, equal chest wall expansion    Cardiovascular:   Regular. Normal first and second heart sounds with no murmurs, rubs, or gallops; the carotid, radial and posterior tibial pulses are intact, no edema bilaterally    Abdomen:  no organomegaly, masses, bruits, or tenderness; bowel sounds are present   Extremities: no cyanosis or clubbing   Skin: no xanthelasma, warm.    Neurologic: normal  bilateral, no tremors     Psychiatric: alert and " oriented x3, calm        Please refer above for cardiac ROS details.        Medical History  Surgical History Family History Social History   Past Medical History:   Diagnosis Date    Arthritis     knees and hip replacement    Bleeding diathesis (H) - due to ELIQUIS     Chronic obstructive pulmonary disease, unspecified COPD type (H) 06/04/2018    PFT Complete  Performed 5/21/2018 Final result Study Result FEV1/FVC is 69 and is reduced. FEV1 is 66% predicted and is reduced. FVC is 74% predicted and reduced. There was improvement in spirometry after a single inhaled dose of bronchodilator. TLC is 110% predicted and is normal. RV is 141% predicted and is increased. DLCO is 77% predicted and is reduced when it  is corrected for hemoglobin.  Im    Degenerative disc disease, cervical     Fatty liver     Former smoker     GERD (gastroesophageal reflux disease)     History of nicotine use 06/04/2018    HLD (hyperlipidemia)     HTN (hypertension)     MO (iron deficiency anemia) 04/24/2015    Inverted nipple     bilateral    Loss of hearing     Lumbar back pain     Morbid obesity (H) 09/04/2018    Pre-diabetes     Severe obstructive sleep apnea 04/18/2018    10/06/17 where we ordered a sleep study which showed severe TAMAR with an RDI of 30 and she had a titration study that showed CPAP of 9 cmH20 to be effective. She was set up with the CPAP on 10/25/17 through [facility].     Urinary incontinence      Past Surgical History:   Procedure Laterality Date    APPENDECTOMY      ARTHROPLASTY KNEE BILATERAL      CATARACT EXTRACTION      CERVICAL SPINE SURGERY  2015    JOINT REPLACEMENT      bilateral knees and right hip    LAPAROSCOPIC CHOLECYSTECTOMY N/A 6/29/2018    Procedure: CHOLECYSTECTOMY, LAPAROSCOPIC;  Surgeon: Jose Armando Humphries MD;  Location: [facility] [place];  Service:     TONSILLECTOMY      WISDOM TOOTH EXTRACTION       Family History   Problem Relation Age of Onset    Breast Cancer Mother 45    Diabetes Mother     Heart  Disease Mother     Hyperlipidemia Mother     Hypertension Mother     Depression Father     Alcoholism Father     Hyperlipidemia Sister     Depression Sister     Obesity Sister     Obesity Daughter     Lung Cancer Son     Cerebrovascular Disease Son         Social History     Socioeconomic History    Marital status:      Spouse name: Not on file    Number of children: 3    Years of education: Not on file    Highest education level: Not on file   Occupational History    Not on file   Tobacco Use    Smoking status: Every Day     Types: Cigarettes    Smokeless tobacco: Never    Tobacco comments:     1-2 cigarettes per day on average   Vaping Use    Vaping Use: Never used   Substance and Sexual Activity    Alcohol use: Yes     Comment: Alcoholic Drinks/day: less than 1 drink per month     Drug use: No    Sexual activity: Not Currently     Partners: Male   Other Topics Concern    Not on file   Social History Narrative    . Retired. Likes to play golf.  3 children. Son has lung cancer.  Was a . Lives in [place] for 6 months and [place] for 6 months of the years.     Social Determinants of Health     Financial Resource Strain: Not on file   Food Insecurity: Not on file   Transportation Needs: Not on file   Physical Activity: Not on file   Stress: Not on file   Social Connections: Not on file   Interpersonal Safety: Not on file   Housing Stability: Not on file           Medications  Allergies   Current Outpatient Medications   Medication Sig Dispense Refill    ferrous sulfate (FE TABS) 325 (65 Fe) MG EC tablet Take 325 mg by mouth three times a week      Fluticasone-Umeclidin-Vilanterol (TRELEGY ELLIPTA) 200-62.5-25 MCG/INH oral inhaler Inhale 1 puff into the lungs daily 60 each 11    meclizine (ANTIVERT) 25 MG tablet Take 1 tablet (25 mg) by mouth 3 times daily as needed for dizziness 30 tablet 1    metoprolol succinate ER (TOPROL XL) 25 MG 24 hr tablet Take 1 tablet (25 mg) by mouth daily 30 tablet  3    multivitamin (MULTIPLE VITAMINS ORAL) [MULTIVITAMIN (MULTIPLE VITAMINS ORAL)] Take by mouth.      rivaroxaban ANTICOAGULANT (XARELTO) 20 MG TABS tablet Take 1 tablet (20 mg) by mouth daily (with dinner) 90 tablet 3    rosuvastatin (CRESTOR) 40 MG tablet Take 1 tablet (40 mg) by mouth At Bedtime 90 tablet 3    triamterene-HCTZ (MAXZIDE-25) 37.5-25 MG tablet Take 1 tablet by mouth daily 90 tablet 3    ipratropium - albuterol 0.5 mg/2.5 mg/3 mL (DUONEB) 0.5-2.5 (3) MG/3ML neb solution Take 1 vial (3 mLs) by nebulization every 6 hours as needed for shortness of breath / dyspnea or wheezing (Patient not taking: Reported on 10/9/2023) 360 mL 11       Allergies   Allergen Reactions    Lisinopril Cough          Lab Results    Chemistry/lipid CBC Cardiac Enzymes/BNP/TSH/INR   Recent Labs   Lab Test 08/09/22  1010   CHOL 197   HDL 67   LDL 78   TRIG 262*     Recent Labs   Lab Test 08/09/22  1010 08/04/21  0942 06/08/20  1233   LDL 78 74 77     Recent Labs   Lab Test 08/10/23  1249      POTASSIUM 4.6   CHLORIDE 106   CO2 26   GLC 93   BUN 13.2   CR 0.89   GFRESTIMATED 64   AMINA 8.9     Recent Labs   Lab Test 08/10/23  1249 08/08/23  0507 08/07/23  1155   CR 0.89 0.89 1.17*     Recent Labs   Lab Test 12/06/21  1639 11/04/20  0926 06/08/20  1233   A1C 5.9* 6.1* 6.3*          Recent Labs   Lab Test 08/08/23  0507   WBC 6.6   HGB 13.2   HCT 41.0   MCV 95        Recent Labs   Lab Test 08/08/23  0507 08/07/23  1155 08/09/22  1010   HGB 13.2 15.2 14.8    Recent Labs   Lab Test 05/12/22  1012   TROPONINI <0.01     Recent Labs   Lab Test 08/10/23  1249 08/07/23  1155 12/06/21  1639 11/30/20  1138   BNP  --   --  51 34   NTBNPI  --  2,254*  --   --    NTBNP 194  --   --   --      Recent Labs   Lab Test 08/07/23  1155   TSH 1.48     Recent Labs   Lab Test 08/07/23  1524 06/29/18 2010   INR 1.22* 0.97        Komal Britt MD

## 2023-10-09 NOTE — PATIENT INSTRUCTIONS
[facility]  Cardiac Electrophysiology  [address]  [place] [address]   Office: [phone]  Fax: [phone]       Thank you for seeing us in clinic today - it is a pleasure to be a part of your care team.  Below is a summary of our plan from today's visit.       You have paroxysmal atrial fibrillation, presently being managed with Metoprolol.  We reviewed physiology and management options including antiarrhythmic drug therapy, catheter ablation and lifestyle modification.  We will plan for the following:  - will plan an ablation  - continue Metoprolol for now  - continue Xarelto.     Please do not hesitate to be in touch with our office at [phone] with any questions that may arise.       Thank you for trusting us with your care,    Komal Britt MD  Clinical Cardiac Electrophysiology  [facility]  [address]  [place] [address]   Office: [phone]  Fax: [phone]                 ATRIAL FIBRILLATION: Patient Information     What is atrial fibrillation?  Atrial fibrillation (AF, A-fib) is a common heart rhythm problem (arrhythmia) occurring within the upper chambers of the heart (the atria).  In normal rhythm, the upper and lower chambers of the heart are electrically driven to contract in a coordinated sequence.  In atrial fibrillation, the atria lose their ability to contract because of rapid and chaotic electrical activity.  The lower chambers of the heart (the ventricles) continue to pump blood throughout the body, though with irregular and often faster rate due to the chaotic activity within the atria.          How do I know if I have atrial fibrillation?   Some people may feel their heart beating faster, harder, or irregularly while in atrial fibrillation.  Others may be lightheaded, fatigued, feel weak or tired or become more short of breath especially with activities.  Some patients have no symptoms at all.   Atrial fibrillation may be found due to an irregular pulse or on an electrocardiogram (ECG). Atrial fibrillation can start and stop on its own, and episodes can last from seconds to several months.       How common is atrial fibrillation?   An estimated 3-6 million people in the [place] have atrial fibrillation.  Atrial fibrillation is a common heart rhythm problem for older persons, affecting as estimated 12-15% of people over the age of 65 years of age.     What causes atrial fibrillation?   Age is the most important risk factor for atrial fibrillation.  Atrial fibrillation is more common in people with other heart disease, high blood pressure, diabetes, obesity, sleep apnea and in people who regularly consume alcohol.  Surgery, lung disease, or thyroid problems can lead to atrial fibrillation.  Atrial fibrillation has multiple possible causes, and in most cases a single cause cannot be found.  Atrial fibrillation is a progressive condition, usually starting with at an early stage with short and infrequent episodes.  In later stages of disease, more frequent and longer lasting episodes of atrial fibrillation occur, ultimately culminating in episodes which do not spontaneously terminate.  Generally, more enlargement and scarring within the upper chambers of the heart is observed as atrial fibrillation progresses from early to late-stage disease.     How is atrial fibrillation diagnosed and evaluated?    Because of its start-stop nature, atrial fibrillation can be challenging to diagnose.  Atrial fibrillation is most commonly diagnosed via cardiac rhythm recordings - either an ECG or wearable cardiac rhythm monitor.  For patients with pacemakers, defibrillators or implantable loop recorders, atrial fibrillation may be recorded via these devices.  Recently, commercially available devices (eg. Apple Watch, Intivix device, certain FitBit devices, others) can allow patients to take 30 second cardiac rhythm  recordings which may document atrial fibrillation.  Once atrial fibrillation is diagnosed, additional tests include blood tests and an echocardiogram.  The echocardiogram uses ultrasound to look at your heart to assess your cardiac function and evaluate for other heart disease.  Additional evaluation may include CT or MRI studies.     Is atrial fibrillation dangerous?   Atrial fibrillation is not usually a life-threatening arrhythmia.  The most serious consequences of atrial fibrillation including stroke and worsening of overall cardiac function.  While in atrial fibrillation, the upper cardiac chambers do not contract normally, resulting in slower blood flow and increased risk of clot formation.  If this blood clot becomes detached from the heart a stroke can occur.  Unfortunately, stroke can be the first sign of atrial fibrillation for some people.  With a stroke, you may notice abnormal sensation, weakness on one side of the body or face, changes in your vision or speech.  If you have any of these signs, you should contact EMS and be evaluated in an emergency room as soon as possible.       How is atrial fibrillation treated?     Several treatment options exist for suppressing atrial fibrillation - however, it is not an easily curable arrhythmia.  The first goal in managing atrial fibrillation is to minimize stroke risk.  The second goal is to improve symptoms associated with atrial fibrillation.  Finally, in patients with reduced cardiac function, maintaining normal rhythm can help improve cardiac function.       Blood thinners are used to reduce the risk of stroke in patients with high estimated stroke risk related to atrial fibrillation.  For patients at higher risk of bleeding related to blood thinner use, implantable devices may be an option to reduce stroke risk without the need for long term blood thinner use.       Atrial fibrillation can be managed via two strategies: rate control and rhythm control.  In  a rate control strategy, continued atrial fibrillation is accepted and medications (eg. beta-blockers or calcium channel blockers) are used to control the lower chamber rate.  In a rhythm control strategy, anti-arrhythmic medications or catheter ablation are used to maintain normal cardiac rhythm and slow disease progression by suppressing atrial fibrillation.  A procedure called a cardioversion, in which an electric shock is delivered through patches placed on the chest wall while under deep sedation, can be performed to temporarily restore normal cardiac rhythm, though does not address the chance of atrial fibrillation recurrence.  Treatments are more effective for earlier rather than later stage atrial fibrillation.  Lifestyle modifications (maintaining a healthy weight, aerobic exercise, diagnosing and treating sleep apnea, and minimizing alcohol intake) are important elements of atrial fibrillation rhythm control.      What is catheter ablation for atrial fibrillation?  Cardiac catheter ablation is a commonly performed, minimally invasive procedure performed by a cardiac electrophysiologist to treat many different cardiac rhythm abnormalities.  During catheter ablation, long, thin, flexible tubes are advanced into the heart via small sheaths inserted into the femoral veins and thermal energy (either heating or cooling) is applied within the heart to disrupt abnormal electrical activity.  Atrial fibrillation ablation is performed under general anesthesia, with procedures generally taking approximately 2-3 hours.  Patients are typically observed for 3-5 hours after the ablation, and in most cases can be discharged home the same day.  Atrial fibrillation ablation is associated with better outcomes (mortality, cardiovascular hospitalizations, atrial arrhythmia recurrences) compared to antiarrhythmic drug therapy.  However, atrial fibrillation recurrences are not uncommon, and repeat catheter ablation may be  offered.  Your electrophysiology team can review atrial fibrillation ablation, anticipated success rates, risks, and recovery expectations with you.     What are anti-arrhythmic medications?  Anti-arrhythmic medications are specialized drugs which alter cardiac electrical functioning to suppress arrhythmias.  There are several anti-arrhythmic medications available, each with its own success rate and side effects.  Some anti-arrhythmic medications are less effective though safer to use, others are more effective though have serious potential toxicities.  Atrial fibrillation recurrences are common and may require dose adjustment or change in antiarrhythmic therapy.  Your electrophysiology team will carefully consider which medication would be the best and safest for your particular case.       Can I live a normal life?    The goal of atrial fibrillation management is for patients to live normal lives without being limited by symptoms related to atrial fibrillation.     Are any additional educational resources available?  There are a number of excellent atrial fibrillation education resources available to you online.  A few options you may wish to review include:  hrsonline.org/guide-atrial-fibrillation  afibmatters.org  getsmartaboutafib.com  stopaf.com     What comes next?    Consider your management options and let us know how we can help in your decision process.  Please take medications as they have been prescribed.  You should also get any tests that may have been ordered for you.       When to Call Your Doctor or seek emergency care:  Call your doctor or seek emergency care if you have any significant changes with the following:  Weakness  Dizziness  Fainting  Fatigue  Shortness of breath  Chest pain with increased activity  If you are concerned that your heart rate is too fast or too slow  Bleeding that does not stop in 10 minutes  Coughing or throwing up blood  Bloody diarrhea or bleeding  hemorrhoids  Dark-colored urine or black stool  Allergic reactions:  Rash  Itching  Swelling  Trouble breathing or swallowing        Please call the [facility] at [phone] if you have concerns about your symptoms, your medicines, or your follow-up appointments.

## 2023-10-09 NOTE — LETTER
10/9/2023    Donaldo Vernon MD  [address]  [place] [address]    RE: Kori Leach       Dear Colleague,     I had the pleasure of seeing Kori Leach in the [facility].    HEART CARE ENCOUNTER CONSULTATON NOTE      [facility]  [phone]      Assessment/Recommendations   Assessment/Plan:    Kori Leach is a very pleasant 83 year old female with PMH of paroxysmal atrial fibrillation, essential hypertension, TAMAR treated with CPAP who presents today to the EP clinic.    Paroxysmal atrial fibrillation  - We reviewed the pathophysiology of atrial fibrillation and management considerations including stroke risk and anticoagulation, rate control, cardioversion, antiarrhythmic drug therapy, and catheter ablation. We discussed atrial fibrillation ablation procedures, anticipated success rates, the potential need for re-do ablation vs addition of anti-arrhythmic drugs, procedural risks (including groin bleeding, tamponade, phrenic or esophageal injury, stroke, pulmonary vein stenosis) and recovery expectations.  - has had significant side effects with both Sotalol and Metoprolol  - will plan an ablation  - we discussed the ongoing importance of lifestyle modification (maintaining a healthy weight, sleep apnea diagnosis and management, alcohol avoidance) as part of a long term strategy for atrial fibrillation management    2. Anticoagulation  - CHADSVASC score 4, HAS BLED 2  - On Xarelto  - Information provided about [facility]    3. HTN  - well controlled    4. TAMAR  - on CPAP    Time spent:30 minutes spent on the date of the encounter doing chart review, history and exam, documentation and further activities as noted above.       History of Present Illness/Subjective    HPI: Kori Leach is a very pleasant 83 year old female with PMH of paroxysmal atrial fibrillation, essential hypertension, TAMAR treated with CPAP who presents today to the EP  "clinic.    Kori has had three episodes of symptomatic AF. She underwent a cardioversion for the last episode in the ER recently. She was then started on Sotalol 80 mg BID which was subsequently decreased to 40 mg BID due to lightheadedness and bradycardia. She however still continues to have dizziness on Sotalol and would like to stop it. Sotalol was stopped and she was switched to Metoprolol. She continued to have similar symptoms.    She does report easy and frequent bruising on the Xarelto.      Recent Echocardiogram Results (personally reviewed):  August 2023    Interpretation Summary     Left ventricular size, wall motion and function are normal. The ejection  fraction is 60-65%.  Normal right ventricle size and systolic function.  The left atrium is mildly dilated.  The right atrium is mildly dilated.  No hemodynamically significant valvular abnormalities on 2D or color flow  imaging.  Compared to prior study, there is no significant change.      Labs below reviewed personally     Physical Examination  Review of Systems   Vitals: /52 (BP Location: Right arm, Patient Position: Sitting, Cuff Size: Adult Regular)   Pulse 82   Resp 16   Ht 1.581 m (5' 2.24\")   Wt 85.3 kg (188 lb)   LMP 08/06/1988   BMI 34.12 kg/m    BMI= Body mass index is 34.12 kg/m .  Wt Readings from Last 3 Encounters:   10/09/23 85.3 kg (188 lb)   09/11/23 87.2 kg (192 lb 3.2 oz)   08/31/23 85.7 kg (189 lb)       General Appearance:   no distress, normal body habitus   ENT/Mouth: membranes moist, no oral lesions or bleeding gums.      EYES:  no scleral icterus, normal conjunctivae   Neck: no carotid bruits or thyromegaly   Chest/Lungs:   lungs are clear to auscultation, no rales or wheezing, no sternal scar, equal chest wall expansion    Cardiovascular:   Regular. Normal first and second heart sounds with no murmurs, rubs, or gallops; the carotid, radial and posterior tibial pulses are intact, no edema bilaterally    Abdomen:  no " organomegaly, masses, bruits, or tenderness; bowel sounds are present   Extremities: no cyanosis or clubbing   Skin: no xanthelasma, warm.    Neurologic: normal  bilateral, no tremors     Psychiatric: alert and oriented x3, calm        Please refer above for cardiac ROS details.        Medical History  Surgical History Family History Social History   Past Medical History:   Diagnosis Date    Arthritis     knees and hip replacement    Bleeding diathesis (H) - due to ELIQUIS     Chronic obstructive pulmonary disease, unspecified COPD type (H) 06/04/2018    PFT Complete  Performed 5/21/2018 Final result Study Result FEV1/FVC is 69 and is reduced. FEV1 is 66% predicted and is reduced. FVC is 74% predicted and reduced. There was improvement in spirometry after a single inhaled dose of bronchodilator. TLC is 110% predicted and is normal. RV is 141% predicted and is increased. DLCO is 77% predicted and is reduced when it  is corrected for hemoglobin.  Im    Degenerative disc disease, cervical     Fatty liver     Former smoker     GERD (gastroesophageal reflux disease)     History of nicotine use 06/04/2018    HLD (hyperlipidemia)     HTN (hypertension)     MO (iron deficiency anemia) 04/24/2015    Inverted nipple     bilateral    Loss of hearing     Lumbar back pain     Morbid obesity (H) 09/04/2018    Pre-diabetes     Severe obstructive sleep apnea 04/18/2018    10/06/17 where we ordered a sleep study which showed severe TAMAR with an RDI of 30 and she had a titration study that showed CPAP of 9 cmH20 to be effective. She was set up with the CPAP on 10/25/17 through [facility].     Urinary incontinence      Past Surgical History:   Procedure Laterality Date    APPENDECTOMY      ARTHROPLASTY KNEE BILATERAL      CATARACT EXTRACTION      CERVICAL SPINE SURGERY  2015    JOINT REPLACEMENT      bilateral knees and right hip    LAPAROSCOPIC CHOLECYSTECTOMY N/A 6/29/2018    Procedure: CHOLECYSTECTOMY, LAPAROSCOPIC;  Surgeon: Jose Armando  SALLY Humphries MD;  Location: [facility] OR;  Service:     TONSILLECTOMY      WISDOM TOOTH EXTRACTION       Family History   Problem Relation Age of Onset    Breast Cancer Mother 45    Diabetes Mother     Heart Disease Mother     Hyperlipidemia Mother     Hypertension Mother     Depression Father     Alcoholism Father     Hyperlipidemia Sister     Depression Sister     Obesity Sister     Obesity Daughter     Lung Cancer Son     Cerebrovascular Disease Son         Social History     Socioeconomic History    Marital status:      Spouse name: Not on file    Number of children: 3    Years of education: Not on file    Highest education level: Not on file   Occupational History    Not on file   Tobacco Use    Smoking status: Every Day     Types: Cigarettes    Smokeless tobacco: Never    Tobacco comments:     1-2 cigarettes per day on average   Vaping Use    Vaping Use: Never used   Substance and Sexual Activity    Alcohol use: Yes     Comment: Alcoholic Drinks/day: less than 1 drink per month     Drug use: No    Sexual activity: Not Currently     Partners: Male   Other Topics Concern    Not on file   Social History Narrative    . Retired. Likes to play golf.  3 children. Son has lung cancer.  Was a . Lives in [place] for 6 months and [place] for 6 months of the years.     Social Determinants of Health     Financial Resource Strain: Not on file   Food Insecurity: Not on file   Transportation Needs: Not on file   Physical Activity: Not on file   Stress: Not on file   Social Connections: Not on file   Interpersonal Safety: Not on file   Housing Stability: Not on file           Medications  Allergies   Current Outpatient Medications   Medication Sig Dispense Refill    ferrous sulfate (FE TABS) 325 (65 Fe) MG EC tablet Take 325 mg by mouth three times a week      Fluticasone-Umeclidin-Vilanterol (TRELEGY ELLIPTA) 200-62.5-25 MCG/INH oral inhaler Inhale 1 puff into the lungs daily 60 each 11     meclizine (ANTIVERT) 25 MG tablet Take 1 tablet (25 mg) by mouth 3 times daily as needed for dizziness 30 tablet 1    metoprolol succinate ER (TOPROL XL) 25 MG 24 hr tablet Take 1 tablet (25 mg) by mouth daily 30 tablet 3    multivitamin (MULTIPLE VITAMINS ORAL) [MULTIVITAMIN (MULTIPLE VITAMINS ORAL)] Take by mouth.      rivaroxaban ANTICOAGULANT (XARELTO) 20 MG TABS tablet Take 1 tablet (20 mg) by mouth daily (with dinner) 90 tablet 3    rosuvastatin (CRESTOR) 40 MG tablet Take 1 tablet (40 mg) by mouth At Bedtime 90 tablet 3    triamterene-HCTZ (MAXZIDE-25) 37.5-25 MG tablet Take 1 tablet by mouth daily 90 tablet 3    ipratropium - albuterol 0.5 mg/2.5 mg/3 mL (DUONEB) 0.5-2.5 (3) MG/3ML neb solution Take 1 vial (3 mLs) by nebulization every 6 hours as needed for shortness of breath / dyspnea or wheezing (Patient not taking: Reported on 10/9/2023) 360 mL 11       Allergies   Allergen Reactions    Lisinopril Cough          Lab Results    Chemistry/lipid CBC Cardiac Enzymes/BNP/TSH/INR   Recent Labs   Lab Test 08/09/22  1010   CHOL 197   HDL 67   LDL 78   TRIG 262*     Recent Labs   Lab Test 08/09/22  1010 08/04/21  0942 06/08/20  1233   LDL 78 74 77     Recent Labs   Lab Test 08/10/23  1249      POTASSIUM 4.6   CHLORIDE 106   CO2 26   GLC 93   BUN 13.2   CR 0.89   GFRESTIMATED 64   AMINA 8.9     Recent Labs   Lab Test 08/10/23  1249 08/08/23  0507 08/07/23  1155   CR 0.89 0.89 1.17*     Recent Labs   Lab Test 12/06/21  1639 11/04/20  0926 06/08/20  1233   A1C 5.9* 6.1* 6.3*          Recent Labs   Lab Test 08/08/23  0507   WBC 6.6   HGB 13.2   HCT 41.0   MCV 95        Recent Labs   Lab Test 08/08/23  0507 08/07/23  1155 08/09/22  1010   HGB 13.2 15.2 14.8    Recent Labs   Lab Test 05/12/22  1012   TROPONINI <0.01     Recent Labs   Lab Test 08/10/23  1249 08/07/23  1155 12/06/21  1639 11/30/20  1138   BNP  --   --  51 34   NTBNPI  --  2,254*  --   --    NTBNP 194  --   --   --      Recent Labs   Lab Test  08/07/23  1155   TSH 1.48     Recent Labs   Lab Test 08/07/23  1524 06/29/18 2010   INR 1.22* 0.97        Komal Britt MD                Thank you for allowing me to participate in the care of your patient.      Sincerely,     Komal Britt MD     [facility]  cc:   No referring provider defined for this encounter.

## 2023-10-12 ENCOUNTER — DOCUMENTATION ONLY (OUTPATIENT)
Dept: CARDIOLOGY | Facility: CLINIC | Age: 83
End: 2023-10-12
Payer: MEDICARE

## 2023-10-12 ENCOUNTER — PREP FOR PROCEDURE (OUTPATIENT)
Dept: CARDIOLOGY | Facility: CLINIC | Age: 83
End: 2023-10-12
Payer: MEDICARE

## 2023-10-12 ENCOUNTER — TELEPHONE (OUTPATIENT)
Dept: CARDIOLOGY | Facility: CLINIC | Age: 83
End: 2023-10-12
Payer: MEDICARE

## 2023-10-12 DIAGNOSIS — I48.0 PAROXYSMAL ATRIAL FIBRILLATION (H): Primary | ICD-10-CM

## 2023-10-12 RX ORDER — FENTANYL CITRATE 50 UG/ML
25 INJECTION, SOLUTION INTRAMUSCULAR; INTRAVENOUS
Status: CANCELLED | OUTPATIENT
Start: 2023-10-12

## 2023-10-12 RX ORDER — SODIUM CHLORIDE 9 MG/ML
100 INJECTION, SOLUTION INTRAVENOUS CONTINUOUS
Status: CANCELLED | OUTPATIENT
Start: 2023-10-12

## 2023-10-12 RX ORDER — LIDOCAINE 40 MG/G
CREAM TOPICAL
Status: CANCELLED | OUTPATIENT
Start: 2023-10-12

## 2023-10-12 NOTE — PROGRESS NOTES
H&P Date: Teach Date: Imaging None   PVI  Order Case Req Y  Order Set Y Lab/EKG  Orders [] Letter [] F/U RN Date:  NP follow-up Date:     AC Xarelto-CONTINUE   AAD Metoprolol-Hold 3 days prior   PPI/H2 Blocker Start Protonix 40mg Daily 3 days prior, 6wk post   Diuretics None   DM/GLP-1 DM Meds- None  GLP-1- None     1940  Home:[phone] (home) Cell:[phone] (mobile)  Emergency Contact: Roland Leach [phone]  PCP: Donaldo Vernon, [phone]    Important patient information for CSC/Cath Lab staff : None    [facility] EP Cath Lab Procedure Order   Ablation Type:  PVI- Atrial Fibrillation  Ordering Provider: Dr Britt  Date Ordered and Prepped: 10/12/2023 Fartun Zapien RN    Scheduling Information:  Anticipated Case Duration:  Standard ( Case per day SA 2:1, DW 4:1, KA 3:1)   Scheduling Timeframe:  Next Available  Scheduling Restrictions: None  Scheduling Contact: Please contact pt to schedule, if you are unable to schedule date within the next 24 hours please contact pt to update on scheduling process  EP RN Follow Up Apt: Schedule EP RN PC visit 3-4 days s/p PVI  MD Preference: Scheduling with ordering provider  Current Device/Device Co Needed for Procedure: None NoneNone  Pre-Procedural Testing needed: None  Mapping System Required:  Carto (Dr Saxena and Dr Britt)  ICE Needed:  Yes  Anesthesia:General Anesthesia    [facility] EP Cath Lab Prep   H&P:  Compled by cardiology on 10/9/23 if scheduled within 30 days, pt will need RN education and Labs apt within 3 days of procedure. If pts PVI scheduled outside of 30 days, pt to schedule H&P with EP BARRON, RN Teach, and Labs within 30 days of PVI  Pre-op Labs: CBC, BMP, Beta HcG if appropriate, and INR if on Warfarin will be ordered AM of procedure, if not completed at pre-op H&P within 7 days of procedure.  T&S Pre-Procedure Review: Does not need for PVI procedures  Medical Records Pertinent for Procedure:  Echo 8/7/23 EF 60% and EKG 8/7/23 AF  Contrast  Dye Allergies: None  GLP-1 Protocol: If on Dulaglutide (Trulicity) (weekly)- Injection hold 7 days prior to procedure  , Exenatide extended release (Bydureon bcise) (weekly)- Injection hold 7 days prior to procedure, Exenatide (Byetta) (twice daily)- Oral Tablet hold day prior and morning of procedure and for Injection hold 7 days prior to procedure, Semaglutide (Ozempic) (weekly)- Injection and Oral hold 7 days prior to procedure, Liraglutide (Victoza, Saxenda) (daily)- Injection hold day prior and morning of procedure    Allergies   Allergen Reactions    Lisinopril Cough       Current Outpatient Medications:     ferrous sulfate (FE TABS) 325 (65 Fe) MG EC tablet, Take 325 mg by mouth three times a week, Disp: , Rfl:     Fluticasone-Umeclidin-Vilanterol (TRELEGY ELLIPTA) 200-62.5-25 MCG/INH oral inhaler, Inhale 1 puff into the lungs daily, Disp: 60 each, Rfl: 11    ipratropium - albuterol 0.5 mg/2.5 mg/3 mL (DUONEB) 0.5-2.5 (3) MG/3ML neb solution, Take 1 vial (3 mLs) by nebulization every 6 hours as needed for shortness of breath / dyspnea or wheezing (Patient not taking: Reported on 10/9/2023), Disp: 360 mL, Rfl: 11    meclizine (ANTIVERT) 25 MG tablet, Take 1 tablet (25 mg) by mouth 3 times daily as needed for dizziness, Disp: 30 tablet, Rfl: 1    metoprolol succinate ER (TOPROL XL) 25 MG 24 hr tablet, Take 1 tablet (25 mg) by mouth daily, Disp: 30 tablet, Rfl: 3    multivitamin (MULTIPLE VITAMINS ORAL), [MULTIVITAMIN (MULTIPLE VITAMINS ORAL)] Take by mouth., Disp: , Rfl:     rivaroxaban ANTICOAGULANT (XARELTO) 20 MG TABS tablet, Take 1 tablet (20 mg) by mouth daily (with dinner), Disp: 90 tablet, Rfl: 3    rosuvastatin (CRESTOR) 40 MG tablet, Take 1 tablet (40 mg) by mouth At Bedtime, Disp: 90 tablet, Rfl: 3    triamterene-HCTZ (MAXZIDE-25) 37.5-25 MG tablet, Take 1 tablet by mouth daily, Disp: 90 tablet, Rfl: 3    Documentation Date:10/12/2023 11:14 AM  Fartun Zapien RN

## 2023-10-12 NOTE — TELEPHONE ENCOUNTER
1st Attempt to contact pt, detailed message discussing the below with contact information was left per pt request.    Advised pt of scheduling timeframe and would call once schedule is out  10/12/2023 11:49 AM  Fartun Zapien RN

## 2023-10-12 NOTE — TELEPHONE ENCOUNTER
PC pt to let her know KA scheduled is full and when the scheduled is open for December they will contact her.

## 2023-10-12 NOTE — TELEPHONE ENCOUNTER
[facility] Call Center    Phone Message    May a detailed message be left on voicemail: yes     Reason for Call: Other: Pt is requesting a call back to today to schedule an ablation     Action Taken: Other: cardio    Travel Screening: Not Applicable    Thank you!  [facility]

## 2023-11-03 ENCOUNTER — MYC MEDICAL ADVICE (OUTPATIENT)
Dept: INTERNAL MEDICINE | Facility: CLINIC | Age: 83
End: 2023-11-03
Payer: MEDICARE

## 2023-12-05 PROBLEM — R42 DIZZINESS: Status: RESOLVED | Noted: 2023-08-07 | Resolved: 2023-12-05

## 2024-03-19 ENCOUNTER — TRANSFERRED RECORDS (OUTPATIENT)
Dept: HEALTH INFORMATION MANAGEMENT | Facility: CLINIC | Age: 84
End: 2024-03-19

## 2024-03-19 LAB — EJECTION FRACTION: NORMAL %

## 2024-03-29 ENCOUNTER — TRANSFERRED RECORDS (OUTPATIENT)
Dept: HEALTH INFORMATION MANAGEMENT | Facility: CLINIC | Age: 84
End: 2024-03-29

## 2024-04-23 ENCOUNTER — TELEPHONE (OUTPATIENT)
Dept: CARDIOLOGY | Facility: CLINIC | Age: 84
End: 2024-04-23
Payer: MEDICARE

## 2024-04-23 NOTE — TELEPHONE ENCOUNTER
Called patient to schedule PVI with Dr. Britt in June and patient had her ablation in March down in [place].

## 2024-05-08 ENCOUNTER — TRANSFERRED RECORDS (OUTPATIENT)
Dept: HEALTH INFORMATION MANAGEMENT | Facility: CLINIC | Age: 84
End: 2024-05-08

## 2024-05-17 ENCOUNTER — TRANSFERRED RECORDS (OUTPATIENT)
Dept: HEALTH INFORMATION MANAGEMENT | Facility: CLINIC | Age: 84
End: 2024-05-17

## 2024-07-12 ENCOUNTER — OFFICE VISIT (OUTPATIENT)
Dept: INTERNAL MEDICINE | Facility: CLINIC | Age: 84
End: 2024-07-12
Payer: MEDICARE

## 2024-07-12 VITALS
SYSTOLIC BLOOD PRESSURE: 124 MMHG | HEART RATE: 82 BPM | OXYGEN SATURATION: 95 % | TEMPERATURE: 97.6 F | HEIGHT: 62 IN | WEIGHT: 200.2 LBS | DIASTOLIC BLOOD PRESSURE: 62 MMHG | BODY MASS INDEX: 36.84 KG/M2 | RESPIRATION RATE: 16 BRPM

## 2024-07-12 DIAGNOSIS — D50.9 IRON DEFICIENCY ANEMIA, UNSPECIFIED IRON DEFICIENCY ANEMIA TYPE: ICD-10-CM

## 2024-07-12 DIAGNOSIS — R42 DIZZINESS: ICD-10-CM

## 2024-07-12 DIAGNOSIS — I48.0 PAROXYSMAL ATRIAL FIBRILLATION (H): Primary | ICD-10-CM

## 2024-07-12 DIAGNOSIS — J44.9 CHRONIC OBSTRUCTIVE PULMONARY DISEASE, UNSPECIFIED COPD TYPE (H): ICD-10-CM

## 2024-07-12 DIAGNOSIS — I10 PRIMARY HYPERTENSION: Chronic | ICD-10-CM

## 2024-07-12 DIAGNOSIS — E66.812 CLASS 2 SEVERE OBESITY DUE TO EXCESS CALORIES WITH SERIOUS COMORBIDITY AND BODY MASS INDEX (BMI) OF 37.0 TO 37.9 IN ADULT (H): ICD-10-CM

## 2024-07-12 DIAGNOSIS — E66.01 CLASS 2 SEVERE OBESITY DUE TO EXCESS CALORIES WITH SERIOUS COMORBIDITY AND BODY MASS INDEX (BMI) OF 37.0 TO 37.9 IN ADULT (H): ICD-10-CM

## 2024-07-12 PROCEDURE — 99215 OFFICE O/P EST HI 40 MIN: CPT | Performed by: INTERNAL MEDICINE

## 2024-07-12 PROCEDURE — G2211 COMPLEX E/M VISIT ADD ON: HCPCS | Performed by: INTERNAL MEDICINE

## 2024-07-12 RX ORDER — RESPIRATORY SYNCYTIAL VIRUS VACCINE 120MCG/0.5
0.5 KIT INTRAMUSCULAR ONCE
Qty: 1 EACH | Refills: 0 | Status: CANCELLED | OUTPATIENT
Start: 2024-07-12 | End: 2024-07-12

## 2024-07-12 RX ORDER — METOPROLOL TARTRATE 100 MG
1 TABLET ORAL
COMMUNITY
Start: 2024-07-05 | End: 2024-07-13

## 2024-07-12 ASSESSMENT — PAIN SCALES - GENERAL: PAINLEVEL: NO PAIN (0)

## 2024-07-12 NOTE — PROGRESS NOTES
[facility]    Comprehensive and complex medical care - Chronic disease management - Shared decision making - Care coordination - Compassionate care    Patient advocacy - Rational deprescribing - Minimally disruptive medicine - Ethical focus - Customized care         Date of Service: 7/12/2024  Primary Provider: Donaldo Vernon    Patient Care Team:  Donaldo Vernon MD as PCP - General (Internal Medicine)  Mariella Coyne MD as MD (Neurology)  Bennett Varela MD as MD (Orthopaedic Surgery)  Beka Adams MD as MD  Donaldo Vernon MD as Assigned PCP  Rich Rodríguez MD as MD (Cardiovascular Disease)  Lana Chaudhari MD as MD (Cardiovascular Disease)  Komal Britt MD as Assigned Heart and Vascular Provider          Patient's Pharmacy:    [id number] [address] [address]  Phone: [phone] Fax: [phone]    [facility] - [facility] AT Portal to Registered [facility] Sites  [facility]  [place] [address]  Phone: [phone] Fax: [phone]     Patient's Contacts:  Name Home Phone Work Phone Mobile Phone Relationship Lgl GrRADHA Hassan [phone]   Son    JENNIFER ORTIZ [phone]   Daughter      Patient's Insurance:    Payor: MEDICARE / Plan: MEDICARE / Product Type: Medicare /            Active Problem List:  Problem List as of 7/12/2024 Reviewed: 6/16/2023  9:25 AM by Shawna Moses NP         High    Former smoker - quit in 2018    Chronic obstructive pulmonary disease, unspecified COPD type (H), thought to be mild on PFTs 2018    Paroxysmal atrial fibrillation (H)    Last Assessment & Plan 6/12/2023 Office Visit Written 6/16/2023  9:30 AM by Shawna Moses NP     Continue anticoagulation. Rate is controlled             Medium    HTN (hypertension)    Severe obstructive sleep apnea on CPAP    MO (iron deficiency  anemia) - Dec 2021, also 2015    Paresthesias, left post auricular and occiput     Last Assessment & Plan 6/12/2023 Office Visit Written 6/12/2023  2:30 PM by Shawna Moses NP     Try topical lidocaine gel on the area         Shortness of breath    Memory difficulty       Low    HLD (hyperlipidemia)    Dysphagia    Prediabetes    Fatty liver    GERD (gastroesophageal reflux disease)    Loss of hearing    Lumbar back pain    Bleeding diathesis (H) - due to ELIQUIS    Mass of right parotid gland        Current Outpatient Medications   Medication Instructions    ferrous sulfate (FE TABS) 325 mg, Oral, THREE TIMES WEEKLY    Fluticasone-Umeclidin-Vilanterol (TRELEGY ELLIPTA) 200-62.5-25 MCG/INH oral inhaler 1 puff, Inhalation, DAILY    multivitamin (MULTIPLE VITAMINS ORAL) Oral    rosuvastatin (CRESTOR) 40 mg, Oral, AT BEDTIME        Social History     Social History Narrative    . Retired. Likes to play golf.  3 children. Son has lung cancer.  Was a . Lives in [place] for 6 months and [place] for 6 months of the years.       Subjective:     Kori Leach is a 84 year old female who comes in today for:    Chief Complaint   Patient presents with    Medication     Medication review. Patient states she would like to go over her medications with the provider  Patient states she has been feeling dizzy. Wants to know if it is because of her medication.   Has not taken Xarelto for two days.   Patient wants to talk to the provider about Metoprolol.            7/12/2024    12:06 PM   Additional Questions   Roomed by Wilmer Caballero MA     In for follow up multiple issues.    First reviewed her atrial fibrillation.  She did have ablation in [place] in March and doing well at this time.  No records in relationship to this at this time    Cardiology recommended stopping the rivaroxaban (Xarelto) and she has been tapering the metoprolol.    Has continued to have issues with dizziness and lightheadedness especially  "when on her feet.  Would like to work off the metoprolol for this issue.  Can be tippy on feet.  No syncope issues.    Still taking iron.    Inhaler still being used for her breathing.    We reviewed her other issues noted in the assessment but not specifically addressed in the HPI above.     Objective:     Wt Readings from Last 3 Encounters:   07/12/24 90.8 kg (200 lb 3.2 oz)   10/09/23 85.3 kg (188 lb)   09/11/23 87.2 kg (192 lb 3.2 oz)     BP Readings from Last 3 Encounters:   07/12/24 124/62   10/09/23 118/52   09/12/23 138/72     /62 (BP Location: Right arm, Patient Position: Sitting, Cuff Size: Adult Regular)   Pulse 82   Temp 97.6  F (36.4  C) (Oral)   Resp 16   Ht 1.565 m (5' 1.61\")   Wt 90.8 kg (200 lb 3.2 oz)   LMP 08/06/1988   SpO2 95%   BMI 37.08 kg/m     The patient is comfortable, no acute distress.  Mood good.  Insight fair to good.  Eyes are nonicteric.  Neck is supple without mass.  No cervical adenopathy.  No thyromegaly. Heart regular rate and rhythm.  Lungs clear to auscultation bilaterally.  Respiratory effort is good. Extremities trace-1+ edema.      Diagnostics:     Office Visit on 08/17/2023   Component Date Value Ref Range Status    Ventricular Rate 08/17/2023 49  BPM Final    Atrial Rate 08/17/2023 49  BPM Final    DC Interval 08/17/2023 138  ms Final    QRS Duration 08/17/2023 82  ms Final    QT 08/17/2023 448  ms Final    QTc 08/17/2023 404  ms Final    P Axis 08/17/2023 70  degrees Final    R AXIS 08/17/2023 32  degrees Final    T Axis 08/17/2023 34  degrees Final    Interpretation ECG 08/17/2023    Final                    Value:Sinus bradycardia  Otherwise normal ECG  When compared with ECG of 07-AUG-2023 13:23,  Vent. rate has decreased BY  25 BPM  Confirmed by KAYLIE GARCIA, MIGUELITO LOC:JN ([id number]) on 8/17/2023 3:27:58 PM         No results found for any visits on 07/12/24.     Assessment and Plan:     1. Chronic obstructive pulmonary disease, unspecified COPD type (H)  Doing " okay on inhaler at this time.  Continue to monitor.    2. Paroxysmal atrial fibrillation (H)  Ablation done earlier this year and patient doing well.  Continue to monitor.  Cardiology discontinued rivaroxaban (Xarelto).  Request records in relationship to this.    3. Class 2 severe obesity due to excess calories with serious comorbidity and body mass index (BMI) of 37.0 to 37.9 in adult (H)  Complicated by hypertension.  Work on weight loss.    4. Dizziness  Chronic and likely neurologic in nature.  Continue to monitor.  Patient may taper off of metoprolol to see if this helps.  Could see neurology for this if she wishes.    5. Iron deficiency anemia, unspecified iron deficiency anemia type  Recheck blood work in the  next visit.    6. Primary hypertension  Stop metoprolol and discussed restarting the triamterene/hydrochlorothiazide.       Continue current medications otherwise.  Follow up sooner if issues.          Donaldo Vernon MD  General Internal Medicine  [facility]    40 minutes or greater was spent today on the patient's care on the day of service.      This includes time for chart preparation, reviewing medical tests done before or during the visit, talking with the patient, review of quality indicators, required documentation, and other elements of care.     The longitudinal plan of care for the diagnoses and conditions as documented were addressed during this visit. Due to the added complexity in care, I will continue to support Kori in the subsequent management and with ongoing continuity of care.     Return in about 2 months (around 9/16/2024) for annual wellness visit.     Future Appointments   Date Time Provider Department Center   9/16/2024  9:00 AM [facility]         HCC issues resolved at this visit.

## 2024-07-13 PROBLEM — E66.812 CLASS 2 SEVERE OBESITY DUE TO EXCESS CALORIES WITH SERIOUS COMORBIDITY IN ADULT (H): Status: ACTIVE | Noted: 2024-07-13

## 2024-07-13 PROBLEM — I48.0 PAROXYSMAL ATRIAL FIBRILLATION (H): Status: ACTIVE | Noted: 2022-11-11

## 2024-07-13 PROBLEM — E66.01 CLASS 2 SEVERE OBESITY DUE TO EXCESS CALORIES WITH SERIOUS COMORBIDITY IN ADULT (H): Status: ACTIVE | Noted: 2024-07-13

## 2024-07-13 PROBLEM — I10 PRIMARY HYPERTENSION: Chronic | Status: ACTIVE | Noted: 2022-08-09

## 2024-07-13 NOTE — PATIENT INSTRUCTIONS
Future Appointments   Date Time Provider Department Center   9/16/2024  9:00 AM [facility]

## 2024-08-11 ENCOUNTER — MYC MEDICAL ADVICE (OUTPATIENT)
Dept: INTERNAL MEDICINE | Facility: CLINIC | Age: 84
End: 2024-08-11
Payer: MEDICARE

## 2024-08-12 NOTE — TELEPHONE ENCOUNTER
Please see if Shawna Moses DNP would be willing to add to schedule this week (she knows patient and can do injection) or offer next Mon Aug 19th at 2:30 pm with me.    Thank you,    Donaldo Vernon MD  General Internal Medicine  [facility]  8/12/2024, 11:34 AM

## 2024-08-14 ENCOUNTER — OFFICE VISIT (OUTPATIENT)
Dept: INTERNAL MEDICINE | Facility: CLINIC | Age: 84
End: 2024-08-14
Payer: MEDICARE

## 2024-08-14 VITALS
SYSTOLIC BLOOD PRESSURE: 130 MMHG | OXYGEN SATURATION: 95 % | DIASTOLIC BLOOD PRESSURE: 60 MMHG | TEMPERATURE: 98.9 F | HEART RATE: 81 BPM | BODY MASS INDEX: 36.75 KG/M2 | RESPIRATION RATE: 24 BRPM | HEIGHT: 62 IN | WEIGHT: 199.7 LBS

## 2024-08-14 DIAGNOSIS — M25.511 ACUTE PAIN OF RIGHT SHOULDER: Primary | ICD-10-CM

## 2024-08-14 PROCEDURE — 20610 DRAIN/INJ JOINT/BURSA W/O US: CPT | Mod: RT | Performed by: NURSE PRACTITIONER

## 2024-08-14 RX ORDER — TRIAMCINOLONE ACETONIDE 40 MG/ML
80 INJECTION, SUSPENSION INTRA-ARTICULAR; INTRAMUSCULAR ONCE
Status: COMPLETED | OUTPATIENT
Start: 2024-08-14 | End: 2024-08-14

## 2024-08-14 RX ADMIN — TRIAMCINOLONE ACETONIDE 80 MG: 40 INJECTION, SUSPENSION INTRA-ARTICULAR; INTRAMUSCULAR at 11:30

## 2024-08-14 ASSESSMENT — PAIN SCALES - GENERAL: PAINLEVEL: SEVERE PAIN (7)

## 2024-08-14 NOTE — PROGRESS NOTES
"  Assessment & Plan   Problem List Items Addressed This Visit    None  Visit Diagnoses       Acute pain of right shoulder    -  Primary    Relevant Medications    triamcinolone (KENALOG-40) injection 80 mg (Start on 8/14/2024 11:30 AM)        Right shoulder corticosteroid injection given today. Reviewed aftercare. Advised against getting any vaccines in the next 2 weeks. May return for a left shoulder injection after at least 2 weeks.        Nicotine/Tobacco Cessation  She reports that she has been smoking cigarettes. She has never used smokeless tobacco.      BMI  Estimated body mass index is 37 kg/m  as calculated from the following:    Height as of this encounter: 1.565 m (5' 1.6\").    Weight as of this encounter: 90.6 kg (199 lb 11.2 oz).         Sergio Sheikh is a 84 year old, presenting for the following health issues:  Injections (Shoulder)        8/14/2024    11:06 AM   Additional Questions   Roomed by DASIA Dalton   Accompanied by TEAGAN     History of Present Illness       Reason for visit:  Shoulder shots..She consumes 0 sweetened beverage(s) daily.She exercises with enough effort to increase her heart rate 9 or less minutes per day.  She exercises with enough effort to increase her heart rate 3 or less days per week. She is missing 1 dose(s) of medications per week.  She is not taking prescribed medications regularly due to remembering to take.     Her right shoulder has started to hurt again.  She last had a corticosteroid injection September 2023 so it has been almost 1 year.  No injury.  She does not have any restrictions in range of motion.        Objective    /60   Pulse 81   Temp 98.9  F (37.2  C) (Temporal)   Resp 24   Ht 1.565 m (5' 1.6\")   Wt 90.6 kg (199 lb 11.2 oz)   LMP 08/06/1988   SpO2 95%   BMI 37.00 kg/m    Body mass index is 37 kg/m .  MSK: b/l shoulders have full range of motion but pain at endpoints of shoulder abduction and flexion      Procedure: Verbal consent for " a steroid injection was obtained. Posterior border of the acromion was sterilely prepped and a mixture of 1 mL lidocaine 1% and 2 mL kenalog (40 mg/mL) was injected to the subacromial space of the right shoulder.   Patient tolerated the procedure well.   Patient instructed to avoid excessive use of the shoulder over the next 2 weeks but in particular the next 3 days. Monitor for signs of allergic reaction. Contact the office for any concerns.           Signed Electronically by: Shawna Moses NP

## 2024-08-28 ENCOUNTER — VIRTUAL VISIT (OUTPATIENT)
Dept: URGENT CARE | Facility: CLINIC | Age: 84
End: 2024-08-28
Payer: MEDICARE

## 2024-08-28 DIAGNOSIS — U07.1 INFECTION DUE TO 2019 NOVEL CORONAVIRUS: Primary | ICD-10-CM

## 2024-08-28 DIAGNOSIS — J44.9 CHRONIC OBSTRUCTIVE PULMONARY DISEASE, UNSPECIFIED COPD TYPE (H): ICD-10-CM

## 2024-08-28 PROCEDURE — 99442 PR PHYSICIAN TELEPHONE EVALUATION 11-20 MIN: CPT | Mod: 93

## 2024-08-28 NOTE — PROGRESS NOTES
"Assessment & Plan     Chronic obstructive pulmonary disease, unspecified COPD type (H)  Tesha refill given, appointment scheduled to see PCP in 3 weeks.  - Fluticasone-Umeclidin-Vilanterol (TRELEGY ELLIPTA) 200-62.5-25 MCG/ACT oral inhaler; Inhale 1 puff into the lungs daily.    Infection due to 2019 novel coronavirus  - nirmatrelvir and ritonavir (PAXLOVID) 300 mg/100 mg therapy pack; Take 3 tablets by mouth 2 times daily for 5 days.      Return in about 1 week (around 9/4/2024) for visit with primary care provider if not improving.   COVID-19 positive patient.  Encounter for consideration of medication intervention. Patient does qualify for a prescription. Full discussion with patient including medication options, risks and benefits. Potential drug interactions reviewed with patient.     Treatment Planned: Paxlovid  Temporary change to home medications: hold atorvastatin and restart Sept 4    Estimated body mass index is 37 kg/m  as calculated from the following:    Height as of 8/14/24: 1.565 m (5' 1.6\").    Weight as of 8/14/24: 90.6 kg (199 lb 11.2 oz).  GFR Estimate   Date Value Ref Range Status   08/10/2023 64 >60 mL/min/1.73m2 Final   11/30/2020 >60 >60 mL/min/1.73m2 Final     ALT   Date Value Ref Range Status   08/07/2023 20 0 - 50 U/L Final     Comment:     Reference intervals for this test were updated on 6/12/2023 to more accurately reflect our healthy population. There may be differences in the flagging of prior results with similar values performed with this method. Interpretation of those prior results can be made in the context of the updated reference intervals.       AST   Date Value Ref Range Status   08/07/2023 18 0 - 45 U/L Final     Comment:     Reference intervals for this test were updated on 6/12/2023 to more accurately reflect our healthy population. There may be differences in the flagging of prior results with similar values performed with this method. Interpretation of those prior " results can be made in the context of the updated reference intervals.     Alkaline Phosphatase   Date Value Ref Range Status   08/07/2023 117 (H) 35 - 104 U/L Final     Lab Results   Component Value Date    [id number] Positive (A) 01/06/2022       [facility]    Subjective   Kori Leach is a 84 year old who presents for the following health issues    HPI    Kori presents after testing positive for COVID-19 with symptoms first began in the afternoon on Friday the 23rd.  Today is the morning of the 28th, technically within the first 5 days.  She has had cold symptoms including a cough runny nose chest congestion and slight shortness of breath.  Oxygen saturation is 94.  His history of COPD.      Review of Systems   ROS negative except as stated above.      Objective    Physical Exam   healthy, alert and no distress  PSYCH: Alert and oriented times 3; coherent speech, normal   rate and volume, able to articulate logical thoughts, able   to abstract reason, no tangential thoughts, no hallucinations   or delusions. Affect is normal and pleasant  RESP: intermittent cough, no audible wheezing, able to talk in full sentences  Remainder of exam unable to be completed due to telephone visits    Call duration: 18 min  Provider location: offsite at home, [facility]    No results found for any visits on 08/28/24.

## 2024-08-28 NOTE — PATIENT INSTRUCTIONS
Hold rosuvastatin, restart 3 days after your last dose of Paxlovid (sept 4)    Stay home and away from others until your symptoms are improving AND you have been fever free for 24 hours.    From the CDC guidance:  When you go back to your normal activities, take added precaution over the next 5 days, such as taking additional steps for  air, hygiene, masks, physical distancing, and/or testing when you will be around other people indoors.  Keep in mind that you may still be able to spread the virus that made you sick, even if you are feeling better. You are likely to be less contagious at this time, depending on factors like how long you were sick or how sick you were.  If you develop a fever or you start to feel worse after you have gone back to normal activities, stay home and away from others again until, for at least 24 hours, both are true: your symptoms are improving overall, and you have not had a fever (and are not using fever-reducing medication). Then take added precaution for the next 5 days.    Paxlovid can cause a rebound. This can happen if there is enough virus left in your body when the course of medication is complete that your symptoms develop again. If you start to feel ill again soon after finishing your course of Paxlovid, please repeat the above isolation to prevent spreading the virus to others.    Check O2 levels. If your number stays at 90 or below at rest, go to the emergency room.    Visit the CDC websites for more information and most up to date guidelines:  www.cdc.gov/respiratory-viruses/prevention/precautions-when-sick.html    Free COVID tests from the government at: https://sayyeshometest.org/   [email or web address]

## 2024-09-09 ENCOUNTER — MYC MEDICAL ADVICE (OUTPATIENT)
Dept: INTERNAL MEDICINE | Facility: CLINIC | Age: 84
End: 2024-09-09
Payer: MEDICARE

## 2024-09-10 ENCOUNTER — OFFICE VISIT (OUTPATIENT)
Dept: INTERNAL MEDICINE | Facility: CLINIC | Age: 84
End: 2024-09-10
Payer: MEDICARE

## 2024-09-10 VITALS
WEIGHT: 199.6 LBS | TEMPERATURE: 98.4 F | HEART RATE: 95 BPM | BODY MASS INDEX: 36.73 KG/M2 | SYSTOLIC BLOOD PRESSURE: 150 MMHG | HEIGHT: 62 IN | RESPIRATION RATE: 20 BRPM | DIASTOLIC BLOOD PRESSURE: 68 MMHG | OXYGEN SATURATION: 93 %

## 2024-09-10 DIAGNOSIS — I48.0 PAROXYSMAL ATRIAL FIBRILLATION (H): ICD-10-CM

## 2024-09-10 DIAGNOSIS — I10 PRIMARY HYPERTENSION: Chronic | ICD-10-CM

## 2024-09-10 DIAGNOSIS — M54.2 NECK PAIN: Primary | ICD-10-CM

## 2024-09-10 DIAGNOSIS — M48.02 SPINAL STENOSIS IN CERVICAL REGION: ICD-10-CM

## 2024-09-10 DIAGNOSIS — U07.1 COVID-19 VIRUS INFECTION: ICD-10-CM

## 2024-09-10 DIAGNOSIS — M25.512 ACUTE PAIN OF LEFT SHOULDER: ICD-10-CM

## 2024-09-10 DIAGNOSIS — E78.2 MIXED HYPERLIPIDEMIA: ICD-10-CM

## 2024-09-10 LAB
ERYTHROCYTE [DISTWIDTH] IN BLOOD BY AUTOMATED COUNT: 13 % (ref 10–15)
ERYTHROCYTE [SEDIMENTATION RATE] IN BLOOD BY WESTERGREN METHOD: 11 MM/HR (ref 0–30)
HCT VFR BLD AUTO: 48 % (ref 35–47)
HGB BLD-MCNC: 15.5 G/DL (ref 11.7–15.7)
MCH RBC QN AUTO: 29.4 PG (ref 26.5–33)
MCHC RBC AUTO-ENTMCNC: 32.3 G/DL (ref 31.5–36.5)
MCV RBC AUTO: 91 FL (ref 78–100)
PLATELET # BLD AUTO: 228 10E3/UL (ref 150–450)
RBC # BLD AUTO: 5.28 10E6/UL (ref 3.8–5.2)
WBC # BLD AUTO: 7 10E3/UL (ref 4–11)

## 2024-09-10 PROCEDURE — 86431 RHEUMATOID FACTOR QUANT: CPT | Performed by: INTERNAL MEDICINE

## 2024-09-10 PROCEDURE — 36415 COLL VENOUS BLD VENIPUNCTURE: CPT | Performed by: INTERNAL MEDICINE

## 2024-09-10 PROCEDURE — 86200 CCP ANTIBODY: CPT | Performed by: INTERNAL MEDICINE

## 2024-09-10 PROCEDURE — G2211 COMPLEX E/M VISIT ADD ON: HCPCS | Performed by: INTERNAL MEDICINE

## 2024-09-10 PROCEDURE — 83721 ASSAY OF BLOOD LIPOPROTEIN: CPT | Performed by: INTERNAL MEDICINE

## 2024-09-10 PROCEDURE — 85027 COMPLETE CBC AUTOMATED: CPT | Performed by: INTERNAL MEDICINE

## 2024-09-10 PROCEDURE — 99215 OFFICE O/P EST HI 40 MIN: CPT | Performed by: INTERNAL MEDICINE

## 2024-09-10 PROCEDURE — 85652 RBC SED RATE AUTOMATED: CPT | Performed by: INTERNAL MEDICINE

## 2024-09-10 PROCEDURE — 80061 LIPID PANEL: CPT | Performed by: INTERNAL MEDICINE

## 2024-09-10 PROCEDURE — 86140 C-REACTIVE PROTEIN: CPT | Performed by: INTERNAL MEDICINE

## 2024-09-10 PROCEDURE — 80053 COMPREHEN METABOLIC PANEL: CPT | Performed by: INTERNAL MEDICINE

## 2024-09-10 RX ORDER — PREDNISONE 20 MG/1
TABLET ORAL
Qty: 9 TABLET | Refills: 0 | Status: SHIPPED | OUTPATIENT
Start: 2024-09-10 | End: 2024-09-16

## 2024-09-10 ASSESSMENT — PAIN SCALES - GENERAL: PAINLEVEL: WORST PAIN (10)

## 2024-09-11 ENCOUNTER — MYC MEDICAL ADVICE (OUTPATIENT)
Dept: INTERNAL MEDICINE | Facility: CLINIC | Age: 84
End: 2024-09-11
Payer: MEDICARE

## 2024-09-11 DIAGNOSIS — M48.02 SPINAL STENOSIS IN CERVICAL REGION: ICD-10-CM

## 2024-09-11 DIAGNOSIS — M54.2 NECK PAIN: Primary | ICD-10-CM

## 2024-09-11 DIAGNOSIS — M25.512 ACUTE PAIN OF LEFT SHOULDER: ICD-10-CM

## 2024-09-11 LAB
ALBUMIN SERPL BCG-MCNC: 4.5 G/DL (ref 3.5–5.2)
ALP SERPL-CCNC: 94 U/L (ref 40–150)
ALT SERPL W P-5'-P-CCNC: 31 U/L (ref 0–50)
ANION GAP SERPL CALCULATED.3IONS-SCNC: 11 MMOL/L (ref 7–15)
AST SERPL W P-5'-P-CCNC: 16 U/L (ref 0–45)
BILIRUB SERPL-MCNC: 0.2 MG/DL
BUN SERPL-MCNC: 17.8 MG/DL (ref 8–23)
CALCIUM SERPL-MCNC: 9.7 MG/DL (ref 8.8–10.4)
CCP AB SER IA-ACNC: 0.5 U/ML
CHLORIDE SERPL-SCNC: 105 MMOL/L (ref 98–107)
CHOLEST SERPL-MCNC: 344 MG/DL
CREAT SERPL-MCNC: 0.88 MG/DL (ref 0.51–0.95)
CRP SERPL-MCNC: <3 MG/L
EGFRCR SERPLBLD CKD-EPI 2021: 64 ML/MIN/1.73M2
FASTING STATUS PATIENT QL REPORTED: ABNORMAL
FASTING STATUS PATIENT QL REPORTED: NORMAL
GLUCOSE SERPL-MCNC: 90 MG/DL (ref 70–99)
HCO3 SERPL-SCNC: 26 MMOL/L (ref 22–29)
HDLC SERPL-MCNC: 63 MG/DL
LDLC SERPL CALC-MCNC: ABNORMAL MG/DL
LDLC SERPL DIRECT ASSAY-MCNC: 174 MG/DL
NONHDLC SERPL-MCNC: 281 MG/DL
POTASSIUM SERPL-SCNC: 4.5 MMOL/L (ref 3.4–5.3)
PROT SERPL-MCNC: 7.3 G/DL (ref 6.4–8.3)
RHEUMATOID FACT SERPL-ACNC: <10 IU/ML
SODIUM SERPL-SCNC: 142 MMOL/L (ref 135–145)
TRIGL SERPL-MCNC: 538 MG/DL

## 2024-09-11 NOTE — PROGRESS NOTES
[facility]    Comprehensive and complex medical care - Chronic disease management - Shared decision making - Care coordination - Compassionate care    Patient advocacy - Rational deprescribing - Minimally disruptive medicine - Ethical focus - Customized care         Date of Service: 9/10/2024  Primary Provider: Donaldo Vernon    Patient Care Team:  Donaldo Vernon MD as PCP - General (Internal Medicine)  Mariella Coyne MD as MD (Neurology)  Bennett Varela MD as MD (Orthopaedic Surgery)  Beka Adams MD as MD  Donaldo Vernon MD as Assigned PCP  Rich Rodríguez MD as MD (Cardiovascular Disease)  Lana Chaudhari MD as MD (Cardiovascular Disease)  Komal Britt MD as Assigned Heart and Vascular Provider          Patient's Pharmacy:    [id number] [address] [address]  Phone: [phone] Fax: [phone]    [facility]IRA [facility] - [facility] AT Portal to Registered [facility] Sites  [facility]  [place] [address]  Phone: [phone] Fax: [phone]     Patient's Contacts:  Name Home Phone Work Phone Mobile Phone Relationship Lgl RADHA Weiner [phone]   Son    JENNIFER ORTIZ [phone]   Daughter      Patient's Insurance:    Payor: MEDICARE / Plan: MEDICARE / Product Type: Medicare /            Active Problem List:  Problem List as of 9/10/2024 Reviewed: 8/14/2024 12:40 PM by Shawna Moses NP         High    Former smoker - quit in 2018    Chronic obstructive pulmonary disease, unspecified COPD type (H), thought to be mild on PFTs 2018    Paroxysmal atrial fibrillation (H)    Last Assessment & Plan 6/12/2023 Office Visit Written 6/16/2023  9:30 AM by Shawna Moses NP     Continue anticoagulation. Rate is controlled             Medium    Primary hypertension    Severe obstructive sleep apnea on CPAP    MO (iron  deficiency anemia) - Dec 2021, also 2015    Paresthesias, left post auricular and occiput     Last Assessment & Plan 6/12/2023 Office Visit Written 6/12/2023  2:30 PM by Shawna Moses NP     Try topical lidocaine gel on the area         Shortness of breath    Memory difficulty    Class 2 severe obesity due to excess calories with serious comorbidity in adult (H)       Low    HLD (hyperlipidemia)    Dysphagia    Prediabetes    Fatty liver    GERD (gastroesophageal reflux disease)    Loss of hearing    Lumbar back pain    Mass of right parotid gland        Current Outpatient Medications   Medication Instructions    ferrous sulfate (FE TABS) 325 mg, Oral, THREE TIMES WEEKLY    Fluticasone-Umeclidin-Vilanterol (TRELEGY ELLIPTA) 200-62.5-25 MCG/ACT oral inhaler 1 puff, Inhalation, DAILY    metoprolol-hydrochlorothiazide (DUTOPROL) 50-12.5 MG TB24 per tablet 1 tablet, Oral, DAILY    multivitamin (MULTIPLE VITAMINS ORAL) Oral    predniSONE (DELTASONE) 20 MG tablet Take 2 tablets (40 mg) by mouth daily for 3 days, THEN 1 tablet (20 mg) daily for 3 days.    rosuvastatin (CRESTOR) 40 mg, Oral, AT BEDTIME      Social History     Social History Narrative    . Retired. Likes to play golf.  3 children. Son has lung cancer.  Was a . Lives in [place] for 6 months and [place] for 6 months of the years.       Subjective:     Kori CAMILO Kinderman is a 84 year old female who comes in today for:    Chief Complaint   Patient presents with    Shoulder Pain     Patient states constant pain kind of in the neck and it is sore, goes down to the neck. 10/10 pain          9/10/2024     4:08 PM   Additional Questions   Roomed by Hong LR CMA     Follow up mainly related to her neck pain.    Recently in for her right shoulder pain and this has been an ongoing issue.  Corticosteroid injection by Shawna Moses DNP was helpful for this.    Her problems with her neck and her left shoulder came on after having a COVID infection.  This  "was August 24th.  Did take nirmatrelvir and ritonavir (PAXLOVID).    Neck and left shoulder have been very bad in the last 6 days.    Worse with turning head to the left.    Did have neck pain last November as well and saw  spine for this and MRI scan done at that time at [facility] was reviewed and there was signs of cervical spinal stenosis and foraminal stenosis as well as some upper thoracic disease.  Neck pain is now acute again rather than chronic.  Seems to go into the trapezius and posterior and lateral shoulder.  Can generally abduct the shoulder well.  No numbness or tingling down the arm.  No loss of strength on the left.  Gait is good.    No fever or chills.    We reviewed her other issues noted in the assessment but not specifically addressed in the HPI above.     Objective:     Wt Readings from Last 3 Encounters:   09/10/24 90.5 kg (199 lb 9.6 oz)   08/14/24 90.6 kg (199 lb 11.2 oz)   07/12/24 90.8 kg (200 lb 3.2 oz)     BP Readings from Last 3 Encounters:   09/10/24 (!) 150/68   08/14/24 130/60   07/12/24 124/62     BP (!) 150/68 (BP Location: Right arm, Patient Position: Sitting, Cuff Size: Adult Large)   Pulse 95   Temp 98.4  F (36.9  C) (Oral)   Resp 20   Ht 1.565 m (5' 1.6\")   Wt 90.5 kg (199 lb 9.6 oz)   LMP 08/06/1988   SpO2 93%   BMI 36.98 kg/m     The patient is comfortable, no acute distress.  Mood good.  Insight fair.  Eyes are nonicteric.  Neck is supple.  Motion to the left (rotation, abduction) is much worse than motion to the right.  Reflexes in the upper extremities is good and strength normal.  No significant impingement syndrome signs at this time.  If so, mild.   No cervical adenopathy.  No thyromegaly. Heart regular rate and rhythm.  Lungs clear to auscultation bilaterally.  Respiratory effort is good. Extremities no edema.      Diagnostics:     Transferred Records on 03/19/2024   Component Date Value Ref Range Status    Ejection Fraction 03/19/2024 50-55  % Final "       Results for orders placed or performed in visit on 09/10/24   CBC with platelets     Status: Abnormal   Result Value Ref Range    WBC Count 7.0 4.0 - 11.0 10e3/uL    RBC Count 5.28 (H) 3.80 - 5.20 10e6/uL    Hemoglobin 15.5 11.7 - 15.7 g/dL    Hematocrit 48.0 (H) 35.0 - 47.0 %    MCV 91 78 - 100 fL    MCH 29.4 26.5 - 33.0 pg    MCHC 32.3 31.5 - 36.5 g/dL    RDW 13.0 10.0 - 15.0 %    Platelet Count 228 150 - 450 10e3/uL   Erythrocyte sedimentation rate auto     Status: Normal   Result Value Ref Range    Erythrocyte Sedimentation Rate 11 0 - 30 mm/hr        Assessment:     1. Neck pain    2. Paroxysmal atrial fibrillation (H)    3. Primary hypertension    4. Mixed hyperlipidemia    5. Spinal stenosis in cervical region    6. Acute pain of left shoulder    7. COVID-19 virus infection        Plan:     Check lab work today.     Trial of steroids as this may be an inflammation after COVID.  Has follow up with me next week.  Seek attention if worsens in the meantime.  Consider repeat MRI scan and spine clinic referral if not improving.  Also consider [facility] spine if needed.  Continue current medications otherwise.  Follow up sooner if issues.    Orders Placed This Encounter   Procedures    CBC with platelets    CRP inflammation    Erythrocyte sedimentation rate auto    Lipid panel reflex to direct LDL Non-fasting    Comprehensive metabolic panel    Rheumatoid factor    Cyclic Citrullinated Peptide Antibody IgG        40 minutes or greater was spent today on the patient's care on the day of service.      This includes time for chart preparation, reviewing medical tests done before or during the visit, talking with the patient, review of quality indicators, required documentation, and other elements of care.        Donaldo Vernon MD  General Internal Medicine  [facility]    The longitudinal plan of care for the diagnoses and conditions as documented were addressed during  this visit. Due to the added complexity in care, I will continue to support Kori in the subsequent management and with ongoing continuity of care.     Return in about 6 days (around 9/16/2024) for annual wellness visit, follow up visit.     Future Appointments   Date Time Provider Department Center   9/16/2024  9:00 AM [facility]

## 2024-09-11 NOTE — PATIENT INSTRUCTIONS
Future Appointments   Date Time Provider Department Center   9/16/2024  9:00 AM [facility]

## 2024-09-13 RX ORDER — HYDROCODONE BITARTRATE AND ACETAMINOPHEN 5; 325 MG/1; MG/1
1 TABLET ORAL EVERY 6 HOURS PRN
Qty: 20 TABLET | Refills: 0 | Status: SHIPPED | OUTPATIENT
Start: 2024-09-13 | End: 2024-09-16

## 2024-09-16 ENCOUNTER — OFFICE VISIT (OUTPATIENT)
Dept: INTERNAL MEDICINE | Facility: CLINIC | Age: 84
End: 2024-09-16
Payer: MEDICARE

## 2024-09-16 DIAGNOSIS — I10 PRIMARY HYPERTENSION: Chronic | ICD-10-CM

## 2024-09-16 DIAGNOSIS — I48.0 PAROXYSMAL ATRIAL FIBRILLATION (H): ICD-10-CM

## 2024-09-16 DIAGNOSIS — M48.02 SPINAL STENOSIS IN CERVICAL REGION: ICD-10-CM

## 2024-09-16 DIAGNOSIS — M54.2 NECK PAIN: ICD-10-CM

## 2024-09-16 DIAGNOSIS — E66.01 CLASS 2 SEVERE OBESITY DUE TO EXCESS CALORIES WITH SERIOUS COMORBIDITY AND BODY MASS INDEX (BMI) OF 37.0 TO 37.9 IN ADULT (H): ICD-10-CM

## 2024-09-16 DIAGNOSIS — F40.240 CLAUSTROPHOBIA: ICD-10-CM

## 2024-09-16 DIAGNOSIS — J44.9 CHRONIC OBSTRUCTIVE PULMONARY DISEASE, UNSPECIFIED COPD TYPE (H): ICD-10-CM

## 2024-09-16 DIAGNOSIS — E66.812 CLASS 2 SEVERE OBESITY DUE TO EXCESS CALORIES WITH SERIOUS COMORBIDITY AND BODY MASS INDEX (BMI) OF 37.0 TO 37.9 IN ADULT (H): ICD-10-CM

## 2024-09-16 DIAGNOSIS — Z00.00 MEDICARE ANNUAL WELLNESS VISIT, SUBSEQUENT: Primary | ICD-10-CM

## 2024-09-16 PROCEDURE — 99214 OFFICE O/P EST MOD 30 MIN: CPT | Mod: 25 | Performed by: INTERNAL MEDICINE

## 2024-09-16 PROCEDURE — G0439 PPPS, SUBSEQ VISIT: HCPCS | Performed by: INTERNAL MEDICINE

## 2024-09-16 RX ORDER — DIAZEPAM 5 MG
5 TABLET ORAL ONCE
Qty: 1 TABLET | Refills: 0 | Status: SHIPPED | OUTPATIENT
Start: 2024-09-16 | End: 2024-09-16

## 2024-09-16 RX ORDER — NABUMETONE 500 MG/1
500 TABLET, FILM COATED ORAL 2 TIMES DAILY
Qty: 60 TABLET | Refills: 2 | Status: SHIPPED | OUTPATIENT
Start: 2024-09-16

## 2024-09-16 RX ORDER — CYCLOBENZAPRINE HCL 5 MG
5 TABLET ORAL 3 TIMES DAILY PRN
Qty: 40 TABLET | Refills: 2 | Status: SHIPPED | OUTPATIENT
Start: 2024-09-16

## 2024-09-16 NOTE — PROGRESS NOTES
[facility]    Comprehensive and complex medical care - Chronic disease management - Shared decision making - Care coordination - Compassionate care    Patient advocacy - Rational deprescribing - Minimally disruptive medicine - Ethical focus - Customized care         Date of Service: 9/16/2024  Primary Provider: Donaldo Vernon    Patient Care Team:  Donaldo Vernon MD as PCP - General (Internal Medicine)  Mariella Coyne MD as MD (Neurology)  Bennett Varela MD as MD (Orthopaedic Surgery)  Beka Adams MD as MD  Donaldo Vernon MD as Assigned PCP  Rich Rodríguez MD as MD (Cardiovascular Disease)  Lana Chaudhari MD as MD (Cardiovascular Disease)  Komal Britt MD as Assigned Heart and Vascular Provider          Patient's Pharmacy:    [id number] [place], [place] - [address] [place] [id number]  Phone: [phone] Fax: [phone]    [facility] - ZAHIRA Patten - [facility] AT Portal to Registered [facility] Sites  [facility]  [place] [address]  Phone: [phone] Fax: [phone]     Patient's Contacts:  Name Home Phone Work Phone Mobile Phone Relationship Lgl RADHA Weiner [phone]   Son    JENNIFER LEACH [phone]   Daughter      Patient's Insurance:    Payor: MEDICARE / Plan: MEDICARE / Product Type: Medicare /      Subjective:     History of present illness:    Kori Leach is an 84 year old here for an annual wellness visit.    The issues she would like to address at today's visit include the following:    Chief Complaint   Patient presents with    Annual Visit           9/16/2024     8:47 AM   Additional Questions   Roomed by Chica     Patient comes in today for annual wellness visit and other issues    We mainly reviewed her neck pain again.  She has known cervical spinal stenosis.  She is having a follow-up MRI.  She is scheduled  with the [facility] spine clinic in [place].  She did try using hydrocodone and this created more problems than good and did not really help much.  She would like to try something else for her neck pain.    She still has not had a whole lot of radicular type symptoms with this but her pain has been severe and constant.  Steroids were not effective for her.    She has noticed some stuffiness and some phlegm at times.  Her energy has been lower.  She denies any sinus pressure.    She had some questions about her parotid lesion which was biopsied in the past and was normal.    Her blood pressure is doing well at this time without issue.    She has had no worsening or recurrence of her A-fib.  She had ablation in [place] for this.    She would like to avoid vaccination at this time as she feels poorly.    We reviewed her other issues noted in the assessment but not specifically addressed in the HPI above.           Active Problem List:  Problem List as of 9/16/2024 Reviewed: 8/14/2024 12:40 PM by Shawna Moses NP         High    Former smoker - quit in 2018    Chronic obstructive pulmonary disease, unspecified COPD type (H), thought to be mild on PFTs 2018    Paroxysmal atrial fibrillation (H)    Last Assessment & Plan 6/12/2023 Office Visit Written 6/16/2023  9:30 AM by Shawna Moses NP     Continue anticoagulation. Rate is controlled             Medium    Primary hypertension    Severe obstructive sleep apnea on CPAP    MO (iron deficiency anemia) - Dec 2021, also 2015    Paresthesias, left post auricular and occiput     Last Assessment & Plan 6/12/2023 Office Visit Written 6/12/2023  2:30 PM by Shawna Moses NP     Try topical lidocaine gel on the area         Shortness of breath    Memory difficulty    Class 2 severe obesity due to excess calories with serious comorbidity in adult (H)       Low    HLD (hyperlipidemia)    Dysphagia    Prediabetes    Fatty liver    GERD (gastroesophageal reflux disease)    Loss of  hearing    Lumbar back pain    Mass of right parotid gland        Past Medical History:   Diagnosis Date    Arthritis     knees and hip replacement    Chronic obstructive pulmonary disease, unspecified COPD type (H) 06/04/2018    PFT Complete  Performed 5/21/2018 Final result Study Result FEV1/FVC is 69 and is reduced. FEV1 is 66% predicted and is reduced. FVC is 74% predicted and reduced. There was improvement in spirometry after a single inhaled dose of bronchodilator. TLC is 110% predicted and is normal. RV is 141% predicted and is increased. DLCO is 77% predicted and is reduced when it  is corrected for hemoglobin.  Im    Fatty liver     Former smoker     GERD (gastroesophageal reflux disease)     History of nicotine use 06/04/2018    HLD (hyperlipidemia)     HTN (hypertension)     MO (iron deficiency anemia) 04/24/2015    Inverted nipple     bilateral    Loss of hearing     Lumbar back pain     Morbid obesity (H) 09/04/2018    Pre-diabetes     Severe obstructive sleep apnea 04/18/2018    10/06/17 where we ordered a sleep study which showed severe TAMAR with an RDI of 30 and she had a titration study that showed CPAP of 9 cmH20 to be effective. She was set up with the CPAP on 10/25/17 through [facility].     Urinary incontinence       Past Surgical History:   Procedure Laterality Date    APPENDECTOMY      ARTHROPLASTY KNEE BILATERAL      CATARACT EXTRACTION      CERVICAL SPINE SURGERY  2015    JOINT REPLACEMENT      bilateral knees and right hip    LAPAROSCOPIC CHOLECYSTECTOMY N/A 6/29/2018    Procedure: CHOLECYSTECTOMY, LAPAROSCOPIC;  Surgeon: Jose Armando Humphries MD;  Location: [facility];  Service:     TONSILLECTOMY      WISDOM TOOTH EXTRACTION        Family History   Problem Relation Age of Onset    Breast Cancer Mother 45    Diabetes Mother     Heart Disease Mother     Hyperlipidemia Mother     Hypertension Mother     Depression Father     Alcoholism Father     Hyperlipidemia Sister     Depression Sister      Obesity Sister     Obesity Daughter     Lung Cancer Son     Cerebrovascular Disease Son       Family history is otherwise noncontributory.    Social History     Occupational History    Not on file   Tobacco Use    Smoking status: Every Day     Types: Cigarettes    Smokeless tobacco: Never    Tobacco comments:     1-2 cigarettes per day on average   Vaping Use    Vaping status: Never Used   Substance and Sexual Activity    Alcohol use: Yes     Comment: Alcoholic Drinks/day: less than 1 drink per month     Drug use: No    Sexual activity: Not Currently     Partners: Male      Social History     Social History Narrative    . Retired. Likes to play golf.  3 children. Son has lung cancer.  Was a . Lives in [place] for 6 months and [place] for 6 months of the years.      Current Outpatient Medications   Medication Instructions    cyclobenzaprine (FLEXERIL) 5 mg, Oral, 3 TIMES DAILY PRN, Start at nighttime and can increase if needed.    ferrous sulfate (FE TABS) 325 mg, Oral, THREE TIMES WEEKLY    Fluticasone-Umeclidin-Vilanterol (TRELEGY ELLIPTA) 200-62.5-25 MCG/ACT oral inhaler 1 puff, Inhalation, DAILY    metoprolol-hydrochlorothiazide (DUTOPROL) 50-12.5 MG TB24 per tablet 1 tablet, Oral, DAILY    multivitamin (MULTIPLE VITAMINS ORAL) Oral    nabumetone (RELAFEN) 500 mg, Oral, 2 TIMES DAILY, Take regularly for the neck.    rosuvastatin (CRESTOR) 40 mg, Oral, AT BEDTIME      Allergies: Lisinopril     Immunization History   Administered Date(s) Administered    COVID-19 12+ (Pfizer) 11/20/2023    COVID-19 Bivalent 12+ (Pfizer) 10/07/2022    COVID-19 MONOVALENT 12+ (Pfizer) 02/04/2021, 02/25/2021, 10/08/2021    COVID-19 Monovalent 12+ (Pfizer 2022) 05/12/2022    Influenza (High Dose) Trivalent,PF (Fluzone) 10/10/2016, 10/11/2017, 10/08/2018, 09/25/2019    Influenza Vaccine 65+ (Fluzone HD) 08/31/2020, 12/06/2021, 10/07/2022, 09/11/2023    Pneumo Conj 13-V (2010&after) 05/30/2017    Pneumococcal 23 valent  "06/04/2018    TDAP (Adacel,Boostrix) 05/30/2017      Objective:     Wt Readings from Last 3 Encounters:   09/16/24 89.7 kg (197 lb 12.8 oz)   09/10/24 90.5 kg (199 lb 9.6 oz)   08/14/24 90.6 kg (199 lb 11.2 oz)     BP Readings from Last 3 Encounters:   09/16/24 128/74   09/10/24 (!) 150/68   08/14/24 130/60       PHYSICAL EXAM  /74   Pulse 67   Temp 98.1  F (36.7  C) (Oral)   Resp 14   Ht 1.575 m (5' 2\")   Wt 89.7 kg (197 lb 12.8 oz)   LMP 08/06/1988   SpO2 98%   BMI 36.18 kg/m     The patient is mildly uncomfortable, no acute distress.  Mood good.  Insight is good.  No skin lesions or nodules of concern.  Ears clear.  Eyes are nonicteric.  Pupils equal and reactive.  Throat is clear.  Neck is supple without mass, no thyromegaly. No cervical or epitrochlear adenopathy.  No axillary lymph nodes. Heart regular rate and rhythm.  Lungs clear to auscultation bilaterally.  Respiratory effort good.  Abdomen soft and nontender.  No hepatosplenomegaly.  Extremities show no edema.      Diagnostics:     Office Visit on 09/10/2024   Component Date Value Ref Range Status    WBC Count 09/10/2024 7.0  4.0 - 11.0 10e3/uL Final    RBC Count 09/10/2024 5.28 (H)  3.80 - 5.20 10e6/uL Final    Hemoglobin 09/10/2024 15.5  11.7 - 15.7 g/dL Final    Hematocrit 09/10/2024 48.0 (H)  35.0 - 47.0 % Final    MCV 09/10/2024 91  78 - 100 fL Final    MCH 09/10/2024 29.4  26.5 - 33.0 pg Final    MCHC 09/10/2024 32.3  31.5 - 36.5 g/dL Final    RDW 09/10/2024 13.0  10.0 - 15.0 % Final    Platelet Count 09/10/2024 228  150 - 450 10e3/uL Final    CRP Inflammation 09/10/2024 <3.00  <5.00 mg/L Final    Erythrocyte Sedimentation Rate 09/10/2024 11  0 - 30 mm/hr Final    Cholesterol 09/10/2024 344 (H)  <200 mg/dL Final    Triglycerides 09/10/2024 538 (H)  <150 mg/dL Final    Direct Measure HDL 09/10/2024 63  >=50 mg/dL Final    LDL Cholesterol Calculated 09/10/2024    Final    Cannot estimate LDL when triglyceride exceeds 400 mg/dL    Non HDL " Cholesterol 09/10/2024 281 (H)  <130 mg/dL Final    Patient Fasting > 8hrs? 09/10/2024 Unknown   Final    Sodium 09/10/2024 142  135 - 145 mmol/L Final    Potassium 09/10/2024 4.5  3.4 - 5.3 mmol/L Final    Carbon Dioxide (CO2) 09/10/2024 26  22 - 29 mmol/L Final    Anion Gap 09/10/2024 11  7 - 15 mmol/L Final    Urea Nitrogen 09/10/2024 17.8  8.0 - 23.0 mg/dL Final    Creatinine 09/10/2024 0.88  0.51 - 0.95 mg/dL Final    GFR Estimate 09/10/2024 64  >60 mL/min/1.73m2 Final    eGFR calculated using 2021 CKD-EPI equation.    Calcium 09/10/2024 9.7  8.8 - 10.4 mg/dL Final    Reference intervals for this test were updated on 7/16/2024 to reflect our healthy population more accurately. There may be differences in the flagging of prior results with similar values performed with this method. Those prior results can be interpreted in the context of the updated reference intervals.    Chloride 09/10/2024 105  98 - 107 mmol/L Final    Glucose 09/10/2024 90  70 - 99 mg/dL Final    Alkaline Phosphatase 09/10/2024 94  40 - 150 U/L Final    AST 09/10/2024 16  0 - 45 U/L Final    ALT 09/10/2024 31  0 - 50 U/L Final    Protein Total 09/10/2024 7.3  6.4 - 8.3 g/dL Final    Albumin 09/10/2024 4.5  3.5 - 5.2 g/dL Final    Bilirubin Total 09/10/2024 0.2  <=1.2 mg/dL Final    Patient Fasting > 8hrs? 09/10/2024 Unknown   Final    Rheumatoid Factor 09/10/2024 <10  <14 IU/mL Final    Cyclic Citrullinated Peptide Antib* 09/10/2024 0.5  <7.0 U/mL Final    Negative    LDL Cholesterol Direct 09/10/2024 174 (H)  <100 mg/dL Final    Age 2-19 years:  Desirable: < 110 mg/dL   Borderline High: 110-129 mg/dL   High: >= 130 mg/dL    Age 20 years and older:  Desirable: < 100 mg/dL  Above Desirable: 100-129 mg/dL   Borderline High: 130-159 mg/dL   High: 160-189 mg/dL   Very High: >= 190 mg/dL       No results found for any visits on 09/16/24.    Assessment:     1. Medicare annual wellness visit, subsequent    2. Claustrophobia    3. Neck pain    4.  Spinal stenosis in cervical region    5. Paroxysmal atrial fibrillation (H)    6. Chronic obstructive pulmonary disease, unspecified COPD type (H), thought to be mild on PFTs 2018    7. Primary hypertension    8. Class 2 severe obesity due to excess calories with serious comorbidity and body mass index (BMI) of 37.0 to 37.9 in adult (H)         Plan:     Awaiting MRI report.  Likely will need injection therapy.  Valium given to the patient for the planned MRI.  Nabumetone and cyclobenzaprine to see if we can help get rid of the neck pain.  Determine further follow-up based on the results of the scan and how she is doing.  Continue current medications  Follow up sooner if issues.    Orders Placed This Encounter   Medications    diazepam (VALIUM) 5 MG tablet     Sig: Take 1 tablet (5 mg) by mouth once for 1 dose. Take 30-60 minutes before MRI.     Dispense:  1 tablet     Refill:  0    nabumetone (RELAFEN) 500 MG tablet     Sig: Take 1 tablet (500 mg) by mouth 2 times daily. Take regularly for the neck.     Dispense:  60 tablet     Refill:  2    cyclobenzaprine (FLEXERIL) 5 MG tablet     Sig: Take 1 tablet (5 mg) by mouth 3 times daily as needed for muscle spasms. Start at nighttime and can increase if needed.     Dispense:  40 tablet     Refill:  2         A personalized health plan based on the identified health risks was provided to the patient on the AVS.       Donaldo Vernon MD  General Internal Medicine  [facility]      Return in about 1 year (around 9/16/2025) for annual wellness visit.     Future Appointments   Date Time Provider Department Center   9/18/2024  4:45 PM [facility] MR 2 [facility]   9/25/2024 11:00 AM Elaine Pineda, APRN CNP [facility]         Health Maintenance   Topic Date Due    ANNUAL REVIEW OF  ORDERS  Never done    ZOSTER IMMUNIZATION (2 of 2) 12/10/2015    INFLUENZA VACCINE (1) 09/01/2024    COVID-19 Vaccine (7 - 2024-25 season) 09/01/2024     NICOTINE/TOBACCO CESSATION COUNSELING Q 1 YR  08/14/2025    LIPID  09/10/2025    MEDICARE ANNUAL WELLNESS VISIT  09/16/2025    FALL RISK ASSESSMENT  09/16/2025    DTAP/TDAP/TD IMMUNIZATION (2 - Td or Tdap) 05/30/2027    ADVANCE CARE PLANNING  09/16/2029    DEXA  07/01/2034    SPIROMETRY  Completed    PHQ-2 (once per calendar year)  Completed    Pneumococcal Vaccine: 65+ Years  Completed    COPD ACTION PLAN  Addressed    HPV IMMUNIZATION  Aged Out    MENINGITIS IMMUNIZATION  Aged Out    RSV MONOCLONAL ANTIBODY  Aged Out    RSV VACCINE  Discontinued        ______________________________________________________________________     Additional required elements:    I have reviewed Opioid Use Disorder and Substance Use Disorder risk factors and made any needed referrals.  This may not apply to this individual patient, but this documentation is required by Medicare.    Memory screen:      9/16/2024     8:54 AM   MINI COG   Clock Draw Score 2 Normal   3 Item Recall 2 objects recalled   Mini Cog Total Score 4        PHQ-2 Score:         9/15/2024    12:31 PM 7/12/2024    11:55 AM   PHQ-2 ( 1999 Pfizer)   Q1: Little interest or pleasure in doing things 1 1   Q2: Feeling down, depressed or hopeless 1 1   PHQ-2 Score 2 2   Q1: Little interest or pleasure in doing things Several days Several days   Q2: Feeling down, depressed or hopeless Several days Several days   PHQ-2 Score 2 2        Advanced care planning reviewed.        9/13/2024   Fall Risk   Fallen 2 or more times in the past year? No    No   Trouble with walking or balance? No    No       Multiple values from one day are sorted in reverse-chronological order          9/13/2024   Substance Use   Alcohol more than 3/day or more than 7/wk No   Do you have a current opioid prescription? (!) YES   How severe/bad is pain from 1 to 10? 9/10   Do you use any other substances recreationally? No    (!) INHALANTS       Multiple values from one day are sorted in  reverse-chronological order       Last vision screening (if done):       No data to display

## 2024-09-17 VITALS
BODY MASS INDEX: 36.4 KG/M2 | DIASTOLIC BLOOD PRESSURE: 74 MMHG | RESPIRATION RATE: 14 BRPM | OXYGEN SATURATION: 98 % | WEIGHT: 197.8 LBS | HEIGHT: 62 IN | TEMPERATURE: 98.1 F | HEART RATE: 67 BPM | SYSTOLIC BLOOD PRESSURE: 128 MMHG

## 2024-09-17 NOTE — PATIENT INSTRUCTIONS
Future Appointments   Date Time Provider Department Center   9/18/2024  4:45 PM ERMAN MR 2 [facility]   9/25/2024 11:00 AM Elaine Pineda APRN [facility]        Patient Education   Learning About Activities of Daily Living  What are activities of daily living?     Activities of daily living (ADLs) are the basic self-care tasks you do every day. These include eating, bathing, dressing, and moving around.  As you age, and if you have health problems, you may find that it's harder to do some of these tasks. If so, your doctor can suggest ideas that may help.  To measure what kind of help you may need, your doctor will ask how well you are able to do ADLs. Let your doctor know if there are any tasks that you are having trouble doing. This is an important first step to getting help. And when you have the help you need, you can stay as independent as possible.  How will a doctor assess your ADLs?  Asking about ADLs is part of a routine health checkup your doctor will likely do as you age. Your health check might be done in a doctor's office, in your home, or at a hospital. The goal is to find out if you are having any problems that could make it hard to care for yourself or that make it unsafe for you to be on your own.  To measure your ADLs, your doctor will ask how hard it is for you to do routine tasks. Your doctor may also want to know if you have changed the way you do a task because of a health problem. Your doctor may watch how you:  Walk back and forth.  Keep your balance while you stand or walk.  Move from sitting to standing or from a bed to a chair.  Button or unbutton a shirt or sweater.  Remove and put on your shoes.  It's common to feel a little worried or anxious if you find you can't do all the things you used to be able to do. Talking with your doctor about ADLs is a way to make sure you're as safe as possible and able to care for yourself as well as you can. You may want to bring a  caregiver, friend, or family member to your checkup. They can help you talk to your doctor.  Follow-up care is a key part of your treatment and safety. Be sure to make and go to all appointments, and call your doctor if you are having problems. It's also a good idea to know your test results and keep a list of the medicines you take.  Current as of: October 24, 2023  Content Version: 14.1    5897-9828 [employer].   Care instructions adapted under license by your healthcare professional. If you have questions about a medical condition or this instruction, always ask your healthcare professional. [employer] disclaims any warranty or liability for your use of this information.    Hearing Loss: Care Instructions  Overview     Hearing loss is a sudden or slow decrease in how well you hear. It can range from slight to profound. Permanent hearing loss can occur with aging. It also can happen when you are exposed long-term to loud noise. Examples include listening to loud music, riding motorcycles, or being around other loud machines.  Hearing loss can affect your work and home life. It can make you feel lonely or depressed. You may feel that you have lost your independence. But hearing aids and other devices can help you hear better and feel connected to others.  Follow-up care is a key part of your treatment and safety. Be sure to make and go to all appointments, and call your doctor if you are having problems. It's also a good idea to know your test results and keep a list of the medicines you take.  How can you care for yourself at home?  Avoid loud noises whenever possible. This helps keep your hearing from getting worse.  Always wear hearing protection around loud noises.  Wear a hearing aid as directed.  A professional can help you pick a hearing aid that will work best for you.  You can also get hearing aids over the counter for mild to moderate hearing loss.  Have hearing tests as your  "doctor suggests. They can show whether your hearing has changed. Your hearing aid may need to be adjusted.  Use other devices as needed. These may include:  Telephone amplifiers and hearing aids that can connect to a television, stereo, radio, or microphone.  Devices that use lights or vibrations. These alert you to the doorbell, a ringing telephone, or a baby monitor.  Television closed-captioning. This shows the words at the bottom of the screen. Most new TVs can do this.  TTY (text telephone). This lets you type messages back and forth on the telephone instead of talking or listening. These devices are also called TDD. When messages are typed on the keyboard, they are sent over the phone line to a receiving TTY. The message is shown on a monitor.  Use text messaging, social media, and email if it is hard for you to communicate by telephone.  Try to learn a listening technique called speechreading. It is not lipreading. You pay attention to people's gestures, expressions, posture, and tone of voice. These clues can help you understand what a person is saying. Face the person you are talking to, and have them face you. Make sure the lighting is good. You need to see the other person's face clearly.  Think about counseling if you need help to adjust to your hearing loss.  When should you call for help?  Watch closely for changes in your health, and be sure to contact your doctor if:    You think your hearing is getting worse.     You have new symptoms, such as dizziness or nausea.   Where can you learn more?  Go to https://www.[employer].net/patiented  Enter R798 in the search box to learn more about \"Hearing Loss: Care Instructions.\"  Current as of: September 27, 2023               Content Version: 14.0    2117-0327 [employer].   Care instructions adapted under license by your healthcare professional. If you have questions about a medical condition or this instruction, always ask your healthcare " professional. [employer] disclaims any warranty or liability for your use of this information.      Learning About Sleeping Well  What does sleeping well mean?     Sleeping well means getting enough sleep to feel good and stay healthy. How much sleep is enough varies among people.  The number of hours you sleep and how you feel when you wake up are both important. If you do not feel refreshed, you probably need more sleep. Another sign of not getting enough sleep is feeling tired during the day.  Experts recommend that adults get at least 7 or more hours of sleep per day. Children and older adults need more sleep.  Why is getting enough sleep important?  Getting enough quality sleep is a basic part of good health. When your sleep suffers, your physical health, mood, and your thoughts can suffer too. You may find yourself feeling more grumpy or stressed. Not getting enough sleep also can lead to serious problems, including injury, accidents, anxiety, and depression.  What might cause poor sleeping?  Many things can cause sleep problems, including:  Changes to your sleep schedule.  Stress. Stress can be caused by fear about a single event, such as giving a speech. Or you may have ongoing stress, such as worry about work or school.  Depression, anxiety, and other mental or emotional conditions.  Changes in your sleep habits or surroundings. This includes changes that happen where you sleep, such as noise, light, or sleeping in a different bed. It also includes changes in your sleep pattern, such as having jet lag or working a late shift.  Health problems, such as pain, breathing problems, and restless legs syndrome.  Lack of regular exercise.  Using alcohol, nicotine, or caffeine before bed.  How can you help yourself?  Here are some tips that may help you sleep more soundly and wake up feeling more refreshed.  Your sleeping area   Use your bedroom only for sleeping and sex. A bit of light reading may  "help you fall asleep. But if it doesn't, do your reading elsewhere in the house. Try not to use your TV, computer, smartphone, or tablet while you are in bed.  Be sure your bed is big enough to stretch out comfortably, especially if you have a sleep partner.  Keep your bedroom quiet, dark, and cool. Use curtains, blinds, or a sleep mask to block out light. To block out noise, use earplugs, soothing music, or a \"white noise\" machine.  Your evening and bedtime routine   Create a relaxing bedtime routine. You might want to take a warm shower or bath, or listen to soothing music.  Go to bed at the same time every night. And get up at the same time every morning, even if you feel tired.  What to avoid   Limit caffeine (coffee, tea, caffeinated sodas) during the day, and don't have any for at least 6 hours before bedtime.  Avoid drinking alcohol before bedtime. Alcohol can cause you to wake up more often during the night.  Try not to smoke or use tobacco, especially in the evening. Nicotine can keep you awake.  Limit naps during the day, especially close to bedtime.  Avoid lying in bed awake for too long. If you can't fall asleep or if you wake up in the middle of the night and can't get back to sleep within about 20 minutes, get out of bed and go to another room until you feel sleepy.  Avoid taking medicine right before bed that may keep you awake or make you feel hyper or energized. Your doctor can tell you if your medicine may do this and if you can take it earlier in the day.  If you can't sleep   Imagine yourself in a peaceful, pleasant scene. Focus on the details and feelings of being in a place that is relaxing.  Get up and do a quiet or boring activity until you feel sleepy.  Avoid drinking any liquids before going to bed to help prevent waking up often to use the bathroom.  Where can you learn more?  Go to https://www.healthwise.net/patiented  Enter J942 in the search box to learn more about \"Learning About " "Sleeping Well.\"  Current as of: July 10, 2023  Content Version: 14.1 2006-2024 [employer].   Care instructions adapted under license by your healthcare professional. If you have questions about a medical condition or this instruction, always ask your healthcare professional. [employer] disclaims any warranty or liability for your use of this information.    Substance Use Disorder: Care Instructions  Overview     You can improve your life and health by stopping your use of alcohol or drugs. When you don't drink or use drugs, you may feel and sleep better. You may get along better with your family, friends, and coworkers. There are medicines and programs that can help with substance use disorder.  How can you care for yourself at home?  Here are some ways to help you stay sober and prevent relapse.  If you have been given medicine to help keep you sober or reduce your cravings, be sure to take it exactly as prescribed.  Talk to your doctor about programs that can help you stop using drugs or drinking alcohol.  Do not keep alcohol or drugs in your home.  Plan ahead. Think about what you'll say if other people ask you to drink or use drugs. Try not to spend time with people who drink or use drugs.  Use the time and money spent on drinking or drugs to do something that's important to you.  Preventing a relapse  Have a plan to deal with relapse. Learn to recognize changes in your thinking that lead you to drink or use drugs. Get help before you start to drink or use drugs again.  Try to stay away from situations, friends, or places that may lead you to drink or use drugs.  If you feel the need to drink alcohol or use drugs again, seek help right away. Call a trusted friend or family member. Some people get support from organizations such as Narcotics Anonymous or [employer] or from treatment facilities.  If you relapse, get help as soon as you can. Some people make a plan with another " person that outlines what they want that person to do for them if they relapse. The plan usually includes how to handle the relapse and who to notify in case of relapse.  Don't give up. Remember that a relapse doesn't mean that you have failed. Use the experience to learn the triggers that lead you to drink or use drugs. Then quit again. Recovery is a lifelong process. Many people have several relapses before they are able to quit for good.  Follow-up care is a key part of your treatment and safety. Be sure to make and go to all appointments, and call your doctor if you are having problems. It's also a good idea to know your test results and keep a list of the medicines you take.  When should you call for help?   Call 911  anytime you think you may need emergency care. For example, call if you or someone else:    Has overdosed or has withdrawal signs. Be sure to tell the emergency workers that you are or someone else is using or trying to quit using drugs. Overdose or withdrawal signs may include:  Losing consciousness.  Seizure.  Seeing or hearing things that aren't there (hallucinations).     Is thinking or talking about suicide or harming others.   Where to get help 24 hours a day, 7 days a week   If you or someone you know talks about suicide, self-harm, a mental health crisis, a substance use crisis, or any other kind of emotional distress, get help right away. You can:    Call the Suicide and Crisis Lifeline at 984.     Call 4-661-561-TALK (1-400.795.6951).     Text HOME to 922199 to access the Crisis Text Line.   Consider saving these numbers in your phone.  Go to [employer] for more information or to chat online.  Call your doctor now or seek immediate medical care if:    You are having withdrawal symptoms. These may include nausea or vomiting, sweating, shakiness, and anxiety.   Watch closely for changes in your health, and be sure to contact your doctor if:    You have a relapse.     You need more  "help or support to stop.   Where can you learn more?  Go to https://www.[employer].net/patiented  Enter H573 in the search box to learn more about \"Substance Use Disorder: Care Instructions.\"  Current as of: November 15, 2023               Content Version: 14.0    5350-2895 [employer].   Care instructions adapted under license by your healthcare professional. If you have questions about a medical condition or this instruction, always ask your healthcare professional. [employer] disclaims any warranty or liability for your use of this information.      Chronic Pain: Care Instructions  Your Care Instructions     Chronic pain is pain that lasts a long time (months or even years) and may or may not have a clear cause. It is different from acute pain, which usually does have a clear cause--like an injury or illness--and gets better over time. Chronic pain:  Lasts over time but may vary from day to day.  Does not go away despite efforts to end it.  May disrupt your sleep and lead to fatigue.  May cause depression or anxiety.  May make your muscles tense, causing more pain.  Can disrupt your work, hobbies, home life, and relationships with friends and family.  Chronic pain is a very real condition. It is not just in your head. Treatment can help and usually includes several methods used together, such as medicines, physical therapy, exercise, and other treatments. Learning how to relax and changing negative thought patterns can also help you cope.  Chronic pain is complex. Taking an active role in your treatment will help you better manage your pain. Tell your doctor if you have trouble dealing with your pain. You may have to try several things before you find what works best for you.  Follow-up care is a key part of your treatment and safety. Be sure to make and go to all appointments, and call your doctor if you are having problems. It's also a good idea to know your test results and keep a " list of the medicines you take.  How can you care for yourself at home?  Pace yourself. Break up large jobs into smaller tasks. Save harder tasks for days when you have less pain, or go back and forth between hard tasks and easier ones. Take rest breaks.  Relax, and reduce stress. Relaxation techniques such as deep breathing or meditation can help.  Keep moving. Gentle, daily exercise can help reduce pain over the long run. Try low- or no-impact exercises such as walking, swimming, and stationary biking. Do stretches to stay flexible.  Try heat, cold packs, and massage.  Get enough sleep. Chronic pain can make you tired and drain your energy. Talk with your doctor if you have trouble sleeping because of pain.  Think positive. Your thoughts can affect your pain level. Do things that you enjoy to distract yourself when you have pain instead of focusing on the pain. See a movie, read a book, listen to music, or spend time with a friend.  If you think you are depressed, talk to your doctor about treatment.  Keep a daily pain diary. Record how your moods, thoughts, sleep patterns, activities, and medicine affect your pain. You may find that your pain is worse during or after certain activities or when you are feeling a certain emotion. Having a record of your pain can help you and your doctor find the best ways to treat your pain.  Take pain medicines exactly as directed.  If the doctor gave you a prescription medicine for pain, take it as prescribed.  If you are not taking a prescription pain medicine, ask your doctor if you can take an over-the-counter medicine.  Reducing constipation caused by pain medicine  Talk to your doctor about a laxative. If a laxative doesn't work, your doctor may suggest a prescription medicine.  Include fruits, vegetables, beans, and whole grains in your diet each day. These foods are high in fiber.  If your doctor recommends it, get more exercise. Walking is a good choice. Bit by bit,  "increase the amount you walk every day. Try for at least 30 minutes on most days of the week.  Schedule time each day for a bowel movement. A daily routine may help. Take your time and do not strain when having a bowel movement.  When should you call for help?   Call your doctor now or seek immediate medical care if:    Your pain gets worse or is out of control.     You feel down or blue, or you do not enjoy things like you once did. You may be depressed, which is common in people with chronic pain. Depression can be treated.     You have vomiting or cramps for more than 2 hours.   Watch closely for changes in your health, and be sure to contact your doctor if:    You cannot sleep because of pain.     You are very worried or anxious about your pain.     You have trouble taking your pain medicine.     You have any concerns about your pain medicine.     You have trouble with bowel movements, such as:  No bowel movement in 3 days.  Blood in the anal area, in your stool, or on the toilet paper.  Diarrhea for more than 24 hours.   Where can you learn more?  Go to https://www.[employer].net/patiented  Enter N004 in the search box to learn more about \"Chronic Pain: Care Instructions.\"  Current as of: July 10, 2023               Content Version: 14.0    3037-9800 [employer].   Care instructions adapted under license by your healthcare professional. If you have questions about a medical condition or this instruction, always ask your healthcare professional. [employer] disclaims any warranty or liability for your use of this information.         "

## 2024-09-18 ENCOUNTER — HOSPITAL ENCOUNTER (OUTPATIENT)
Dept: MRI IMAGING | Facility: HOSPITAL | Age: 84
Discharge: HOME | End: 2024-09-18
Attending: INTERNAL MEDICINE | Admitting: INTERNAL MEDICINE
Payer: MEDICARE

## 2024-09-18 DIAGNOSIS — M25.512 ACUTE PAIN OF LEFT SHOULDER: ICD-10-CM

## 2024-09-18 DIAGNOSIS — M54.2 NECK PAIN: ICD-10-CM

## 2024-09-18 DIAGNOSIS — M48.02 SPINAL STENOSIS IN CERVICAL REGION: ICD-10-CM

## 2024-09-18 PROCEDURE — A9585 GADOBUTROL INJECTION: HCPCS | Performed by: INTERNAL MEDICINE

## 2024-09-18 PROCEDURE — 72156 MRI NECK SPINE W/O & W/DYE: CPT | Mod: ME

## 2024-09-18 PROCEDURE — 255N000002 HC RX 255 OP 636: Performed by: INTERNAL MEDICINE

## 2024-09-18 RX ORDER — GADOBUTROL 604.72 MG/ML
9 INJECTION INTRAVENOUS ONCE
Status: COMPLETED | OUTPATIENT
Start: 2024-09-18 | End: 2024-09-18

## 2024-09-18 RX ADMIN — GADOBUTROL 9 ML: 604.72 INJECTION INTRAVENOUS at 16:40

## 2024-09-25 ENCOUNTER — OFFICE VISIT (OUTPATIENT)
Dept: PHYSICAL MEDICINE AND REHAB | Facility: CLINIC | Age: 84
End: 2024-09-25
Attending: INTERNAL MEDICINE
Payer: MEDICARE

## 2024-09-25 VITALS
BODY MASS INDEX: 36.07 KG/M2 | WEIGHT: 196 LBS | SYSTOLIC BLOOD PRESSURE: 163 MMHG | HEIGHT: 62 IN | HEART RATE: 92 BPM | DIASTOLIC BLOOD PRESSURE: 83 MMHG

## 2024-09-25 DIAGNOSIS — M54.12 CERVICAL RADICULOPATHY: Primary | ICD-10-CM

## 2024-09-25 DIAGNOSIS — M54.2 NECK PAIN: ICD-10-CM

## 2024-09-25 DIAGNOSIS — M48.02 SPINAL STENOSIS IN CERVICAL REGION: ICD-10-CM

## 2024-09-25 PROCEDURE — 99204 OFFICE O/P NEW MOD 45 MIN: CPT | Performed by: NURSE PRACTITIONER

## 2024-09-25 RX ORDER — LORAZEPAM 1 MG/1
TABLET ORAL
Qty: 2 TABLET | Refills: 0 | Status: SHIPPED | OUTPATIENT
Start: 2024-09-25 | End: 2024-10-03

## 2024-09-25 ASSESSMENT — PAIN SCALES - GENERAL: PAINLEVEL: SEVERE PAIN (6)

## 2024-09-25 NOTE — LETTER
9/25/2024      Kori Leach  [address] [place] [address]      Dear Colleague,    Thank you for referring your patient, Kori Leach, to the [facility]. Please see a copy of my visit note below.    ASSESSMENT: Kori Leach is a 84 year old female presents for consultation at the request of PCP Donaldo Vernon, who presents today for new patient evaluation of :     -cervical stenosis    Patient is neurologically intact on exam. No myelopathic or red flag symptoms. Resolution of left arm pain but ongoing neck pain to the shoulder quite limiting to her activity. We had a very lengthy discussion regarding options and ultimately given pain severity, she would like to pursue a steroid injection. We discussed risks, benefits, role of a C7-T1 IL MAURIZIO and she would like to proceed. I gave her cervical PT exercises to do on her own. Declines formal PT at this time. We talked about the role of neuropathic agent, could consider adding in future. Follow up per routine following injection.          9/20/2024     3:12 PM   OSWESTRY DISABILITY INDEX   Count 9   Sum 22   Oswestry Score (%) 48.89 %            Diagnoses and all orders for this visit:  Neck pain  -     Spine  Referral  Spinal stenosis in cervical region  -     Spine  Referral       PLAN:  Reviewed spine anatomy and disease process. Discussed diagnosis and treatment options with the patient today. A shared decision making model was used. The patient's values and choices were respected. The following represents what was discussed and decided upon by the provider and the patient.     -DIAGNOSTIC TESTS:  Images were personally reviewed and interpreted and explained to patient today using spine model.   --no new imaging ordered    -PHYSICAL THERAPY:    -PT exercises given to patient today  -declines formal PT at this time  Discussed the importance of core strengthening, ROM, stretching exercises with the  patient and how each of these entities is important in decreasing pain.  Explained to the patient that the purpose of physical therapy is to teach the patient a home exercise program.  These exercises need to be performed every day in order to decrease pain and prevent future occurrences of pain.    -MEDICATIONS:    -ok to continue relafen prn  -ok to continue flexeril prn   Discussed multiple medication options today with patient. Discussed risks, side effects, and proper use of medications. Patient verbalized understanding.    -INTERVENTIONS:    -Discussed the role, risks, benefits of injections with patient today. Patient would like to pursue C7-T1 IL MAURIZIO  We discussed the role of MBB//RFA depending on symptoms     -PATIENT EDUCATION: Total time of 40 minutes, on the day of service, spent with the patient, reviewing the chart, placing orders, and documenting.   -Today we also discussed the issues related to the pros and cons of the current treatment plan.    -FOLLOW-UP:  we will call with results of imaging    Advised patient to call the Sp[facility] Center if symptoms worsen or if they develop red flag symptoms such as numbness, weakness, severe pain uncontrolled by current pain med regimen, or any new or worsening problems controlling bladder and bowel function.   ______________________________________________________________________    SUBJECTIVE:   Kori Leach  is a 84 year old female hx C3-6 ACDF with Dr Sheets in 2015, paroxysmal afib, copd, GERD, hld, memory difficulty, right parotid gland mass who presents today for new patient evaluation of cervical stenosis    Neck pain started suddenly without trauma after she had covid at the end of August. Radiated into the left shoulder, and left arm at least down to the elbow and into the forearm. Not sure if she had numbnss or tingling, but potentially. She did have some weakness in the arm. Pain was 10/10 x 1 wk.  Was seen by pcp 9/10 and 9/16. She tried a  course of oral steroids which did not help. Relafen she took once, did not take any more. The flexeril has helped the most. They discussed a shoulder injection- ultimately did not pursue as cervical radiculopathy suspected. Referred for cervical MRI. Things spontaneously began to improve. When she would wake up in the morning, it wasn't as bad. It has not returned to prior 10/10 yet, but remains severe. Worse in the evenings. 6-7/10. 2/10 in the mornings. Sharp, intense. The arm pain has resolved but still left neck pain into the shoulder.    She reports having had cervical injections of some kind in 2019 under sedation, which provided significant relief. She is interested in additional injections, needs to get to [place]. Of note, states the valium did not help her pre-MRI claustrophobia, and if has another injection, would need a stronger medication. Per my review of the  Spine notes 10/19/2019, it looks like orders were placed for a left C6-7, T1-2, and T2-3 MBB and there was mention if limited relief would next suggest C6-7 IL MAURIZIO. I can not see any notes of what procedure(s) were ultimately done    She denies any weakness In the arms or hands, difficulty with fine motor tasks, imbalance, leg heaviness, soreness.    She was on gabapentin in the past  Last course of PT was in 2020  She has not had any chiropractic care    -Treatment to Date:     -Medications:    Current Outpatient Medications   Medication Sig Dispense Refill     cyclobenzaprine (FLEXERIL) 5 MG tablet Take 1 tablet (5 mg) by mouth 3 times daily as needed for muscle spasms. Start at nighttime and can increase if needed. 40 tablet 2     ferrous sulfate (FE TABS) 325 (65 Fe) MG EC tablet Take 325 mg by mouth three times a week       Fluticasone-Umeclidin-Vilanterol (TRELEGY ELLIPTA) 200-62.5-25 MCG/ACT oral inhaler Inhale 1 puff into the lungs daily. 60 each 0     metoprolol-hydrochlorothiazide (DUTOPROL) 50-12.5 MG TB24 per tablet Take 1 tablet by  mouth daily.       multivitamin (MULTIPLE VITAMINS ORAL) [MULTIVITAMIN (MULTIPLE VITAMINS ORAL)] Take by mouth.       nabumetone (RELAFEN) 500 MG tablet Take 1 tablet (500 mg) by mouth 2 times daily. Take regularly for the neck. 60 tablet 2     rosuvastatin (CRESTOR) 40 MG tablet Take 1 tablet (40 mg) by mouth At Bedtime 90 tablet 3     No current facility-administered medications for this visit.       Allergies   Allergen Reactions     Lisinopril Cough       Past Medical History:   Diagnosis Date     Arthritis     knees and hip replacement     Chronic obstructive pulmonary disease, unspecified COPD type (H) 06/04/2018    PFT Complete  Performed 5/21/2018 Final result Study Result FEV1/FVC is 69 and is reduced. FEV1 is 66% predicted and is reduced. FVC is 74% predicted and reduced. There was improvement in spirometry after a single inhaled dose of bronchodilator. TLC is 110% predicted and is normal. RV is 141% predicted and is increased. DLCO is 77% predicted and is reduced when it  is corrected for hemoglobin.  Im     Fatty liver      Former smoker      GERD (gastroesophageal reflux disease)      History of nicotine use 06/04/2018     HLD (hyperlipidemia)      HTN (hypertension)      MO (iron deficiency anemia) 04/24/2015     Inverted nipple     bilateral     Loss of hearing      Lumbar back pain      Morbid obesity (H) 09/04/2018     Pre-diabetes      Severe obstructive sleep apnea 04/18/2018    10/06/17 where we ordered a sleep study which showed severe TAMAR with an RDI of 30 and she had a titration study that showed CPAP of 9 cmH20 to be effective. She was set up with the CPAP on 10/25/17 through [facility].      Urinary incontinence         Patient Active Problem List   Diagnosis     HLD (hyperlipidemia)     Dysphagia     Severe obstructive sleep apnea on CPAP     Chronic obstructive pulmonary disease, unspecified COPD type (H), thought to be mild on PFTs 2018     Former smoker - quit in 2018     Fatty liver      GERD (gastroesophageal reflux disease)     Loss of hearing     Lumbar back pain     MO (iron deficiency anemia) - Dec 2021, also 2015     Primary hypertension     Paroxysmal atrial fibrillation (H)     Paresthesias, left post auricular and occiput      Mass of right parotid gland     Memory difficulty     Class 2 severe obesity due to excess calories with serious comorbidity in adult (H)       Past Surgical History:   Procedure Laterality Date     APPENDECTOMY       ARTHROPLASTY KNEE BILATERAL       CATARACT EXTRACTION       CERVICAL SPINE SURGERY  2015     JOINT REPLACEMENT      bilateral knees and right hip     LAPAROSCOPIC CHOLECYSTECTOMY N/A 6/29/2018    Procedure: CHOLECYSTECTOMY, LAPAROSCOPIC;  Surgeon: Jose Armando Humphries MD;  Location: [facility];  Service:      TONSILLECTOMY       WISDOM TOOTH EXTRACTION         Family History   Problem Relation Age of Onset     Breast Cancer Mother 45     Diabetes Mother      Heart Disease Mother      Hyperlipidemia Mother      Hypertension Mother      Depression Father      Alcoholism Father      Hyperlipidemia Sister      Depression Sister      Obesity Sister      Obesity Daughter      Lung Cancer Son      Cerebrovascular Disease Son        Reviewed past medical, surgical, and family history with patient found on new patient intake packet located in EMR [employer] tab.     SOCIAL HX: smoker, alcohol use, no heavy drinking, no rec drug use    Oswestry (JO-ANN) Questionnaire:        9/20/2024     3:12 PM   OSWESTRY DISABILITY INDEX   Count 9   Sum 22   Oswestry Score (%) 48.89 %       Neck Disability Index:      9/20/2024     3:15 PM   Neck Disability Index (  [employer]. 1991. All rights reserved.; used with permission)   SECTION 1 - PAIN INTENSITY 3   SECTION 2 - PERSONAL CARE 2   SECTION 3 - LIFTING 4   SECTION 4 - READING 3   SECTION 5 - HEADACHES 0   SECTION 6 - CONCENTRATION 2   SECTION 7 - WORK 3   SECTION 8 - DRIVING 3   SECTION 9 - SLEEPING 4   SECTION 10  "- RECREATION 5   Count 10   Sum 29   Raw Score: /50 29   Neck Disability Index Score: (%) 58 %          PHQ-2 Score:       9/15/2024    12:31 PM 7/12/2024    11:55 AM   PHQ-2 ( 1999 [employer])   Q1: Little interest or pleasure in doing things 1 1   Q2: Feeling down, depressed or hopeless 1 1   PHQ-2 Score 2 2   Q1: Little interest or pleasure in doing things Several days Several days   Q2: Feeling down, depressed or hopeless Several days Several days   PHQ-2 Score 2 2          ROS: positive for ringing in ears, sob, joint pain, dizziness, easy bruising. Specifically negative for bowel/bladder dysfunction, balance changes, headache, dizziness, foot drop, fevers, chills, appetite changes, nausea/vomiting, unexplained weight loss. Otherwise 13 systems reviewed are negative. Please see the patient's intake questionnaire from today for details.    OBJECTIVE:  BP (!) 163/83   Pulse 92   Ht 5' 2\" (1.575 m)   Wt 196 lb (88.9 kg)   LMP 08/06/1988   BMI 35.85 kg/m      PHYSICAL EXAMINATION:  --CONSTITUTIONAL: Vital signs as above. No acute distress. The patient is well nourished and well groomed.  --PSYCHIATRIC: The patient is awake, alert, oriented to person, place, and time, and answering questions appropriately with clear speech. Appropriate mood and affect   --HEENT: Sclera are non-injected. Extraocular muscles are intact. Moist oral mucosa.  --SKIN: Skin over the face, bilateral upper extremities, and posterior torso is clean, dry, intact without rashes.  --RESPIRATORY: Normal rhythm and effort. No abnormal accessory muscle breathing patterns noted.     --NEUROLOGIC: CN III-XII are grossly intact.   --GROSS MOTOR: Easily arises from a seated position.     --UPPER EXTREMITY MOTOR TESTING:  Shoulder abduction: right 5/5, left 5/5  Triceps: right 5/5 left 5/5  Biceps:right 5/5  left 5/5,   Hand :  right 5/5  left 5/5,   Intrinsics: right 5/5  left 5/5,   Extensors: right 5/5  left 5/5,     --LOWER EXTREMITY MOTOR " TESTING  Hip flexion: right 5/5  left 5/5,   Quads:right 5/5  left 5/5,   Hamstrings: right 5/5  left 5/5,   Dorsiflexion: right 5/5  left 5/5,   Plantarflexion: right 5/5  left 5/5,   EHL: right 5/5  left 5/5,     REFLEXES: 1/4 symmetric triceps, biceps, brachioradialis bilaterally. 1/4 symmetric patellar, achilles reflex bilaterally.  Negative Clonus, Babinski, and Ingram's bilaterally.      SENSATION: slight decreased sensation in the left hand compared to right, otherwise intact throughout the upper and lower extremities is intact to light touch.     --VASCULAR: Warm upper and lower limbs bilaterally. No swelling or color change noted.    --CERVICAL SPINE: Inspection reveals no evidence of deformity or swelling. No point tenderness to palpation of cervical spine. No tenderness to palpation of traps, scaps, or paraspinal musculature.    --SHOULDERS: Full range of motion bilaterally: abduction, flexion, cross chest movement internal/external rotation. Does have some soreness in the left shoulder with range of motion exercises. No significant tenderness to palpation or swelling noted of AC joint.        RESULTS:   Prior medical records from [facility] and Care Everywhere were reviewed today.         IMAGING:  Spine imaging was personally reviewed and interpreted today. The images were shown to the patient and the findings were explained using a spine model.     MR Cervical Spine w/o & w Contrast    Result Date: 9/19/2024  EXAM: MR CERVICAL SPINE W/O and W CONTRAST LOCATION: [facility] DATE: 9/18/2024 INDICATION:  Neck pain, Spinal stenosis in cervical region, Acute pain of left shoulder COMPARISON: None. CONTRAST: 9 ml gadavist TECHNIQUE: MRI Cervical Spine without and with IV contrast. FINDINGS: Normal craniocervical and atlantoaxial alignment. At C2-C3, anterolisthesis measures 2 mm. C2-C6 ACDF hardware is in place. Solid interbody osseous fusion from C3 through C6. Partial  interbody osseous fusion at C7-T1. No abnormal cord signal. No extraspinal abnormality. Craniovertebral junction and C1-C2: Normal. C2-C3: Mild disc height loss. Disc bulge. Mild right facet arthropathy. Moderate left facet arthropathy. Mild spinal canal stenosis. Mild right neural foraminal stenosis. Moderate left neural foraminal stenosis. C3-C4: Interbody osseous fusion. Normal facets. No spinal canal stenosis. No neural foraminal stenosis. C4-C5: Interbody osseous fusion. Normal facets. No spinal canal stenosis. Mild bilateral neural foraminal stenosis. C5-C6: Interbody osseous fusion. Interbody osseous spurring. Normal facets. Severe spinal canal stenosis. Moderate neural foraminal stenosis bilaterally. C6-C7: Mild disc height loss. Broad posterior disc protrusion. Right greater than left uncovertebral vertebrae. Moderate facet arthropathy. Mild left facet arthropathy. Moderate spinal canal stenosis. Severe right neural foraminal stenosis. Moderate left  neural foraminal stenosis. C7-T1: Interbody osseous fusion. Normal facets. No spinal canal or neural foraminal stenosis.     IMPRESSION: 1.  Multilevel cervical spondylosis. 2.  Severe spinal canal stenosis at C5-C6. Moderate spinal canal stenosis at C6-C7. No abnormal cord signal. 3.  Severe neural foraminal stenosis on the right at C6-C7. Moderate neural foraminal stenosis bilaterally at C5-C6 and on the left at C3-C3 and C6-C7.          This note was dictated using voice recognition software. Any grammatical or context distortions are unintentional and inherent to the software.       Elaine BANKSP-C  [facility]  O. [phone]      Again, thank you for allowing me to participate in the care of your patient.        Sincerely,        Elaine Pineda, KALANI CNP

## 2024-09-25 NOTE — PROGRESS NOTES
ASSESSMENT: Kori Leach is a 84 year old female presents for consultation at the request of PCP Donaldo Vernon, who presents today for new patient evaluation of :     -cervical stenosis    Patient is neurologically intact on exam. No myelopathic or red flag symptoms. Resolution of left arm pain but ongoing neck pain to the shoulder quite limiting to her activity. We had a very lengthy discussion regarding options and ultimately given pain severity, she would like to pursue a steroid injection. We discussed risks, benefits, role of a C7-T1 IL MAURIZIO and she would like to proceed. I gave her cervical PT exercises to do on her own. Declines formal PT at this time. We talked about the role of neuropathic agent, could consider adding in future. Follow up per routine following injection.          9/20/2024     3:12 PM   OSWESTRY DISABILITY INDEX   Count 9   Sum 22   Oswestry Score (%) 48.89 %            Diagnoses and all orders for this visit:  Neck pain  -     Spine  Referral  Spinal stenosis in cervical region  -     Spine  Referral       PLAN:  Reviewed spine anatomy and disease process. Discussed diagnosis and treatment options with the patient today. A shared decision making model was used. The patient's values and choices were respected. The following represents what was discussed and decided upon by the provider and the patient.     -DIAGNOSTIC TESTS:  Images were personally reviewed and interpreted and explained to patient today using spine model.   --no new imaging ordered    -PHYSICAL THERAPY:    -PT exercises given to patient today  -declines formal PT at this time  Discussed the importance of core strengthening, ROM, stretching exercises with the patient and how each of these entities is important in decreasing pain.  Explained to the patient that the purpose of physical therapy is to teach the patient a home exercise program.  These exercises need to be performed every day in order to  decrease pain and prevent future occurrences of pain.    -MEDICATIONS:    -ok to continue relafen prn  -ok to continue flexeril prn   Discussed multiple medication options today with patient. Discussed risks, side effects, and proper use of medications. Patient verbalized understanding.    -INTERVENTIONS:    -Discussed the role, risks, benefits of injections with patient today. Patient would like to pursue C7-T1 IL MAURIZIO  We discussed the role of MBB//RFA depending on symptoms     -PATIENT EDUCATION: Total time of 40 minutes, on the day of service, spent with the patient, reviewing the chart, placing orders, and documenting.   -Today we also discussed the issues related to the pros and cons of the current treatment plan.    -FOLLOW-UP:  we will call with results of imaging    Advised patient to call the Spine Center if symptoms worsen or if they develop red flag symptoms such as numbness, weakness, severe pain uncontrolled by current pain med regimen, or any new or worsening problems controlling bladder and bowel function.   ______________________________________________________________________    SUBJECTIVE:   Kori Leach  is a 84 year old female hx C3-6 ACDF with Dr Sheets in 2015, paroxysmal afib, copd, GERD, hld, memory difficulty, right parotid gland mass who presents today for new patient evaluation of cervical stenosis    Neck pain started suddenly without trauma after she had covid at the end of August. Radiated into the left shoulder, and left arm at least down to the elbow and into the forearm. Not sure if she had numbnss or tingling, but potentially. She did have some weakness in the arm. Pain was 10/10 x 1 wk.  Was seen by pcp 9/10 and 9/16. She tried a course of oral steroids which did not help. Relafen she took once, did not take any more. The flexeril has helped the most. They discussed a shoulder injection- ultimately did not pursue as cervical radiculopathy suspected. Referred for cervical  MRI. Things spontaneously began to improve. When she would wake up in the morning, it wasn't as bad. It has not returned to prior 10/10 yet, but remains severe. Worse in the evenings. 6-7/10. 2/10 in the mornings. Sharp, intense. The arm pain has resolved but still left neck pain into the shoulder.    She reports having had cervical injections of some kind in 2019 under sedation, which provided significant relief. She is interested in additional injections, needs to get to [place]. Of note, states the valium did not help her pre-MRI claustrophobia, and if has another injection, would need a stronger medication. Per my review of the  Spine notes 10/19/2019, it looks like orders were placed for a left C6-7, T1-2, and T2-3 MBB and there was mention if limited relief would next suggest C6-7 IL MAURIZIO. I can not see any notes of what procedure(s) were ultimately done    She denies any weakness In the arms or hands, difficulty with fine motor tasks, imbalance, leg heaviness, soreness.    She was on gabapentin in the past  Last course of PT was in 2020  She has not had any chiropractic care    -Treatment to Date:     -Medications:    Current Outpatient Medications   Medication Sig Dispense Refill    cyclobenzaprine (FLEXERIL) 5 MG tablet Take 1 tablet (5 mg) by mouth 3 times daily as needed for muscle spasms. Start at nighttime and can increase if needed. 40 tablet 2    ferrous sulfate (FE TABS) 325 (65 Fe) MG EC tablet Take 325 mg by mouth three times a week      Fluticasone-Umeclidin-Vilanterol (TRELEGY ELLIPTA) 200-62.5-25 MCG/ACT oral inhaler Inhale 1 puff into the lungs daily. 60 each 0    metoprolol-hydrochlorothiazide (DUTOPROL) 50-12.5 MG TB24 per tablet Take 1 tablet by mouth daily.      multivitamin (MULTIPLE VITAMINS ORAL) [MULTIVITAMIN (MULTIPLE VITAMINS ORAL)] Take by mouth.      nabumetone (RELAFEN) 500 MG tablet Take 1 tablet (500 mg) by mouth 2 times daily. Take regularly for the neck. 60 tablet 2     rosuvastatin (CRESTOR) 40 MG tablet Take 1 tablet (40 mg) by mouth At Bedtime 90 tablet 3     No current facility-administered medications for this visit.       Allergies   Allergen Reactions    Lisinopril Cough       Past Medical History:   Diagnosis Date    Arthritis     knees and hip replacement    Chronic obstructive pulmonary disease, unspecified COPD type (H) 06/04/2018    PFT Complete  Performed 5/21/2018 Final result Study Result FEV1/FVC is 69 and is reduced. FEV1 is 66% predicted and is reduced. FVC is 74% predicted and reduced. There was improvement in spirometry after a single inhaled dose of bronchodilator. TLC is 110% predicted and is normal. RV is 141% predicted and is increased. DLCO is 77% predicted and is reduced when it  is corrected for hemoglobin.  Im    Fatty liver     Former smoker     GERD (gastroesophageal reflux disease)     History of nicotine use 06/04/2018    HLD (hyperlipidemia)     HTN (hypertension)     MO (iron deficiency anemia) 04/24/2015    Inverted nipple     bilateral    Loss of hearing     Lumbar back pain     Morbid obesity (H) 09/04/2018    Pre-diabetes     Severe obstructive sleep apnea 04/18/2018    10/06/17 where we ordered a sleep study which showed severe TAMAR with an RDI of 30 and she had a titration study that showed CPAP of 9 cmH20 to be effective. She was set up with the CPAP on 10/25/17 through [facility].     Urinary incontinence         Patient Active Problem List   Diagnosis    HLD (hyperlipidemia)    Dysphagia    Severe obstructive sleep apnea on CPAP    Chronic obstructive pulmonary disease, unspecified COPD type (H), thought to be mild on PFTs 2018    Former smoker - quit in 2018    Fatty liver    GERD (gastroesophageal reflux disease)    Loss of hearing    Lumbar back pain    MO (iron deficiency anemia) - Dec 2021, also 2015    Primary hypertension    Paroxysmal atrial fibrillation (H)    Paresthesias, left post auricular and occiput     Mass of right parotid  gland    Memory difficulty    Class 2 severe obesity due to excess calories with serious comorbidity in adult (H)       Past Surgical History:   Procedure Laterality Date    APPENDECTOMY      ARTHROPLASTY KNEE BILATERAL      CATARACT EXTRACTION      CERVICAL SPINE SURGERY  2015    JOINT REPLACEMENT      bilateral knees and right hip    LAPAROSCOPIC CHOLECYSTECTOMY N/A 6/29/2018    Procedure: CHOLECYSTECTOMY, LAPAROSCOPIC;  Surgeon: Jose Armando Humphries MD;  Location: [facility] - [place], [place];  Service:     TONSILLECTOMY      WISDOM TOOTH EXTRACTION         Family History   Problem Relation Age of Onset    Breast Cancer Mother 45    Diabetes Mother     Heart Disease Mother     Hyperlipidemia Mother     Hypertension Mother     Depression Father     Alcoholism Father     Hyperlipidemia Sister     Depression Sister     Obesity Sister     Obesity Daughter     Lung Cancer Son     Cerebrovascular Disease Son        Reviewed past medical, surgical, and family history with patient found on new patient intake packet located in EMR Media tab.     SOCIAL HX: smoker, alcohol use, no heavy drinking, no rec drug use    Oswestry (JO-ANN) Questionnaire:        9/20/2024     3:12 PM   OSWESTRY DISABILITY INDEX   Count 9   Sum 22   Oswestry Score (%) 48.89 %       Neck Disability Index:      9/20/2024     3:15 PM   Neck Disability Index (  [employer]. 1991. All rights reserved.; used with permission)   SECTION 1 - PAIN INTENSITY 3   SECTION 2 - PERSONAL CARE 2   SECTION 3 - LIFTING 4   SECTION 4 - READING 3   SECTION 5 - HEADACHES 0   SECTION 6 - CONCENTRATION 2   SECTION 7 - WORK 3   SECTION 8 - DRIVING 3   SECTION 9 - SLEEPING 4   SECTION 10 - RECREATION 5   Count 10   Sum 29   Raw Score: /50 29   Neck Disability Index Score: (%) 58 %          PHQ-2 Score:       9/15/2024    12:31 PM 7/12/2024    11:55 AM   PHQ-2 ( 1999 [employer])   Q1: Little interest or pleasure in doing things 1 1   Q2: Feeling down, depressed or hopeless 1 1   PHQ-2  "Score 2 2   Q1: Little interest or pleasure in doing things Several days Several days   Q2: Feeling down, depressed or hopeless Several days Several days   PHQ-2 Score 2 2          ROS: positive for ringing in ears, sob, joint pain, dizziness, easy bruising. Specifically negative for bowel/bladder dysfunction, balance changes, headache, dizziness, foot drop, fevers, chills, appetite changes, nausea/vomiting, unexplained weight loss. Otherwise 13 systems reviewed are negative. Please see the patient's intake questionnaire from today for details.    OBJECTIVE:  BP (!) 163/83   Pulse 92   Ht 5' 2\" (1.575 m)   Wt 196 lb (88.9 kg)   LMP 08/06/1988   BMI 35.85 kg/m      PHYSICAL EXAMINATION:  --CONSTITUTIONAL: Vital signs as above. No acute distress. The patient is well nourished and well groomed.  --PSYCHIATRIC: The patient is awake, alert, oriented to person, place, and time, and answering questions appropriately with clear speech. Appropriate mood and affect   --HEENT: Sclera are non-injected. Extraocular muscles are intact. Moist oral mucosa.  --SKIN: Skin over the face, bilateral upper extremities, and posterior torso is clean, dry, intact without rashes.  --RESPIRATORY: Normal rhythm and effort. No abnormal accessory muscle breathing patterns noted.     --NEUROLOGIC: CN III-XII are grossly intact.   --GROSS MOTOR: Easily arises from a seated position.     --UPPER EXTREMITY MOTOR TESTING:  Shoulder abduction: right 5/5, left 5/5  Triceps: right 5/5 left 5/5  Biceps:right 5/5  left 5/5,   Hand :  right 5/5  left 5/5,   Intrinsics: right 5/5  left 5/5,   Extensors: right 5/5  left 5/5,     --LOWER EXTREMITY MOTOR TESTING  Hip flexion: right 5/5  left 5/5,   Quads:right 5/5  left 5/5,   Hamstrings: right 5/5  left 5/5,   Dorsiflexion: right 5/5  left 5/5,   Plantarflexion: right 5/5  left 5/5,   EHL: right 5/5  left 5/5,     REFLEXES: 1/4 symmetric triceps, biceps, brachioradialis bilaterally. 1/4 symmetric " patellar, achilles reflex bilaterally.  Negative Clonus, Babinski, and Ingram's bilaterally.      SENSATION: slight decreased sensation in the left hand compared to right, otherwise intact throughout the upper and lower extremities is intact to light touch.     --VASCULAR: Warm upper and lower limbs bilaterally. No swelling or color change noted.    --CERVICAL SPINE: Inspection reveals no evidence of deformity or swelling. No point tenderness to palpation of cervical spine. No tenderness to palpation of traps, scaps, or paraspinal musculature.    --SHOULDERS: Full range of motion bilaterally: abduction, flexion, cross chest movement internal/external rotation. Does have some soreness in the left shoulder with range of motion exercises. No significant tenderness to palpation or swelling noted of AC joint.        RESULTS:   Prior medical records from [facility] and Care Everywhere were reviewed today.         IMAGING:  Spine imaging was personally reviewed and interpreted today. The images were shown to the patient and the findings were explained using a spine model.     MR Cervical Spine w/o & w Contrast    Result Date: 9/19/2024  EXAM: MR CERVICAL SPINE W/O and W CONTRAST LOCATION: [facility] DATE: 9/18/2024 INDICATION:  Neck pain, Spinal stenosis in cervical region, Acute pain of left shoulder COMPARISON: None. CONTRAST: 9 ml gadavist TECHNIQUE: MRI Cervical Spine without and with IV contrast. FINDINGS: Normal craniocervical and atlantoaxial alignment. At C2-C3, anterolisthesis measures 2 mm. C2-C6 ACDF hardware is in place. Solid interbody osseous fusion from C3 through C6. Partial interbody osseous fusion at C7-T1. No abnormal cord signal. No extraspinal abnormality. Craniovertebral junction and C1-C2: Normal. C2-C3: Mild disc height loss. Disc bulge. Mild right facet arthropathy. Moderate left facet arthropathy. Mild spinal canal stenosis. Mild right neural foraminal stenosis.  Moderate left neural foraminal stenosis. C3-C4: Interbody osseous fusion. Normal facets. No spinal canal stenosis. No neural foraminal stenosis. C4-C5: Interbody osseous fusion. Normal facets. No spinal canal stenosis. Mild bilateral neural foraminal stenosis. C5-C6: Interbody osseous fusion. Interbody osseous spurring. Normal facets. Severe spinal canal stenosis. Moderate neural foraminal stenosis bilaterally. C6-C7: Mild disc height loss. Broad posterior disc protrusion. Right greater than left uncovertebral vertebrae. Moderate facet arthropathy. Mild left facet arthropathy. Moderate spinal canal stenosis. Severe right neural foraminal stenosis. Moderate left  neural foraminal stenosis. C7-T1: Interbody osseous fusion. Normal facets. No spinal canal or neural foraminal stenosis.     IMPRESSION: 1.  Multilevel cervical spondylosis. 2.  Severe spinal canal stenosis at C5-C6. Moderate spinal canal stenosis at C6-C7. No abnormal cord signal. 3.  Severe neural foraminal stenosis on the right at C6-C7. Moderate neural foraminal stenosis bilaterally at C5-C6 and on the left at C3-C3 and C6-C7.          This note was dictated using voice recognition software. Any grammatical or context distortions are unintentional and inherent to the software.       Elaine Pineda [facility]  [facility]  O. [phone]

## 2024-09-25 NOTE — PATIENT INSTRUCTIONS
An injection has been ordered today to potentially help with your pain symptoms. These injections do not fix what is going on in your back, therefore they typically do not take away the pain completely, however they can many times help improve symptoms. Injections should always be completed along with other modalities such as physical therapy for the best long term outcomes. If injections alone are done, then pain will likely return.     [facility] Injection Requirements:    A  is required for all fluoroscopically-guided injections.  Injection appointments may be cancelled if there are signs/symptoms of an active infection or if the patient is being actively treated with antibiotics for a diagnosed infection.  Patients may have their steroid injection cancelled if they have had another steroid injection within 2 weeks.  Diabetic patients will have their blood glucose levels checked the day of their injection and the appointment will be rescheduled if the blood glucose level is 300 or higher.  Patients with allergies to cortisone, local anesthetics, iodine, or contrast dye should contact the Spine Center to further discuss these considerations.  Patients scheduled for medial branch block diagnostic injections should refrain from taking pain medication the day of the procedure.  The medial branch block injection appointment will be rescheduled if the patient's pain rating is not 5/10 or greater at the time of the procedure.  Patients taking warfarin/Coumadin will have their INR checked the day of the procedure and the procedure may be rescheduled if the INR is greater than 3.0.  Please contact the Spine Center (#[phone]) if you are taking any prescription blood-thinning medications (warfarin, Plavix, Lovenox, Eliquis, Brilinta, Effient, etc.) as special dosing adjustments may need to be made depending on the type of injection you are scheduled to receive.  It is recommended  that you delay having your steroid injection if you have received a flu shot or shingles vaccine within 2 weeks.       Importance of specialized Physical Therapy:     Today, we discussed the importance of core strengthening, ROM, and stretching exercises, and how each of these are key in decreasing pain.   The purpose of physical therapy is to teach patients a home exercise program individualized to them and their specific health concerns.  These exercises need to be performed every day in order to decrease pain and prevent future occurrences of pain.        ~Please call our [facility] Nurse Navigation line [phone] with any questions or concerns about your treatment plan, if symptoms worsen and you would like to be seen urgently, or if you have any new or worsening numbness, weakness, or problems controlling bladder and bowel function.  ~You are also welcome to contact Elaine Pineda via [email or web address], but please be aware that responses to [email or web address] message may take 2-3 days due to the high volume of patients seen in clinic.

## 2024-09-29 ENCOUNTER — APPOINTMENT (OUTPATIENT)
Dept: RADIOLOGY | Facility: HOSPITAL | Age: 84
End: 2024-09-29
Attending: EMERGENCY MEDICINE
Payer: MEDICARE

## 2024-09-29 ENCOUNTER — HOSPITAL ENCOUNTER (EMERGENCY)
Facility: HOSPITAL | Age: 84
Discharge: HOME | End: 2024-09-29
Attending: EMERGENCY MEDICINE | Admitting: EMERGENCY MEDICINE
Payer: MEDICARE

## 2024-09-29 VITALS
HEART RATE: 85 BPM | HEIGHT: 62 IN | TEMPERATURE: 98 F | WEIGHT: 189 LBS | OXYGEN SATURATION: 93 % | SYSTOLIC BLOOD PRESSURE: 165 MMHG | BODY MASS INDEX: 34.78 KG/M2 | RESPIRATION RATE: 18 BRPM | DIASTOLIC BLOOD PRESSURE: 69 MMHG

## 2024-09-29 DIAGNOSIS — R07.9 CHEST PAIN, UNSPECIFIED TYPE: ICD-10-CM

## 2024-09-29 LAB
ANION GAP SERPL CALCULATED.3IONS-SCNC: 10 MMOL/L (ref 7–15)
BASOPHILS # BLD AUTO: 0 10E3/UL (ref 0–0.2)
BASOPHILS NFR BLD AUTO: 1 %
BUN SERPL-MCNC: 10.9 MG/DL (ref 8–23)
CALCIUM SERPL-MCNC: 9.6 MG/DL (ref 8.8–10.4)
CHLORIDE SERPL-SCNC: 107 MMOL/L (ref 98–107)
CREAT SERPL-MCNC: 0.87 MG/DL (ref 0.51–0.95)
EGFRCR SERPLBLD CKD-EPI 2021: 65 ML/MIN/1.73M2
EOSINOPHIL # BLD AUTO: 0.2 10E3/UL (ref 0–0.7)
EOSINOPHIL NFR BLD AUTO: 3 %
ERYTHROCYTE [DISTWIDTH] IN BLOOD BY AUTOMATED COUNT: 13.8 % (ref 10–15)
GLUCOSE SERPL-MCNC: 136 MG/DL (ref 70–99)
HCO3 SERPL-SCNC: 26 MMOL/L (ref 22–29)
HCT VFR BLD AUTO: 45.4 % (ref 35–47)
HGB BLD-MCNC: 14.8 G/DL (ref 11.7–15.7)
HOLD SPECIMEN: NORMAL
HOLD SPECIMEN: NORMAL
IMM GRANULOCYTES # BLD: 0 10E3/UL
IMM GRANULOCYTES NFR BLD: 0 %
LYMPHOCYTES # BLD AUTO: 1 10E3/UL (ref 0.8–5.3)
LYMPHOCYTES NFR BLD AUTO: 15 %
MCH RBC QN AUTO: 29.5 PG (ref 26.5–33)
MCHC RBC AUTO-ENTMCNC: 32.6 G/DL (ref 31.5–36.5)
MCV RBC AUTO: 91 FL (ref 78–100)
MONOCYTES # BLD AUTO: 0.6 10E3/UL (ref 0–1.3)
MONOCYTES NFR BLD AUTO: 8 %
NEUTROPHILS # BLD AUTO: 5 10E3/UL (ref 1.6–8.3)
NEUTROPHILS NFR BLD AUTO: 74 %
NRBC # BLD AUTO: 0 10E3/UL
NRBC BLD AUTO-RTO: 0 /100
PLATELET # BLD AUTO: 213 10E3/UL (ref 150–450)
POTASSIUM SERPL-SCNC: 3.9 MMOL/L (ref 3.4–5.3)
RBC # BLD AUTO: 5.01 10E6/UL (ref 3.8–5.2)
SODIUM SERPL-SCNC: 143 MMOL/L (ref 135–145)
TROPONIN T SERPL HS-MCNC: 14 NG/L
WBC # BLD AUTO: 6.8 10E3/UL (ref 4–11)

## 2024-09-29 PROCEDURE — 99285 EMERGENCY DEPT VISIT HI MDM: CPT | Mod: 25

## 2024-09-29 PROCEDURE — 36415 COLL VENOUS BLD VENIPUNCTURE: CPT | Performed by: EMERGENCY MEDICINE

## 2024-09-29 PROCEDURE — 71046 X-RAY EXAM CHEST 2 VIEWS: CPT

## 2024-09-29 PROCEDURE — 85004 AUTOMATED DIFF WBC COUNT: CPT | Performed by: EMERGENCY MEDICINE

## 2024-09-29 PROCEDURE — 93005 ELECTROCARDIOGRAM TRACING: CPT | Performed by: STUDENT IN AN ORGANIZED HEALTH CARE EDUCATION/TRAINING PROGRAM

## 2024-09-29 PROCEDURE — 93005 ELECTROCARDIOGRAM TRACING: CPT | Performed by: EMERGENCY MEDICINE

## 2024-09-29 PROCEDURE — 80048 BASIC METABOLIC PNL TOTAL CA: CPT | Performed by: EMERGENCY MEDICINE

## 2024-09-29 PROCEDURE — 84484 ASSAY OF TROPONIN QUANT: CPT | Performed by: EMERGENCY MEDICINE

## 2024-09-29 RX ORDER — ASPIRIN 81 MG/1
324 TABLET, CHEWABLE ORAL ONCE
Status: DISCONTINUED | OUTPATIENT
Start: 2024-09-29 | End: 2024-09-29

## 2024-09-29 ASSESSMENT — COLUMBIA-SUICIDE SEVERITY RATING SCALE - C-SSRS
1. IN THE PAST MONTH, HAVE YOU WISHED YOU WERE DEAD OR WISHED YOU COULD GO TO SLEEP AND NOT WAKE UP?: NO
6. HAVE YOU EVER DONE ANYTHING, STARTED TO DO ANYTHING, OR PREPARED TO DO ANYTHING TO END YOUR LIFE?: NO
2. HAVE YOU ACTUALLY HAD ANY THOUGHTS OF KILLING YOURSELF IN THE PAST MONTH?: NO

## 2024-09-29 ASSESSMENT — ACTIVITIES OF DAILY LIVING (ADL)
ADLS_ACUITY_SCORE: 35
ADLS_ACUITY_SCORE: 35

## 2024-09-29 NOTE — DISCHARGE INSTRUCTIONS
Our cardiology team will call you to help you to set up a follow up appointment with them this week so they can talk to you about your heart and whether they recommend any additional outpatient testing of your heart to keep you safe going forward.

## 2024-09-29 NOTE — ED TRIAGE NOTES
The pt presents for evaluation of SOB and feeling like she is in A Fib. She has had a hx of ablation x1. Yesterday she could tell she was in A Fib again. Mild SOB. Mild chest tightness.

## 2024-09-29 NOTE — ED PROVIDER NOTES
EMERGENCY DEPARTMENT ENCOUNTER      NAME: Kori Leach  AGE: 84 year old female  YOB: 1940  MRN: [id number]  EVALUATION DATE & TIME: 9/29/2024 11:51 AM    PCP: Donaldo Vernon    ED PROVIDER: Jacinda Christensen M.D.      Chief Complaint   Patient presents with    Breathing Problem         FINAL IMPRESSION:  1. Chest pain, unspecified type          ED COURSE & MEDICAL DECISION MAKING:    ED Course as of 09/29/24 1332   Sun Sep 29, 2024   1227 Pt with sense of chest pressure since last night, low risk for ACS, here because she was afraid that it was being caused by afib but reassuringly not in afib. Pt reports extreme anxiety over 2 days, neurovascularly intact iwth known HTN and very anxious in ED today, examination WNL and EKG with sinus rhythm with a PVC and same on cardiac monitor with rare PVCs/PACs but no afib present. Patient ok with plan to check CXR, monitor for afib, troponin testing to r/o Acs and electrolytes to ensure no anomalies and plan to clinically reassess   1327 CBC and chemistry WNL and troponin = 14 which is under gender speciic negative cutoff for ACS with over 6h symptoms   1327 CXR reassruingly negative for acute pathology. Will reassess now.   1331 Pt reassessed and feeling well, BP downtrending, pt eager for discharge and notes her chest pressure was likely 2/2 anxiety/stress. Patient discharged after being provided with extensive anticipatory guidance and given return precautions, importance of PMD follow-up emphasized.        Pertinent Labs & Imaging studies reviewed. (See chart for details)    Medical Decision Making  Obtained supplemental history:Supplemental history obtained?: Documented in chart and Family Member/Significant Other  Reviewed external records: External records reviewed?: Documented in chart  Care impacted by chronic illness:Documented in Chart  Did you consider but not order tests?: Work up considered but not performed and documented in chart, if  "applicable  Did you interpret images independently?: Independent interpretation of ECG and images noted in documentation, when applicable.  Consultation discussion with other provider:Did you involve another provider (consultant, , pharmacy, etc.)?: No  Discharge. No recommendations on prescription strength medication(s). I considered admission, but discharged patient after significant clinical improvement.    MIPS: Not Applicable      At the conclusion of the encounter I discussed the results of all of the tests and the disposition. The questions were answered. The patient or family acknowledged understanding and was agreeable with the care plan.     MEDICATIONS GIVEN IN THE EMERGENCY:  Medications - No data to display    NEW PRESCRIPTIONS STARTED AT TODAY'S ER VISIT  New Prescriptions    No medications on file          =================================================================    HPI      Kori Leach is a 84 year old female with PMHx of severe COPD, HTN, HLD, paroxysmal afib on metoprolol therapy without blood thinners and history of remote ablation of afib who presents to the ED today via private vehicle with chest pain and NOT shortness of breath.    Per my chart review, she has previously followed with [facility] and underwent ablation of afib nidus on 10/12/2023.    She presents to the ED with chest pressure to left anterior chest wall 4/10 constant nonradiating and not better or worse with anything that came on gradually last night. She reports she has \"a LOT of stress\" for two days. No shortness of breath. No cough or fever. No wheezing. No prior heart attack or blood clots. No medications taken for this. No prior chest pain episodes. She used her home cardiac monitor and thought it may have indicated that she could be in atrial fibrillation so she came to the ER. No near syncope.       REVIEW OF SYSTEMS   All other systems reviewed and are negative except as " noted above in HPI.    PAST MEDICAL HISTORY:  Past Medical History:   Diagnosis Date    Arthritis     knees and hip replacement    Chronic obstructive pulmonary disease, unspecified COPD type (H) 06/04/2018    PFT Complete  Performed 5/21/2018 Final result Study Result FEV1/FVC is 69 and is reduced. FEV1 is 66% predicted and is reduced. FVC is 74% predicted and reduced. There was improvement in spirometry after a single inhaled dose of bronchodilator. TLC is 110% predicted and is normal. RV is 141% predicted and is increased. DLCO is 77% predicted and is reduced when it  is corrected for hemoglobin.  Im    Fatty liver     Former smoker     GERD (gastroesophageal reflux disease)     History of nicotine use 06/04/2018    HLD (hyperlipidemia)     HTN (hypertension)     MO (iron deficiency anemia) 04/24/2015    Inverted nipple     bilateral    Loss of hearing     Lumbar back pain     Morbid obesity (H) 09/04/2018    Pre-diabetes     Severe obstructive sleep apnea 04/18/2018    10/06/17 where we ordered a sleep study which showed severe TAMAR with an RDI of 30 and she had a titration study that showed CPAP of 9 cmH20 to be effective. She was set up with the CPAP on 10/25/17 through  DME.     Urinary incontinence        PAST SURGICAL HISTORY:  Past Surgical History:   Procedure Laterality Date    APPENDECTOMY      ARTHROPLASTY KNEE BILATERAL      CATARACT EXTRACTION      CERVICAL SPINE SURGERY  2015    JOINT REPLACEMENT      bilateral knees and right hip    LAPAROSCOPIC CHOLECYSTECTOMY N/A 6/29/2018    Procedure: CHOLECYSTECTOMY, LAPAROSCOPIC;  Surgeon: Jose Armando Humphries MD;  Location: [facility];  Service:     TONSILLECTOMY      WISDOM TOOTH EXTRACTION         CURRENT MEDICATIONS:    cyclobenzaprine (FLEXERIL) 5 MG tablet  ferrous sulfate (FE TABS) 325 (65 Fe) MG EC tablet  Fluticasone-Umeclidin-Vilanterol (TRELEGY ELLIPTA) 200-62.5-25 MCG/ACT oral inhaler  LORazepam (ATIVAN) 1 MG  tablet  metoprolol-hydrochlorothiazide (DUTOPROL) 50-12.5 MG TB24 per tablet  multivitamin (MULTIPLE VITAMINS ORAL)  nabumetone (RELAFEN) 500 MG tablet  rosuvastatin (CRESTOR) 40 MG tablet        ALLERGIES:  Allergies   Allergen Reactions    Lisinopril Cough       FAMILY HISTORY:  Family History   Problem Relation Age of Onset    Breast Cancer Mother 45    Diabetes Mother     Heart Disease Mother     Hyperlipidemia Mother     Hypertension Mother     Depression Father     Alcoholism Father     Hyperlipidemia Sister     Depression Sister     Obesity Sister     Obesity Daughter     Lung Cancer Son     Cerebrovascular Disease Son        SOCIAL HISTORY:   Social History     Socioeconomic History    Marital status:     Number of children: 3   Tobacco Use    Smoking status: Every Day     Types: Cigarettes    Smokeless tobacco: Never    Tobacco comments:     1-2 cigarettes per day on average   Vaping Use    Vaping status: Never Used   Substance and Sexual Activity    Alcohol use: Yes     Comment: Alcoholic Drinks/day: less than 1 drink per month     Drug use: No    Sexual activity: Not Currently     Partners: Male   Social History Narrative    . Retired. Likes to play golf.  3 children. Son has lung cancer.  Was a . Lives in [place] for 6 months and [place] for 6 months of the years.     Social Determinants of Health     Financial Resource Strain: Low Risk  (9/13/2024)    Financial Resource Strain     Within the past 12 months, have you or your family members you live with been unable to get utilities (heat, electricity) when it was really needed?: No   Food Insecurity: Low Risk  (9/13/2024)    Food Insecurity     Within the past 12 months, did you worry that your food would run out before you got money to buy more?: No     Within the past 12 months, did the food you bought just not last and you didn t have money to get more?: No   Transportation Needs: Low Risk  (9/13/2024)    Transportation Needs  "    Within the past 12 months, has lack of transportation kept you from medical appointments, getting your medicines, non-medical meetings or appointments, work, or from getting things that you need?: No   Physical Activity: Inactive (9/13/2024)    Exercise Vital Sign     Days of Exercise per Week: 0 days     Minutes of Exercise per Session: 0 min   Stress: Stress Concern Present (9/13/2024)    [place] [place] [employer] - Occupational Stress Questionnaire     Feeling of Stress : To some extent   Social Connections: Unknown (9/13/2024)    Social Connection and Isolation Panel [NHANES]     Frequency of Social Gatherings with Friends and Family: Once a week   Interpersonal Safety: Low Risk  (9/16/2024)    Interpersonal Safety     Do you feel physically and emotionally safe where you currently live?: Yes     Within the past 12 months, have you been hit, slapped, kicked or otherwise physically hurt by someone?: No     Within the past 12 months, have you been humiliated or emotionally abused in other ways by your partner or ex-partner?: No   Housing Stability: Low Risk  (9/13/2024)    Housing Stability     Do you have housing? : Yes     Are you worried about losing your housing?: No       VITALS:  Patient Vitals for the past 24 hrs:   BP Temp Temp src Pulse Resp SpO2 Height Weight   09/29/24 1231 (!) 166/67 -- -- 85 23 94 % -- --   09/29/24 1223 (!) 182/83 -- -- 93 -- 93 % -- --   09/29/24 1200 -- -- -- -- -- -- 1.575 m (5' 2\") 85.7 kg (189 lb)   09/29/24 1159 (!) 201/89 -- -- 97 -- 95 % -- --   09/29/24 1157 (!) 201/89 98  F (36.7  C) Oral 101 18 96 % -- --       PHYSICAL EXAM    GENERAL: Awake, alert.  In no acute distress.   HEENT: Normocephalic, atraumatic.  Pupils equal, round and reactive.  Conjunctiva normal.  EOMI.  NECK: No stridor or apparent deformity.  PULMONARY: Symmetrical breath sounds without distress.  Lungs clear to auscultation bilaterally without wheezes, rhonchi or rales.  CARDIO: " Regular rate and rhythm.  No significant murmur, rub or gallop.  Radial pulses strong and symmetrical.  ABDOMINAL: Abdomen soft, non-distended and non-tender to palpation.  No CVAT, no palpable hepatosplenomegaly.  EXTREMITIES: No lower extremity swelling or edema.    NEURO: Alert and oriented to person, place and time.  Cranial nerves grossly intact.  No focal motor deficit.  PSYCH: Normal mood and affect  SKIN: No rashes      LAB:  All pertinent labs reviewed and interpreted.  Results for orders placed or performed during the hospital encounter of 09/29/24   XR Chest 2 Views    Impression    IMPRESSION: No pleural fluid or pneumothorax. Mild interstitial edema is suspected. Normal size of the heart.   Basic metabolic panel   Result Value Ref Range    Sodium 143 135 - 145 mmol/L    Potassium 3.9 3.4 - 5.3 mmol/L    Chloride 107 98 - 107 mmol/L    Carbon Dioxide (CO2) 26 22 - 29 mmol/L    Anion Gap 10 7 - 15 mmol/L    Urea Nitrogen 10.9 8.0 - 23.0 mg/dL    Creatinine 0.87 0.51 - 0.95 mg/dL    GFR Estimate 65 >60 mL/min/1.73m2    Calcium 9.6 8.8 - 10.4 mg/dL    Glucose 136 (H) 70 - 99 mg/dL   Result Value Ref Range    Troponin T, High Sensitivity 14 <=14 ng/L   CBC with platelets and differential   Result Value Ref Range    WBC Count 6.8 4.0 - 11.0 10e3/uL    RBC Count 5.01 3.80 - 5.20 10e6/uL    Hemoglobin 14.8 11.7 - 15.7 g/dL    Hematocrit 45.4 35.0 - 47.0 %    MCV 91 78 - 100 fL    MCH 29.5 26.5 - 33.0 pg    MCHC 32.6 31.5 - 36.5 g/dL    RDW 13.8 10.0 - 15.0 %    Platelet Count 213 150 - 450 10e3/uL    % Neutrophils 74 %    % Lymphocytes 15 %    % Monocytes 8 %    % Eosinophils 3 %    % Basophils 1 %    % Immature Granulocytes 0 %    NRBCs per 100 WBC 0 <1 /100    Absolute Neutrophils 5.0 1.6 - 8.3 10e3/uL    Absolute Lymphocytes 1.0 0.8 - 5.3 10e3/uL    Absolute Monocytes 0.6 0.0 - 1.3 10e3/uL    Absolute Eosinophils 0.2 0.0 - 0.7 10e3/uL    Absolute Basophils 0.0 0.0 - 0.2 10e3/uL    Absolute Immature  Granulocytes 0.0 <=0.4 10e3/uL    Absolute NRBCs 0.0 10e3/uL   Extra Red Top Tube   Result Value Ref Range    Hold Specimen JIC    Extra Blue Top Tube   Result Value Ref Range    Hold Specimen JIC        RADIOLOGY:  Reviewed all pertinent imaging. Please see official radiology report.  XR Chest 2 Views   Final Result   IMPRESSION: No pleural fluid or pneumothorax. Mild interstitial edema is suspected. Normal size of the heart.            EKG:    Reviewed and interpreted as: 1157 normal sinus rhythm with occasional PVCs, HR 99 no ST abnormalities, morphologically similar to prior EKG from 8/1/2023      I have independently reviewed and interpreted the EKG(s) documented above.         Jacinda Christensen MD  09/29/24 1611

## 2024-09-30 ENCOUNTER — PATIENT OUTREACH (OUTPATIENT)
Dept: INTERNAL MEDICINE | Facility: CLINIC | Age: 84
End: 2024-09-30
Payer: MEDICARE

## 2024-09-30 DIAGNOSIS — I48.0 PAROXYSMAL ATRIAL FIBRILLATION (H): Primary | ICD-10-CM

## 2024-09-30 LAB
ATRIAL RATE - MUSE: 99 BPM
DIASTOLIC BLOOD PRESSURE - MUSE: NORMAL MMHG
INTERPRETATION ECG - MUSE: NORMAL
P AXIS - MUSE: 76 DEGREES
PR INTERVAL - MUSE: 132 MS
QRS DURATION - MUSE: 82 MS
QT - MUSE: 330 MS
QTC - MUSE: 423 MS
R AXIS - MUSE: 61 DEGREES
SYSTOLIC BLOOD PRESSURE - MUSE: NORMAL MMHG
T AXIS - MUSE: 56 DEGREES
VENTRICULAR RATE- MUSE: 99 BPM

## 2024-09-30 RX ORDER — METOPROLOL SUCCINATE 50 MG/1
50 TABLET, EXTENDED RELEASE ORAL DAILY
Status: SHIPPED
Start: 2024-09-30 | End: 2024-10-03

## 2024-09-30 NOTE — TELEPHONE ENCOUNTER
Thanks for the update.  We likely have it wrong in her records.  We did not prescribe this medication.  This may have been transcribed incorrectly from her [place] records.    Please see if she needs or would like follow up with me.  May use a reserved slot for this.    Donaldo Vernon MD  General Internal Medicine  [facility]  9/30/2024, 12:22 PM

## 2024-10-01 DIAGNOSIS — M54.12 CERVICAL RADICULOPATHY: Primary | ICD-10-CM

## 2024-10-03 ENCOUNTER — OFFICE VISIT (OUTPATIENT)
Dept: INTERNAL MEDICINE | Facility: CLINIC | Age: 84
End: 2024-10-03
Payer: MEDICARE

## 2024-10-03 VITALS
DIASTOLIC BLOOD PRESSURE: 93 MMHG | TEMPERATURE: 97.6 F | HEART RATE: 86 BPM | OXYGEN SATURATION: 95 % | RESPIRATION RATE: 16 BRPM | WEIGHT: 198 LBS | HEIGHT: 62 IN | BODY MASS INDEX: 36.44 KG/M2 | SYSTOLIC BLOOD PRESSURE: 178 MMHG

## 2024-10-03 DIAGNOSIS — M54.2 NECK PAIN: ICD-10-CM

## 2024-10-03 DIAGNOSIS — I49.9 IRREGULAR HEARTBEAT: Primary | ICD-10-CM

## 2024-10-03 DIAGNOSIS — Z09 HOSPITAL DISCHARGE FOLLOW-UP: ICD-10-CM

## 2024-10-03 DIAGNOSIS — R19.7 INTERMITTENT DIARRHEA: ICD-10-CM

## 2024-10-03 DIAGNOSIS — I48.0 PAROXYSMAL ATRIAL FIBRILLATION (H): ICD-10-CM

## 2024-10-03 DIAGNOSIS — M48.02 SPINAL STENOSIS IN CERVICAL REGION: ICD-10-CM

## 2024-10-03 DIAGNOSIS — I10 PRIMARY HYPERTENSION: Chronic | ICD-10-CM

## 2024-10-03 PROCEDURE — 90480 ADMN SARSCOV2 VAC 1/ONLY CMP: CPT | Performed by: INTERNAL MEDICINE

## 2024-10-03 PROCEDURE — 90662 IIV NO PRSV INCREASED AG IM: CPT | Performed by: INTERNAL MEDICINE

## 2024-10-03 PROCEDURE — 91320 SARSCV2 VAC 30MCG TRS-SUC IM: CPT | Performed by: INTERNAL MEDICINE

## 2024-10-03 PROCEDURE — G0008 ADMIN INFLUENZA VIRUS VAC: HCPCS | Performed by: INTERNAL MEDICINE

## 2024-10-03 PROCEDURE — 99214 OFFICE O/P EST MOD 30 MIN: CPT | Mod: 25 | Performed by: INTERNAL MEDICINE

## 2024-10-03 RX ORDER — METOPROLOL SUCCINATE 50 MG/1
50 TABLET, EXTENDED RELEASE ORAL DAILY
Qty: 90 TABLET | Refills: 3 | Status: SHIPPED | OUTPATIENT
Start: 2024-10-03 | End: 2025-09-28

## 2024-10-03 RX ORDER — TRIAMTERENE AND HYDROCHLOROTHIAZIDE 37.5; 25 MG/1; MG/1
1 TABLET ORAL DAILY
Qty: 90 TABLET | Refills: 3 | Status: SHIPPED | OUTPATIENT
Start: 2024-10-03

## 2024-10-03 ASSESSMENT — PAIN SCALES - GENERAL: PAINLEVEL: MODERATE PAIN (5)

## 2024-10-03 NOTE — PROGRESS NOTES
"  {PROVIDER CHARTING PREFERENCE:795896}    Subjective   Kori is a 84 year old, presenting for the following health issues:  Follow Up, Atrial Fib, and Recheck Medication        10/3/2024     3:43 PM   Additional Questions   Roomed by DASIA Durán   Accompanied by Self         10/3/2024   Declines Weight   Did patient decline having their weight taken? Yes      History of Present Illness       Vascular Disease:  She presents for follow up of vascular disease.     She never takes nitroglycerin. She is not taking daily aspirin.    Reason for visit:  Med evaluation    She eats 0-1 servings of fruits and vegetables daily.She consumes 4 sweetened beverage(s) daily.She exercises with enough effort to increase her heart rate 9 or less minutes per day.  She exercises with enough effort to increase her heart rate 3 or less days per week. She is missing 1 dose(s) of medications per week.  She is not taking prescribed medications regularly due to remembering to take.       {MA/LPN/RN Pre-Provider Visit Orders- hCG/UA/Strep (Optional):872128}  {SUPERLIST (Optional):535145}  {additonal problems for provider to add (Optional):187463}    {ROS Picklists (Optional):652075}      Objective    BP (!) 178/93 (BP Location: Right arm, Patient Position: Sitting, Cuff Size: Adult Regular)   Pulse 86   Temp 97.6  F (36.4  C) (Oral)   Resp 16   Ht 1.575 m (5' 2\")   Wt 89.8 kg (198 lb)   LMP 08/06/1988 (Exact Date)   SpO2 95%   BMI 36.21 kg/m    Body mass index is 36.21 kg/m .  Physical Exam   {Exam List (Optional):873148}    {Diagnostic Test Results (Optional):880473}        Signed Electronically by: Donaldo Vernon MD  {Email feedback regarding this note to [email or web address]   :644328}  "

## 2024-10-03 NOTE — PROGRESS NOTES
PHYSICAL THERAPY EVALUATION  Type of Visit: Evaluation       Fall Risk Screen:  Fall screen completed by: PT  Have you fallen 2 or more times in the past year?: No  Have you fallen and had an injury in the past year?: No  Is patient a fall risk?: No    Subjective       Presenting condition or subjective complaint: Neck pain    Saw Elaine Pineda and declined formal PT but was given exercises. Plans to get a epidural streoid injection, potential for RFA/MBB. Is required to do PT before the injections. Reports pain all the time in the neck and shoulder. Is not sure why PT would be helpful if it already hurts in the neck. Plans to go to [place] for the fall/winter and in hoping to get there soon. Has been working on a couple of the exercises the doctor gave her over the past week.      MRI:   IMPRESSION:  1.  Multilevel cervical spondylosis.  2.  Severe spinal canal stenosis at C5-C6. Moderate spinal canal stenosis at C6-C7. No abnormal cord signal.  3.  Severe neural foraminal stenosis on the right at C6-C7. Moderate neural foraminal stenosis bilaterally at C5-C6 and on the left at C3-C3 and C6-C7.    Date of onset:      Relevant medical history: COPD; Dizziness; Heart problems; High blood pressure; Neck injury; Pain at night or rest   Dates & types of surgery: Two knee replacements, one hip replacement, four level fusion in neck. Neck surgery was roughly aboutt 2018    Prior diagnostic imaging/testing results: MRI     Prior therapy history for the same diagnosis, illness or injury: No    Living Environment  Social support: Alone   Type of home: House; Multi-level   Stairs to enter the home: Yes 7 Is there a railing: Yes     Ramp: No   Stairs inside the home: Yes 14 Is there a railing: Yes     Help at home: None; Home and Yard maintenance tasks  Equipment owned:       Employment: No    Hobbies/Interests: Golf    Patient goals for therapy: Can t specify any one thing, since there is neck pain all the time. Want to be pain  free!    Pain assessment: See objective evaluation for additional pain details     Objective   CERVICAL SPINE EVALUATION  PAIN: Pain Level at Rest: 5/10  Pain Level with Use: 8/10  Pain Location: cervical spine  POSTURE: Sitting Posture: Rounded shoulders, Forward head, Thoracic kyphosis increased  ROM:   (Degrees) Left AROM Right AROM    Cervical Flexion Max limited P+    Cervical Extension Max limited P+    Cervical Side bend Max limited P+ Mod limited P+    Cervical Rotation Max limited P+ Mod limited P+    Cervical Protrusion     Cervical Retraction     Thoracic Flexion     Thoracic Extension     Thoracic Rotation      Pain:   End Feel:     MYOTOMES: WNL    Assessment & Plan   CLINICAL IMPRESSIONS  Medical Diagnosis: Cervical radiculopathy    Treatment Diagnosis: Neck Pain/Radiculopathy   Impression/Assessment: Patient is a 84 year old female with neck and radicular complaints.  The following significant findings have been identified: Pain, Decreased ROM/flexibility, Decreased joint mobility, Decreased strength, Inflammation, Impaired muscle performance, Decreased activity tolerance, and Impaired posture. These impairments interfere with their ability to perform self care tasks, work tasks, recreational activities, household chores, driving , household mobility, and community mobility as compared to previous level of function.     Clinical Decision Making (Complexity):  Clinical Presentation: Stable/Uncomplicated  Clinical Presentation Rationale: based on medical and personal factors listed in PT evaluation  Clinical Decision Making (Complexity): Low complexity    PLAN OF CARE  Treatment Interventions:  Modalities: Cryotherapy, Hot Pack  Interventions: Gait Training, Manual Therapy, Neuromuscular Re-education, Therapeutic Activity, Therapeutic Exercise, Self-Care/Home Management    Long Term Goals     PT Goal 1  Goal Identifier: Prolonged Positioning  Goal Description: The patient will be able to sustain a  position, either sitting or standing x30 minutes with appropriate posture and pain <3/10.  Rationale: to maximize safety and independence with performance of ADLs and functional tasks;to maximize safety and independence with self cares  Target Date: 12/27/24      Frequency of Treatment: 1x/week tapering to 1x every 2 weeks  Duration of Treatment: 12 weeks    Recommended Referrals to Other Professionals:   Education Assessment:   Learner/Method: Patient    Risks and benefits of evaluation/treatment have been explained.   Patient/Family/caregiver agrees with Plan of Care.     Evaluation Time:     PT Eval, Low Complexity Minutes (50200): 15       Signing Clinician: Cindy Quiros, PT        [facility]                                                                                   OUTPATIENT PHYSICAL THERAPY      PLAN OF TREATMENT FOR OUTPATIENT REHABILITATION   Patient's Last Name, First Name, Kori Andre YOB: 1940   Provider's Name   [facility]   Medical Record No.  [id number]     Onset Date:    Start of Care Date: 10/04/24     Medical Diagnosis:  Cervical radiculopathy      PT Treatment Diagnosis:  Neck Pain/Radiculopathy Plan of Treatment  Frequency/Duration: 1x/week tapering to 1x every 2 weeks/ 12 weeks    Certification date from 10/04/24 to 12/27/24         See note for plan of treatment details and functional goals     Cindy Quiros, PT                         I CERTIFY THE NEED FOR THESE SERVICES FURNISHED UNDER        THIS PLAN OF TREATMENT AND WHILE UNDER MY CARE     (Physician attestation of this document indicates review and certification of the therapy plan).              Referring Provider:  Elaine Pineda    Initial Assessment  See Epic Evaluation- Start of Care Date: 10/04/24

## 2024-10-03 NOTE — PROGRESS NOTES
Wt Readings from Last 20 Encounters:   10/03/24 89.8 kg (198 lb)   09/29/24 85.7 kg (189 lb)   09/25/24 88.9 kg (196 lb)   09/16/24 89.7 kg (197 lb 12.8 oz)   09/10/24 90.5 kg (199 lb 9.6 oz)   08/14/24 90.6 kg (199 lb 11.2 oz)   07/12/24 90.8 kg (200 lb 3.2 oz)   10/09/23 85.3 kg (188 lb)   09/11/23 87.2 kg (192 lb 3.2 oz)   08/31/23 85.7 kg (189 lb)   08/17/23 86.2 kg (190 lb)   08/10/23 83.6 kg (184 lb 4.8 oz)   08/07/23 85.7 kg (189 lb)   06/12/23 85.7 kg (188 lb 14.4 oz)   12/01/22 89 kg (196 lb 1.6 oz)   11/11/22 88 kg (194 lb)   11/11/22 90.4 kg (199 lb 6.4 oz)   11/04/22 88.5 kg (195 lb)   10/07/22 90.4 kg (199 lb 6.4 oz)   08/09/22 90.3 kg (199 lb)     BP Readings from Last 20 Encounters:   10/03/24 (!) 178/93   09/29/24 (!) 165/69   09/25/24 (!) 163/83   09/16/24 128/74   09/10/24 (!) 150/68   08/14/24 130/60   07/12/24 124/62   10/09/23 118/52   09/12/23 138/72   08/31/23 (!) 166/62   08/17/23 (!) 146/58   08/10/23 (!) 154/79   08/08/23 118/70   06/12/23 130/75   12/03/22 132/80   11/11/22 102/52   11/11/22 122/62   11/04/22 117/54   10/07/22 (!) 174/79   08/09/22 138/60      Pulse Readings from Last 20 Encounters:   10/03/24 86   09/29/24 85   09/25/24 92   09/16/24 67   09/10/24 95   08/14/24 81   07/12/24 82   10/09/23 82   09/11/23 68   08/31/23 62   08/17/23 60   08/10/23 60   08/08/23 67   06/12/23 62   12/01/22 86   11/11/22 74   11/11/22 77   11/04/22 86   10/07/22 79   08/09/22 72     SpO2 Readings from Last 20 Encounters:   10/03/24 95%   09/29/24 93%   09/16/24 98%   09/10/24 93%   08/14/24 95%   07/12/24 95%   09/11/23 94%   08/31/23 96%   08/10/23 94%   08/08/23 92%   06/12/23 95%   12/01/22 95%   11/11/22 95%   11/04/22 96%   10/07/22 95%   08/09/22 96%   05/12/22 97%   01/09/22 93%   01/06/22 97%   12/06/21 96%

## 2024-10-04 ENCOUNTER — THERAPY VISIT (OUTPATIENT)
Dept: PHYSICAL THERAPY | Facility: REHABILITATION | Age: 84
End: 2024-10-04
Attending: NURSE PRACTITIONER
Payer: MEDICARE

## 2024-10-04 DIAGNOSIS — M54.12 CERVICAL RADICULOPATHY: ICD-10-CM

## 2024-10-04 DIAGNOSIS — M54.2 NECK PAIN: Primary | ICD-10-CM

## 2024-10-04 PROCEDURE — 97110 THERAPEUTIC EXERCISES: CPT | Mod: GP | Performed by: PHYSICAL THERAPIST

## 2024-10-04 PROCEDURE — 97161 PT EVAL LOW COMPLEX 20 MIN: CPT | Mod: GP | Performed by: PHYSICAL THERAPIST

## 2024-10-07 NOTE — PROGRESS NOTES
[facility]    Comprehensive and complex medical care - Chronic disease management - Shared decision making - Care coordination - Compassionate care    Patient advocacy - Rational deprescribing - Minimally disruptive medicine - Ethical focus - Customized care         Date of Service: 10/3/2024  Primary Provider: Donaldo Vernon    Patient Care Team:  Donaldo Vernon MD as PCP - General (Internal Medicine)  Mariella Coyne MD as MD (Neurology)  Bennett Varela MD as MD (Orthopaedic Surgery)  Beka Adams MD as MD  Donaldo Vernon MD as Assigned PCP  Rich Rodríguez MD as MD (Cardiovascular Disease)  Lana Chaudhari MD as MD (Cardiovascular Disease)  Komal Britt MD as Assigned Heart and Vascular Provider          Patient's Pharmacy:    [id number] [address] [address]  Phone: [phone] Fax: [phone]    [facility] - [facility] AT Portal to Registered [facility] Sites  formerly [facility]  [place] [address]  Phone: [phone] Fax: [phone]     Patient's Contacts:  Name Home Phone Work Phone Mobile Phone Relationship Lgl GrRADHA Hassan [phone]   Son    JENNIFER ORTIZ [phone]   Daughter      Patient's Insurance:    Payor: MEDICARE / Plan: MEDICARE / Product Type: Medicare /           Active Problem List:  Problem List as of 10/3/2024 Reviewed: 8/14/2024 12:40 PM by Shawna Moses NP         High    Former smoker - quit in 2018    Chronic obstructive pulmonary disease, unspecified COPD type (H), thought to be mild on PFTs 2018    Paroxysmal atrial fibrillation (H)    Last Assessment & Plan 6/12/2023 Office Visit Written 6/16/2023  9:30 AM by Shawna Moses NP     Continue anticoagulation. Rate is controlled             Medium    Primary hypertension    Severe obstructive sleep apnea on CPAP    MO (iron deficiency  anemia) - Dec 2021, also 2015    Paresthesias, left post auricular and occiput     Last Assessment & Plan 6/12/2023 Office Visit Written 6/12/2023  2:30 PM by Shawna Moses NP     Try topical lidocaine gel on the area         Memory difficulty    Class 2 severe obesity due to excess calories with serious comorbidity in adult (H)       Low    HLD (hyperlipidemia)    Dysphagia    Fatty liver    GERD (gastroesophageal reflux disease)    Loss of hearing    Lumbar back pain    Mass of right parotid gland        Current Outpatient Medications   Medication Instructions    cyclobenzaprine (FLEXERIL) 5 mg, Oral, 3 TIMES DAILY PRN, Start at nighttime and can increase if needed.    ferrous sulfate (FE TABS) 325 mg, Oral, THREE TIMES WEEKLY    Fluticasone-Umeclidin-Vilanterol (TRELEGY ELLIPTA) 200-62.5-25 MCG/ACT oral inhaler 1 puff, Inhalation, DAILY    metoprolol succinate ER (TOPROL XL) 50 mg, Oral, DAILY    multivitamin (MULTIPLE VITAMINS ORAL) Oral    nabumetone (RELAFEN) 500 mg, Oral, 2 TIMES DAILY, Take regularly for the neck.    rosuvastatin (CRESTOR) 40 mg, Oral, AT BEDTIME    triamterene-HCTZ (MAXZIDE-25) 37.5-25 MG tablet 1 tablet, Oral, DAILY     Social History     Social History Narrative    . Retired. Likes to play golf.  3 children. Son has lung cancer.  Was a . Lives in [place] for 6 months and [place] for 6 months of the years.       Subjective:     Kori Leach is a 84 year old female who comes in today for:    Chief Complaint   Patient presents with    Follow Up    Atrial Fib    Recheck Medication          10/3/2024     3:43 PM   Additional Questions   Roomed by DASIA Durán   Accompanied by Self     Patient comes in today for follow-up of ER visit.    She went into the emergency room for irregular heartbeat noticed on her heart monitor.  She was concerned that she went back into atrial fibrillation.  There is a note of chest pain in the ER notes, but she really was having concern  "about poor blood flow from the A-fib.  Her EKG in the ER did not show A-fib and she was discharged.    She did have ablation in the winter in [place].  She has not noticed any signs of A-fib since the ablation.    She does have a follow-up with cardiology scheduled.    She has been dealing with neck and left arm pain which has been due to cervical spinal stenosis during this time.  She has seen the spine clinic and they are going to be attempting an injection here in the near future.    We reviewed her blood pressure and her blood pressure is elevated.  She has decreased her medications on her own since the ablation and is not on anticoagulation and went off of some blood pressure medication but remains on metoprolol.  We talked about possibly restarting triamterene hydrochlorothiazide.  She was on this last fall and was previously on losartan hydrochlorothiazide but did experience some lower blood pressures with this.    She has had increased family stress as of late.    She does get intermittent diarrhea off and on maybe 1 or 2 bouts a week.    We reviewed her other issues noted in the assessment but not specifically addressed in the HPI above.     Objective:     Wt Readings from Last 3 Encounters:   10/03/24 89.8 kg (198 lb)   09/29/24 85.7 kg (189 lb)   09/25/24 88.9 kg (196 lb)     BP Readings from Last 3 Encounters:   10/03/24 (!) 178/93   09/29/24 (!) 165/69   09/25/24 (!) 163/83     BP (!) 178/93 (BP Location: Right arm, Patient Position: Sitting, Cuff Size: Adult Regular)   Pulse 86   Temp 97.6  F (36.4  C) (Oral)   Resp 16   Ht 1.575 m (5' 2\")   Wt 89.8 kg (198 lb)   LMP 08/06/1988 (Exact Date)   SpO2 95%   BMI 36.21 kg/m     The patient is comfortable, no acute distress.  Mood good.  Insight fair to good.  Eyes are nonicteric.  Neck is supple without mass.  No cervical adenopathy.  No thyromegaly. Heart regular rate and rhythm.  Lungs clear to auscultation bilaterally.  Respiratory effort is good. "  Abdomen soft and nontender.  No hepatosplenomegaly.  Extremities no edema.      Diagnostics:     Admission on 09/29/2024, Discharged on 09/29/2024   Component Date Value Ref Range Status    Ventricular Rate 09/29/2024 99  BPM Final    Atrial Rate 09/29/2024 99  BPM Final    TN Interval 09/29/2024 132  ms Final    QRS Duration 09/29/2024 82  ms Final    QT 09/29/2024 330  ms Final    QTc 09/29/2024 423  ms Final    P Axis 09/29/2024 76  degrees Final    R AXIS 09/29/2024 61  degrees Final    T Axis 09/29/2024 56  degrees Final    Interpretation ECG 09/29/2024    Final                    Value:Sinus rhythm with occasional Premature ventricular complexes  Nonspecific ST abnormality  Abnormal ECG  When compared with ECG of 17-Aug-2023 10:45,  Premature ventricular complexes are now Present  Vent. rate has increased by  50 bpm  Confirmed by SEE ED PROVIDER NOTE FOR, ECG INTERPRETATION ([id number]),  YONAS VALLECILLO ([id number]) on 9/30/2024 1:58:50 AM      Sodium 09/29/2024 143  135 - 145 mmol/L Final    Potassium 09/29/2024 3.9  3.4 - 5.3 mmol/L Final    Chloride 09/29/2024 107  98 - 107 mmol/L Final    Carbon Dioxide (CO2) 09/29/2024 26  22 - 29 mmol/L Final    Anion Gap 09/29/2024 10  7 - 15 mmol/L Final    Urea Nitrogen 09/29/2024 10.9  8.0 - 23.0 mg/dL Final    Creatinine 09/29/2024 0.87  0.51 - 0.95 mg/dL Final    GFR Estimate 09/29/2024 65  >60 mL/min/1.73m2 Final    eGFR calculated using 2021 CKD-EPI equation.    Calcium 09/29/2024 9.6  8.8 - 10.4 mg/dL Final    Reference intervals for this test were updated on 7/16/2024 to reflect our healthy population more accurately. There may be differences in the flagging of prior results with similar values performed with this method. Those prior results can be interpreted in the context of the updated reference intervals.    Glucose 09/29/2024 136 (H)  70 - 99 mg/dL Final    Troponin T, High Sensitivity 09/29/2024 14  <=14 ng/L Final    Either a High Sensitivity Troponin T  baseline (0 hours) value = 100 ng/L, or an increase in High Sensitivity Troponin T = 7 ng/L at 2 hours compared to 0 hours (2-0 hours), suggests myocardial injury, and urgent clinical attention is required.    If the 2-0 hours increase is <7 ng/L, a High Sensitivity Troponin T result above gender-specific reference ranges warrants further evaluation.   Recommendations for further evaluation include correlation with clinical decision-making tool (e.g., HEART), a 3rd High Sensitivity Troponin T test 2 hours after the 2nd (a 20% change from baseline would represent concern), admission for observation, close PCC/cardiology follow-up, or urgent outpatient provocative testing.    WBC Count 09/29/2024 6.8  4.0 - 11.0 10e3/uL Final    RBC Count 09/29/2024 5.01  3.80 - 5.20 10e6/uL Final    Hemoglobin 09/29/2024 14.8  11.7 - 15.7 g/dL Final    Hematocrit 09/29/2024 45.4  35.0 - 47.0 % Final    MCV 09/29/2024 91  78 - 100 fL Final    MCH 09/29/2024 29.5  26.5 - 33.0 pg Final    MCHC 09/29/2024 32.6  31.5 - 36.5 g/dL Final    RDW 09/29/2024 13.8  10.0 - 15.0 % Final    Platelet Count 09/29/2024 213  150 - 450 10e3/uL Final    % Neutrophils 09/29/2024 74  % Final    % Lymphocytes 09/29/2024 15  % Final    % Monocytes 09/29/2024 8  % Final    % Eosinophils 09/29/2024 3  % Final    % Basophils 09/29/2024 1  % Final    % Immature Granulocytes 09/29/2024 0  % Final    NRBCs per 100 WBC 09/29/2024 0  <1 /100 Final    Absolute Neutrophils 09/29/2024 5.0  1.6 - 8.3 10e3/uL Final    Absolute Lymphocytes 09/29/2024 1.0  0.8 - 5.3 10e3/uL Final    Absolute Monocytes 09/29/2024 0.6  0.0 - 1.3 10e3/uL Final    Absolute Eosinophils 09/29/2024 0.2  0.0 - 0.7 10e3/uL Final    Absolute Basophils 09/29/2024 0.0  0.0 - 0.2 10e3/uL Final    Absolute Immature Granulocytes 09/29/2024 0.0  <=0.4 10e3/uL Final    Absolute NRBCs 09/29/2024 0.0  10e3/uL Final    Hold Specimen 09/29/2024 JI   Final    Hold Specimen 09/29/2024 [facility]   Final        Assessment:     1. Irregular heartbeat    2. Hospital discharge follow-up    3. Paroxysmal atrial fibrillation (H)    4. Primary hypertension    5. Neck pain    6. Spinal stenosis in cervical region    7. Intermittent diarrhea        Plan:     No signs of return of A-fib so far.  Follow-up with cardiology as scheduled and consider heart monitor as appropriate.  Add back in triamterene hydrochlorothiazide for blood pressure control.  COVID and flu shots done today.  Refilled metoprolol.  Follow-up with me next month as scheduled.  Continue current medications otherwise.  Follow up sooner if issues.    Orders Placed This Encounter   Procedures    INFLUENZA HIGH DOSE, TRIVALENT, PF (FLUZONE)    COVID-19 12+ (PFIZER)           Donaldo Vernon MD  General Internal Medicine  [facility]    The longitudinal plan of care for the diagnoses and conditions as documented were addressed during this visit. Due to the added complexity in care, I will continue to support Kori in the subsequent management and with ongoing continuity of care.     No follow-ups on file.     Future Appointments   Date Time Provider Department Center   10/9/2024  9:50 AM Lana Chaudhari MD [facility]   10/16/2024  2:00 PM Meaghan Reza, PT [facility]   11/5/2024 10:00 AM [facility]

## 2024-10-09 ENCOUNTER — OFFICE VISIT (OUTPATIENT)
Dept: CARDIOLOGY | Facility: CLINIC | Age: 84
End: 2024-10-09
Attending: EMERGENCY MEDICINE
Payer: MEDICARE

## 2024-10-09 VITALS
RESPIRATION RATE: 16 BRPM | SYSTOLIC BLOOD PRESSURE: 132 MMHG | DIASTOLIC BLOOD PRESSURE: 71 MMHG | HEIGHT: 62 IN | WEIGHT: 193.2 LBS | HEART RATE: 92 BPM | BODY MASS INDEX: 35.55 KG/M2

## 2024-10-09 DIAGNOSIS — R07.9 CHEST PAIN, UNSPECIFIED TYPE: ICD-10-CM

## 2024-10-09 PROCEDURE — G2211 COMPLEX E/M VISIT ADD ON: HCPCS | Performed by: INTERNAL MEDICINE

## 2024-10-09 PROCEDURE — 99205 OFFICE O/P NEW HI 60 MIN: CPT | Performed by: INTERNAL MEDICINE

## 2024-10-09 RX ORDER — DIAZEPAM 5 MG/1
TABLET ORAL
COMMUNITY
Start: 2024-09-16 | End: 2024-11-05

## 2024-10-09 RX ORDER — ASPIRIN 81 MG/1
81 TABLET ORAL DAILY
Qty: 30 TABLET | Refills: 12 | Status: SHIPPED | OUTPATIENT
Start: 2024-10-09

## 2024-10-09 NOTE — LETTER
10/9/2024    Donaldo Vernon MD  [address]  [place] [address]    RE: Kori Leach       Dear Colleague,     [facility] the [facility].    HEART CARE VALVE CLINIC ENCOUNTER CONSULTATON NOTE      [facility]  [phone]      Assessment/Recommendations   Assessment/Plan:Kori Leach is an 84F w/ AF s/p PVI at OSH, HTN, HL, COPD, high BMI, TAMAR on CPAP who is here for f/up after an ED visit for CP.     # Chest discomfort-  I personally reviewed cardiac imaging including EKG, which showed no AF or obvious ischemia  -  after a long discussion of natural history, risk/benefit, and management options w/  the patient, we discussed options of proceeding w/ angio +/- PCI, non-invasive assessment w/ a stress test (likely dobutamine stress echo due to COPD.exercise limitations) v.  Continued trial of medical therapy  - she would prefer to start w/ the latter option, but knows that if symptoms recur, she should let us know, so that we could expedite a more aggressive w/up  - in the meantime, continue metoprolol ( she is taking succinate)/rosuva 40, add ASA    # AF- she has been on DOAC + metoprolol but tells me that she opted out of anticoagulation due to cost considerations after talking to her physician in [place]  - I again, discussed potential indications for anticoagulation (including coumadin) v. LAAO. She will consider it  - will make referral to LAAO clinic for further discussion and if she chooses to continue without either anticoagulation or LAAO, that would be an educated decision    # Smoking cessation - discussed need to quit    The longitudinal plan of care for the diagnosis(es)/condition(s) as documented were addressed during this visit. Due to the added complexity in care, I will continue to support Kori in the subsequent management and with ongoing continuity of care.      A total of >60 mins was spent  "reviewing prior records, including documentation, lab studies, cardiac testing/imaging, interview with patient, physical exam, and documentation     History of Present Illness/Subjective    HPI: Kori Leach is a 84 year old female w/ AF s/p PVI at OSH, HTN, HL, COPD, high BMI, TAMAR on CPAP who is here for f/up after an ED visit for CP.     She was recently seen for CP in the ED, where she was ruled out for an MI by EKG and biomarkers.   She was very stressed out as she is dealing w/ neck pain, for which she is undergoing w/up/PT and hoping for an injection. She was having some chest pressure for for the first time, in the middle of her chest, she can't  radiation due to chronic neck pain. Her home monitor indicated that she was in aF, ultimately prompting her to come in for evaluation. EKG in the ED showed NSR w/ some PVC's. Her pain has eventually resolved and hasn't come back over the past week and a half or so. She notes her BP's haven't been very well controlled - 's.   She has COPD  but no specific complaints of SOB at rest. + PERKINS w/ a half a block of walking. No orthopnea/PND but wears CPAP, no edema, no syncope/N/V/F/C/wt.changes. + Diarrhea occasionally since she has been on metoprolol.    Lives by herself, family in the area but gets some care in [place] where she ashby. Still smokes a half a pack per day.  No EtOH .    OSH Holter 5/24:  NSR, no AF    Recent Echocardiogram Results:  None    Recent Coronary Angiogram Results:  None       Physical Examination  Review of Systems   Vitals: /71 (BP Location: Right arm, Patient Position: Sitting, Cuff Size: Adult Regular)   Pulse 92   Resp 16   Ht 1.575 m (5' 2\")   Wt 87.6 kg (193 lb 3.2 oz)   LMP 08/06/1988 (Exact Date)   BMI 35.34 kg/m    BMI= Body mass index is 35.34 kg/m .  Wt Readings from Last 3 Encounters:   10/03/24 89.8 kg (198 lb)   09/29/24 85.7 kg (189 lb)   09/25/24 88.9 kg (196 lb)       General Appearance:   no distress, " normal body habitus   ENT/Mouth: membranes moist, no oral lesions or bleeding gums.      EYES:  no scleral icterus, normal conjunctivae   Neck: no carotid bruits or thyromegaly   Chest/Lungs:   lungs are clear to auscultation, no rales or wheezing, no sternal scar, equal chest wall expansion    Cardiovascular:   Regular. Normal first and second heart sounds with no systolic murmur, no rubs, or gallops; the carotid, radial and posterior tibial pulses are intact, Jugular venous pressure 6, no edema bilaterally    Abdomen:  no organomegaly, masses, bruits, or tenderness; bowel sounds are present   Extremities: no cyanosis or clubbing   Skin: no xanthelasma, warm.    Neurologic: normal  bilateral, no tremors     Psychiatric: alert and oriented x3, calm        Please refer above for cardiac ROS details.        Medical History  Surgical History Family History Social History   Past Medical History:   Diagnosis Date     Arthritis     knees and hip replacement     Chronic obstructive pulmonary disease, unspecified COPD type (H) 06/04/2018    PFT Complete  Performed 5/21/2018 Final result Study Result FEV1/FVC is 69 and is reduced. FEV1 is 66% predicted and is reduced. FVC is 74% predicted and reduced. There was improvement in spirometry after a single inhaled dose of bronchodilator. TLC is 110% predicted and is normal. RV is 141% predicted and is increased. DLCO is 77% predicted and is reduced when it  is corrected for hemoglobin.  Im     Fatty liver      Former smoker      GERD (gastroesophageal reflux disease)      History of nicotine use 06/04/2018     HLD (hyperlipidemia)      HTN (hypertension)      MO (iron deficiency anemia) 04/24/2015     Inverted nipple     bilateral     Loss of hearing      Lumbar back pain      Morbid obesity (H) 09/04/2018     Pre-diabetes      Severe obstructive sleep apnea 04/18/2018    10/06/17 where we ordered a sleep study which showed severe TAMAR with an RDI of 30 and she had a  titration study that showed CPAP of 9 cmH20 to be effective. She was set up with the CPAP on 10/25/17 through [facility].      Urinary incontinence      Past Surgical History:   Procedure Laterality Date     APPENDECTOMY       ARTHROPLASTY KNEE BILATERAL       CATARACT EXTRACTION       CERVICAL SPINE SURGERY  2015     JOINT REPLACEMENT      bilateral knees and right hip     LAPAROSCOPIC CHOLECYSTECTOMY N/A 6/29/2018    Procedure: CHOLECYSTECTOMY, LAPAROSCOPIC;  Surgeon: Jose Armando Humphries MD;  Location[facility];  Service:      TONSILLECTOMY       WISDOM TOOTH EXTRACTION       Family History   Problem Relation Age of Onset     Breast Cancer Mother 45     Diabetes Mother      Heart Disease Mother      Hyperlipidemia Mother      Hypertension Mother      Depression Father      Alcoholism Father      Hyperlipidemia Sister      Depression Sister      Obesity Sister      Obesity Daughter      Lung Cancer Son      Cerebrovascular Disease Son         Social History     Socioeconomic History     Marital status:      Spouse name: Not on file     Number of children: 3     Years of education: Not on file     Highest education level: Not on file   Occupational History     Not on file   Tobacco Use     Smoking status: Every Day     Types: Cigarettes     Smokeless tobacco: Never     Tobacco comments:     1-2 cigarettes per day on average   Vaping Use     Vaping status: Never Used   Substance and Sexual Activity     Alcohol use: Yes     Comment: Alcoholic Drinks/day: less than 1 drink per month      Drug use: No     Sexual activity: Not Currently     Partners: Male   Other Topics Concern     Not on file   Social History Narrative    . Retired. Likes to play golf.  3 children. Son has lung cancer.  Was a . Lives in [place] for 6 months and [place] for 6 months of the years.     Social Determinants of Health     Financial Resource Strain: Low Risk  (9/13/2024)    Financial Resource Strain      Within the  past 12 months, have you or your family members you live with been unable to get utilities (heat, electricity) when it was really needed?: No   Food Insecurity: Low Risk  (9/13/2024)    Food Insecurity      Within the past 12 months, did you worry that your food would run out before you got money to buy more?: No      Within the past 12 months, did the food you bought just not last and you didn t have money to get more?: No   Transportation Needs: Low Risk  (9/13/2024)    Transportation Needs      Within the past 12 months, has lack of transportation kept you from medical appointments, getting your medicines, non-medical meetings or appointments, work, or from getting things that you need?: No   Physical Activity: Inactive (9/13/2024)    Exercise Vital Sign      Days of Exercise per Week: 0 days      Minutes of Exercise per Session: 0 min   Stress: Stress Concern Present (9/13/2024)    [employer] [place] [employer] - Occupational Stress Questionnaire      Feeling of Stress : To some extent   Social Connections: Unknown (9/13/2024)    Social Connection and Isolation Panel [NHANES]      Frequency of Communication with Friends and Family: Not on file      Frequency of Social Gatherings with Friends and Family: Once a week      Attends Jainism Services: Not on file      Active Member of Clubs or Organizations: Not on file      Attends Club or Organization Meetings: Not on file      Marital Status: Not on file   Interpersonal Safety: Low Risk  (9/16/2024)    Interpersonal Safety      Do you feel physically and emotionally safe where you currently live?: Yes      Within the past 12 months, have you been hit, slapped, kicked or otherwise physically hurt by someone?: No      Within the past 12 months, have you been humiliated or emotionally abused in other ways by your partner or ex-partner?: No   Housing Stability: Low Risk  (9/13/2024)    Housing Stability      Do you have housing? : Yes      Are you worried  about losing your housing?: No           Medications  Allergies   Current Outpatient Medications   Medication Sig Dispense Refill     cyclobenzaprine (FLEXERIL) 5 MG tablet Take 1 tablet (5 mg) by mouth 3 times daily as needed for muscle spasms. Start at nighttime and can increase if needed. 40 tablet 2     ferrous sulfate (FE TABS) 325 (65 Fe) MG EC tablet Take 325 mg by mouth three times a week       Fluticasone-Umeclidin-Vilanterol (TRELEGY ELLIPTA) 200-62.5-25 MCG/ACT oral inhaler Inhale 1 puff into the lungs daily. 60 each 0     metoprolol succinate ER (TOPROL XL) 50 MG 24 hr tablet Take 1 tablet (50 mg) by mouth daily. 90 tablet 3     multivitamin (MULTIPLE VITAMINS ORAL) [MULTIVITAMIN (MULTIPLE VITAMINS ORAL)] Take by mouth.       nabumetone (RELAFEN) 500 MG tablet Take 1 tablet (500 mg) by mouth 2 times daily. Take regularly for the neck. 60 tablet 2     rosuvastatin (CRESTOR) 40 MG tablet Take 1 tablet (40 mg) by mouth At Bedtime 90 tablet 3     triamterene-HCTZ (MAXZIDE-25) 37.5-25 MG tablet Take 1 tablet by mouth daily. 90 tablet 3     diazepam (VALIUM) 5 MG tablet Take 1 tablet (5 mg) by mouth once for 1 dose. Take 30-60 minutes before MRI.* (Patient not taking: Reported on 10/9/2024)         Allergies   Allergen Reactions     Lisinopril Cough          Lab Results    Chemistry/lipid CBC Cardiac Enzymes/BNP/TSH/INR   Recent Labs   Lab Test 09/10/24  1652   CHOL 344*   HDL 63   *   TRIG 538*     Recent Labs   Lab Test 09/10/24  1652 08/09/22  1010 08/04/21  0942   * 78 74     Recent Labs   Lab Test 09/29/24  1219      POTASSIUM 3.9   CHLORIDE 107   CO2 26   *   BUN 10.9   CR 0.87   GFRESTIMATED 65   AMINA 9.6     Recent Labs   Lab Test 09/29/24  1219 09/10/24  1652 08/10/23  1249   CR 0.87 0.88 0.89     Recent Labs   Lab Test 12/06/21  1639 11/04/20  0926 06/08/20  1233   A1C 5.9* 6.1* 6.3*          Recent Labs   Lab Test 09/29/24  1219   WBC 6.8   HGB 14.8   HCT 45.4   MCV 91         Recent Labs   Lab Test 09/29/24  1219 09/10/24  1652 08/08/23  0507   HGB 14.8 15.5 13.2    Recent Labs   Lab Test 05/12/22  1012   TROPONINI <0.01     Recent Labs   Lab Test 08/10/23  1249 08/07/23  1155 12/06/21  1639 11/30/20  1138   BNP  --   --  51 34   NTBNPI  --  2,254*  --   --    NTBNP 194  --   --   --      Recent Labs   Lab Test 08/07/23  1155   TSH 1.48     Recent Labs   Lab Test 08/07/23  1524 06/29/18 2010   INR 1.22* 0.97        Glenn Ricardo MD                                        Thank you for allowing me to participate in the care of your patient.      Sincerely,     Glenn Ricardo MD     [facility]  cc:   Jacinda Christensen MD  [address] [place] [address]

## 2024-10-09 NOTE — PROGRESS NOTES
HEART CARE VALVE CLINIC ENCOUNTER CONSULTATON NOTE      [facility]  [phone]      Assessment/Recommendations   Assessment/Plan:Kori Leach is an 84F w/ AF s/p PVI at OSH, HTN, HL, COPD, high BMI, TAMAR on CPAP who is here for f/up after an ED visit for CP.     # Chest discomfort-  I personally reviewed cardiac imaging including EKG, which showed no AF or obvious ischemia  -  after a long discussion of natural history, risk/benefit, and management options w/  the patient, we discussed options of proceeding w/ angio +/- PCI, non-invasive assessment w/ a stress test (likely dobutamine stress echo due to COPD.exercise limitations) v.  Continued trial of medical therapy  - she would prefer to start w/ the latter option, but knows that if symptoms recur, she should let us know, so that we could expedite a more aggressive w/up  - in the meantime, continue metoprolol ( she is taking succinate)/rosuva 40, add ASA    # AF- she has been on DOAC + metoprolol but tells me that she opted out of anticoagulation due to cost considerations after talking to her physician in [place]  - I again, discussed potential indications for anticoagulation (including coumadin) v. LAAO. She will consider it  - will make referral to LAAO clinic for further discussion and if she chooses to continue without either anticoagulation or LAAO, that would be an educated decision    # Smoking cessation - discussed need to quit    The longitudinal plan of care for the diagnosis(es)/condition(s) as documented were addressed during this visit. Due to the added complexity in care, I will continue to support Kori in the subsequent management and with ongoing continuity of care.      A total of >60 mins was spent reviewing prior records, including documentation, lab studies, cardiac testing/imaging, interview with patient, physical exam, and documentation     History of Present Illness/Subjective    HPI: Kori Leach is a 84  "year old female w/ AF s/p PVI at OSH, HTN, HL, COPD, high BMI, TAMAR on CPAP who is here for f/up after an ED visit for CP.     She was recently seen for CP in the ED, where she was ruled out for an MI by EKG and biomarkers.   She was very stressed out as she is dealing w/ neck pain, for which she is undergoing w/up/PT and hoping for an injection. She was having some chest pressure for for the first time, in the middle of her chest, she can't  radiation due to chronic neck pain. Her home monitor indicated that she was in aF, ultimately prompting her to come in for evaluation. EKG in the ED showed NSR w/ some PVC's. Her pain has eventually resolved and hasn't come back over the past week and a half or so. She notes her BP's haven't been very well controlled - 's.   She has COPD  but no specific complaints of SOB at rest. + PERKINS w/ a half a block of walking. No orthopnea/PND but wears CPAP, no edema, no syncope/N/V/F/C/wt.changes. + Diarrhea occasionally since she has been on metoprolol.    Lives by herself, family in the area but gets some care in [place] where she ash[place]. Still smokes a half a pack per day.  No EtOH .    OSH Holter 5/24:  NSR, no AF    Recent Echocardiogram Results:  None    Recent Coronary Angiogram Results:  None       Physical Examination  Review of Systems   Vitals: /71 (BP Location: Right arm, Patient Position: Sitting, Cuff Size: Adult Regular)   Pulse 92   Resp 16   Ht 1.575 m (5' 2\")   Wt 87.6 kg (193 lb 3.2 oz)   LMP 08/06/1988 (Exact Date)   BMI 35.34 kg/m    BMI= Body mass index is 35.34 kg/m .  Wt Readings from Last 3 Encounters:   10/03/24 89.8 kg (198 lb)   09/29/24 85.7 kg (189 lb)   09/25/24 88.9 kg (196 lb)       General Appearance:   no distress, normal body habitus   ENT/Mouth: membranes moist, no oral lesions or bleeding gums.      EYES:  no scleral icterus, normal conjunctivae   Neck: no carotid bruits or thyromegaly   Chest/Lungs:   lungs are clear to " auscultation, no rales or wheezing, no sternal scar, equal chest wall expansion    Cardiovascular:   Regular. Normal first and second heart sounds with no systolic murmur, no rubs, or gallops; the carotid, radial and posterior tibial pulses are intact, Jugular venous pressure 6, no edema bilaterally    Abdomen:  no organomegaly, masses, bruits, or tenderness; bowel sounds are present   Extremities: no cyanosis or clubbing   Skin: no xanthelasma, warm.    Neurologic: normal  bilateral, no tremors     Psychiatric: alert and oriented x3, calm        Please refer above for cardiac ROS details.        Medical History  Surgical History Family History Social History   Past Medical History:   Diagnosis Date    Arthritis     knees and hip replacement    Chronic obstructive pulmonary disease, unspecified COPD type (H) 06/04/2018    PFT Complete  Performed 5/21/2018 Final result Study Result FEV1/FVC is 69 and is reduced. FEV1 is 66% predicted and is reduced. FVC is 74% predicted and reduced. There was improvement in spirometry after a single inhaled dose of bronchodilator. TLC is 110% predicted and is normal. RV is 141% predicted and is increased. DLCO is 77% predicted and is reduced when it  is corrected for hemoglobin.  Im    Fatty liver     Former smoker     GERD (gastroesophageal reflux disease)     History of nicotine use 06/04/2018    HLD (hyperlipidemia)     HTN (hypertension)     MO (iron deficiency anemia) 04/24/2015    Inverted nipple     bilateral    Loss of hearing     Lumbar back pain     Morbid obesity (H) 09/04/2018    Pre-diabetes     Severe obstructive sleep apnea 04/18/2018    10/06/17 where we ordered a sleep study which showed severe TAMAR with an RDI of 30 and she had a titration study that showed CPAP of 9 cmH20 to be effective. She was set up with the CPAP on 10/25/17 through [facility].     Urinary incontinence      Past Surgical History:   Procedure Laterality Date    APPENDECTOMY      ARTHROPLASTY  KNEE BILATERAL      CATARACT EXTRACTION      CERVICAL SPINE SURGERY  2015    JOINT REPLACEMENT      bilateral knees and right hip    LAPAROSCOPIC CHOLECYSTECTOMY N/A 6/29/2018    Procedure: CHOLECYSTECTOMY, LAPAROSCOPIC;  Surgeon: Jose Armando Humphries MD;  [facility];  Service:     TONSILLECTOMY      WISDOM TOOTH EXTRACTION       Family History   Problem Relation Age of Onset    Breast Cancer Mother 45    Diabetes Mother     Heart Disease Mother     Hyperlipidemia Mother     Hypertension Mother     Depression Father     Alcoholism Father     Hyperlipidemia Sister     Depression Sister     Obesity Sister     Obesity Daughter     Lung Cancer Son     Cerebrovascular Disease Son         Social History     Socioeconomic History    Marital status:      Spouse name: Not on file    Number of children: 3    Years of education: Not on file    Highest education level: Not on file   Occupational History    Not on file   Tobacco Use    Smoking status: Every Day     Types: Cigarettes    Smokeless tobacco: Never    Tobacco comments:     1-2 cigarettes per day on average   Vaping Use    Vaping status: Never Used   Substance and Sexual Activity    Alcohol use: Yes     Comment: Alcoholic Drinks/day: less than 1 drink per month     Drug use: No    Sexual activity: Not Currently     Partners: Male   Other Topics Concern    Not on file   Social History Narrative    . Retired. Likes to play golf.  3 children. Son has lung cancer.  Was a . Lives in [place] for 6 months and [place] for 6 months of the years.     Social Determinants of Health     Financial Resource Strain: Low Risk  (9/13/2024)    Financial Resource Strain     Within the past 12 months, have you or your family members you live with been unable to get utilities (heat, electricity) when it was really needed?: No   Food Insecurity: Low Risk  (9/13/2024)    Food Insecurity     Within the past 12 months, did you worry that your food would  run out before you got money to buy more?: No     Within the past 12 months, did the food you bought just not last and you didn t have money to get more?: No   Transportation Needs: Low Risk  (9/13/2024)    Transportation Needs     Within the past 12 months, has lack of transportation kept you from medical appointments, getting your medicines, non-medical meetings or appointments, work, or from getting things that you need?: No   Physical Activity: Inactive (9/13/2024)    Exercise Vital Sign     Days of Exercise per Week: 0 days     Minutes of Exercise per Session: 0 min   Stress: Stress Concern Present (9/13/2024)    [employer] - Occupational Stress Questionnaire     Feeling of Stress : To some extent   Social Connections: Unknown (9/13/2024)    Social Connection and Isolation Panel [NHANES]     Frequency of Communication with Friends and Family: Not on file     Frequency of Social Gatherings with Friends and Family: Once a week     Attends [employer] Services: Not on file     Active Member of Clubs or Organizations: Not on file     Attends Club or Organization Meetings: Not on file     Marital Status: Not on file   Interpersonal Safety: Low Risk  (9/16/2024)    Interpersonal Safety     Do you feel physically and emotionally safe where you currently live?: Yes     Within the past 12 months, have you been hit, slapped, kicked or otherwise physically hurt by someone?: No     Within the past 12 months, have you been humiliated or emotionally abused in other ways by your partner or ex-partner?: No   Housing Stability: Low Risk  (9/13/2024)    Housing Stability     Do you have housing? : Yes     Are you worried about losing your housing?: No           Medications  Allergies   Current Outpatient Medications   Medication Sig Dispense Refill    cyclobenzaprine (FLEXERIL) 5 MG tablet Take 1 tablet (5 mg) by mouth 3 times daily as needed for muscle spasms. Start at nighttime and can increase if  needed. 40 tablet 2    ferrous sulfate (FE TABS) 325 (65 Fe) MG EC tablet Take 325 mg by mouth three times a week      Fluticasone-Umeclidin-Vilanterol (TRELEGY ELLIPTA) 200-62.5-25 MCG/ACT oral inhaler Inhale 1 puff into the lungs daily. 60 each 0    metoprolol succinate ER (TOPROL XL) 50 MG 24 hr tablet Take 1 tablet (50 mg) by mouth daily. 90 tablet 3    multivitamin (MULTIPLE VITAMINS ORAL) [MULTIVITAMIN (MULTIPLE VITAMINS ORAL)] Take by mouth.      nabumetone (RELAFEN) 500 MG tablet Take 1 tablet (500 mg) by mouth 2 times daily. Take regularly for the neck. 60 tablet 2    rosuvastatin (CRESTOR) 40 MG tablet Take 1 tablet (40 mg) by mouth At Bedtime 90 tablet 3    triamterene-HCTZ (MAXZIDE-25) 37.5-25 MG tablet Take 1 tablet by mouth daily. 90 tablet 3    diazepam (VALIUM) 5 MG tablet Take 1 tablet (5 mg) by mouth once for 1 dose. Take 30-60 minutes before MRI.* (Patient not taking: Reported on 10/9/2024)         Allergies   Allergen Reactions    Lisinopril Cough          Lab Results    Chemistry/lipid CBC Cardiac Enzymes/BNP/TSH/INR   Recent Labs   Lab Test 09/10/24  1652   CHOL 344*   HDL 63   *   TRIG 538*     Recent Labs   Lab Test 09/10/24  1652 08/09/22  1010 08/04/21  0942   * 78 74     Recent Labs   Lab Test 09/29/24  1219      POTASSIUM 3.9   CHLORIDE 107   CO2 26   *   BUN 10.9   CR 0.87   GFRESTIMATED 65   AMINA 9.6     Recent Labs   Lab Test 09/29/24  1219 09/10/24  1652 08/10/23  1249   CR 0.87 0.88 0.89     Recent Labs   Lab Test 12/06/21  1639 11/04/20  0926 06/08/20  1233   A1C 5.9* 6.1* 6.3*          Recent Labs   Lab Test 09/29/24  1219   WBC 6.8   HGB 14.8   HCT 45.4   MCV 91        Recent Labs   Lab Test 09/29/24  1219 09/10/24  1652 08/08/23  0507   HGB 14.8 15.5 13.2    Recent Labs   Lab Test 05/12/22  1012   TROPONINI <0.01     Recent Labs   Lab Test 08/10/23  1249 08/07/23  1155 12/06/21  1639 11/30/20  1138   BNP  --   --  51 34   NTBNPI  --  2,254*  --    --    NTBNP 194  --   --   --      Recent Labs   Lab Test 08/07/23  1155   TSH 1.48     Recent Labs   Lab Test 08/07/23  1524 06/29/18 2010   INR 1.22* 0.97        Glenn Ricardo MD

## 2024-10-11 ENCOUNTER — TELEPHONE (OUTPATIENT)
Dept: NEUROSURGERY | Facility: CLINIC | Age: 84
End: 2024-10-11
Payer: MEDICARE

## 2024-10-11 DIAGNOSIS — M54.12 CERVICAL RADICULOPATHY: Primary | ICD-10-CM

## 2024-10-14 ENCOUNTER — OFFICE VISIT (OUTPATIENT)
Dept: CARDIOLOGY | Facility: CLINIC | Age: 84
End: 2024-10-14
Payer: MEDICARE

## 2024-10-14 VITALS
BODY MASS INDEX: 35.7 KG/M2 | DIASTOLIC BLOOD PRESSURE: 60 MMHG | SYSTOLIC BLOOD PRESSURE: 132 MMHG | HEIGHT: 62 IN | WEIGHT: 194 LBS | HEART RATE: 93 BPM | RESPIRATION RATE: 16 BRPM

## 2024-10-14 DIAGNOSIS — I10 PRIMARY HYPERTENSION: Chronic | ICD-10-CM

## 2024-10-14 DIAGNOSIS — E78.5 HYPERLIPIDEMIA, UNSPECIFIED HYPERLIPIDEMIA TYPE: ICD-10-CM

## 2024-10-14 DIAGNOSIS — R13.10 DYSPHAGIA, UNSPECIFIED TYPE: ICD-10-CM

## 2024-10-14 DIAGNOSIS — I48.0 PAROXYSMAL ATRIAL FIBRILLATION (H): Primary | ICD-10-CM

## 2024-10-14 PROCEDURE — 99215 OFFICE O/P EST HI 40 MIN: CPT | Performed by: PHYSICIAN ASSISTANT

## 2024-10-14 PROCEDURE — 99417 PROLNG OP E/M EACH 15 MIN: CPT | Performed by: PHYSICIAN ASSISTANT

## 2024-10-14 RX ORDER — EZETIMIBE 10 MG/1
10 TABLET ORAL DAILY
Qty: 90 TABLET | Refills: 3 | Status: SHIPPED | OUTPATIENT
Start: 2024-10-14

## 2024-10-14 NOTE — PATIENT INSTRUCTIONS
Kori Leach,    It was a pleasure to see you today in the clinic regarding your Watchman Consult.     My recommendations after this visit include:     - Re-start blood thinner after your next injection - please discuss with the MD doing your spinal injection when it is safe to start blood thinner   - consider whether you would like to schedule the Watchman procedure   - no other medication changes at this time   - start Zetia in addition to rosuvastatin for elevated cholesterol levels. Repeat Lipid panel in 3 months   - follow a heart health diet low in salt and trans/saturated fats      If you have questions or concerns, please call using the numbers below:      After Hours/Scheduling  [phone]    Otherwise you can dial the nurse directly at:                GORDY Rolle RN  [phone]    Alexandria Paul PA-C  Structural Heart Program  [facility]

## 2024-10-14 NOTE — TELEPHONE ENCOUNTER
"Patient's injection moved up to 10/21/24. Noted in chart she recently saw her cardiologist and instructed to start ASA 81 mg daily.     Phone call to patient to discuss. Explained she would need to hold the ASA for 6 days leading up to injection. \"I just started it. I won't take it anymore until after injection.\"   "
Other: Patient symptoms have worsened since starting physical therapy. She is wondering if Elaine Pineda CNP can reach out to insurance to try and have injection approved.      Could we send this information to you in [employer] or would you prefer to receive a phone call?:   Patient would like to be contacted via [employer]    
PA team: [facility] has ordered the injection for this patient again. Wants to have to calm pain down in order to do PT. Can we submit again?     Schedulers: can you contact once we have response from PA team?  
Pain worsened with PT. I would suggest proceeding with injection.  Orders replaced for C7-T1 IL MAURIZIO   
Phone call to patient to discuss. Reports her pain is the best upon arising for the day. Movement and especially PT?HEP worsens her pain. Patient is open to doing PT once pain under better control. Please advise.  
You can access the FollowMyHealth Patient Portal offered by [facility] by registering at the following website: [email or web address]. By joining [facility]’s FollowMyHealth portal, you will also be able to view your health information using other applications (apps) compatible with our system.

## 2024-10-14 NOTE — PROGRESS NOTES
HEART CARE ENCOUNTER NOTE       [facility]  [phone]      Assessment/Recommendations   1.  Paroxysmal atrial fibrillation: Previous ablation March 2024 in [place].    I have personally reviewed this patient's chart and have spoken with the patient about the treatment options, including LYNN device.  She has a [id number] score of at least 5 for age >75, female gender, hypertension, coronary artery calcification.  She has a HAS-BLED score of 2 for age and bleeding predisposition.   She is not a good candidate for long-term anticoagulation due to risk for falls and prohibitive cost of Xarelto..      She is not currently on anticoagulation but is open to restarting her Xarelto 20 mg daily after her neck injections next week. She will need to follow-up with the MD who is completing her neck injections as to when it is safe re-start anticoagulation post-injection.     Once scheduled, the patient will stay on Xarelto up until implant. 2 weeks prior to the procedure She will also start aspirin 81 mg daily. After implant, the patient will remain on aspirin 81 mg daily indefinitely and Xarelto for 6 weeks.  The patient will then stop Xarelto and start Plavix 75 mg daily for approximately 4 months. She will then stop Plavix and remain on aspirin 81 mg daily indefinitely.  She will have a LENO or CTA approximately 3 months and 1 year post-implant.  She understands that the risks of the procedure are <2% and include, but are not limited to device embolization, air embolism, myocardial perforation, device thrombosis, ASD, stroke, or death.  We discussed expected recovery and follow-up.       The patient is a good candidate for proceeding with left atrial appendage implant.  Her questions were answered to her satisfaction.  She is currently undecided whether she would like to move forward with procedure. She will be in [place] over the winter and may consider having the procedure when she returns.    2.  Mild coronary artery calcification - noted on CT chest August 2021. Reports chronic dyspnea on exertion, which has been stable.  She was recently seen in the emergency department for evaluation of chest pain. She had follow-up in the heart clinic for Angio +/- PCI vs stress testing - she is still considering which option she would like to pursue    3.  Hyperlipidemia - , she is open to starting Zetia 10 mg daily in addition to her rosuvastatin.  Repeat lipid panel in 2 to 3 months.    4.  Hypertension -blood pressure is controlled.  Continue triamterene-hydrochlorothiazide, metoprolol succinate    5. Dysphagia - reports difficulty swallowing pills and food getting caught when swallowing. GI referral placed to ensure appropriate for LENO placement.         History of Present Illness/Subjective    Kori Leach is a 84 year old female who comes in today for Watchman Consult. She is accompanied by her daughter for today's visit.    Kori Leach has a past history of paroxysmal atrial fibrillation, coronary artery calcifications, hyperlipidemia, obstructive sleep apnea, COPD, current  smoker, GERD, hypertension chronic neck and left arm pain due to cervical stenosis    She has been diagnosed with atrial fibrillation since at least 2022.  She underwent ablation in [place] in March of this year.  She is not currently taking anticoagulation as Xarelto has been cost prohibitive for her.  She did have bleeding issues from her access site after her ablation which resulted in extensive bruising down her leg.  She otherwise denies having any other major bleeding issues.  She denies issues with her gait or balance but has had several falls in the past year.  She does take Aleve for her neck pain.  She reports she has a spinal injection next week for her neck pain.  She has been taking a baby aspirin but this is on hold in anticipation for her neck injection.  She denies alcohol use.  She does report some  "pain/difficulty swallowing larger pills and will sometimes get food caught in her throat.  She denies a previous history of stroke, DVT, or PE.  She denies any immediate family history of prolonged bleeding or frequent nosebleeds.      Kori Leach denies chest discomfort, palpitations, shortness of breath, paroxysmal nocturnal dyspnea, orthopnea, lightheadedness, dizziness, pre-syncope, or syncope.  Kori Leach also denies any weight loss, changes in appetite, nausea or vomiting.     Medical, surgical, family, social history, and medications were reviewed and updated as necessary.    ECHO results (from 8/8/2023):  Interpretation Summary     Left ventricular size, wall motion and function are normal. The ejection  fraction is 60-65%.  Normal right ventricle size and systolic function.  The left atrium is mildly dilated.  The right atrium is mildly dilated.  No hemodynamically significant valvular abnormalities on 2D or color flow  imaging.  Compared to prior study, there is no significant change.  _______________________________________________         Physical Examination Review of Systems   Vitals: /60 (BP Location: Left arm, Patient Position: Sitting, Cuff Size: Adult Large)   Pulse 93   Resp 16   Ht 1.575 m (5' 2\")   Wt 88 kg (194 lb)   LMP 08/06/1988 (Exact Date)   BMI 35.48 kg/m    BMI= Body mass index is 35.48 kg/m .  Wt Readings from Last 3 Encounters:   10/14/24 88 kg (194 lb)   10/09/24 87.6 kg (193 lb 3.2 oz)   10/03/24 89.8 kg (198 lb)       General Appearance:   Alert, cooperative and in no acute distress   ENT/Mouth: membranes moist, no oral lesions or bleeding gums.      EYES:  no scleral icterus, normal conjunctivae   Neck: Thyroid not visualized   Chest/Lungs:   lungs are clear to auscultation, no rales or wheezing   Cardiovascular:   Regular . Normal first and second heart sounds with no murmurs, rubs or gallops; the carotid, radial and posterior tibial pulses are intact, " no edema bilaterally    Abdomen:  Soft and nontender. Bowel sounds are present in all quadrants   Extremities: no cyanosis or clubbing   Skin: no xanthelasma, warm.    Neurologic: normal gait, normal  bilateral, no tremors   Psychiatric: Normal mood and affect       Please refer above for cardiac ROS details.      Medical History  Surgical History Family History Social History   Past Medical History:   Diagnosis Date    Arthritis     knees and hip replacement    Chronic obstructive pulmonary disease, unspecified COPD type (H) 06/04/2018    PFT Complete  Performed 5/21/2018 Final result Study Result FEV1/FVC is 69 and is reduced. FEV1 is 66% predicted and is reduced. FVC is 74% predicted and reduced. There was improvement in spirometry after a single inhaled dose of bronchodilator. TLC is 110% predicted and is normal. RV is 141% predicted and is increased. DLCO is 77% predicted and is reduced when it  is corrected for hemoglobin.  Im    Fatty liver     Former smoker     GERD (gastroesophageal reflux disease)     History of nicotine use 06/04/2018    HLD (hyperlipidemia)     HTN (hypertension)     MO (iron deficiency anemia) 04/24/2015    Inverted nipple     bilateral    Loss of hearing     Lumbar back pain     Morbid obesity (H) 09/04/2018    Pre-diabetes     Severe obstructive sleep apnea 04/18/2018    10/06/17 where we ordered a sleep study which showed severe TAMAR with an RDI of 30 and she had a titration study that showed CPAP of 9 cmH20 to be effective. She was set up with the CPAP on 10/25/17 through [facility].     Urinary incontinence      Past Surgical History:   Procedure Laterality Date    APPENDECTOMY      ARTHROPLASTY KNEE BILATERAL      CATARACT EXTRACTION      CERVICAL SPINE SURGERY  2015    JOINT REPLACEMENT      bilateral knees and right hip    LAPAROSCOPIC CHOLECYSTECTOMY N/A 6/29/2018    Procedure: CHOLECYSTECTOMY, LAPAROSCOPIC;  Surgeon: Jose Armando Humphries MD;  Location: [facility];  Service:      TONSILLECTOMY      WISDOM TOOTH EXTRACTION       Family History   Problem Relation Age of Onset    Breast Cancer Mother 45    Diabetes Mother     Heart Disease Mother     Hyperlipidemia Mother     Hypertension Mother     Depression Father     Alcoholism Father     Hyperlipidemia Sister     Depression Sister     Obesity Sister     Obesity Daughter     Lung Cancer Son     Cerebrovascular Disease Son     Social History     Socioeconomic History    Marital status:      Spouse name: Not on file    Number of children: 3    Years of education: Not on file    Highest education level: Not on file   Occupational History    Not on file   Tobacco Use    Smoking status: Every Day     Types: Cigarettes    Smokeless tobacco: Never    Tobacco comments:     1-2 cigarettes per day on average   Vaping Use    Vaping status: Never Used   Substance and Sexual Activity    Alcohol use: Yes     Comment: Alcoholic Drinks/day: less than 1 drink per month     Drug use: No    Sexual activity: Not Currently     Partners: Male   Other Topics Concern    Not on file   Social History Narrative    . Retired. Likes to play golf.  3 children. Son has lung cancer.  Was a . Lives in [place] for 6 months and [place] for 6 months of the years.     Social Determinants of Health     Financial Resource Strain: Low Risk  (9/13/2024)    Financial Resource Strain     Within the past 12 months, have you or your family members you live with been unable to get utilities (heat, electricity) when it was really needed?: No   Food Insecurity: Low Risk  (9/13/2024)    Food Insecurity     Within the past 12 months, did you worry that your food would run out before you got money to buy more?: No     Within the past 12 months, did the food you bought just not last and you didn t have money to get more?: No   Transportation Needs: Low Risk  (9/13/2024)    Transportation Needs     Within the past 12 months, has lack of transportation kept you  from medical appointments, getting your medicines, non-medical meetings or appointments, work, or from getting things that you need?: No   Physical Activity: Inactive (9/13/2024)    Exercise Vital Sign     Days of Exercise per Week: 0 days     Minutes of Exercise per Session: 0 min   Stress: Stress Concern Present (9/13/2024)    [employer] - Occupational Stress Questionnaire     Feeling of Stress : To some extent   Social Connections: Unknown (9/13/2024)    Social Connection and Isolation Panel [NHANES]     Frequency of Communication with Friends and Family: Not on file     Frequency of Social Gatherings with Friends and Family: Once a week     Attends Taoism Services: Not on file     Active Member of Clubs or Organizations: Not on file     Attends Club or Organization Meetings: Not on file     Marital Status: Not on file   Interpersonal Safety: Low Risk  (9/16/2024)    Interpersonal Safety     Do you feel physically and emotionally safe where you currently live?: Yes     Within the past 12 months, have you been hit, slapped, kicked or otherwise physically hurt by someone?: No     Within the past 12 months, have you been humiliated or emotionally abused in other ways by your partner or ex-partner?: No   Housing Stability: Low Risk  (9/13/2024)    Housing Stability     Do you have housing? : Yes     Are you worried about losing your housing?: No          Medications  Allergies   Current Outpatient Medications   Medication Sig Dispense Refill    aspirin 81 MG EC tablet Take 1 tablet (81 mg) by mouth daily. 30 tablet 12    cyclobenzaprine (FLEXERIL) 5 MG tablet Take 1 tablet (5 mg) by mouth 3 times daily as needed for muscle spasms. Start at nighttime and can increase if needed. 40 tablet 2    ezetimibe (ZETIA) 10 MG tablet Take 1 tablet (10 mg) by mouth daily. 90 tablet 3    ferrous sulfate (FE TABS) 325 (65 Fe) MG EC tablet Take 325 mg by mouth three times a week       Fluticasone-Umeclidin-Vilanterol (TRELEGY ELLIPTA) 200-62.5-25 MCG/ACT oral inhaler Inhale 1 puff into the lungs daily. 60 each 0    metoprolol succinate ER (TOPROL XL) 50 MG 24 hr tablet Take 1 tablet (50 mg) by mouth daily. 90 tablet 3    multivitamin (MULTIPLE VITAMINS ORAL) [MULTIVITAMIN (MULTIPLE VITAMINS ORAL)] Take by mouth.      nabumetone (RELAFEN) 500 MG tablet Take 1 tablet (500 mg) by mouth 2 times daily. Take regularly for the neck. 60 tablet 2    rosuvastatin (CRESTOR) 40 MG tablet Take 1 tablet (40 mg) by mouth At Bedtime 90 tablet 3    triamterene-HCTZ (MAXZIDE-25) 37.5-25 MG tablet Take 1 tablet by mouth daily. 90 tablet 3    diazepam (VALIUM) 5 MG tablet Take 1 tablet (5 mg) by mouth once for 1 dose. Take 30-60 minutes before MRI.* (Patient not taking: Reported on 10/9/2024)      Allergies   Allergen Reactions    Lisinopril Cough         Lab Results    Chemistry/lipid CBC Cardiac Enzymes/BNP/TSH/INR   Recent Labs   Lab Test 09/10/24  1652   CHOL 344*   HDL 63   *   TRIG 538*     Recent Labs   Lab Test 09/10/24  1652 08/09/22  1010 08/04/21  0942   * 78 74     Recent Labs   Lab Test 09/29/24  1219      POTASSIUM 3.9   CHLORIDE 107   CO2 26   *   BUN 10.9   CR 0.87   GFRESTIMATED 65   AMINA 9.6     Recent Labs   Lab Test 09/29/24  1219 09/10/24  1652 08/10/23  1249   CR 0.87 0.88 0.89     Recent Labs   Lab Test 12/06/21  1639 11/04/20  0926 06/08/20  1233   A1C 5.9* 6.1* 6.3*    Recent Labs   Lab Test 09/29/24  1219   WBC 6.8   HGB 14.8   HCT 45.4   MCV 91        Recent Labs   Lab Test 09/29/24  1219 09/10/24  1652 08/08/23  0507   HGB 14.8 15.5 13.2    Recent Labs   Lab Test 05/12/22  1012   TROPONINI <0.01     Recent Labs   Lab Test 08/10/23  1249 08/07/23  1155 12/06/21  1639 11/30/20  1138   BNP  --   --  51 34   NTBNPI  --  2,254*  --   --    NTBNP 194  --   --   --      Recent Labs   Lab Test 08/07/23  1155   TSH 1.48     Recent Labs   Lab Test 08/07/23  1524  06/29/18 2010   INR 1.22* 0.97        70 minutes spent on the date of encounter doing education, chart prep/review, review of outside records, review of test results, interpretation with above tests, patient visit, documentation, and discussion with family.      This note has been dictated using voice recognition software. Any grammatical or context distortions are unintentional and inherent to the software.    Alexandria Paul PA-C  Structural Heart Program  [facility]

## 2024-10-14 NOTE — LETTER
10/14/2024    Donaldo Vernon MD  [address]  [facility] [address]    RE: Kori LESLEE Leach       Dear Colleague,     I had the pleasure of seeing Kori LESLEE Galvezmarychuy in the [facility].  HEART CARE ENCOUNTER NOTE       [facility]  [phone]      Assessment/Recommendations   1.  Paroxysmal atrial fibrillation: Previous ablation March 2024 in [place].    I have personally reviewed this patient's chart and have spoken with the patient about the treatment options, including LYNN device.  She has a [id number] score of at least 5 for age >75, female gender, hypertension, coronary artery calcification.  She has a HAS-BLED score of 2 for age and bleeding predisposition.   She is not a good candidate for long-term anticoagulation due to risk for falls and prohibitive cost of Xarelto..      She is not currently on anticoagulation but is open to restarting her Xarelto 20 mg daily after her neck injections next week. She will need to follow-up with the MD who is completing her neck injections as to when it is safe re-start anticoagulation post-injection.     Once scheduled, the patient will stay on Xarelto up until implant. 2 weeks prior to the procedure She will also start aspirin 81 mg daily. After implant, the patient will remain on aspirin 81 mg daily indefinitely and Xarelto for 6 weeks.  The patient will then stop Xarelto and start Plavix 75 mg daily for approximately 4 months. She will then stop Plavix and remain on aspirin 81 mg daily indefinitely.  She will have a LENO or CTA approximately 3 months and 1 year post-implant.  She understands that the risks of the procedure are <2% and include, but are not limited to device embolization, air embolism, myocardial perforation, device thrombosis, ASD, stroke, or death.  We discussed expected recovery and follow-up.       The patient is a good candidate for proceeding with left atrial appendage implant.  Her questions  were answered to her satisfaction.  She is currently undecided whether she would like to move forward with procedure. She will be in [place] over the winter and may consider having the procedure when she returns.    2. Mild coronary artery calcification - noted on CT chest August 2021. Reports chronic dyspnea on exertion, which has been stable.  She was recently seen in the emergency department for evaluation of chest pain. She had follow-up in the heart clinic for Angio +/- PCI vs stress testing - she is still considering which option she would like to pursue    3.  Hyperlipidemia - , she is open to starting Zetia 10 mg daily in addition to her rosuvastatin.  Repeat lipid panel in 2 to 3 months.    4.  Hypertension -blood pressure is controlled.  Continue triamterene-hydrochlorothiazide, metoprolol succinate    5. Dysphagia - reports difficulty swallowing pills and food getting caught when swallowing. GI referral placed to ensure appropriate for LENO placement.         History of Present Illness/Subjective    Kori Leach is a 84 year old female who comes in today for Watchman Consult. She is accompanied by her daughter for today's visit.    Kori Leach has a past history of paroxysmal atrial fibrillation, coronary artery calcifications, hyperlipidemia, obstructive sleep apnea, COPD, current  smoker, GERD, hypertension chronic neck and left arm pain due to cervical stenosis    She has been diagnosed with atrial fibrillation since at least 2022.  She underwent ablation in [place] in March of this year.  She is not currently taking anticoagulation as Xarelto has been cost prohibitive for her.  She did have bleeding issues from her access site after her ablation which resulted in extensive bruising down her leg.  She otherwise denies having any other major bleeding issues.  She denies issues with her gait or balance but has had several falls in the past year.  She does take Aleve for her neck pain.   "She reports she has a spinal injection next week for her neck pain.  She has been taking a baby aspirin but this is on hold in anticipation for her neck injection.  She denies alcohol use.  She does report some pain/difficulty swallowing larger pills and will sometimes get food caught in her throat.  She denies a previous history of stroke, DVT, or PE.  She denies any immediate family history of prolonged bleeding or frequent nosebleeds.      Kori Leach denies chest discomfort, palpitations, shortness of breath, paroxysmal nocturnal dyspnea, orthopnea, lightheadedness, dizziness, pre-syncope, or syncope.  Kori Leach also denies any weight loss, changes in appetite, nausea or vomiting.     Medical, surgical, family, social history, and medications were reviewed and updated as necessary.    ECHO results (from 8/8/2023):  Interpretation Summary     Left ventricular size, wall motion and function are normal. The ejection  fraction is 60-65%.  Normal right ventricle size and systolic function.  The left atrium is mildly dilated.  The right atrium is mildly dilated.  No hemodynamically significant valvular abnormalities on 2D or color flow  imaging.  Compared to prior study, there is no significant change.  _______________________________________________         Physical Examination Review of Systems   Vitals: /60 (BP Location: Left arm, Patient Position: Sitting, Cuff Size: Adult Large)   Pulse 93   Resp 16   Ht 1.575 m (5' 2\")   Wt 88 kg (194 lb)   LMP 08/06/1988 (Exact Date)   BMI 35.48 kg/m    BMI= Body mass index is 35.48 kg/m .  Wt Readings from Last 3 Encounters:   10/14/24 88 kg (194 lb)   10/09/24 87.6 kg (193 lb 3.2 oz)   10/03/24 89.8 kg (198 lb)       General Appearance:   Alert, cooperative and in no acute distress   ENT/Mouth: membranes moist, no oral lesions or bleeding gums.      EYES:  no scleral icterus, normal conjunctivae   Neck: Thyroid not visualized   Chest/Lungs:   lungs " are clear to auscultation, no rales or wheezing   Cardiovascular:   Regular . Normal first and second heart sounds with no murmurs, rubs or gallops; the carotid, radial and posterior tibial pulses are intact, no edema bilaterally    Abdomen:  Soft and nontender. Bowel sounds are present in all quadrants   Extremities: no cyanosis or clubbing   Skin: no xanthelasma, warm.    Neurologic: normal gait, normal  bilateral, no tremors   Psychiatric: Normal mood and affect       Please refer above for cardiac ROS details.      Medical History  Surgical History Family History Social History   Past Medical History:   Diagnosis Date     Arthritis     knees and hip replacement     Chronic obstructive pulmonary disease, unspecified COPD type (H) 06/04/2018    PFT Complete  Performed 5/21/2018 Final result Study Result FEV1/FVC is 69 and is reduced. FEV1 is 66% predicted and is reduced. FVC is 74% predicted and reduced. There was improvement in spirometry after a single inhaled dose of bronchodilator. TLC is 110% predicted and is normal. RV is 141% predicted and is increased. DLCO is 77% predicted and is reduced when it  is corrected for hemoglobin.  Im     Fatty liver      Former smoker      GERD (gastroesophageal reflux disease)      History of nicotine use 06/04/2018     HLD (hyperlipidemia)      HTN (hypertension)      MO (iron deficiency anemia) 04/24/2015     Inverted nipple     bilateral     Loss of hearing      Lumbar back pain      Morbid obesity (H) 09/04/2018     Pre-diabetes      Severe obstructive sleep apnea 04/18/2018    10/06/17 where we ordered a sleep study which showed severe TAMAR with an RDI of 30 and she had a titration study that showed CPAP of 9 cmH20 to be effective. She was set up with the CPAP on 10/25/17 through [facility].      Urinary incontinence      Past Surgical History:   Procedure Laterality Date     APPENDECTOMY       ARTHROPLASTY KNEE BILATERAL       CATARACT EXTRACTION       CERVICAL SPINE  SURGERY  2015     JOINT REPLACEMENT      bilateral knees and right hip     LAPAROSCOPIC CHOLECYSTECTOMY N/A 6/29/2018    Procedure: CHOLECYSTECTOMY, LAPAROSCOPIC;  Surgeon: Jose Armando Humphries MD;  Location: [facility] OR;  Service:      TONSILLECTOMY       WISDOM TOOTH EXTRACTION       Family History   Problem Relation Age of Onset     Breast Cancer Mother 45     Diabetes Mother      Heart Disease Mother      Hyperlipidemia Mother      Hypertension Mother      Depression Father      Alcoholism Father      Hyperlipidemia Sister      Depression Sister      Obesity Sister      Obesity Daughter      Lung Cancer Son      Cerebrovascular Disease Son     Social History     Socioeconomic History     Marital status:      Spouse name: Not on file     Number of children: 3     Years of education: Not on file     Highest education level: Not on file   Occupational History     Not on file   Tobacco Use     Smoking status: Every Day     Types: Cigarettes     Smokeless tobacco: Never     Tobacco comments:     1-2 cigarettes per day on average   Vaping Use     Vaping status: Never Used   Substance and Sexual Activity     Alcohol use: Yes     Comment: Alcoholic Drinks/day: less than 1 drink per month      Drug use: No     Sexual activity: Not Currently     Partners: Male   Other Topics Concern     Not on file   Social History Narrative    . Retired. Likes to play golf.  3 children. Son has lung cancer.  Was a . Lives in [place] for 6 months and [place] for 6 months of the years.     Social Determinants of Health     Financial Resource Strain: Low Risk  (9/13/2024)    Financial Resource Strain      Within the past 12 months, have you or your family members you live with been unable to get utilities (heat, electricity) when it was really needed?: No   Food Insecurity: Low Risk  (9/13/2024)    Food Insecurity      Within the past 12 months, did you worry that your food would run out before you got money to  buy more?: No      Within the past 12 months, did the food you bought just not last and you didn t have money to get more?: No   Transportation Needs: Low Risk  (9/13/2024)    Transportation Needs      Within the past 12 months, has lack of transportation kept you from medical appointments, getting your medicines, non-medical meetings or appointments, work, or from getting things that you need?: No   Physical Activity: Inactive (9/13/2024)    Exercise Vital Sign      Days of Exercise per Week: 0 days      Minutes of Exercise per Session: 0 min   Stress: Stress Concern Present (9/13/2024)    [employer] [place] [employer] - Occupational Stress Questionnaire      Feeling of Stress : To some extent   Social Connections: Unknown (9/13/2024)    Social Connection and Isolation Panel [NHANES]      Frequency of Communication with Friends and Family: Not on file      Frequency of Social Gatherings with Friends and Family: Once a week      Attends Latter-day Services: Not on file      Active Member of Clubs or Organizations: Not on file      Attends Club or Organization Meetings: Not on file      Marital Status: Not on file   Interpersonal Safety: Low Risk  (9/16/2024)    Interpersonal Safety      Do you feel physically and emotionally safe where you currently live?: Yes      Within the past 12 months, have you been hit, slapped, kicked or otherwise physically hurt by someone?: No      Within the past 12 months, have you been humiliated or emotionally abused in other ways by your partner or ex-partner?: No   Housing Stability: Low Risk  (9/13/2024)    Housing Stability      Do you have housing? : Yes      Are you worried about losing your housing?: No          Medications  Allergies   Current Outpatient Medications   Medication Sig Dispense Refill     aspirin 81 MG EC tablet Take 1 tablet (81 mg) by mouth daily. 30 tablet 12     cyclobenzaprine (FLEXERIL) 5 MG tablet Take 1 tablet (5 mg) by mouth 3 times daily as  needed for muscle spasms. Start at nighttime and can increase if needed. 40 tablet 2     ezetimibe (ZETIA) 10 MG tablet Take 1 tablet (10 mg) by mouth daily. 90 tablet 3     ferrous sulfate (FE TABS) 325 (65 Fe) MG EC tablet Take 325 mg by mouth three times a week       Fluticasone-Umeclidin-Vilanterol (TRELEGY ELLIPTA) 200-62.5-25 MCG/ACT oral inhaler Inhale 1 puff into the lungs daily. 60 each 0     metoprolol succinate ER (TOPROL XL) 50 MG 24 hr tablet Take 1 tablet (50 mg) by mouth daily. 90 tablet 3     multivitamin (MULTIPLE VITAMINS ORAL) [MULTIVITAMIN (MULTIPLE VITAMINS ORAL)] Take by mouth.       nabumetone (RELAFEN) 500 MG tablet Take 1 tablet (500 mg) by mouth 2 times daily. Take regularly for the neck. 60 tablet 2     rosuvastatin (CRESTOR) 40 MG tablet Take 1 tablet (40 mg) by mouth At Bedtime 90 tablet 3     triamterene-HCTZ (MAXZIDE-25) 37.5-25 MG tablet Take 1 tablet by mouth daily. 90 tablet 3     diazepam (VALIUM) 5 MG tablet Take 1 tablet (5 mg) by mouth once for 1 dose. Take 30-60 minutes before MRI.* (Patient not taking: Reported on 10/9/2024)      Allergies   Allergen Reactions     Lisinopril Cough         Lab Results    Chemistry/lipid CBC Cardiac Enzymes/BNP/TSH/INR   Recent Labs   Lab Test 09/10/24  1652   CHOL 344*   HDL 63   *   TRIG 538*     Recent Labs   Lab Test 09/10/24  1652 08/09/22  1010 08/04/21  0942   * 78 74     Recent Labs   Lab Test 09/29/24  1219      POTASSIUM 3.9   CHLORIDE 107   CO2 26   *   BUN 10.9   CR 0.87   GFRESTIMATED 65   AMINA 9.6     Recent Labs   Lab Test 09/29/24  1219 09/10/24  1652 08/10/23  1249   CR 0.87 0.88 0.89     Recent Labs   Lab Test 12/06/21  1639 11/04/20  0926 06/08/20  1233   A1C 5.9* 6.1* 6.3*    Recent Labs   Lab Test 09/29/24  1219   WBC 6.8   HGB 14.8   HCT 45.4   MCV 91        Recent Labs   Lab Test 09/29/24  1219 09/10/24  1652 08/08/23  0507   HGB 14.8 15.5 13.2    Recent Labs   Lab Test 05/12/22  1012    TROPONINI <0.01     Recent Labs   Lab Test 08/10/23  1249 08/07/23  1155 12/06/21  1639 11/30/20  1138   BNP  --   --  51 34   NTBNPI  --  2,254*  --   --    NTBNP 194  --   --   --      Recent Labs   Lab Test 08/07/23  1155   TSH 1.48     Recent Labs   Lab Test 08/07/23  1524 06/29/18  2010   INR 1.22* 0.97        70 minutes spent on the date of encounter doing education, chart prep/review, review of outside records, review of test results, interpretation with above tests, patient visit, documentation, and discussion with family.      This note has been dictated using voice recognition software. Any grammatical or context distortions are unintentional and inherent to the software.    Alexandria Paul PA-C  Structural Heart Program  [facility]       Thank you for allowing me to participate in the care of your patient.      Sincerely,     Alexandria Paul PA-C     [facility]  cc:   Glenn Ricardo MD  [address]  [place] [address]

## 2024-10-15 ENCOUNTER — TELEPHONE (OUTPATIENT)
Dept: CARDIOLOGY | Facility: CLINIC | Age: 84
End: 2024-10-15

## 2024-10-15 NOTE — TELEPHONE ENCOUNTER
Noted below for future reference. [facility]    ----- Message from Alexandria Paul sent at 10/15/2024  8:41 AM CDT -----  Hi,    Patient is candidate for Watchman procedure, however she is currently undecided if she would like to move forward. She will be in [place] for the winter and may consider having the procedure when she returns. She has not been on anticoagulation but will consider re-starting after her spinal injection next week - I have asked her to follow-up with the MD completing her spinal injection as to when she can safely start her Xarelto. She also has some issues with dysphagia and will need follow-up with GI - a referral has been placed.    Thank you,  Alexandria

## 2024-10-18 ENCOUNTER — TELEPHONE (OUTPATIENT)
Dept: PHYSICAL MEDICINE AND REHAB | Facility: CLINIC | Age: 84
End: 2024-10-18
Payer: MEDICARE

## 2024-10-18 NOTE — TELEPHONE ENCOUNTER
"PSP:  Elaine Pineda, APRN, CNP   Last clinic visit:  9/25/24 new consult  Reason for call: Question regarding premedication for injection on Monday  Clinical information:  Asks when she should take the medication as instructions say 1 hours prior to injection. \"I have to be there at 9. I know my injection isn't happening then. I don't want it to ear off.\"  Advice given to patient: Explained the injection will be at 915-930. She should take the medication at 820 AM and bring 2nd tablet to appointment. Stated understanding.       " no

## 2024-10-21 ENCOUNTER — RADIOLOGY INJECTION OFFICE VISIT (OUTPATIENT)
Dept: PHYSICAL MEDICINE AND REHAB | Facility: CLINIC | Age: 84
End: 2024-10-21
Attending: NURSE PRACTITIONER
Payer: MEDICARE

## 2024-10-21 VITALS
BODY MASS INDEX: 35.33 KG/M2 | WEIGHT: 192 LBS | TEMPERATURE: 97.9 F | HEIGHT: 62 IN | HEART RATE: 94 BPM | SYSTOLIC BLOOD PRESSURE: 124 MMHG | DIASTOLIC BLOOD PRESSURE: 72 MMHG | OXYGEN SATURATION: 95 %

## 2024-10-21 DIAGNOSIS — M54.12 CERVICAL RADICULOPATHY: ICD-10-CM

## 2024-10-21 PROCEDURE — 99207 PR NON-BILLABLE SERV PER CHARTING: CPT | Performed by: STUDENT IN AN ORGANIZED HEALTH CARE EDUCATION/TRAINING PROGRAM

## 2024-10-21 ASSESSMENT — PAIN SCALES - GENERAL: PAINLEVEL: NO PAIN (0)

## 2024-10-21 NOTE — PATIENT INSTRUCTIONS
We did not move forward with your scheduled C7-T1 epidural steroid injection today on account of your pain having improved since the order was placed by Elaine.  Should your pain return, please contact the [facility] Care Navigation Team at #[phone] and they can assist in rescheduling your injection with Dr. Maria.    Please resume taking the aspirin (as prescribed) that you've been holding for the last week in anticipation of the injection today.  Bear in mind, you will need to hold your aspirin for 7 days prior to your injection if/when you reschedule.

## 2024-10-21 NOTE — PROGRESS NOTES
Kori CAMILO Leonormarychuy is here today for a C7-T1 epidural steroid injection.  The patient is not able to have the injection today because she is no longer having the arm and neck pain since last Friday, 10/18/24.  The patient was given the phone number for the [facility] Care Navigation Team and was encouraged to call as soon as her pain returned so she could be rescheduled.    The patient is aware that she is to resume taking the aspirin (as prescribed) today (10/21/24) that she has been holding in anticipation of the injection.  She verbalizes understanding that she will need to hold the aspirin for 7 days if/when she has her injection in the future.      No charge for today s visit.

## 2024-11-05 ENCOUNTER — OFFICE VISIT (OUTPATIENT)
Dept: INTERNAL MEDICINE | Facility: CLINIC | Age: 84
End: 2024-11-05
Payer: MEDICARE

## 2024-11-05 VITALS
TEMPERATURE: 98.4 F | WEIGHT: 197.9 LBS | OXYGEN SATURATION: 97 % | HEART RATE: 85 BPM | RESPIRATION RATE: 16 BRPM | DIASTOLIC BLOOD PRESSURE: 74 MMHG | SYSTOLIC BLOOD PRESSURE: 127 MMHG | HEIGHT: 62 IN | BODY MASS INDEX: 36.42 KG/M2

## 2024-11-05 DIAGNOSIS — Z86.79 S/P ABLATION OF ATRIAL FIBRILLATION: ICD-10-CM

## 2024-11-05 DIAGNOSIS — Z98.890 S/P ABLATION OF ATRIAL FIBRILLATION: ICD-10-CM

## 2024-11-05 DIAGNOSIS — E78.2 MIXED HYPERLIPIDEMIA: ICD-10-CM

## 2024-11-05 DIAGNOSIS — I10 PRIMARY HYPERTENSION: Chronic | ICD-10-CM

## 2024-11-05 DIAGNOSIS — I48.0 PAROXYSMAL ATRIAL FIBRILLATION (H): Primary | ICD-10-CM

## 2024-11-05 PROCEDURE — 99214 OFFICE O/P EST MOD 30 MIN: CPT | Performed by: INTERNAL MEDICINE

## 2024-11-05 PROCEDURE — G2211 COMPLEX E/M VISIT ADD ON: HCPCS | Performed by: INTERNAL MEDICINE

## 2024-11-05 ASSESSMENT — PAIN SCALES - GENERAL: PAINLEVEL_OUTOF10: NO PAIN (0)

## 2024-11-05 NOTE — PROGRESS NOTES
[facility] - [facility] Specialists    Comprehensive and complex medical care - Chronic disease management - Shared decision making - Care coordination - Compassionate care    Patient advocacy - Rational deprescribing - Minimally disruptive medicine - Ethical focus - Customized care         Date of Service: 11/5/2024  Primary Provider: Donaldo Vernon    Patient Care Team:  Donaldo Vernon MD as PCP - General (Internal Medicine)  Mariella Coyne MD as MD (Neurology)  Bennett Varela MD as MD (Orthopaedic Surgery)  Beka Adams MD as MD  Donaldo Vernon MD as Assigned PCP  Rich Rodríguez MD as MD (Cardiovascular Disease)  Lana Chaudhari MD as MD (Cardiovascular Disease)  Komal Britt MD as Assigned Heart and Vascular Provider  Elaine Pineda APRN CNP as Assigned Neuroscience Provider          Patient's Pharmacy:    [facility] [address] [address]  Phone: [phone] Fax: [phone]    [facility] - [facility] AT Portal to Registered [facility]  [place] [address]  Phone: [phone] Fax: [phone]     Patient's Contacts:  Name Home Phone Work Phone Mobile Phone Relationship Lgl GrRADHA Hassan [phone]   Son    JENNIFER ORTIZ [phone]   Daughter      Patient's Insurance:    Payor: MEDICARE / Plan: MEDICARE / Product Type: Medicare /           Active Problem List:  Problem List as of 11/5/2024 Reviewed: 8/14/2024 12:40 PM by Shawna Moses NP         High    Former smoker - quit in 2018    Chronic obstructive pulmonary disease, unspecified COPD type (H), thought to be mild on PFTs 2018    Paroxysmal atrial fibrillation (H)    Last Assessment & Plan 6/12/2023 Office Visit Written 6/16/2023  9:30 AM by Shawna Moses NP     Continue anticoagulation. Rate is controlled             Medium    Primary  hypertension    Severe obstructive sleep apnea on CPAP    MO (iron deficiency anemia) - Dec 2021, also 2015    Paresthesias, left post auricular and occiput     Last Assessment & Plan 6/12/2023 Office Visit Written 6/12/2023  2:30 PM by Shawna Moses NP     Try topical lidocaine gel on the area         Memory difficulty    Class 2 severe obesity due to excess calories with serious comorbidity in adult (H)    Neck pain       Low    HLD (hyperlipidemia)    Dysphagia    Fatty liver    GERD (gastroesophageal reflux disease)    Loss of hearing    Lumbar back pain    Mass of right parotid gland        Current Outpatient Medications   Medication Instructions    aspirin 81 mg, Oral, DAILY    cyclobenzaprine (FLEXERIL) 5 mg, Oral, 3 TIMES DAILY PRN, Start at nighttime and can increase if needed.    ezetimibe (ZETIA) 10 mg, Oral, DAILY    ferrous sulfate (FE TABS) 325 mg, Oral, THREE TIMES WEEKLY    Fluticasone-Umeclidin-Vilanterol (TRELEGY ELLIPTA) 200-62.5-25 MCG/ACT oral inhaler 1 puff, Inhalation, DAILY    metoprolol succinate ER (TOPROL XL) 50 mg, Oral, DAILY    multivitamin (MULTIPLE VITAMINS ORAL) Oral    nabumetone (RELAFEN) 500 mg, Oral, 2 TIMES DAILY, Take regularly for the neck.    rivaroxaban ANTICOAGULANT (XARELTO) 20 mg, Oral, DAILY    rosuvastatin (CRESTOR) 40 mg, Oral, AT BEDTIME    triamterene-HCTZ (MAXZIDE-25) 37.5-25 MG tablet 1 tablet, Oral, DAILY     Social History     Social History Narrative    . Retired. Likes to play golf.  3 children. Son has lung cancer.  Was a . Lives in [place] for 6 months and [place] for 6 months of the years.       Subjective:     Kori Leach is a 84 year old female who comes in today for:    Chief Complaint   Patient presents with    Hypertension    Hyperlipidemia          11/5/2024     9:40 AM   Additional Questions   Roomed by Macey   Accompanied by TEAGAN     Patient comes in today for follow-up of a number of issues.    She will be going  "down to [place] in the future.    We reviewed her atrial fibrillation.  She did see cardiology in relationship to this and other issues.  These results were reviewed.  They recommended that she go back on some sort of anticoagulation, preferably Xarelto.  We reviewed the reasoning for this.    She does have a history of recurrent iron deficiency anemia and this needs to be monitored if she goes on stronger anticoagulation.    She is taking a baby aspirin at this time.    She has had all lot of visits with different specialists and this can be daunting at times.  She would like to keep her medication burden down as much as possible.  She was started on Zetia by a cardiology specialist, and she is not sure if she really needs to do this at this point.    We reviewed her other issues noted in the assessment but not specifically addressed in the HPI above.     Objective:     Wt Readings from Last 3 Encounters:   11/05/24 89.8 kg (197 lb 14.4 oz)   10/21/24 87.1 kg (192 lb)   10/14/24 88 kg (194 lb)     BP Readings from Last 3 Encounters:   11/05/24 127/74   10/21/24 124/72   10/14/24 132/60     /74 (BP Location: Right arm, Patient Position: Sitting, Cuff Size: Adult Regular)   Pulse 85   Temp 98.4  F (36.9  C) (Oral)   Resp 16   Ht 1.575 m (5' 2\")   Wt 89.8 kg (197 lb 14.4 oz)   LMP 08/06/1988 (Exact Date)   SpO2 97%   BMI 36.20 kg/m     The patient is comfortable, no acute distress.  Mood good.  Insight good.  Eyes are nonicteric.  Neck is supple without mass.  No cervical adenopathy.  No thyromegaly. Heart regular rate and rhythm.  Lungs clear to auscultation bilaterally.  Respiratory effort is good.   Extremities no edema.      Diagnostics:     Admission on 09/29/2024, Discharged on 09/29/2024   Component Date Value Ref Range Status    Ventricular Rate 09/29/2024 99  BPM Final    Atrial Rate 09/29/2024 99  BPM Final    TX Interval 09/29/2024 132  ms Final    QRS Duration 09/29/2024 82  ms Final    QT " 09/29/2024 330  ms Final    QTc 09/29/2024 423  ms Final    P Axis 09/29/2024 76  degrees Final    R AXIS 09/29/2024 61  degrees Final    T Axis 09/29/2024 56  degrees Final    Interpretation ECG 09/29/2024    Final                    Value:Sinus rhythm with occasional Premature ventricular complexes  Nonspecific ST abnormality  Abnormal ECG  When compared with ECG of 17-Aug-2023 10:45,  Premature ventricular complexes are now Present  Vent. rate has increased by  50 bpm  Confirmed by SEE ED PROVIDER NOTE FOR, ECG INTERPRETATION (4000),  YONAS VALLECILLO ([id number]) on 9/30/2024 1:58:50 AM      Sodium 09/29/2024 143  135 - 145 mmol/L Final    Potassium 09/29/2024 3.9  3.4 - 5.3 mmol/L Final    Chloride 09/29/2024 107  98 - 107 mmol/L Final    Carbon Dioxide (CO2) 09/29/2024 26  22 - 29 mmol/L Final    Anion Gap 09/29/2024 10  7 - 15 mmol/L Final    Urea Nitrogen 09/29/2024 10.9  8.0 - 23.0 mg/dL Final    Creatinine 09/29/2024 0.87  0.51 - 0.95 mg/dL Final    GFR Estimate 09/29/2024 65  >60 mL/min/1.73m2 Final    eGFR calculated using 2021 CKD-EPI equation.    Calcium 09/29/2024 9.6  8.8 - 10.4 mg/dL Final    Reference intervals for this test were updated on 7/16/2024 to reflect our healthy population more accurately. There may be differences in the flagging of prior results with similar values performed with this method. Those prior results can be interpreted in the context of the updated reference intervals.    Glucose 09/29/2024 136 (H)  70 - 99 mg/dL Final    Troponin T, High Sensitivity 09/29/2024 14  <=14 ng/L Final    Either a High Sensitivity Troponin T baseline (0 hours) value = 100 ng/L, or an increase in High Sensitivity Troponin T = 7 ng/L at 2 hours compared to 0 hours (2-0 hours), suggests myocardial injury, and urgent clinical attention is required.    If the 2-0 hours increase is <7 ng/L, a High Sensitivity Troponin T result above gender-specific reference ranges warrants further evaluation.    Recommendations for further evaluation include correlation with clinical decision-making tool (e.g., HEART), a 3rd High Sensitivity Troponin T test 2 hours after the 2nd (a 20% change from baseline would represent concern), admission for observation, close PCC/cardiology follow-up, or urgent outpatient provocative testing.    WBC Count 09/29/2024 6.8  4.0 - 11.0 10e3/uL Final    RBC Count 09/29/2024 5.01  3.80 - 5.20 10e6/uL Final    Hemoglobin 09/29/2024 14.8  11.7 - 15.7 g/dL Final    Hematocrit 09/29/2024 45.4  35.0 - 47.0 % Final    MCV 09/29/2024 91  78 - 100 fL Final    MCH 09/29/2024 29.5  26.5 - 33.0 pg Final    MCHC 09/29/2024 32.6  31.5 - 36.5 g/dL Final    RDW 09/29/2024 13.8  10.0 - 15.0 % Final    Platelet Count 09/29/2024 213  150 - 450 10e3/uL Final    % Neutrophils 09/29/2024 74  % Final    % Lymphocytes 09/29/2024 15  % Final    % Monocytes 09/29/2024 8  % Final    % Eosinophils 09/29/2024 3  % Final    % Basophils 09/29/2024 1  % Final    % Immature Granulocytes 09/29/2024 0  % Final    NRBCs per 100 WBC 09/29/2024 0  <1 /100 Final    Absolute Neutrophils 09/29/2024 5.0  1.6 - 8.3 10e3/uL Final    Absolute Lymphocytes 09/29/2024 1.0  0.8 - 5.3 10e3/uL Final    Absolute Monocytes 09/29/2024 0.6  0.0 - 1.3 10e3/uL Final    Absolute Eosinophils 09/29/2024 0.2  0.0 - 0.7 10e3/uL Final    Absolute Basophils 09/29/2024 0.0  0.0 - 0.2 10e3/uL Final    Absolute Immature Granulocytes 09/29/2024 0.0  <=0.4 10e3/uL Final    Absolute NRBCs 09/29/2024 0.0  10e3/uL Final    Hold Specimen 09/29/2024 [facility]   Final    Hold Specimen 09/29/2024 [facility]   Final       No results found for any visits on 11/05/24.    Assessment:     1. Paroxysmal atrial fibrillation (H)    2. Primary hypertension    3. S/P ablation of atrial fibrillation    4. Mixed hyperlipidemia        Plan:     We gave her a prescription for Xarelto to start if she is willing.  She would stop the aspirin at that time.  She should follow-up at a minimum in  the spring to check a CBC lipid panel and chemistry panel.  Consider the role of Zetia in a patient without established vascular disease to my knowledge.  Recheck cholesterol and stop if able in the future.  Blood pressure doing better on the triamterene hydrochlorothiazide which was the issue we reviewing in the last visit.  Continue this through the winter.  Continue current medications otherwise.  Follow up sooner if issues.    No orders of the defined types were placed in this encounter.          Donaldo Vernon MD  General Internal Medicine  [facility]    The longitudinal plan of care for the diagnoses and conditions as documented were addressed during this visit. Due to the added complexity in care, I will continue to support Kori in the subsequent management and with ongoing continuity of care.     Return in about 6 months (around 5/12/2025) for visit and blood work.     Future Appointments   Date Time Provider Department Center   5/12/2025 10:00 AM Donaldo Vernon MD [facility]

## 2024-11-07 ENCOUNTER — TRANSFERRED RECORDS (OUTPATIENT)
Dept: HEALTH INFORMATION MANAGEMENT | Facility: CLINIC | Age: 84
End: 2024-11-07
Payer: MEDICARE

## 2024-11-07 PROBLEM — Z98.890 S/P ABLATION OF ATRIAL FIBRILLATION: Chronic | Status: ACTIVE | Noted: 2024-11-07

## 2024-11-07 PROBLEM — Z86.79 S/P ABLATION OF ATRIAL FIBRILLATION: Chronic | Status: ACTIVE | Noted: 2024-11-07

## 2024-11-07 PROBLEM — Z98.890 S/P ABLATION OF ATRIAL FIBRILLATION: Status: ACTIVE | Noted: 2024-11-07

## 2024-11-07 PROBLEM — Z86.79 S/P ABLATION OF ATRIAL FIBRILLATION: Status: ACTIVE | Noted: 2024-11-07

## 2024-11-07 NOTE — PATIENT INSTRUCTIONS
Future Appointments   Date Time Provider Department Center   5/12/2025 10:00 AM [facility]

## 2024-11-08 PROBLEM — M54.2 NECK PAIN: Status: RESOLVED | Noted: 2024-10-04 | Resolved: 2024-11-08

## 2024-12-01 ENCOUNTER — MYC MEDICAL ADVICE (OUTPATIENT)
Dept: INTERNAL MEDICINE | Facility: CLINIC | Age: 84
End: 2024-12-01
Payer: MEDICARE

## 2024-12-05 ENCOUNTER — OFFICE VISIT (OUTPATIENT)
Dept: INTERNAL MEDICINE | Facility: CLINIC | Age: 84
End: 2024-12-05
Payer: MEDICARE

## 2024-12-05 VITALS
RESPIRATION RATE: 20 BRPM | OXYGEN SATURATION: 96 % | SYSTOLIC BLOOD PRESSURE: 120 MMHG | WEIGHT: 197.8 LBS | HEIGHT: 62 IN | DIASTOLIC BLOOD PRESSURE: 71 MMHG | HEART RATE: 95 BPM | TEMPERATURE: 98.7 F | BODY MASS INDEX: 36.4 KG/M2

## 2024-12-05 DIAGNOSIS — J44.9 CHRONIC OBSTRUCTIVE PULMONARY DISEASE, UNSPECIFIED COPD TYPE (H): ICD-10-CM

## 2024-12-05 DIAGNOSIS — M75.41 IMPINGEMENT SYNDROME OF RIGHT SHOULDER: Primary | ICD-10-CM

## 2024-12-05 DIAGNOSIS — E78.2 MIXED HYPERLIPIDEMIA: ICD-10-CM

## 2024-12-05 DIAGNOSIS — I48.0 PAROXYSMAL ATRIAL FIBRILLATION (H): ICD-10-CM

## 2024-12-05 RX ORDER — ROSUVASTATIN CALCIUM 40 MG/1
40 TABLET, COATED ORAL AT BEDTIME
Qty: 90 TABLET | Refills: 3 | Status: SHIPPED | OUTPATIENT
Start: 2024-12-05

## 2024-12-05 RX ORDER — TRIAMCINOLONE ACETONIDE 40 MG/ML
80 INJECTION, SUSPENSION INTRA-ARTICULAR; INTRAMUSCULAR ONCE
Status: COMPLETED | OUTPATIENT
Start: 2024-12-05 | End: 2024-12-05

## 2024-12-05 RX ORDER — METOPROLOL SUCCINATE 50 MG/1
50 TABLET, EXTENDED RELEASE ORAL DAILY
Qty: 90 TABLET | Refills: 3 | Status: SHIPPED | OUTPATIENT
Start: 2024-12-05

## 2024-12-05 RX ADMIN — TRIAMCINOLONE ACETONIDE 80 MG: 40 INJECTION, SUSPENSION INTRA-ARTICULAR; INTRAMUSCULAR at 21:22

## 2024-12-05 ASSESSMENT — PAIN SCALES - GENERAL: PAINLEVEL_OUTOF10: MILD PAIN (3)

## 2024-12-05 NOTE — PROGRESS NOTES
Right shoulder corticosteroid injection, subacromial    Date/Time: 12/5/2024 9:24 PM    Performed by: Donaldo Vernon MD  Authorized by: Donaldo Vernon MD         Procedure:  Right subacromial corticosteroid injection.    Diagnosis:  Shoulder impingement syndrome.    Description of procedure:    Patient was given informed consent prior to proceeding with corticosteroid injection.  Patient agreed to proceed knowing risks and benefits.  A posterior approach was marked and prepped with alcohol.  Using a combination of 80 mg of Kenalog and 1 ml of lidocaine with epinephrine, the subacromial space was found using a 22G 1.5 inch needle.  The space was then injected and the fluid flowed easily.  Bandage applied and aftercare instructions given.  No immediate complications.

## 2024-12-06 NOTE — PROGRESS NOTES
[facility] - Primary [facility] Specialists    Comprehensive and complex medical care - Chronic disease management - Shared decision making - Care coordination - Compassionate care    Patient advocacy - Rational deprescribing - Minimally disruptive medicine - Ethical focus - Customized care         Date of Service: 12/5/2024  Primary Provider: Donaldo Vernon    Patient Care Team:  Donaldo Vernon MD as PCP - General (Internal Medicine)  Mariella Coyne MD as MD (Neurology)  Bennett Varela MD as MD (Orthopaedic Surgery)  Beka Adams MD as MD  Donaldo Vernon MD as Assigned PCP  Rich Rodríguez MD as MD (Cardiovascular Disease)  Lana Chaudhari MD as MD (Cardiovascular Disease)  Komal Britt MD as Assigned Heart and Vascular Provider  Elaine Pineda APRN CNP as Assigned Neuroscience Provider          Patient's Pharmacy:    [facility] [address] [place] [id number]  Phone: [phone] Fax: [phone]    [facility] - [facility] AT Portal to Registered [facility]  [place] [address]  Phone: [phone] Fax: [phone]     Patient's Contacts:  Name Home Phone Work Phone Mobile Phone Relationship Lgl GrRADHA Hassan [phone]   Son    JENNIFER ORTIZ [phone]   Daughter      Patient's Insurance:    Payor: MEDICARE / Plan: MEDICARE / Product Type: Medicare /           Active Problem List:  Problem List as of 12/5/2024 Reviewed: 8/14/2024 12:40 PM by Shawna Moses NP         High    Chronic obstructive pulmonary disease, unspecified COPD type (H), thought to be mild on PFTs 2018    Tobacco abuse    Paroxysmal atrial fibrillation (H)    Last Assessment & Plan 6/12/2023 Office Visit Written 6/16/2023  9:30 AM by Shawna Moses NP     Continue anticoagulation. Rate is controlled             Medium    Primary hypertension    S/P  ablation of atrial fibrillation    Severe obstructive sleep apnea on CPAP    MO (iron deficiency anemia) - Dec 2021, also 2015    Paresthesias, left post auricular and occiput     Last Assessment & Plan 6/12/2023 Office Visit Written 6/12/2023  2:30 PM by Shawna Moses, NP     Try topical lidocaine gel on the area         Memory difficulty    Class 2 severe obesity due to excess calories with serious comorbidity in adult (H)       Low    Mixed hyperlipidemia    Dysphagia    Fatty liver    GERD (gastroesophageal reflux disease)    Loss of hearing    Lumbar back pain    Mass of right parotid gland        Current Outpatient Medications   Medication Instructions    aspirin 81 mg, Oral, DAILY    cyclobenzaprine (FLEXERIL) 5 mg, Oral, 3 TIMES DAILY PRN, Start at nighttime and can increase if needed.    ezetimibe (ZETIA) 10 mg, Oral, DAILY    ferrous sulfate (FE TABS) 325 mg, THREE TIMES WEEKLY    Fluticasone-Umeclidin-Vilanterol (TRELEGY ELLIPTA) 200-62.5-25 MCG/ACT oral inhaler 1 puff, Inhalation, DAILY    metoprolol succinate ER (TOPROL XL) 50 mg, Oral, DAILY    multivitamin (MULTIPLE VITAMINS ORAL) [MULTIVITAMIN (MULTIPLE VITAMINS ORAL)] Take by mouth.    nabumetone (RELAFEN) 500 mg, Oral, 2 TIMES DAILY, Take regularly for the neck.    rivaroxaban ANTICOAGULANT (XARELTO) 20 mg, Oral, DAILY    rosuvastatin (CRESTOR) 40 mg, Oral, AT BEDTIME    triamterene-HCTZ (MAXZIDE-25) 37.5-25 MG tablet 1 tablet, Oral, DAILY     Social History     Social History Narrative    . Retired. Likes to play golf.  3 children. Son has lung cancer.  Was a . Lives in [place] for 6 months and [place] for 6 months of the years.       Subjective:     Kori Leach is a 84 year old female who comes in today for:    Chief Complaint   Patient presents with    Shoulder Pain     Injection          12/5/2024     3:07 PM   Additional Questions   Roomed by DASIA Dalton   Accompanied by TEAGAN     Patient comes in today for follow-up  "and issues with her right shoulder.    Her right shoulder has been worse lately.  It is about as bad as she has had it, she says.  Her pain is mostly posterolateral more in the supraspinatus region.  It is worse with abduction.  It does bother her at night and she cannot sleep on this side.  Her last shot was on August 14 of this year.  She has questions about it not lasting as long at this point.  She had a shot that lasted over a year.    We reviewed her atrial fibrillation and she would like a refill of her Xarelto at this point.    She does need refill of her metoprolol related to her high blood pressure.  Her blood pressure has been doing well.    Reviewed her COPD and she needs a refill of her inhaler as well.  This has also been helpful for her.  She continues to smoke.    We reviewed her other issues noted in the assessment but not specifically addressed in the HPI above.     Objective:     Wt Readings from Last 3 Encounters:   12/05/24 89.7 kg (197 lb 12.8 oz)   11/05/24 89.8 kg (197 lb 14.4 oz)   10/21/24 87.1 kg (192 lb)     BP Readings from Last 3 Encounters:   12/05/24 120/71   11/05/24 127/74   10/21/24 124/72     /71 (BP Location: Right arm, Patient Position: Sitting, Cuff Size: Adult Large)   Pulse 95   Temp 98.7  F (37.1  C) (Oral)   Resp 20   Ht 1.575 m (5' 2\")   Wt 89.7 kg (197 lb 12.8 oz)   LMP 08/06/1988 (Exact Date)   SpO2 96%   BMI 36.18 kg/m     The patient is comfortable, no acute distress.  Mood good.  Insight good.  She has limited abduction to 80 degrees laterally.  On the right shoulder.  No deformity noted.  No weakness appreciated.    Diagnostics:     Admission on 09/29/2024, Discharged on 09/29/2024   Component Date Value Ref Range Status    Ventricular Rate 09/29/2024 99  BPM Final    Atrial Rate 09/29/2024 99  BPM Final    N[place] Interval 09/29/2024 132  ms Final    QRS Duration 09/29/2024 82  ms Final    QT 09/29/2024 330  ms Final    QTc 09/29/2024 423  ms Final    P " [place] 09/29/2024 76  degrees Final    R AXIS 09/29/2024 61  degrees Final    T Axis 09/29/2024 56  degrees Final    Interpretation ECG 09/29/2024    Final                    Value:Sinus rhythm with occasional Premature ventricular complexes  Nonspecific ST abnormality  Abnormal ECG  When compared with ECG of 17-Aug-2023 10:45,  Premature ventricular complexes are now Present  Vent. rate has increased by  50 bpm  Confirmed by SEE ED PROVIDER NOTE FOR, ECG INTERPRETATION ([id number]),  YONAS VALLECILLO ([id number]) on 9/30/2024 1:58:50 AM      Sodium 09/29/2024 143  135 - 145 mmol/L Final    Potassium 09/29/2024 3.9  3.4 - 5.3 mmol/L Final    Chloride 09/29/2024 107  98 - 107 mmol/L Final    Carbon Dioxide (CO2) 09/29/2024 26  22 - 29 mmol/L Final    Anion Gap 09/29/2024 10  7 - 15 mmol/L Final    Urea Nitrogen 09/29/2024 10.9  8.0 - 23.0 mg/dL Final    Creatinine 09/29/2024 0.87  0.51 - 0.95 mg/dL Final    GFR Estimate 09/29/2024 65  >60 mL/min/1.73m2 Final    eGFR calculated using 2021 CKD-EPI equation.    Calcium 09/29/2024 9.6  8.8 - 10.4 mg/dL Final    Reference intervals for this test were updated on 7/16/2024 to reflect our healthy population more accurately. There may be differences in the flagging of prior results with similar values performed with this method. Those prior results can be interpreted in the context of the updated reference intervals.    Glucose 09/29/2024 136 (H)  70 - 99 mg/dL Final    Troponin T, High Sensitivity 09/29/2024 14  <=14 ng/L Final    Either a High Sensitivity Troponin T baseline (0 hours) value = 100 ng/L, or an increase in High Sensitivity Troponin T = 7 ng/L at 2 hours compared to 0 hours (2-0 hours), suggests myocardial injury, and urgent clinical attention is required.    If the 2-0 hours increase is <7 ng/L, a High Sensitivity Troponin T result above gender-specific reference ranges warrants further evaluation.   Recommendations for further evaluation include correlation with  clinical decision-making tool (e.g., HEART), a 3rd High Sensitivity Troponin T test 2 hours after the 2nd (a 20% change from baseline would represent concern), admission for observation, close PCC/cardiology follow-up, or urgent outpatient provocative testing.    WBC Count 09/29/2024 6.8  4.0 - 11.0 10e3/uL Final    RBC Count 09/29/2024 5.01  3.80 - 5.20 10e6/uL Final    Hemoglobin 09/29/2024 14.8  11.7 - 15.7 g/dL Final    Hematocrit 09/29/2024 45.4  35.0 - 47.0 % Final    MCV 09/29/2024 91  78 - 100 fL Final    MCH 09/29/2024 29.5  26.5 - 33.0 pg Final    MCHC 09/29/2024 32.6  31.5 - 36.5 g/dL Final    RDW 09/29/2024 13.8  10.0 - 15.0 % Final    Platelet Count 09/29/2024 213  150 - 450 10e3/uL Final    % Neutrophils 09/29/2024 74  % Final    % Lymphocytes 09/29/2024 15  % Final    % Monocytes 09/29/2024 8  % Final    % Eosinophils 09/29/2024 3  % Final    % Basophils 09/29/2024 1  % Final    % Immature Granulocytes 09/29/2024 0  % Final    NRBCs per 100 WBC 09/29/2024 0  <1 /100 Final    Absolute Neutrophils 09/29/2024 5.0  1.6 - 8.3 10e3/uL Final    Absolute Lymphocytes 09/29/2024 1.0  0.8 - 5.3 10e3/uL Final    Absolute Monocytes 09/29/2024 0.6  0.0 - 1.3 10e3/uL Final    Absolute Eosinophils 09/29/2024 0.2  0.0 - 0.7 10e3/uL Final    Absolute Basophils 09/29/2024 0.0  0.0 - 0.2 10e3/uL Final    Absolute Immature Granulocytes 09/29/2024 0.0  <=0.4 10e3/uL Final    Absolute NRBCs 09/29/2024 0.0  10e3/uL Final    Hold Specimen 09/29/2024 [facility]   Final    Hold Specimen 09/29/2024 [facility]   Final       No results found for any visits on 12/05/24.    Assessment:     1. Impingement syndrome of right shoulder    2. Paroxysmal atrial fibrillation (H)    3. Mixed hyperlipidemia    4. Chronic obstructive pulmonary disease, unspecified COPD type (H)        Plan:     Corticosteroid injection done to the right shoulder today.  See separate note for details.  Medications refilled and chronic medical conditions are stable at this  point.  Follow-up with me in May.  Continue current medications otherwise.  Follow up sooner if issues.    Orders Placed This Encounter   Procedures    DRAIN/INJECT LARGE JOINT/BURSA    Right shoulder corticosteroid injection, subacromial      Orders Placed This Encounter   Medications    metoprolol succinate ER (TOPROL XL) 50 MG 24 hr tablet     Sig: Take 1 tablet (50 mg) by mouth daily.     Dispense:  90 tablet     Refill:  3    rosuvastatin (CRESTOR) 40 MG tablet     Sig: Take 1 tablet (40 mg) by mouth at bedtime.     Dispense:  90 tablet     Refill:  3    Fluticasone-Umeclidin-Vilanterol (TRELEGY ELLIPTA) 200-62.5-25 MCG/ACT oral inhaler     Sig: Inhale 1 puff into the lungs daily.     Dispense:  180 each     Refill:  3    rivaroxaban ANTICOAGULANT (XARELTO) 20 MG TABS tablet     Sig: Take 1 tablet (20 mg) by mouth daily.     Dispense:  90 tablet     Refill:  3    triamcinolone (KENALOG-40) injection 80 mg           Donaldo Vernon MD  General Internal Medicine  [facility]    The longitudinal plan of care for the diagnoses and conditions as documented were addressed during this visit. Due to the added complexity in care, I will continue to support Kori in the subsequent management and with ongoing continuity of care.     Return in about 5 months (around 5/12/2025) for visit and blood work.     Future Appointments   Date Time Provider Department Center   5/12/2025 10:00 AM Donaldo Vernon MD [facility]

## 2024-12-06 NOTE — PATIENT INSTRUCTIONS
Future Appointments   Date Time Provider Department Center   5/12/2025 10:00 AM [facility] Vernon [facility]

## 2025-04-29 ENCOUNTER — MYC MEDICAL ADVICE (OUTPATIENT)
Dept: INTERNAL MEDICINE | Facility: CLINIC | Age: 85
End: 2025-04-29
Payer: MEDICARE

## 2025-04-29 DIAGNOSIS — E78.2 MIXED HYPERLIPIDEMIA: ICD-10-CM

## 2025-04-29 RX ORDER — ROSUVASTATIN CALCIUM 40 MG/1
40 TABLET, COATED ORAL AT BEDTIME
Qty: 90 TABLET | Refills: 3 | Status: SHIPPED | OUTPATIENT
Start: 2025-04-29

## 2025-04-30 ENCOUNTER — MEDICAL CORRESPONDENCE (OUTPATIENT)
Dept: HEALTH INFORMATION MANAGEMENT | Facility: CLINIC | Age: 85
End: 2025-04-30
Payer: MEDICARE

## 2025-06-11 ENCOUNTER — MEDICAL CORRESPONDENCE (OUTPATIENT)
Dept: HEALTH INFORMATION MANAGEMENT | Facility: CLINIC | Age: 85
End: 2025-06-11
Payer: MEDICARE

## 2025-07-29 ENCOUNTER — OFFICE VISIT (OUTPATIENT)
Dept: INTERNAL MEDICINE | Facility: CLINIC | Age: 85
End: 2025-07-29
Payer: MEDICARE

## 2025-07-29 VITALS
WEIGHT: 199.2 LBS | DIASTOLIC BLOOD PRESSURE: 73 MMHG | RESPIRATION RATE: 24 BRPM | SYSTOLIC BLOOD PRESSURE: 125 MMHG | OXYGEN SATURATION: 96 % | HEART RATE: 80 BPM | HEIGHT: 62 IN | TEMPERATURE: 97.9 F | BODY MASS INDEX: 36.66 KG/M2

## 2025-07-29 DIAGNOSIS — E66.812 CLASS 2 SEVERE OBESITY DUE TO EXCESS CALORIES WITH SERIOUS COMORBIDITY AND BODY MASS INDEX (BMI) OF 36.0 TO 36.9 IN ADULT (H): Primary | ICD-10-CM

## 2025-07-29 DIAGNOSIS — I48.0 PAROXYSMAL ATRIAL FIBRILLATION (H): ICD-10-CM

## 2025-07-29 DIAGNOSIS — G47.33 SEVERE OBSTRUCTIVE SLEEP APNEA: ICD-10-CM

## 2025-07-29 DIAGNOSIS — N18.31 CHRONIC KIDNEY DISEASE, STAGE 3A (H): ICD-10-CM

## 2025-07-29 DIAGNOSIS — M75.41 IMPINGEMENT SYNDROME OF RIGHT SHOULDER: ICD-10-CM

## 2025-07-29 DIAGNOSIS — E66.01 CLASS 2 SEVERE OBESITY DUE TO EXCESS CALORIES WITH SERIOUS COMORBIDITY AND BODY MASS INDEX (BMI) OF 36.0 TO 36.9 IN ADULT (H): Primary | ICD-10-CM

## 2025-07-29 DIAGNOSIS — I10 PRIMARY HYPERTENSION: Chronic | ICD-10-CM

## 2025-07-29 DIAGNOSIS — J44.9 CHRONIC OBSTRUCTIVE PULMONARY DISEASE, UNSPECIFIED COPD TYPE (H): ICD-10-CM

## 2025-07-29 DIAGNOSIS — R41.3 MEMORY DIFFICULTY: ICD-10-CM

## 2025-07-29 DIAGNOSIS — G89.29 CHRONIC RIGHT SHOULDER PAIN: ICD-10-CM

## 2025-07-29 DIAGNOSIS — R60.0 BILATERAL LOWER EXTREMITY EDEMA: ICD-10-CM

## 2025-07-29 DIAGNOSIS — M25.511 CHRONIC RIGHT SHOULDER PAIN: ICD-10-CM

## 2025-07-29 RX ORDER — ALBUTEROL SULFATE 90 UG/1
INHALANT RESPIRATORY (INHALATION)
COMMUNITY

## 2025-07-29 RX ORDER — ATORVASTATIN CALCIUM 80 MG/1
TABLET, FILM COATED ORAL
COMMUNITY

## 2025-07-29 RX ORDER — DILTIAZEM HYDROCHLORIDE 180 MG/1
1 TABLET, EXTENDED RELEASE ORAL DAILY
COMMUNITY
Start: 2025-06-18 | End: 2025-07-29

## 2025-07-29 RX ORDER — MECLIZINE HYDROCHLORIDE 25 MG/1
25 TABLET ORAL 3 TIMES DAILY PRN
COMMUNITY
End: 2025-07-29

## 2025-07-29 RX ORDER — FAMOTIDINE 20 MG/1
20 TABLET, FILM COATED ORAL 2 TIMES DAILY
COMMUNITY
Start: 2025-07-29

## 2025-07-29 RX ORDER — PANTOPRAZOLE SODIUM 40 MG/1
40 TABLET, DELAYED RELEASE ORAL 2 TIMES DAILY
COMMUNITY
Start: 2025-06-26 | End: 2025-07-29

## 2025-07-29 RX ORDER — AMLODIPINE BESYLATE 5 MG/1
5 TABLET ORAL DAILY
COMMUNITY
Start: 2025-07-02 | End: 2026-07-03

## 2025-07-29 RX ORDER — GEMFIBROZIL 600 MG/1
TABLET, FILM COATED ORAL
COMMUNITY
End: 2025-07-29

## 2025-07-29 RX ORDER — LOSARTAN POTASSIUM 25 MG/1
TABLET ORAL
COMMUNITY
Start: 2025-07-02

## 2025-07-31 PROBLEM — N18.31 CHRONIC KIDNEY DISEASE, STAGE 3A (H): Status: ACTIVE | Noted: 2025-07-31

## 2025-07-31 PROBLEM — M54.2 NECK PAIN: Status: ACTIVE | Noted: 2024-10-04

## 2025-08-01 NOTE — PROGRESS NOTES
[address] [address]  Phone: [phone]  Fax: [phone]      The practice of medicine is an art, not a trade; a calling, not a business; a calling in which your heart will be exercised equally with your head.  - Dr. William Osler  ______________________________________________________________________     Date of Service: 7/29/2025  Primary Provider: Donaldo Vernon    Patient Care Team:  Donaldo Vernon MD as PCP - General (Internal Medicine)  Mariella Coyne MD as MD (Neurology)  Bennett Varela MD as MD (Orthopaedic Surgery)  Beka Adams MD as Donaldo Quinones MD as Assigned PCP  Rich Rodríguez MD as MD (Cardiovascular Disease)  Lana Chaudhari MD as MD (Cardiovascular Disease)  Elaine Pineda, KALANI CNP as Assigned Neuroscience Provider  Alexandria Paul PA-C as Assigned Heart and Vascular Provider     ______________________________________________________________________     Chief Complaint   Patient presents with    Hypertension     Dizziness, and Afib follow up. Having more right shoulder pain recently, less mobility          7/29/2025    10:17 AM   Additional Questions   Roomed by DASIA Monaco     History of Present Illness-  Kori CAMILO Kinderman, 85 years    Chronic Kidney Disease  - Informed by kidney doctor of stage 3 chronic kidney disease after recent blood work  - Was unaware of any kidney problem prior to this  - Kidney doctor changed medications: discontinued triamterene, discontinued nabumetone, started losartan 25 mg daily, continued amlodipine 5 mg daily  - No symptoms of kidney disease reported  - No abnormal urine findings reported to patient  - No urine sample requested by providers, which patient found unusual  - Most recent blood work performed on June 19, 2025; GFR reported as 56 on June 20, 2025; previous GFR values around 64  - No history of kidney scans except for an abdominal  ultrasound in 2020, which was normal     Shoulder Pain  - Persistent shoulder pain, described as the worst current health issue  - Underwent physical therapy for shoulder pain twice while in [place], no significant improvement  - Last corticosteroid injection for shoulder pain provided minimal relief  - Previous injection lasted almost a year, but recent injections less effective  - Shoulder pain prevented participation in golf during the winter  - X-rays performed approximately 7 years ago; no MRI performed to date  - No history of shoulder surgery    Dizziness and Balance Issues  - Chronic dizziness, not improving or worsening over time  - Dizziness and poor balance significant enough to prevent golfing during the winter  - Reports risk of falling when attempting to alyssa up a golf ball  - No history of physical therapy specifically for dizziness, but interested in trying it after learning about it during recent physical therapy sessions for shoulder    Hand Numbness and Ankle Swelling  - Reports numbness in hands in the morning  - Occasional ankle swelling    Medication Changes and Management  - Reports confusion regarding current medication regimen due to frequent changes  - Discontinued triamterene, nabumetone, diltiazem, and gemfibrozil  - Currently taking losartan, amlodipine, Xarelto, and over-the-counter Pepcid AC twice daily  - Previously prescribed omeprazole but discontinued per provider instructions    Emotional Well-being and Life Changes  - Reports not enjoying life, attributing this to health issues and recent personal losses  -  passed away, followed by son s death two months later  - Expresses difficulty coping with these losses and the stress of selling a second home in [place]  - Describes the process of managing two homes as chaotic and stressful  - States she has more friends in [place] and enjoyed time there, but finds the current situation overwhelming    Atrial Fibrillation (Afib)  -  History of Afib, initially discovered by a physical therapist who referred her to the emergency room    Misc  - No mention of fever, cough, or other acute symptoms  - No mention of gastrointestinal symptoms except for a requested but uncollected fecal sample  - No mention of chest pain, shortness of breath, or other acute cardiopulmonary symptoms  ______________________________________________________________________     Active Problem List:  Problem List as of 7/29/2025 Reviewed: 8/14/2024 12:40 PM by Shawna Moses NP         High    Chronic obstructive pulmonary disease, unspecified COPD type (H), thought to be mild on PFTs 2018    Tobacco abuse    Paroxysmal atrial fibrillation (H)    Last Assessment & Plan 6/12/2023 Office Visit Written 6/16/2023  9:30 AM by Shawna Moses NP   Continue anticoagulation. Rate is controlled             Medium    Primary hypertension    S/P ablation of atrial fibrillation    Severe obstructive sleep apnea on CPAP    MO (iron deficiency anemia) - Dec 2021, also 2015    Paresthesias, left post auricular and occiput     Last Assessment & Plan 6/12/2023 Office Visit Written 6/12/2023  2:30 PM by Shawna Moses NP   Try topical lidocaine gel on the area         Memory difficulty    Class 2 severe obesity due to excess calories with serious comorbidity in adult (H)       Low    Mixed hyperlipidemia    Dysphagia    Fatty liver    GERD (gastroesophageal reflux disease)    Loss of hearing    Lumbar back pain    Mass of right parotid gland     Current Outpatient Medications   Medication Instructions    albuterol (PROAIR HFA/PROVENTIL HFA/VENTOLIN HFA) 108 (90 Base) MCG/ACT inhaler 1 puff as needed every 4 hrs , Inhalation    amLODIPine (NORVASC) 5 mg, Oral, DAILY    apixaban ANTICOAGULANT (ELIQUIS ANTICOAGULANT) 5 MG tablet 1 tablet Twice a day , Orally    atorvastatin (LIPITOR) 80 MG tablet 1 tablet Once a day , Orally    ezetimibe (ZETIA) 10 mg, Oral, DAILY    famotidine (PEPCID) 20 mg, 2  "TIMES DAILY    ferrous sulfate (FE TABS) 325 mg, THREE TIMES WEEKLY    Fluticasone-Umeclidin-Vilanterol (TRELEGY ELLIPTA) 200-62.5-25 MCG/ACT oral inhaler 1 puff, Inhalation, DAILY    losartan (COZAAR) 25 MG tablet 1 tablet Once a day , Orally    metoprolol succinate ER (TOPROL XL) 50 mg, Oral, DAILY    multivitamin (MULTIPLE VITAMINS ORAL) [MULTIVITAMIN (MULTIPLE VITAMINS ORAL)] Take by mouth.    rivaroxaban ANTICOAGULANT (XARELTO) 20 mg, Oral, DAILY    rosuvastatin (CRESTOR) 40 mg, Oral, AT BEDTIME     Social History     Social History Narrative    . Retired. Likes to play golf.  3 children. Son has lung cancer.  Was a . Lives in [place] for 6 months and [place] for 6 months of the years.      ______________________________________________________________________     Wt Readings from Last 3 Encounters:   07/29/25 90.4 kg (199 lb 3.2 oz)   12/05/24 89.7 kg (197 lb 12.8 oz)   11/05/24 89.8 kg (197 lb 14.4 oz)     BP Readings from Last 3 Encounters:   07/29/25 125/73   12/05/24 120/71   11/05/24 127/74     /73   Pulse 80   Temp 97.9  F (36.6  C) (Oral)   Resp 24   Ht 1.575 m (5' 2\")   Wt 90.4 kg (199 lb 3.2 oz)   LMP 08/06/1988 (Exact Date)   SpO2 96%   BMI 36.43 kg/m     The patient is comfortable, no acute distress.  Mood down.  Insight fair to good.  Eyes are nonicteric.  Neck is supple without mass.  No cervical adenopathy.  No thyromegaly. Heart regular rate and rhythm.  Lungs clear to auscultation bilaterally.  Respiratory effort is good.   Shoulder right shows signs of impingement  Extremities 1+ edema.  ______________________________________________________________________     No results found for any visits on 07/29/25.   ______________________________________________________________________     Diagnoses managed today:    1. Class 2 severe obesity due to excess calories with serious comorbidity and body mass index (BMI) of 36.0 to 36.9 in adult (H)    2. Chronic obstructive " pulmonary disease, unspecified COPD type (H)    3. Paroxysmal atrial fibrillation (H)    4. Primary hypertension    5. Severe obstructive sleep apnea on CPAP    6. Memory difficulty    7. Impingement syndrome of right shoulder    8. Chronic right shoulder pain    9. Bilateral lower extremity edema    10. Chronic kidney disease, stage 3a (H)       ______________________________________________________________________     Assessment & Plan  Chronic obstructive pulmonary disease, unspecified COPD type:  - Refill Trelegy inhaler at [facility] in [place].    Primary hypertension:  - Continue amlodipine and losartan. Refill medications as needed.    Impingement syndrome of right shoulder:  - Order MRI of the shoulder at [facility] in [place]. Referral to physical therapy for shoulder pain.    Chronic right shoulder pain:  - Referral to physical therapy for shoulder pain. Consider MRI for further evaluation.    Chronic kidney disease, stage 3a:  - Stage 3 chronic kidney disease noted with GFR of 56. Losartan prescribed to protect kidney function.  - Monitor kidney function. No immediate need for additional blood work.    Class 2 severe obesity due to excess calories with serious comorbidity and body mass index (BMI) of 36.0 to 36.9 in adult:  - Diagnosis acknowledged. No specific management discussed at this visit.    Paroxysmal atrial fibrillation:  - History of paroxysmal atrial fibrillation. Continue Xarelto for stroke prevention.    Severe obstructive sleep apnea:  - Diagnosis acknowledged. No specific management discussed at this visit.    Memory difficulty:  - Diagnosis acknowledged. Patient reports confusion regarding medication changes. Will continue to review medication list at each visit.    Bilateral lower extremity edema:  - Occasional ankle swelling and edema noted on exam. Will continue to monitor.    Continue current medications otherwise.  Follow up sooner if issues.    40 minutes or greater was spent today  on the patient's care on the day of service.      This includes time for chart preparation, reviewing medical tests done before or during the visit, talking with the patient, review of quality indicators, required documentation, and other elements of care.     No orders of the defined types were placed in this encounter.     Issues to follow up on:  Follow up MRI scan.  Sent to [facility] for balance issues and shoulder.    Donaldo Vernon MD  General Internal Medicine  [facility]    The longitudinal plan of care for the diagnoses and conditions as documented were addressed during this visit. Due to the added complexity in care, I will continue to support Kori in the subsequent management and with ongoing continuity of care.     Return in about 3 months (around 10/29/2025) for follow up visit.     No future appointments.

## 2025-08-13 ENCOUNTER — DOCUMENTATION ONLY (OUTPATIENT)
Dept: ANTICOAGULATION | Facility: CLINIC | Age: 85
End: 2025-08-13
Payer: MEDICARE

## 2025-08-18 ENCOUNTER — PATIENT OUTREACH (OUTPATIENT)
Dept: CARE COORDINATION | Facility: CLINIC | Age: 85
End: 2025-08-18
Payer: MEDICARE

## 2025-08-22 ENCOUNTER — TELEPHONE (OUTPATIENT)
Dept: INTERNAL MEDICINE | Facility: CLINIC | Age: 85
End: 2025-08-22
Payer: MEDICARE

## 2025-08-22 ENCOUNTER — TRANSFERRED RECORDS (OUTPATIENT)
Dept: HEALTH INFORMATION MANAGEMENT | Facility: CLINIC | Age: 85
End: 2025-08-22
Payer: MEDICARE

## 2025-09-01 ENCOUNTER — PATIENT OUTREACH (OUTPATIENT)
Dept: CARE COORDINATION | Facility: CLINIC | Age: 85
End: 2025-09-01
Payer: MEDICARE

## 2025-09-02 ENCOUNTER — TRANSFERRED RECORDS (OUTPATIENT)
Dept: HEALTH INFORMATION MANAGEMENT | Facility: CLINIC | Age: 85
End: 2025-09-02
Payer: MEDICARE